# Patient Record
Sex: MALE | Race: WHITE | Employment: OTHER | URBAN - NONMETROPOLITAN AREA
[De-identification: names, ages, dates, MRNs, and addresses within clinical notes are randomized per-mention and may not be internally consistent; named-entity substitution may affect disease eponyms.]

---

## 2017-01-02 DIAGNOSIS — Z94.4 LIVER REPLACED BY TRANSPLANT (H): Primary | ICD-10-CM

## 2017-01-02 DIAGNOSIS — Z13.220 LIPID SCREENING: ICD-10-CM

## 2017-01-02 RX ORDER — LOSARTAN POTASSIUM 50 MG/1
TABLET ORAL
Qty: 90 TABLET | Refills: 1 | Status: SHIPPED | OUTPATIENT
Start: 2017-01-02 | End: 2018-07-05 | Stop reason: DRUGHIGH

## 2017-01-06 ENCOUNTER — COMMUNICATION - GICH (OUTPATIENT)
Dept: INTERNAL MEDICINE | Facility: OTHER | Age: 67
End: 2017-01-06

## 2017-01-06 DIAGNOSIS — J44.9 CHRONIC OBSTRUCTIVE PULMONARY DISEASE (H): ICD-10-CM

## 2017-01-25 ENCOUNTER — DOCUMENTATION ONLY (OUTPATIENT)
Dept: TRANSPLANT | Facility: CLINIC | Age: 67
End: 2017-01-25

## 2017-01-25 NOTE — PROGRESS NOTES
Annual chart review completed.    Pt doing labs at regular advised interval.  [facility] within the past year.

## 2017-02-17 ENCOUNTER — AMBULATORY - GICH (OUTPATIENT)
Dept: LAB | Facility: OTHER | Age: 67
End: 2017-02-17

## 2017-02-17 DIAGNOSIS — Z94.4 HISTORY OF LIVER TRANSPLANT (H): ICD-10-CM

## 2017-02-17 DIAGNOSIS — Z94.4 LIVER REPLACED BY TRANSPLANT (H): ICD-10-CM

## 2017-02-17 DIAGNOSIS — Z79.899 OTHER LONG TERM (CURRENT) DRUG THERAPY: ICD-10-CM

## 2017-02-17 LAB
A/G RATIO - HISTORICAL: 1.4 (ref 1–2)
ALBUMIN SERPL-MCNC: 3.9 G/DL (ref 3.5–5.7)
ALP SERPL-CCNC: 100 IU/L (ref 34–104)
ALT (SGPT) - HISTORICAL: 16 IU/L (ref 7–52)
ANION GAP - HISTORICAL: 8 (ref 5–18)
AST SERPL-CCNC: 14 IU/L (ref 13–39)
BACTERIA URINE: NORMAL BACTERIA/HPF
BILIRUB SERPL-MCNC: 0.7 MG/DL (ref 0.3–1)
BILIRUB UR QL: NEGATIVE
BILIRUBIN, DIRECT: 0.14 MG/DL (ref 0.03–0.18)
BILIRUBIN,INDIRECT: 0.6 MG/DL (ref 0.2–0.8)
BUN SERPL-MCNC: 27 MG/DL (ref 7–25)
BUN/CREAT RATIO - HISTORICAL: 22
CALCIUM SERPL-MCNC: 9 MG/DL (ref 8.6–10.3)
CHLORIDE SERPLBLD-SCNC: 105 MMOL/L (ref 98–107)
CHOL/HDL RATIO - HISTORICAL: 3.82
CHOLESTEROL TOTAL: 126 MG/DL
CLARITY, URINE: CLEAR CLARITY
CO2 SERPL-SCNC: 24 MMOL/L (ref 21–31)
COLOR UR: YELLOW COLOR
CREAT SERPL-MCNC: 1.21 MG/DL (ref 0.7–1.3)
EPITHELIAL CELLS: NORMAL EPI/HPF
ERYTHROCYTE [DISTWIDTH] IN BLOOD BY AUTOMATED COUNT: 13.9 % (ref 11.5–15.5)
GFR IF NOT AFRICAN AMERICAN - HISTORICAL: 60 ML/MIN/1.73M2
GLOBULIN - HISTORICAL: 2.7 G/DL (ref 2–3.7)
GLUCOSE SERPL-MCNC: 169 MG/DL (ref 70–105)
GLUCOSE URINE: NEGATIVE MG/DL
HCT VFR BLD AUTO: 44.3 % (ref 37–53)
HDLC SERPL-MCNC: 33 MG/DL (ref 23–92)
HEMOGLOBIN: 14.6 G/DL (ref 13.5–17.5)
KETONES UR QL: NEGATIVE MG/DL
LDLC SERPL CALC-MCNC: 64 MG/DL
LEUKOCYTE ESTERASE URINE: NEGATIVE
MAGNESIUM SERPL-MCNC: 1.2 MG/DL (ref 1.9–2.7)
MCH RBC QN AUTO: 32.3 PG (ref 26–34)
MCHC RBC AUTO-ENTMCNC: 33 G/DL (ref 32–36)
MCV RBC AUTO: 98 FL (ref 80–100)
NITRITE UR QL STRIP: NEGATIVE
NON-HDL CHOLESTEROL - HISTORICAL: 93 MG/DL
OCCULT BLOOD,URINE - HISTORICAL: ABNORMAL
PATIENT STATUS - HISTORICAL: NORMAL
PH UR: 6 [PH]
PHOSPHATE SERPL-MCNC: 2.5 MG/DL (ref 2.5–5)
PLATELET # BLD AUTO: 84 THOU/CU MM (ref 140–440)
PMV BLD: 9 FL (ref 6.5–11)
POTASSIUM SERPL-SCNC: 5 MMOL/L (ref 3.5–5.1)
PROT SERPL-MCNC: 6.6 G/DL (ref 6.4–8.9)
PROTEIN QUALITATIVE,URINE - HISTORICAL: 30 MG/DL
PROTEIN QUANT,RAND URINE - HISTORICAL: 47 MG/DL (ref 1–14)
RBC - HISTORICAL: NORMAL /HPF
RED BLOOD COUNT - HISTORICAL: 4.53 MIL/CU MM (ref 4.3–5.9)
SODIUM SERPL-SCNC: 137 MMOL/L (ref 133–143)
SP GR UR STRIP: 1.02
TRIGL SERPL-MCNC: 143 MG/DL
UROBILINOGEN,QUALITATIVE - HISTORICAL: NORMAL EU/DL
WBC - HISTORICAL: NORMAL /HPF
WHITE BLOOD COUNT - HISTORICAL: 5.9 THOU/CU MM (ref 4.5–11)

## 2017-02-17 PROCEDURE — 80197 ASSAY OF TACROLIMUS: CPT | Performed by: INTERNAL MEDICINE

## 2017-02-22 ENCOUNTER — COMMUNICATION - GICH (OUTPATIENT)
Dept: PEDIATRICS | Facility: OTHER | Age: 67
End: 2017-02-22

## 2017-02-25 LAB
TACROLIMUS BLD-MCNC: 12.4 UG/L (ref 5–15)
TME LAST DOSE: NORMAL H

## 2017-02-27 DIAGNOSIS — Z94.4 LIVER REPLACED BY TRANSPLANT (H): ICD-10-CM

## 2017-02-27 NOTE — TELEPHONE ENCOUNTER
Prograf level 12.4, drawn at noon.    Please call Bill, remind him that prograf level needs to be a trough level.  If this is a trough level, ask him to decrease his dose to 1 mg po bid and repeat level in a week.

## 2017-03-03 RX ORDER — TACROLIMUS 1 MG/1
1 CAPSULE ORAL EVERY 12 HOURS
Qty: 60 CAPSULE | Refills: 11 | Status: SHIPPED | OUTPATIENT
Start: 2017-03-03 | End: 2018-03-21

## 2017-03-03 NOTE — TELEPHONE ENCOUNTER
Writer spoke with patient who states he spoke to Kiana after my voice message, it was a 12 hour trough, and he did decrease his dose.  Writer reminded patient to repeat tacrolimus drug level lab, and he said Kiana Palumbo RN Post Liver Transplant Coordinator told him he didn't have to, but patient is more comfortable repeating the lab as he is concerned about the result being correct.

## 2017-03-17 ENCOUNTER — COMMUNICATION - GICH (OUTPATIENT)
Dept: INTERNAL MEDICINE | Facility: OTHER | Age: 67
End: 2017-03-17

## 2017-03-24 ENCOUNTER — AMBULATORY - GICH (OUTPATIENT)
Dept: LAB | Facility: OTHER | Age: 67
End: 2017-03-24

## 2017-03-24 DIAGNOSIS — Z94.4 LIVER REPLACED BY TRANSPLANT (H): ICD-10-CM

## 2017-03-24 PROCEDURE — 80197 ASSAY OF TACROLIMUS: CPT | Performed by: INTERNAL MEDICINE

## 2017-03-25 ENCOUNTER — COMMUNICATION - GICH (OUTPATIENT)
Dept: PEDIATRICS | Facility: OTHER | Age: 67
End: 2017-03-25

## 2017-03-27 LAB
TACROLIMUS BLD-MCNC: 5.2 UG/L (ref 5–15)
TME LAST DOSE: NORMAL H

## 2017-03-30 ENCOUNTER — AMBULATORY - GICH (OUTPATIENT)
Dept: FAMILY MEDICINE | Facility: OTHER | Age: 67
End: 2017-03-30

## 2017-04-11 ENCOUNTER — COMMUNICATION - GICH (OUTPATIENT)
Dept: PEDIATRICS | Facility: OTHER | Age: 67
End: 2017-04-11

## 2017-04-11 ENCOUNTER — OFFICE VISIT - GICH (OUTPATIENT)
Dept: PEDIATRICS | Facility: OTHER | Age: 67
End: 2017-04-11

## 2017-04-11 ENCOUNTER — HISTORY (OUTPATIENT)
Dept: PEDIATRICS | Facility: OTHER | Age: 67
End: 2017-04-11

## 2017-04-11 DIAGNOSIS — E66.09 OTHER OBESITY DUE TO EXCESS CALORIES: ICD-10-CM

## 2017-04-11 DIAGNOSIS — Z94.4 HISTORY OF LIVER TRANSPLANT (H): ICD-10-CM

## 2017-04-11 DIAGNOSIS — N18.30 CHRONIC KIDNEY DISEASE, STAGE III (MODERATE) (H): ICD-10-CM

## 2017-04-11 DIAGNOSIS — E11.8 TYPE 2 DIABETES MELLITUS WITH COMPLICATIONS (H): ICD-10-CM

## 2017-04-11 DIAGNOSIS — I10 ESSENTIAL (PRIMARY) HYPERTENSION: ICD-10-CM

## 2017-04-11 DIAGNOSIS — L40.9 PSORIASIS: ICD-10-CM

## 2017-04-11 DIAGNOSIS — D89.9 DISORDER INVOLVING IMMUNE MECHANISM (H): ICD-10-CM

## 2017-04-11 DIAGNOSIS — Z96.642 PRESENCE OF LEFT ARTIFICIAL HIP JOINT: ICD-10-CM

## 2017-04-11 LAB
ANION GAP - HISTORICAL: 2 (ref 5–18)
BUN SERPL-MCNC: 32 MG/DL (ref 7–25)
BUN/CREAT RATIO - HISTORICAL: 24
CALCIUM SERPL-MCNC: 9 MG/DL (ref 8.6–10.3)
CHLORIDE SERPLBLD-SCNC: 105 MMOL/L (ref 98–107)
CO2 SERPL-SCNC: 25 MMOL/L (ref 21–31)
CREAT SERPL-MCNC: 1.32 MG/DL (ref 0.7–1.3)
ESTIMATED AVERAGE GLUCOSE: 143 MG/DL
GFR IF NOT AFRICAN AMERICAN - HISTORICAL: 54 ML/MIN/1.73M2
GLUCOSE SERPL-MCNC: 159 MG/DL (ref 70–105)
HEMOGLOBIN A1C MONITORING (POCT) - HISTORICAL: 6.6 % (ref 4–6.2)
POTASSIUM SERPL-SCNC: 4.9 MMOL/L (ref 3.5–5.1)
SODIUM SERPL-SCNC: 132 MMOL/L (ref 133–143)

## 2017-04-11 ASSESSMENT — ANXIETY QUESTIONNAIRES
3. WORRYING TOO MUCH ABOUT DIFFERENT THINGS: NOT AT ALL
5. BEING SO RESTLESS THAT IT IS HARD TO SIT STILL: NOT AT ALL
GAD7 TOTAL SCORE: 0
6. BECOMING EASILY ANNOYED OR IRRITABLE: NOT AT ALL
4. TROUBLE RELAXING: NOT AT ALL
2. NOT BEING ABLE TO STOP OR CONTROL WORRYING: NOT AT ALL
7. FEELING AFRAID AS IF SOMETHING AWFUL MIGHT HAPPEN: NOT AT ALL
1. FEELING NERVOUS, ANXIOUS, OR ON EDGE: NOT AT ALL

## 2017-04-13 DIAGNOSIS — Z96.642 STATUS POST TOTAL REPLACEMENT OF LEFT HIP: Primary | ICD-10-CM

## 2017-04-17 ENCOUNTER — OFFICE VISIT (OUTPATIENT)
Dept: ORTHOPEDICS | Facility: CLINIC | Age: 67
End: 2017-04-17

## 2017-04-17 ENCOUNTER — AMBULATORY - GICH (OUTPATIENT)
Dept: SCHEDULING | Facility: OTHER | Age: 67
End: 2017-04-17

## 2017-04-17 VITALS — BODY MASS INDEX: 32.75 KG/M2 | WEIGHT: 208.7 LBS | HEIGHT: 67 IN

## 2017-04-17 DIAGNOSIS — S39.012A LOW BACK STRAIN, INITIAL ENCOUNTER: ICD-10-CM

## 2017-04-17 DIAGNOSIS — Z96.642 STATUS POST TOTAL REPLACEMENT OF LEFT HIP: Primary | ICD-10-CM

## 2017-04-17 RX ORDER — HYDROCHLOROTHIAZIDE 25 MG/1
TABLET ORAL
COMMUNITY
Start: 2017-04-11 | End: 2017-12-29

## 2017-04-17 NOTE — NURSING NOTE
"Reason For Visit:   Chief Complaint   Patient presents with     Surgical Followup     1yr F/U S/p Left Total Hip Arthroplasty DOS: 3/18/16     RECHECK     Pt states that a month ago he stepped off a step and he twisted wrong and has had pain ever since.                    Ht 1.689 m (5' 6.5\")  Wt 94.7 kg (208 lb 11.2 oz)  BMI 33.18 kg/m2                   Current Outpatient Prescriptions   Medication     hydrochlorothiazide (HYDRODIURIL) 25 MG tablet     tacrolimus (PROGRAF - GENERIC EQUIVALENT) 1 MG capsule     losartan (COZAAR) 50 MG tablet     magnesium oxide (MAG-OX) 400 MG tablet     calcium-vitamin D (CALTRATE) 600-400 MG-UNIT per tablet     tacrolimus (PROGRAF - GENERIC EQUIVALENT) 0.5 MG capsule     albuterol (PROAIR HFA, PROVENTIL HFA, VENTOLIN HFA) 108 (90 BASE) MCG/ACT inhaler     tiotropium (SPIRIVA HANDIHALER) 18 MCG inhalation capsule     salmeterol (SEREVENT DISKUS) 50 MCG/DOSE diskus inhaler     calcium carbonate-vitamin D 500-400 MG-UNIT TABS tablt     fluocinonide (LIDEX) 0.05 % external solution     desonide (DESOWEN) 0.05 % ointment     fluocinonide (LIDEX) 0.05 % ointment     metoprolol (LOPRESSOR) 100 MG tablet     levothyroxine (SYNTHROID, LEVOTHROID) 125 MCG tablet     omeprazole 20 MG tablet     No current facility-administered medications for this visit.        Allergies   Allergen Reactions     Levaquin [Levofloxacin] Rash     Rash on abdomen, possibly from Levaquin.       Leyda Gtz C.M.A.             "

## 2017-04-17 NOTE — MR AVS SNAPSHOT
After Visit Summary   4/17/2017    Ricki Sharp    MRN: [id number]           Patient Information     Date Of Birth          1950        Visit Information        Provider Department      4/17/2017 8:30 AM Raghu Fisher MD [facility]        Today's Diagnoses     Status post total replacement of left hip    -  1    Low back strain, initial encounter           Follow-ups after your visit        Additional Services     PHYSICAL THERAPY REFERRAL (External-Prints)       Physical Therapy Referral                  Your next 10 appointments already scheduled     Dec 29, 2017 10:45 AM CST   Lab with  LAB   [facility] ([facility])    [address]  1st Floor  [place] [facility] [address] [phone]            Dec 29, 2017 11:45 AM CST   (Arrive by 11:30 AM)   Return Liver Transplant with Mike Gray MD   [facility] ([facility])    [address]  3rd Floor  [place] [facility] [address] [phone]              Who to contact     Please call your clinic at [phone] to:    Ask questions about your health    Make or cancel appointments    Discuss your medicines    Learn about your test results    Speak to your doctor   If you have compliments or concerns about an experience at your clinic, or if you wish to file a complaint, please contact [facility] Patient Relations at [phone] or email us at [email or web address]         Additional Information About Your Visit        MyChart Information     [employer] gives you secure access to your electronic health record. If you see a primary care provider, you can also send messages to your care team and make appointments. If you have questions, please call your primary care clinic.  If you do not have a primary care provider, please call [phone] and they will assist you.      [employer] is an electronic gateway that  "provides easy, online access to your medical records. With [employer], you can request a clinic appointment, read your test results, renew a prescription or communicate with your care team.     To access your existing account, please contact your [facility] or call [phone] for assistance.        Care EveryWhere ID     This is your Care EveryWhere ID. This could be used by other organizations to access your [facility] medical records  [id number]        Your Vitals Were     Height BMI (Body Mass Index)                1.689 m (5' 6.5\") 33.18 kg/m2           Blood Pressure from Last 3 Encounters:   12/19/16 135/84   06/27/16 134/83   04/18/16 145/81    Weight from Last 3 Encounters:   04/17/17 94.7 kg (208 lb 11.2 oz)   12/19/16 93.9 kg (207 lb)   06/27/16 95.3 kg (210 lb)              We Performed the Following     PHYSICAL THERAPY REFERRAL (External-Prints)          Today's Medication Changes          These changes are accurate as of: 4/17/17 11:59 PM.  If you have any questions, ask your nurse or doctor.               These medicines have changed or have updated prescriptions.        Dose/Directions    desonide 0.05 % ointment   Commonly known as:  DESOWEN   This may have changed:    - how to take this  - additional instructions   Used for:  Psoriasis        Use on the face daily   Quantity:  60 g   Refills:  1       * fluocinonide 0.05 % solution   Commonly known as:  LIDEX   This may have changed:  additional instructions   Used for:  Psoriasis        Use to the scalp twice a day   Quantity:  60 mL   Refills:  3       * fluocinonide 0.05 % ointment   Commonly known as:  LIDEX   This may have changed:    - how to take this  - additional instructions   Used for:  Psoriasis        Use on the body twice a day to red scaly areas   Quantity:  60 g   Refills:  3       levothyroxine 125 MCG tablet   Commonly known as:  SYNTHROID/LEVOTHROID   This may have changed:  additional instructions "   Used for:  Liver replaced by transplant (H)        Dose:  125 mcg   Take 1 tablet (125 mcg) by mouth daily   Quantity:  30 tablet   Refills:  4       tiotropium 18 MCG capsule   Commonly known as:  SPIRIVA HANDIHALER   This may have changed:    - how to take this  - when to take this  - additional instructions   Used for:  Chronic obstructive pulmonary disease, unspecified COPD type (H)        Inhale contents of one capsule daily.   Quantity:  30 capsule   Refills:  6       * Notice:  This list has 2 medication(s) that are the same as other medications prescribed for you. Read the directions carefully, and ask your doctor or other care provider to review them with you.             Primary Care Provider Office Phone # Fax #    Jarad Dionicio Bocanegra [phone]       [facility] [address] [facility] [address]        Thank you!     Thank you for choosing [facility]  for your care. Our goal is always to provide you with excellent care. Hearing back from our patients is one way we can continue to improve our services. Please take a few minutes to complete the written survey that you may receive in the mail after your visit with us. Thank you!             Your Updated Medication List - Protect others around you: Learn how to safely use, store and throw away your medicines at www.disposemymeds.org.          This list is accurate as of: 4/17/17 11:59 PM.  Always use your most recent med list.                   Brand Name Dispense Instructions for use    albuterol 108 (90 BASE) MCG/ACT Inhaler    PROAIR HFA/PROVENTIL HFA/VENTOLIN HFA    3 Inhaler    Inhale 2 puffs into the lungs every 4 hours as needed for shortness of breath / dyspnea or wheezing       * calcium carbonate-vitamin D 500-400 MG-UNIT Tabs per tablet     180 tablet    Take 1 tablet by mouth 2 times daily       * calcium-vitamin D 600-400 MG-UNIT per tablet    CALTRATE     Take 1 tablet by mouth        desonide 0.05 % ointment    DESOWEN    60 g    Use on the face daily       * fluocinonide 0.05 % solution    LIDEX    60 mL    Use to the scalp twice a day       * fluocinonide 0.05 % ointment    LIDEX    60 g    Use on the body twice a day to red scaly areas       hydrochlorothiazide 25 MG tablet    HYDRODIURIL     1/2 tablet daily for 4 days then 1 tablet daily       levothyroxine 125 MCG tablet    SYNTHROID/LEVOTHROID    30 tablet    Take 1 tablet (125 mcg) by mouth daily       losartan 50 MG tablet    COZAAR    90 tablet    TAKE ONE TABLET BY MOUTH EVERY DAY       magnesium oxide 400 MG tablet    MAG-OX    120 tablet    Take 2 tablets (800 mg) by mouth 2 times daily       metoprolol 100 MG tablet    LOPRESSOR    60 tablet    Take 1 tablet (100 mg) by mouth 2 times daily       omeprazole 20 MG tablet     30 tablet    Take 1 tablet (20 mg) by mouth daily       salmeterol 50 MCG/DOSE diskus inhaler    SEREVENT DISKUS    3 Inhaler    Inhale 1 puff (50 mcg) into the lungs 2 times daily       * tacrolimus 0.5 MG capsule    PROGRAF - GENERIC EQUIVALENT    60 capsule    TAKE ONE CAPSULE IN THE EVENING (MORNING DOSE 1 MG AND EVENING DOSE 1.5 MG)       * tacrolimus 1 MG capsule    PROGRAF - GENERIC EQUIVALENT    60 capsule    Take 1 capsule (1 mg) by mouth every 12 hours       tiotropium 18 MCG capsule    SPIRIVA HANDIHALER    30 capsule    Inhale contents of one capsule daily.       * Notice:  This list has 6 medication(s) that are the same as other medications prescribed for you. Read the directions carefully, and ask your doctor or other care provider to review them with you.

## 2017-04-17 NOTE — PROGRESS NOTES
DX:  1. R Hip OA  2. L hip OA  3. Chronic Hep C  4. Liver cirrhosis  5. Monomorphic large cell lymphoma/PTLD   6. Portal vein thrombosis  7. Thrombocytopenia - plt 102 in June 2015    TREATMENTS:  1. 3/17/2006, S/P R EVELIN ([facility]) [facility]  2. 2007, joycelyn transplant 2007  3. S/P chemotherapy for lymphoma  4. Portal vein stent placed in Nov 2014  5. $ cycles of rituximab, 4 cycles of R-CHOP - now in complete remission  6. Therapy for hep C with no residual viral load  7. 3/18/2016, L EVELIN ([facility]) [facility]    HISTORY OF PRESENT ILLNESS:  1 mo ago, stepped off stair.  Fell onto R knee.  Having L sided low back and hip pain.  Persists after the injury.  No sx's prior to fall. No radicular sx's.       PHYSICAL EXAMINATION:  Tenderness L side paraspinal mm.  Min tenderness over troch.  He is able to fully weightbear on the affected side.     Neuro exam: no focal weakness.  Full pain free ROM both hips.  Gait: slight antalgic gait in L.      X-rays are obtained demonstrating implant in stable position without evidence of complication.  Leg lengths are symmetric.  Prior non displaced troch tip fx has displaced slightly with fibrous union.     IMPRESSION:    1. 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  No intervention advised at present.  Unclear if aggravated by his recent fall.  I don't think this is the cause of his symptoms that he described today.  2. L spine paraspinal or gluteal m strain.  PT Rx given.  Massage and exercise advised.  NSAID's as allowable given his liver transplant.     PLAN:   1. Aquatic exercise regimen advised.  2. PT Rx given.  3. RTC in 1 yr or sooner as needed if sx's persist or progress.    MD Kenan Walsh Family Professor  Oncology and Adult Reconstructive Surgery  Dept Orthopaedic Surgery, [facility]  [phone] office, [phone] pager  [email or web address]    Total Time = 15 min, 50% of which was spent in counseling and coordination of care as documented above.

## 2017-04-17 NOTE — LETTER
4/17/2017       RE: Ricki Sharp  [address] [place] [facility] [address]     Dear Colleague,    Thank you for referring your patient, Ricki Sharp, to the [facility] at [facility]. Please see a copy of my visit note below.      DX:  1. R Hip OA  2. L hip OA  3. Chronic Hep C  4. Liver cirrhosis  5. Monomorphic large cell lymphoma/PTLD   6. Portal vein thrombosis  7. Thrombocytopenia - plt 102 in June 2015    TREATMENTS:  1. 3/17/2006, S/P R EVELIN ([facility]) [facility]  2. 2007, joycelyn transplant 2007  3. S/P chemotherapy for lymphoma  4. Portal vein stent placed in Nov 2014  5. $ cycles of rituximab, 4 cycles of R-CHOP - now in complete remission  6. Therapy for hep C with no residual viral load  7. 3/18/2016, L EVELIN ([facility]ill[facility]) [facility]    HISTORY OF PRESENT ILLNESS:  1 mo ago, stepped off stair.  Fell onto R knee.  Having L sided low back and hip pain.  Persists after the injury.  No sx's prior to fall. No radicular sx's.       PHYSICAL EXAMINATION:  Tenderness L side paraspinal mm.  Min tenderness over troch.  He is able to fully weightbear on the affected side.     Neuro exam: no focal weakness.  Full pain free ROM both hips.  Gait: slight antalgic gait in L.      X-rays are obtained demonstrating implant in stable position without evidence of complication.  Leg lengths are symmetric.  Prior non displaced troch tip fx has displaced slightly with fibrous union.     IMPRESSION:    1. 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  No intervention advised at present.  Unclear if aggravated by his recent fall.  I don't think this is the cause of his symptoms that he described today.  2. L spine paraspinal or gluteal m strain.  PT Rx given.  Massage and exercise advised.  NSAID's as allowable given his liver transplant.     PLAN:   1. Aquatic exercise regimen advised.  2. PT Rx given.  3. RTC in 1 yr or sooner as needed if sx's persist or progress.    Raghu Fisher,  MD Grayson Family Professor  Oncology and Adult Reconstructive Surgery  Dept Orthopaedic Surgery, [facility]  [phone] office, [phone] pager  [email or web address]    Total Time = 15 min, 50% of which was spent in counseling and coordination of care as documented above.

## 2017-04-19 ENCOUNTER — TELEPHONE (OUTPATIENT)
Dept: ORTHOPEDICS | Facility: CLINIC | Age: 67
End: 2017-04-19

## 2017-04-19 NOTE — TELEPHONE ENCOUNTER
Reached pt's wife today and answered all her questions.  Explained that the small troch fx should not affect his overall prognosis and I don't think his symptoms are related to this incidental finding on the XR.  I asked her to call me again if further concerns or issues arise.    MD Kenan Walsh Family Professor  Oncology and Adult Reconstructive Surgery  Dept Orthopaedic Surgery, [facility]  [phone] office, [phone] pager  [email or web address]

## 2017-04-19 NOTE — TELEPHONE ENCOUNTER
Pt's wife Fide called yesterday and I returned call to 3 different telephone #'s and left voice message at 2 #'s that had voice mail.  Katia will forward copy of my clinic note and the XR's that they had requested.    MD Kenan Walsh Family Professor  Oncology and Adult Reconstructive Surgery  Dept Orthopaedic Surgery, [facility]  [phone] office, [phone] pager  [email or web address]

## 2017-04-25 ENCOUNTER — OFFICE VISIT - GICH (OUTPATIENT)
Dept: PEDIATRICS | Facility: OTHER | Age: 67
End: 2017-04-25

## 2017-04-25 ENCOUNTER — HISTORY (OUTPATIENT)
Dept: PEDIATRICS | Facility: OTHER | Age: 67
End: 2017-04-25

## 2017-04-25 DIAGNOSIS — E66.09 OTHER OBESITY DUE TO EXCESS CALORIES: ICD-10-CM

## 2017-04-25 DIAGNOSIS — N18.30 CHRONIC KIDNEY DISEASE, STAGE III (MODERATE) (H): ICD-10-CM

## 2017-04-25 DIAGNOSIS — I10 ESSENTIAL (PRIMARY) HYPERTENSION: ICD-10-CM

## 2017-04-25 DIAGNOSIS — Z94.4 HISTORY OF LIVER TRANSPLANT (H): ICD-10-CM

## 2017-05-01 ENCOUNTER — COMMUNICATION - GICH (OUTPATIENT)
Dept: PEDIATRICS | Facility: OTHER | Age: 67
End: 2017-05-01

## 2017-05-01 DIAGNOSIS — J44.9 CHRONIC OBSTRUCTIVE PULMONARY DISEASE (H): ICD-10-CM

## 2017-05-01 DIAGNOSIS — J42 CHRONIC BRONCHITIS (H): ICD-10-CM

## 2017-05-18 ENCOUNTER — COMMUNICATION - GICH (OUTPATIENT)
Dept: PEDIATRICS | Facility: OTHER | Age: 67
End: 2017-05-18

## 2017-05-23 ENCOUNTER — COMMUNICATION - GICH (OUTPATIENT)
Dept: INTERNAL MEDICINE | Facility: OTHER | Age: 67
End: 2017-05-23

## 2017-06-22 ENCOUNTER — OFFICE VISIT - GICH (OUTPATIENT)
Dept: PEDIATRICS | Facility: OTHER | Age: 67
End: 2017-06-22

## 2017-06-22 ENCOUNTER — AMBULATORY - GICH (OUTPATIENT)
Dept: LAB | Facility: OTHER | Age: 67
End: 2017-06-22

## 2017-06-22 ENCOUNTER — HISTORY (OUTPATIENT)
Dept: PEDIATRICS | Facility: OTHER | Age: 67
End: 2017-06-22

## 2017-06-22 DIAGNOSIS — I10 ESSENTIAL (PRIMARY) HYPERTENSION: ICD-10-CM

## 2017-06-22 DIAGNOSIS — Z94.4 HISTORY OF LIVER TRANSPLANT (H): ICD-10-CM

## 2017-06-22 DIAGNOSIS — Z79.899 OTHER LONG TERM (CURRENT) DRUG THERAPY: ICD-10-CM

## 2017-06-22 DIAGNOSIS — N18.30 CHRONIC KIDNEY DISEASE, STAGE III (MODERATE) (H): ICD-10-CM

## 2017-06-22 LAB
A/G RATIO - HISTORICAL: 1.3 (ref 1–2)
ALBUMIN SERPL-MCNC: 4 G/DL (ref 3.5–5.7)
ALP SERPL-CCNC: 90 IU/L (ref 34–104)
ALT (SGPT) - HISTORICAL: 15 IU/L (ref 7–52)
ANION GAP - HISTORICAL: 10 (ref 5–18)
AST SERPL-CCNC: 15 IU/L (ref 13–39)
BILIRUB SERPL-MCNC: 1.3 MG/DL (ref 0.3–1)
BILIRUBIN, DIRECT: 0.23 MG/DL (ref 0.03–0.18)
BILIRUBIN,INDIRECT: 1.1 MG/DL (ref 0.2–0.8)
BUN SERPL-MCNC: 32 MG/DL (ref 7–25)
BUN/CREAT RATIO - HISTORICAL: 24
CALCIUM SERPL-MCNC: 9.3 MG/DL (ref 8.6–10.3)
CHLORIDE SERPLBLD-SCNC: 103 MMOL/L (ref 98–107)
CO2 SERPL-SCNC: 23 MMOL/L (ref 21–31)
CREAT SERPL-MCNC: 1.32 MG/DL (ref 0.7–1.3)
ERYTHROCYTE [DISTWIDTH] IN BLOOD BY AUTOMATED COUNT: 15.4 % (ref 11.5–15.5)
GFR IF NOT AFRICAN AMERICAN - HISTORICAL: 54 ML/MIN/1.73M2
GLOBULIN - HISTORICAL: 3 G/DL (ref 2–3.7)
GLUCOSE SERPL-MCNC: 152 MG/DL (ref 70–105)
HCT VFR BLD AUTO: 40.3 % (ref 37–53)
HEMOGLOBIN: 13.9 G/DL (ref 13.5–17.5)
MAGNESIUM SERPL-MCNC: 1.5 MG/DL (ref 1.9–2.7)
MCH RBC QN AUTO: 33.4 PG (ref 26–34)
MCHC RBC AUTO-ENTMCNC: 34.5 G/DL (ref 32–36)
MCV RBC AUTO: 97 FL (ref 80–100)
PHOSPHATE SERPL-MCNC: 3.2 MG/DL (ref 2.5–5)
PLATELET # BLD AUTO: 77 THOU/CU MM (ref 140–440)
PMV BLD: 10.8 FL (ref 6.5–11)
POTASSIUM SERPL-SCNC: 4.6 MMOL/L (ref 3.5–5.1)
PROT SERPL-MCNC: 7 G/DL (ref 6.4–8.9)
RED BLOOD COUNT - HISTORICAL: 4.16 MIL/CU MM (ref 4.3–5.9)
SODIUM SERPL-SCNC: 136 MMOL/L (ref 133–143)
WHITE BLOOD COUNT - HISTORICAL: 7.3 THOU/CU MM (ref 4.5–11)

## 2017-06-22 ASSESSMENT — ANXIETY QUESTIONNAIRES
6. BECOMING EASILY ANNOYED OR IRRITABLE: NOT AT ALL
GAD7 TOTAL SCORE: 0
4. TROUBLE RELAXING: NOT AT ALL
7. FEELING AFRAID AS IF SOMETHING AWFUL MIGHT HAPPEN: NOT AT ALL
5. BEING SO RESTLESS THAT IT IS HARD TO SIT STILL: NOT AT ALL
2. NOT BEING ABLE TO STOP OR CONTROL WORRYING: NOT AT ALL
3. WORRYING TOO MUCH ABOUT DIFFERENT THINGS: NOT AT ALL
1. FEELING NERVOUS, ANXIOUS, OR ON EDGE: NOT AT ALL

## 2017-06-22 ASSESSMENT — PATIENT HEALTH QUESTIONNAIRE - PHQ9: SUM OF ALL RESPONSES TO PHQ QUESTIONS 1-9: 0

## 2017-06-23 ENCOUNTER — AMBULATORY - GICH (OUTPATIENT)
Dept: LAB | Facility: OTHER | Age: 67
End: 2017-06-23

## 2017-06-26 ENCOUNTER — AMBULATORY - GICH (OUTPATIENT)
Dept: LAB | Facility: OTHER | Age: 67
End: 2017-06-26

## 2017-06-26 DIAGNOSIS — Z94.4 LIVER REPLACED BY TRANSPLANT (H): ICD-10-CM

## 2017-06-26 PROCEDURE — 80197 ASSAY OF TACROLIMUS: CPT | Performed by: INTERNAL MEDICINE

## 2017-06-29 LAB
TACROLIMUS BLD-MCNC: 5.3 UG/L (ref 5–15)
TME LAST DOSE: NORMAL H

## 2017-07-24 ENCOUNTER — COMMUNICATION - GICH (OUTPATIENT)
Dept: INTERNAL MEDICINE | Facility: OTHER | Age: 67
End: 2017-07-24

## 2017-07-24 DIAGNOSIS — E83.42 HYPOMAGNESEMIA: ICD-10-CM

## 2017-08-01 ENCOUNTER — COMMUNICATION - GICH (OUTPATIENT)
Dept: INTERNAL MEDICINE | Facility: OTHER | Age: 67
End: 2017-08-01

## 2017-08-01 DIAGNOSIS — L40.9 PSORIASIS: ICD-10-CM

## 2017-09-27 ENCOUNTER — COMMUNICATION - GICH (OUTPATIENT)
Dept: INTERNAL MEDICINE | Facility: OTHER | Age: 67
End: 2017-09-27

## 2017-09-27 DIAGNOSIS — K76.6 PORTAL HYPERTENSION (H): ICD-10-CM

## 2017-10-16 ENCOUNTER — COMMUNICATION - GICH (OUTPATIENT)
Dept: INTERNAL MEDICINE | Facility: OTHER | Age: 67
End: 2017-10-16

## 2017-10-16 DIAGNOSIS — I10 ESSENTIAL (PRIMARY) HYPERTENSION: ICD-10-CM

## 2017-11-06 ENCOUNTER — COMMUNICATION - GICH (OUTPATIENT)
Dept: INTERNAL MEDICINE | Facility: OTHER | Age: 67
End: 2017-11-06

## 2017-11-09 ENCOUNTER — AMBULATORY - GICH (OUTPATIENT)
Dept: PEDIATRICS | Facility: OTHER | Age: 67
End: 2017-11-09

## 2017-11-09 ENCOUNTER — HISTORY (OUTPATIENT)
Dept: PEDIATRICS | Facility: OTHER | Age: 67
End: 2017-11-09

## 2017-11-09 ENCOUNTER — TRANSFERRED RECORDS (OUTPATIENT)
Dept: HEALTH INFORMATION MANAGEMENT | Facility: CLINIC | Age: 67
End: 2017-11-09

## 2017-11-09 ENCOUNTER — OFFICE VISIT - GICH (OUTPATIENT)
Dept: PEDIATRICS | Facility: OTHER | Age: 67
End: 2017-11-09

## 2017-11-09 ENCOUNTER — AMBULATORY - GICH (OUTPATIENT)
Dept: LAB | Facility: OTHER | Age: 67
End: 2017-11-09

## 2017-11-09 DIAGNOSIS — E03.9 HYPOTHYROIDISM: ICD-10-CM

## 2017-11-09 DIAGNOSIS — I35.0 NONRHEUMATIC AORTIC VALVE STENOSIS: ICD-10-CM

## 2017-11-09 DIAGNOSIS — K22.719 BARRETT'S ESOPHAGUS WITH DYSPLASIA: ICD-10-CM

## 2017-11-09 DIAGNOSIS — Z94.4 HISTORY OF LIVER TRANSPLANT (H): ICD-10-CM

## 2017-11-09 DIAGNOSIS — Z79.899 OTHER LONG TERM (CURRENT) DRUG THERAPY: ICD-10-CM

## 2017-11-09 DIAGNOSIS — K27.9 PEPTIC ULCER WITHOUT HEMORRHAGE OR PERFORATION: ICD-10-CM

## 2017-11-09 DIAGNOSIS — M54.50 LOW BACK PAIN: ICD-10-CM

## 2017-11-09 DIAGNOSIS — Z23 ENCOUNTER FOR IMMUNIZATION: ICD-10-CM

## 2017-11-09 DIAGNOSIS — K76.6 PORTAL HYPERTENSION (H): ICD-10-CM

## 2017-11-09 DIAGNOSIS — N52.9 MALE ERECTILE DYSFUNCTION: ICD-10-CM

## 2017-11-09 DIAGNOSIS — G89.29 OTHER CHRONIC PAIN: ICD-10-CM

## 2017-11-09 DIAGNOSIS — J42 CHRONIC BRONCHITIS (H): ICD-10-CM

## 2017-11-09 DIAGNOSIS — E11.8 TYPE 2 DIABETES MELLITUS WITH COMPLICATIONS (H): ICD-10-CM

## 2017-11-09 DIAGNOSIS — I10 ESSENTIAL (PRIMARY) HYPERTENSION: ICD-10-CM

## 2017-11-09 DIAGNOSIS — L30.9 DERMATITIS: ICD-10-CM

## 2017-11-09 LAB
A/G RATIO - HISTORICAL: 1.4 (ref 1–2)
ALBUMIN SERPL-MCNC: 4.1 G/DL (ref 3.5–5.7)
ALP SERPL-CCNC: 80 IU/L (ref 34–104)
ALT (SGPT) - HISTORICAL: 13 IU/L (ref 7–52)
ANION GAP - HISTORICAL: 7 (ref 5–18)
AST SERPL-CCNC: 13 IU/L (ref 13–39)
BILIRUB SERPL-MCNC: 1.3 MG/DL (ref 0.3–1)
BILIRUBIN, DIRECT: 0.2 MG/DL (ref 0.03–0.18)
BILIRUBIN,INDIRECT: 1.1 MG/DL (ref 0.2–0.8)
BUN SERPL-MCNC: 31 MG/DL (ref 7–25)
BUN/CREAT RATIO - HISTORICAL: 25
CALCIUM SERPL-MCNC: 9.3 MG/DL (ref 8.6–10.3)
CHLORIDE SERPLBLD-SCNC: 103 MMOL/L (ref 98–107)
CO2 SERPL-SCNC: 25 MMOL/L (ref 21–31)
CREAT SERPL-MCNC: 1.26 MG/DL (ref 0.7–1.3)
ERYTHROCYTE [DISTWIDTH] IN BLOOD BY AUTOMATED COUNT: 15.6 % (ref 11.5–15.5)
ESTIMATED AVERAGE GLUCOSE: 134 MG/DL
GFR IF NOT AFRICAN AMERICAN - HISTORICAL: 57 ML/MIN/1.73M2
GLOBULIN - HISTORICAL: 2.9 G/DL (ref 2–3.7)
GLUCOSE SERPL-MCNC: 170 MG/DL (ref 70–105)
HCT VFR BLD AUTO: 42.9 % (ref 37–53)
HEMOGLOBIN A1C MONITORING (POCT) - HISTORICAL: 6.3 % (ref 4–6.2)
HEMOGLOBIN: 14.6 G/DL (ref 13.5–17.5)
MAGNESIUM SERPL-MCNC: 1.5 MG/DL (ref 1.9–2.7)
MCH RBC QN AUTO: 32.6 PG (ref 26–34)
MCHC RBC AUTO-ENTMCNC: 34 G/DL (ref 32–36)
MCV RBC AUTO: 96 FL (ref 80–100)
PHOSPHATE SERPL-MCNC: 2.9 MG/DL (ref 2.5–5)
PLATELET # BLD AUTO: 69 THOU/CU MM (ref 140–440)
PMV BLD: 10.3 FL (ref 6.5–11)
POTASSIUM SERPL-SCNC: 4.9 MMOL/L (ref 3.5–5.1)
PROT SERPL-MCNC: 7 G/DL (ref 6.4–8.9)
RED BLOOD COUNT - HISTORICAL: 4.48 MIL/CU MM (ref 4.3–5.9)
SODIUM SERPL-SCNC: 135 MMOL/L (ref 133–143)
TSH - HISTORICAL: 8.51 UIU/ML (ref 0.34–5.6)
WHITE BLOOD COUNT - HISTORICAL: 7.1 THOU/CU MM (ref 4.5–11)

## 2017-11-09 ASSESSMENT — ANXIETY QUESTIONNAIRES
4. TROUBLE RELAXING: NOT AT ALL
6. BECOMING EASILY ANNOYED OR IRRITABLE: NOT AT ALL
7. FEELING AFRAID AS IF SOMETHING AWFUL MIGHT HAPPEN: NOT AT ALL
GAD7 TOTAL SCORE: 0
1. FEELING NERVOUS, ANXIOUS, OR ON EDGE: NOT AT ALL
5. BEING SO RESTLESS THAT IT IS HARD TO SIT STILL: NOT AT ALL
3. WORRYING TOO MUCH ABOUT DIFFERENT THINGS: NOT AT ALL
2. NOT BEING ABLE TO STOP OR CONTROL WORRYING: NOT AT ALL

## 2017-11-09 ASSESSMENT — PATIENT HEALTH QUESTIONNAIRE - PHQ9: SUM OF ALL RESPONSES TO PHQ QUESTIONS 1-9: 0

## 2017-11-14 ENCOUNTER — HOSPITAL ENCOUNTER (OUTPATIENT)
Dept: RADIOLOGY | Facility: OTHER | Age: 67
End: 2017-11-14
Attending: INTERNAL MEDICINE

## 2017-11-14 ENCOUNTER — COMMUNICATION - GICH (OUTPATIENT)
Dept: PEDIATRICS | Facility: OTHER | Age: 67
End: 2017-11-14

## 2017-11-14 ENCOUNTER — TRANSFERRED RECORDS (OUTPATIENT)
Dept: HEALTH INFORMATION MANAGEMENT | Facility: CLINIC | Age: 67
End: 2017-11-14

## 2017-11-14 DIAGNOSIS — M54.50 LOW BACK PAIN: ICD-10-CM

## 2017-11-14 DIAGNOSIS — G89.29 OTHER CHRONIC PAIN: ICD-10-CM

## 2017-11-14 DIAGNOSIS — M51.9 THORACIC, THORACOLUMBAR AND LUMBOSACRAL INTERVERTEBRAL DISC DISORDER: ICD-10-CM

## 2017-11-14 DIAGNOSIS — M99.83 OTHER BIOMECHANICAL LESIONS OF LUMBAR REGION (CODE): ICD-10-CM

## 2017-11-14 DIAGNOSIS — M12.88 OTHER SPECIFIC ARTHROPATHIES, NOT ELSEWHERE CLASSIFIED, OTHER SPECIFIED SITE: ICD-10-CM

## 2017-11-20 ENCOUNTER — TELEPHONE (OUTPATIENT)
Dept: TRANSPLANT | Facility: CLINIC | Age: 67
End: 2017-11-20

## 2017-11-20 NOTE — TELEPHONE ENCOUNTER
Please call patient's wife Fide. Needed to confirm appt dates and touch base with you. Wanted to speak with you first before speaking with scheduling.

## 2017-11-22 ENCOUNTER — TELEPHONE (OUTPATIENT)
Dept: TRANSPLANT | Facility: CLINIC | Age: 67
End: 2017-11-22

## 2017-11-22 NOTE — TELEPHONE ENCOUNTER
Call to bill - we haven't been getting lab results.  Says he has been getting them done, he will call to get them sent to us.

## 2017-11-30 ENCOUNTER — HISTORY (OUTPATIENT)
Dept: PEDIATRICS | Facility: OTHER | Age: 67
End: 2017-11-30

## 2017-11-30 ENCOUNTER — OFFICE VISIT - GICH (OUTPATIENT)
Dept: PEDIATRICS | Facility: OTHER | Age: 67
End: 2017-11-30

## 2017-11-30 ENCOUNTER — TRANSFERRED RECORDS (OUTPATIENT)
Dept: HEALTH INFORMATION MANAGEMENT | Facility: CLINIC | Age: 67
End: 2017-11-30

## 2017-11-30 ENCOUNTER — MEDICAL CORRESPONDENCE (OUTPATIENT)
Dept: HEALTH INFORMATION MANAGEMENT | Facility: CLINIC | Age: 67
End: 2017-11-30

## 2017-11-30 DIAGNOSIS — E66.09 OTHER OBESITY DUE TO EXCESS CALORIES: ICD-10-CM

## 2017-11-30 DIAGNOSIS — M51.16 INTERVERTEBRAL DISC DISORDER WITH RADICULOPATHY OF LUMBAR REGION: ICD-10-CM

## 2017-11-30 DIAGNOSIS — M12.88 OTHER SPECIFIC ARTHROPATHIES, NOT ELSEWHERE CLASSIFIED, OTHER SPECIFIED SITE: ICD-10-CM

## 2017-11-30 ASSESSMENT — ANXIETY QUESTIONNAIRES
GAD7 TOTAL SCORE: 0
5. BEING SO RESTLESS THAT IT IS HARD TO SIT STILL: NOT AT ALL
6. BECOMING EASILY ANNOYED OR IRRITABLE: NOT AT ALL
3. WORRYING TOO MUCH ABOUT DIFFERENT THINGS: NOT AT ALL
7. FEELING AFRAID AS IF SOMETHING AWFUL MIGHT HAPPEN: NOT AT ALL
1. FEELING NERVOUS, ANXIOUS, OR ON EDGE: NOT AT ALL
2. NOT BEING ABLE TO STOP OR CONTROL WORRYING: NOT AT ALL
4. TROUBLE RELAXING: NOT AT ALL

## 2017-11-30 ASSESSMENT — PATIENT HEALTH QUESTIONNAIRE - PHQ9: SUM OF ALL RESPONSES TO PHQ QUESTIONS 1-9: 0

## 2017-12-11 ENCOUNTER — COMMUNICATION - GICH (OUTPATIENT)
Dept: PEDIATRICS | Facility: OTHER | Age: 67
End: 2017-12-11

## 2017-12-16 ENCOUNTER — COMMUNICATION - GICH (OUTPATIENT)
Dept: PEDIATRICS | Facility: OTHER | Age: 67
End: 2017-12-16

## 2017-12-16 DIAGNOSIS — J44.9 CHRONIC OBSTRUCTIVE PULMONARY DISEASE (H): ICD-10-CM

## 2017-12-19 ENCOUNTER — TELEPHONE (OUTPATIENT)
Dept: TRANSPLANT | Facility: CLINIC | Age: 67
End: 2017-12-19

## 2017-12-19 DIAGNOSIS — Z94.4 LIVER REPLACED BY TRANSPLANT (H): Primary | ICD-10-CM

## 2017-12-19 NOTE — LETTER
OUTPATIENT OR TRANSITIONAL CARE  LABORATORY TEST ORDER  [facility] fax: [phone]    Patient Name: Ricki Sharp  Transplant Date: 3/13/2008   YOB: 1950  Issue Date: December 19, 2017   [facility] MR#: [id number]  Expiration Date:   December 19, 2018        Diagnoses: [x]      Liver Transplant (ICD-10 Z94.4)    [x]      Long term use of medications (ICD-10 Z79.899)     Please fax results to [phone]    Frequency: every 2 months and PRN          [x]      CBC with Platelets   [x]      Basic Metabolic Panel (Sodium, Potassium, Chloride, CO2, Creatinine, Urea Nitrogen, Glucose, Calcium)  [x]      Magnesium  [x]      Hepatic panel (Albumin, Alk Phos, ALT, AST, LDH, Direct and Total Bili)  [x]      Tacrolimus drug level  - 12 hour trough, please document time of last dose    Other Frequency: annually around February 2018   [x]      Fasting lipid panel  [x]      Random urine protein  [x]      Urinalysis with microscopic    If you have any questions, please call [facility]- [phone] or [phone],  Fax- [phone].  .

## 2017-12-20 ENCOUNTER — COMMUNICATION - GICH (OUTPATIENT)
Dept: INTERNAL MEDICINE | Facility: OTHER | Age: 67
End: 2017-12-20

## 2017-12-20 DIAGNOSIS — E83.42 HYPOMAGNESEMIA: ICD-10-CM

## 2017-12-21 ENCOUNTER — AMBULATORY - GICH (OUTPATIENT)
Dept: LAB | Facility: OTHER | Age: 67
End: 2017-12-21

## 2017-12-21 DIAGNOSIS — Z94.4 HISTORY OF LIVER TRANSPLANT (H): ICD-10-CM

## 2017-12-21 DIAGNOSIS — Z79.899 OTHER LONG TERM (CURRENT) DRUG THERAPY: ICD-10-CM

## 2017-12-21 DIAGNOSIS — Z94.4 LIVER REPLACED BY TRANSPLANT (H): ICD-10-CM

## 2017-12-21 LAB
A/G RATIO - HISTORICAL: 1.9 (ref 1–2)
ALBUMIN SERPL-MCNC: 4.7 G/DL (ref 3.5–5.7)
ALP SERPL-CCNC: 83 IU/L (ref 34–104)
ALT (SGPT) - HISTORICAL: 14 IU/L (ref 7–52)
ANION GAP - HISTORICAL: 7 (ref 5–18)
AST SERPL-CCNC: 14 IU/L (ref 13–39)
BILIRUB SERPL-MCNC: 1.3 MG/DL (ref 0.3–1)
BILIRUBIN, DIRECT: 0.23 MG/DL (ref 0.03–0.18)
BILIRUBIN,INDIRECT: 1.1 MG/DL (ref 0.2–0.8)
BUN SERPL-MCNC: 28 MG/DL (ref 7–25)
BUN/CREAT RATIO - HISTORICAL: 22
CALCIUM SERPL-MCNC: 9.7 MG/DL (ref 8.6–10.3)
CHLORIDE SERPLBLD-SCNC: 105 MMOL/L (ref 98–107)
CO2 SERPL-SCNC: 23 MMOL/L (ref 21–31)
CREAT SERPL-MCNC: 1.3 MG/DL (ref 0.7–1.3)
ERYTHROCYTE [DISTWIDTH] IN BLOOD BY AUTOMATED COUNT: 15.4 % (ref 11.5–15.5)
GFR IF NOT AFRICAN AMERICAN - HISTORICAL: 55 ML/MIN/1.73M2
GLOBULIN - HISTORICAL: 2.5 G/DL (ref 2–3.7)
GLUCOSE SERPL-MCNC: 229 MG/DL (ref 70–105)
HCT VFR BLD AUTO: 41.4 % (ref 37–53)
HEMOGLOBIN: 14.1 G/DL (ref 13.5–17.5)
MAGNESIUM SERPL-MCNC: 1.6 MG/DL (ref 1.9–2.7)
MCH RBC QN AUTO: 32.6 PG (ref 26–34)
MCHC RBC AUTO-ENTMCNC: 34.1 G/DL (ref 32–36)
MCV RBC AUTO: 96 FL (ref 80–100)
PLATELET # BLD AUTO: 57 THOU/CU MM (ref 140–440)
PMV BLD: 10.8 FL (ref 6.5–11)
POTASSIUM SERPL-SCNC: 5 MMOL/L (ref 3.5–5.1)
PROT SERPL-MCNC: 7.2 G/DL (ref 6.4–8.9)
RED BLOOD COUNT - HISTORICAL: 4.33 MIL/CU MM (ref 4.3–5.9)
SODIUM SERPL-SCNC: 135 MMOL/L (ref 133–143)
WHITE BLOOD COUNT - HISTORICAL: 7 THOU/CU MM (ref 4.5–11)

## 2017-12-21 PROCEDURE — 80197 ASSAY OF TACROLIMUS: CPT | Performed by: INTERNAL MEDICINE

## 2017-12-27 LAB
TACROLIMUS BLD-MCNC: 3.7 UG/L (ref 5–15)
TME LAST DOSE: ABNORMAL H

## 2017-12-27 NOTE — PROGRESS NOTES
Patient Information     Patient Name MRN Sex Ricki Laboy [id number] Male 1950      Progress Notes by Jarad Bocanegra MD at 2017  4:00 PM     Author:  Jarad Bocanegra MD Service:  (none) Author Type:  Physician     Filed:  2017  4:17 PM Encounter Date:  2017 Status:  Signed     :  Jarad Bocanegra MD (Physician)            Subjective  Ricki Sharp is a 67 y.o. male who presents for follow-up blood pressure. His blood pressure has been better lately. At our last visit we started spironolactone, in addition to metoprolol and losartan itch has helped control his hypertension. He thinks it's also been that he's been more active since he had his hip replaced.    Problem List/PMH: reviewed in EMR, and made relevant updates today.  Medications: reviewed in EMR, and made relevant updates today.  Allergies: reviewed in EMR, and made relevant updates today.    Social Hx:  Social History      Substance Use Topics        Smoking status:  Former Smoker     Packs/day: 1.00     Years: 20.00     Types: Cigarettes     Quit date: 10/1/2015     Smokeless tobacco:  Never Used     Alcohol use  No     Social History Narrative     with two children.  Previously worked as a .  On disability secondary to cirrhosis and hepatitis C.      I reviewed social history and made relevant updates today.    Family Hx:   Family History       Problem   Relation Age of Onset     Heart Disease  Father       at 62; myocardial infarction        Alcohol/Drug  Mother       at 65; cirrhosis/heavy alcohol use       Alcohol/Drug  Brother       at 53; cirrhosis/heavy alcohol use       Heart Disease  Other      CAD, MI         Objective  Vitals: reviewed in EMR.  /82  Pulse 66  Wt 91.6 kg (202 lb)  BMI 31.64 kg/m2    Gen: Pleasant male, NAD.  HEENT: MMM  Neck: Supple  Pulm: Breathing easily  Neuro: Grossly intact  Skin: No concerning lesions.  Psychiatric: Normal affect  and insight. Does not appear anxious or depressed.    SODIUM      Date Value Ref Range Status   04/11/2017 132 (L) 133 - 143 mmol/L Final     POTASSIUM      Date Value Ref Range Status   04/11/2017 4.9 3.5 - 5.1 mmol/L Final     CHLORIDE      Date Value Ref Range Status   04/11/2017 105 98 - 107 mmol/L Final     CO2,TOTAL      Date Value Ref Range Status   04/11/2017 25 21 - 31 mmol/L Final     ANION GAP      Date Value Ref Range Status   04/11/2017 2 (L) 5 - 18                 Final     GLUCOSE      Date Value Ref Range Status   04/11/2017 159 (H) 70 - 105 mg/dL Final     BUN      Date Value Ref Range Status   04/11/2017 32 (H) 7 - 25 mg/dL Final     CREATININE      Date Value Ref Range Status   04/11/2017 1.32 (H) 0.70 - 1.30 mg/dL Final     BUN/CREAT RATIO                Date Value Ref Range Status   04/11/2017 24                 Final     CALCIUM      Date Value Ref Range Status   04/11/2017 9.0 8.6 - 10.3 mg/dL Final           Assessment    ICD-10-CM    1. HYPERTENSION I10 BASIC METABOLIC PANEL   2. CKD (chronic kidney disease), stage III N18.3        I'd like to see his kidney function and particularly potassium level. Borderline high normal potassium on previous levels combined with starting a potassium sparing diuretic may have resulted in hyperkalemia. However if potassium level remains normal at appears the spironolactone is helping his hypertension and no change in medications will be made. Strongly encourage lifestyle modification including daily exercise, 5-10% weight loss.    Plan   -- Expected clinical course discussed   -- Medications and their side effects discussed  Patient Instructions    -- No change to BP meds today   -- Daily exercise   -- Work on 5% weight loss   -- Follow-up in 3 months    Return in about 3 months (around 9/22/2017) for Hypertension.    Signed, Jarad Bocanegra MD  Internal Medicine & Pediatrics

## 2017-12-28 NOTE — TELEPHONE ENCOUNTER
Patient Information     Patient Name MRN Ricki Miller [id number] Male 1950      Telephone Encounter by Rosalie Adkins RN at 8/3/2017  8:28 AM     Author:  Rosalie Adkins RN Service:  (none) Author Type:  NURS- Registered Nurse     Filed:  8/3/2017  8:30 AM Encounter Date:  2017 Status:  Signed     :  Rosalie Adkins RN (NURS- Registered Nurse)            High Potency Topical Creams    Office visit in the past 6 months.    Last visit with LEANN DESOUZA was on: 2017 in [facility]  Next visit with LEANN DESOUZA is on: No future appointment listed with this provider  Next visit with Internal Medicine is on: No future appointment listed in this department    Max refill for 6 months from last office visit.  Prescription refilled per RN Medication Refill Policy.................... ROSALIE ADKINS RN ....................  8/3/2017   8:28 AM

## 2017-12-28 NOTE — TELEPHONE ENCOUNTER
Patient Information     Patient Name MRN Sex Ricki Laboy [id number] Male 1950      Telephone Encounter by Jacky Pugh RN at 2017 10:51 AM     Author:  Jacky Pugh RN Service:  (none) Author Type:  NURS- Registered Nurse     Filed:  2017 10:58 AM Encounter Date:  2017 Status:  Signed     :  Jacky Pugh RN (NURS- Registered Nurse)            This is a Refill request from: [facility]  Name of Medication: Mag oxide 400 mg tab  Quantity requested: 120 tabs  Last fill date: Unknown as per rx request  Due for refill: As per rx request and chart review, yes  Last visit with LEANN BOCANEGRA was on: 2017 in [facility]  PCP:  Leann Bocanegra MD  Controlled Substance Agreement:  N/A   Diagnosis r/t this medication request: Unknown    Chart review shows that requested rx was last filled in patient's chart on 13 for a 6 month supply. Chart review also shows that rx request was refused by Dr. Tillman on 17 as rx request wasn't appropriate. Chart review also shows that patient's magnesium was checked on 17 with result of 1.5. Writer is unable to fill rx as requested. Patient was seen by PCP on 17, however magnesium result as noted not noted in office visit notes on that date. Will route rx request to PCP for his consideration/approval.     Unable to complete prescription refill per RN Medication Refill Policy.................... Jacky Pugh RN ....................  2017   10:52 AM

## 2017-12-28 NOTE — PROGRESS NOTES
"Patient Information     Patient Name MRN Sex Ricki Laboy [id number] Male 1950      Progress Notes by Jarad Bocanegra MD at 2017 12:45 PM     Author:  Jarad Bocanegra MD Service:  (none) Author Type:  Physician     Filed:  2017  1:37 PM Encounter Date:  2017 Status:  Signed     :  Jarad Bocanegra MD (Physician)            Subjective  Ricki Sharp is a 67 y.o. male who presents for follow-up MRI. He continues to have pain in the left upper outer buttock that radiates down to the hip. It seems to be worse after specific incident couple of months ago. No foot drop or urinary incontinence. He's tried therapy multiple times in the past and doesn't think it to work.    Problem List/PMH: reviewed in EMR, and made relevant updates today.  Medications: reviewed in EMR, and made relevant updates today.  Allergies: reviewed in EMR, and made relevant updates today.    Social Hx:  Social History      Substance Use Topics        Smoking status:  Former Smoker     Packs/day: 1.00     Years: 20.00     Types: Cigarettes     Quit date: 10/1/2015     Smokeless tobacco:  Never Used     Alcohol use  No     Social History Narrative     with two children.  Previously worked as a .  On disability secondary to cirrhosis and hepatitis C.      I reviewed social history and made relevant updates today.    Family Hx:   Family History       Problem   Relation Age of Onset     Heart Disease  Father       at 62; myocardial infarction        Alcohol/Drug  Mother       at 65; cirrhosis/heavy alcohol use       Alcohol/Drug  Brother       at 53; cirrhosis/heavy alcohol use       Heart Disease  Other      CAD, MI         Objective  Vitals: reviewed in EMR.  /90  Pulse 84  Ht 1.702 m (5' 7\")  Wt 90.7 kg (200 lb)  BMI 31.32 kg/m2    Gen: Pleasant male, NAD.  HEENT: MMM  Neck: Supple  Pulm: Breathing easily  Neuro: Grossly intact  Skin: No concerning " lesions.  Psychiatric: Normal affect and insight. Does not appear anxious or depressed.    Lumbar MRI report dated November 14, 2017 was personally reviewed with the patient today.      Assessment    ICD-10-CM    1. Lumbar disc disease with radiculopathy M51.16 AMB CONSULT TO PHYSICAL MEDICINE REHAB      diclofenac 1 % topical (VOLTAREN) gel   2. Lumbar facet arthropathy M12.88 AMB CONSULT TO PHYSICAL MEDICINE REHAB      diclofenac 1 % topical (VOLTAREN) gel   3. Non morbid obesity due to excess calories E66.09        We had a lengthy discussion with regards to his multiple findings on MRI, etiology of low back pain, expected clinical course, possible treatments.    Plan   -- Expected clinical course discussed   -- Medications and their side effects discussed  Patient Instructions    -- Return to PT   -- PM&R consult   -- Trial of voltaren gel    Your BMI is Body mass index is 31.32 kg/(m^2).  (BMI ranges: Normal 18.5 - 25, Overweight 25 - 30, Obesity greater than 30,     Morbid Obesity greater than 40 or greater than 35 with associated conditions.)    Facts about losing weight:   -- Overweight and Obesity increase your risk for developing diabetes, high blood pressure and stroke, and shorten your life.   -- 90% of weight loss comes from dietary changes, only 10% from exercise    What should I do?   -- Work on 5-10% weight loss   -- Focus on a few healthy dietary changes   -- Eat more fresh fruits and vegetables, and fewer carbohydrates   -- Cut out all calorie-containing beverages (milk, juice, alcohol, etc)   -- Exercise every day   -- Weigh yourself once a week   -- Consider the DASH Diet (http://bit.ly/DASHDiet - redirects to the NIH)   -- Consider Weight Watchers (http://www.weightwatchers.com)   -- Consider My Fitness Pal (iOS, Android, http://www.myfitnesspal.com)          Return if symptoms worsen or fail to improve.    Signed, Jarad Bocanegra MD  Internal Medicine & Pediatrics    Total time 25 minutes, over  half spent counseling and coordinating care regarding low back pain.

## 2017-12-28 NOTE — PROGRESS NOTES
Patient Information     Patient Name MRN Sex Ricki Laboy [id number] Male 1950      Progress Notes by Neena Wang at 2017 12:50 PM     Author:  Neena Wang Service:  (none) Author Type:  Other Clinical Staff     Filed:  2017 12:50 PM Date of Service:  2017 12:50 PM Status:  Signed     :  Neena Wang (Other Clinical Staff)            Falls Risk Criteria:    Age 65 and older or under age 4        Sensory deficits    Poor vision    Use of ambulatory aides    Impaired judgment    Unable to walk independently    Meets High Risk criteria for falls:  Yes               1.  Do you have dizziness or vertigo?    no                    2.  Do you need help standing or walking?   no                 3.  Have you fallen within the last 6 months?    no           4.  Has the patient been fasting?      no       If any risks are marked Yes, the following interventions are utilized:    Do not leave patient unattended     Assist patient in the dressing room and bathroom    Have ambulatory aides available throughout procedure    Involve patient s family if available

## 2017-12-28 NOTE — TELEPHONE ENCOUNTER
Patient Information     Patient Name MRN Ricki Miller [id number] Male 1950      Telephone Encounter by Seda Mcdonough at 2017  1:35 PM     Author:  Seda Mcdonough Service:  (none) Author Type:  (none)     Filed:  2017  1:36 PM Encounter Date:  2017 Status:  Signed     :  Seda Mcdonough            Received call from patient's wife wondering if we had ever received images from [facility].  Could not locate any.  She will call [facility] and have them push the x-rays to us.  Seda Mcdonough LPN ....................  2017   1:36 PM

## 2017-12-28 NOTE — TELEPHONE ENCOUNTER
Patient Information     Patient Name MRN Sex Ricki Laboy [id number] Male 1950      Telephone Encounter by Pauline Jimenez RN at 10/2/2017 10:26 AM     Author:  Pauline Jimenez RN Service:  (none) Author Type:  NURS- Registered Nurse     Filed:  10/2/2017 10:30 AM Encounter Date:  2017 Status:  Signed     :  Pauline Jimenez RN (NURS- Registered Nurse)            Beta Blockers   Metoprolol    Office visit in the past 12 months or per provider note.    Last visit with LEANN DESOUZA was on: 2017 in [facility]  Next visit with LEANN DESOUZA is on: No future appointment listed with this provider  Next visit with Internal Medicine is on: No future appointment listed in this department    Max refill for 12 months from last office visit or per provider note.  Prescription refilled per RN Medication Refill Policy.................... Pauline Jimenez RN ....................  10/2/2017   10:29 AM

## 2017-12-28 NOTE — TELEPHONE ENCOUNTER
Patient Information     Patient Name MRN Sex Ricki Laboy [id number] Male 1950      Telephone Encounter by Jarad Bocanegra MD at 2017  2:57 PM     Author:  Jarad Bocanegra MD Service:  (none) Author Type:  Physician     Filed:  2017  2:57 PM Encounter Date:  2017 Status:  Signed     :  Jarad Bocanegra MD (Physician)            Reviewed MRI report.     -- injection in radiology   -- Return to PT   -- If not improving, would request PM&R consult    Signed, Jarad Bocanegra MD  Internal Medicine & Pediatrics

## 2017-12-28 NOTE — PATIENT INSTRUCTIONS
Patient Information     Patient Name MRN Ricki Miller [id number] Male 1950      Patient Instructions by Jarad Bocanegra MD at 2017  4:00 PM     Author:  Jarad Bocanegra MD Service:  (none) Author Type:  Physician     Filed:  2017  4:14 PM Encounter Date:  2017 Status:  Signed     :  Jarad Bocanegra MD (Physician)             -- No change to BP meds today   -- Daily exercise   -- Work on 5% weight loss   -- Follow-up in 3 months

## 2017-12-28 NOTE — TELEPHONE ENCOUNTER
Patient Information     Patient Name MRN Sex Ricki Laboy [id number] Male 1950      Telephone Encounter by Jocy Estevez RN at 10/19/2017 10:31 AM     Author:  Jocy Estevez RN Service:  (none) Author Type:  NURS- Registered Nurse     Filed:  10/19/2017 10:45 AM Encounter Date:  10/16/2017 Status:  Signed     :  Jocy Estevez RN (NURS- Registered Nurse)            Diuretics (may be prescribed for edema)    Office visit in the past 12 months or per provider note.    Last visit with LEANN DESOUZA was on: 2017 in [place] [facility]  Next visit with LEANN DESOUZA is on: No future appointment listed with this provider  Next visit with Internal Medicine is on: No future appointment listed in this department    Lab testing requirements:  Creatinine and Potassium annually, if ordering lab, order BMP.  CREATININE (mg/dL)    Date Value   2017 1.32 (H)     POTASSIUM (mmol/L)    Date Value   2017 4.6       Review last provider visit note.  If BP reviewed and plan is noted, can refill.  Max refill for 12 months from last office visit or per provider note.    Angiotensin Receptor Blockers (ARB)    Office visit in the past 12 months or per provider note.    Last visit with LEANN DESOUZA was on: 2017 in [facility]  Next visit with LEANN DESOUZA is on: No future appointment listed with this provider  Next visit with Internal Medicine is on: No future appointment listed in this department    Lab test requirements:  Creatinine and Potassium annually, if ordering lab, order BMP.  CREATININE (mg/dL)    Date Value   2017 1.32 (H)     POTASSIUM (mmol/L)    Date Value   2017 4.6       Max refill for 12 months from last office visit or per provider note  Patient is due for medication management appointment. Limited refill provided at this time. [employer] message and/or letter sent for reminder to patient. Prescription refilled per RN Medication Refill  Policy.................... Jocy Estevez RN ....................  10/19/2017   10:44 AM

## 2017-12-28 NOTE — TELEPHONE ENCOUNTER
Patient Information     Patient Name MRN Ricki Miller [id number] Male 1950      Telephone Encounter by Seda Mcdonough at 2017 12:41 PM     Author:  Seda Mcdonough Service:  (none) Author Type:  (none)     Filed:  2017 12:43 PM Encounter Date:  2017 Status:  Signed     :  Seda Mcdonough            Left message to call back  ..................Seda Mcdonough LPN......2017 12:42 PM

## 2017-12-29 ENCOUNTER — OFFICE VISIT (OUTPATIENT)
Dept: GASTROENTEROLOGY | Facility: CLINIC | Age: 67
End: 2017-12-29
Attending: INTERNAL MEDICINE
Payer: MEDICARE

## 2017-12-29 VITALS
TEMPERATURE: 97.8 F | HEIGHT: 67 IN | SYSTOLIC BLOOD PRESSURE: 165 MMHG | HEART RATE: 95 BPM | DIASTOLIC BLOOD PRESSURE: 96 MMHG | BODY MASS INDEX: 31.14 KG/M2 | OXYGEN SATURATION: 96 % | WEIGHT: 198.4 LBS

## 2017-12-29 DIAGNOSIS — Z94.4 LIVER REPLACED BY TRANSPLANT (H): Primary | ICD-10-CM

## 2017-12-29 PROCEDURE — 99212 OFFICE O/P EST SF 10 MIN: CPT | Mod: ZF

## 2017-12-29 RX ORDER — SIMVASTATIN 20 MG
20 TABLET ORAL
COMMUNITY
Start: 2017-11-09 | End: 2018-01-15

## 2017-12-29 RX ORDER — SPIRONOLACTONE 50 MG/1
50 TABLET, FILM COATED ORAL
COMMUNITY
Start: 2017-11-09 | End: 2018-01-15

## 2017-12-29 ASSESSMENT — PAIN SCALES - GENERAL: PAINLEVEL: NO PAIN (0)

## 2017-12-29 NOTE — LETTER
12/29/2017     RE: Ricki Sharp  [address] [facility] [address]     Dear Colleague,    Thank you for referring your patient, Ricki Sharp, to the [facility] HEPATOLOGY at [facility]. Please see a copy of my visit note below.    GI CLINIC VISIT    Interval History: 67 year old male with PMH significant for HEP C cirrhosis c/b hepatopulmonary syndrome s/p DDLT who presents for routine follow up.    Patient denied any complaints at this time including CP, SOB, abdominal pain, n/v/d. Patient noted that he was started on spironolactone and simvastatin recently by PCP.     ROS: 10pt ROS performed and otherwise negative.    PERTINENT PAST MEDICAL/SURGICAL HISTORY:  As noted above.    PERTINENT MEDICATIONS:  Medications reviewed with patient today, see Medication List/Assessment for details.  No other NSAID/anticoagulation reported by patient.  No other OTC/herbal/supplements reported by patient.    PHYSICAL EXAMINATION:  Vitals reviewed, AFVSS  Gen: aaox3, cooperative, pleasant, not diaphoretic, nad  HEENT: ncat, neck supple, no clad/sclad, normal op w/o ulcer/exudate, anicteric, mmm  Resp/CV without acute findings, not dyspneic/tachycardic  Abd: soft, protuberant, nt/nd  Ext: no c/c/e  Skin: warm, perfused, no jaundice  Neuro: grossly intact, no asterixis noted    PERTINENT STUDIES:  Tacrolimus 3.7  Last Basic Metabolic Panel:  Lab Results   Component Value Date     03/23/2016      Lab Results   Component Value Date    POTASSIUM 4.3 03/23/2016     Lab Results   Component Value Date    CHLORIDE 100 03/23/2016     Lab Results   Component Value Date    LIT 8.5 03/23/2016     Lab Results   Component Value Date    CO2 26 03/23/2016     Lab Results   Component Value Date    BUN 25 03/23/2016     Lab Results   Component Value Date    CR 1.28 03/23/2016     Lab Results   Component Value Date     03/23/2016     Liver Function Studies -   Recent Labs   Lab  Test  03/19/16   0652   09/29/14   1107   PROTTOTAL  5.8*   < >  6.9   ALBUMIN  3.1*   < >  4.0   BILITOTAL  0.9   < >  0.8   ALKPHOS  79   < >  119*   AST  19   < >  25   ALT  20   < >  22   BILIDIRECT   --    --   0.10    < > = values in this interval not displayed.       ASSESSMENT/PLAN:    67 year old male with PMH significant for HEP C cirrhosis c/b hepatopulmonary syndrome s/p DDLT who presents for routine follow up.    Plans today  1. 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 LFTs WNL. Tacrolimus 3.7. Continue to monitor. Update vaccines as needed.      # Hyperkalemia   K 5.0. Patient was placed on spironolactone per PCP for HTN. Have instructed patient to stop taking this medication as the risk of hyperkalemia is high since patient is ARB already.    # HTN  /96. Patient is on losartan and metoprolol and recently started on spironolactone. Instructed patient to stop taking spironolactone for above reason. Follow up with PCP. Consider amlodipine as an alternative agent.    RTC 6 months, sooner if symptomatic.    Patient staffed with Dr. Gray, who agrees with plans above.     It was a pleasure to participate in the care of this patient; please contact us with any further questions.     Elmer Nuñez MD  Internal Medicine PGY-2  [phone]    The patient was seen and examined.  The above assessment and plan was developed jointly with the resident.      Mike Gray MD      Professor of Medicine  [employer]      Executive Medical Director, Solid Organ Transplant Program  [facility]

## 2017-12-29 NOTE — NURSING NOTE
Here for post liver transplant follow-up.  Accompanied by wife, Fide.  Reviewed recent labs and assisted with interpretation.  Liver tests and creatinine good.  Feeling well.      Complaints:  Chronic back pain - suggested physical therapy.    Current immunosuppression:    Prograf monotherapy.    Med changes: Was put on spironolactone per PCP for BP control.  Dr. Gray suggested stopping this, using something like amlodipine if necessary.  Suggested Bill take BP at home at least daily and then return to see PCP for management.    Lab frequency:  q 2-3 mos    Follow-up:  Hepatology in 6 mos, derm and PCP annually.

## 2017-12-29 NOTE — PATIENT INSTRUCTIONS
Patient Information     Patient Name MRN Sex Ricki aLboy [id number] Male 1950      Patient Instructions by Jarad Bocanegra MD at 2017  2:30 PM     Author:  Jarad Bocanegra MD  Service:  (none) Author Type:  Physician     Filed:  2017  3:10 PM  Encounter Date:  2017 Status:  Addendum     :  Jarad Bocanegra MD (Physician)        Related Notes: Original Note by Jarad Bocanegra MD (Physician) filed at 2017  3:09 PM             -- Ask Dr. Gray about discontinuing PPI (omeprazole)   -- Get a Pneumonia 23 vaccine after 2017   -- MRI of back   -- Consider Injection and/or return to PT        Your BMI is Body mass index is 31.39 kg/(m^2).  (BMI ranges: Normal 18.5 - 25, Overweight 25 - 30, Obesity greater than 30,     Morbid Obesity greater than 40 or greater than 35 with associated conditions.)    Facts about losing weight:   -- Overweight and Obesity increase your risk for developing diabetes, high blood pressure and stroke, and shorten your life.   -- 90% of weight loss comes from dietary changes, only 10% from exercise    What should I do?   -- Work on 5-10% weight loss   -- Focus on a few healthy dietary changes   -- Eat more fresh fruits and vegetables, and fewer carbohydrates   -- Cut out all calorie-containing beverages (milk, juice, alcohol, etc)   -- Exercise every day   -- Weigh yourself once a week   -- Consider the DASH Diet (http://bit.ly/DASHDiet - redirects to the NIH)   -- Consider Weight Watchers (http://www.weightwatchers.com)   -- Consider My Fitness Pal (iOS, Android, http://www.IBTgamesnesspal.com)

## 2017-12-29 NOTE — PATIENT INSTRUCTIONS
Patient Information     Patient Name MRRicki Hall [id number] Male 1950      Patient Instructions by Jarad Bocanegra MD at 2017 12:45 PM     Author:  Jarad Bocanegra MD  Service:  (none) Author Type:  Physician     Filed:  2017  1:15 PM  Encounter Date:  2017 Status:  Addendum     :  Jarad Bocanegra MD (Physician)        Related Notes: Original Note by Jarad Bocanegra MD (Physician) filed at 2017  1:12 PM             -- Return to PT   -- PM&R consult   -- Trial of voltaren gel    Your BMI is Body mass index is 31.32 kg/(m^2).  (BMI ranges: Normal 18.5 - 25, Overweight 25 - 30, Obesity greater than 30,     Morbid Obesity greater than 40 or greater than 35 with associated conditions.)    Facts about losing weight:   -- Overweight and Obesity increase your risk for developing diabetes, high blood pressure and stroke, and shorten your life.   -- 90% of weight loss comes from dietary changes, only 10% from exercise    What should I do?   -- Work on 5-10% weight loss   -- Focus on a few healthy dietary changes   -- Eat more fresh fruits and vegetables, and fewer carbohydrates   -- Cut out all calorie-containing beverages (milk, juice, alcohol, etc)   -- Exercise every day   -- Weigh yourself once a week   -- Consider the DASH Diet (http://bit.Correlsense/DASHDiet - redirects to the NIH)   -- Consider Weight Watchers (http://www.weightwatchers.com)   -- Consider My Fitness Pal (iOS, Android, http://www.Spinzonesspal.com)

## 2017-12-29 NOTE — NURSING NOTE
Chief Complaint   Patient presents with     RECHECK     Post Liver TXP   Pt roomed, vitals, meds, and allergies reviewed with pt. Pt ready for provider.  Daniel Ontiveros, CMA

## 2017-12-29 NOTE — LETTER
12/29/2017    RE: Ricki Sharp  [address] [place] [place] [address]     GI CLINIC VISIT    Interval History: 67 year old male with PMH significant for HEP C cirrhosis c/b hepatopulmonary syndrome s/p DDLT who presents for routine follow up.    Patient denied any complaints at this time including CP, SOB, abdominal pain, n/v/d. Patient noted that he was started on spironolactone and simvastatin recently by PCP.     ROS: 10pt ROS performed and otherwise negative.    PERTINENT PAST MEDICAL/SURGICAL HISTORY:  As noted above.    PERTINENT MEDICATIONS:  Medications reviewed with patient today, see Medication List/Assessment for details.  No other NSAID/anticoagulation reported by patient.  No other OTC/herbal/supplements reported by patient.    PHYSICAL EXAMINATION:  Vitals reviewed, AFVSS  Gen: aaox3, cooperative, pleasant, not diaphoretic, nad  HEENT: ncat, neck supple, no clad/sclad, normal op w/o ulcer/exudate, anicteric, mmm  Resp/CV without acute findings, not dyspneic/tachycardic  Abd: soft, protuberant, nt/nd  Ext: no c/c/e  Skin: warm, perfused, no jaundice  Neuro: grossly intact, no asterixis noted    PERTINENT STUDIES:  Tacrolimus 3.7  Last Basic Metabolic Panel:  Lab Results   Component Value Date     03/23/2016      Lab Results   Component Value Date    POTASSIUM 4.3 03/23/2016     Lab Results   Component Value Date    CHLORIDE 100 03/23/2016     Lab Results   Component Value Date    LIT 8.5 03/23/2016     Lab Results   Component Value Date    CO2 26 03/23/2016     Lab Results   Component Value Date    BUN 25 03/23/2016     Lab Results   Component Value Date    CR 1.28 03/23/2016     Lab Results   Component Value Date     03/23/2016     Liver Function Studies -   Recent Labs   Lab Test  03/19/16   0652   09/29/14   1107   PROTTOTAL  5.8*   < >  6.9   ALBUMIN  3.1*   < >  4.0   BILITOTAL  0.9   < >  0.8   ALKPHOS  79   < >  119*   AST  19   < >  25   ALT  20   < >  22   BILIDIRECT    --    --   0.10    < > = values in this interval not displayed.       ASSESSMENT/PLAN:    67 year old male with PMH significant for HEP C cirrhosis c/b hepatopulmonary syndrome s/p DDLT who presents for routine follow up.    Plans today  1. 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 LFTs WNL. Tacrolimus 3.7. Continue to monitor. Update vaccines as needed.      # Hyperkalemia   K 5.0. Patient was placed on spironolactone per PCP for HTN. Have instructed patient to stop taking this medication as the risk of hyperkalemia is high since patient is ARB already.    # HTN  /96. Patient is on losartan and metoprolol and recently started on spironolactone. Instructed patient to stop taking spironolactone for above reason. Follow up with PCP. Consider amlodipine as an alternative agent.    RTC 6 months, sooner if symptomatic.    Patient staffed with Dr. Gray, who agrees with plans above.     It was a pleasure to participate in the care of this patient; please contact us with any further questions.     Elmer Nuñez MD  Internal Medicine PGY-2  [phone]    The patient was seen and examined.  The above assessment and plan was developed jointly with the resident.      Mike Gray MD      Professor of Medicine  [employer]      Executive Medical Director, Solid Organ Transplant Program  [facility]

## 2017-12-29 NOTE — PROGRESS NOTES
GI CLINIC VISIT    Interval History: 67 year old male with PMH significant for HEP C cirrhosis c/b hepatopulmonary syndrome s/p DDLT who presents for routine follow up.    Patient denied any complaints at this time including CP, SOB, abdominal pain, n/v/d. Patient noted that he was started on spironolactone and simvastatin recently by PCP.     ROS: 10pt ROS performed and otherwise negative.    PERTINENT PAST MEDICAL/SURGICAL HISTORY:  As noted above.    PERTINENT MEDICATIONS:  Medications reviewed with patient today, see Medication List/Assessment for details.  No other NSAID/anticoagulation reported by patient.  No other OTC/herbal/supplements reported by patient.    PHYSICAL EXAMINATION:  Vitals reviewed, AFVSS  Gen: aaox3, cooperative, pleasant, not diaphoretic, nad  HEENT: ncat, neck supple, no clad/sclad, normal op w/o ulcer/exudate, anicteric, mmm  Resp/CV without acute findings, not dyspneic/tachycardic  Abd: soft, protuberant, nt/nd  Ext: no c/c/e  Skin: warm, perfused, no jaundice  Neuro: grossly intact, no asterixis noted    PERTINENT STUDIES:  Tacrolimus 3.7  Last Basic Metabolic Panel:  Lab Results   Component Value Date     03/23/2016      Lab Results   Component Value Date    POTASSIUM 4.3 03/23/2016     Lab Results   Component Value Date    CHLORIDE 100 03/23/2016     Lab Results   Component Value Date    LIT 8.5 03/23/2016     Lab Results   Component Value Date    CO2 26 03/23/2016     Lab Results   Component Value Date    BUN 25 03/23/2016     Lab Results   Component Value Date    CR 1.28 03/23/2016     Lab Results   Component Value Date     03/23/2016     Liver Function Studies -   Recent Labs   Lab Test  03/19/16   0652   09/29/14   1107   PROTTOTAL  5.8*   < >  6.9   ALBUMIN  3.1*   < >  4.0   BILITOTAL  0.9   < >  0.8   ALKPHOS  79   < >  119*   AST  19   < >  25   ALT  20   < >  22   BILIDIRECT   --    --   0.10    < > = values in this interval not displayed.        ASSESSMENT/PLAN:    67 year old male with PMH significant for HEP C cirrhosis c/b hepatopulmonary syndrome s/p DDLT who presents for routine follow up.    Plans today  1. 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 LFTs WNL. Tacrolimus 3.7. Continue to monitor. Update vaccines as needed.      # Hyperkalemia   K 5.0. Patient was placed on spironolactone per PCP for HTN. Have instructed patient to stop taking this medication as the risk of hyperkalemia is high since patient is ARB already.    # HTN  /96. Patient is on losartan and metoprolol and recently started on spironolactone. Instructed patient to stop taking spironolactone for above reason. Follow up with PCP. Consider amlodipine as an alternative agent.    RTC 6 months, sooner if symptomatic.    Patient staffed with Dr. Gray, who agrees with plans above.     It was a pleasure to participate in the care of this patient; please contact us with any further questions.     Elmer Nuñez MD  Internal Medicine PGY-2  [id number]    The patient was seen and examined.  The above assessment and plan was developed jointly with the resident.      Mike Gray MD      Professor of Medicine  [employer]      Executive Medical Director, Solid Organ Transplant Program  [facility]

## 2017-12-29 NOTE — MR AVS SNAPSHOT
After Visit Summary   12/29/2017    Ricki Sharp    MRN: [id number]           Patient Information     Date Of Birth          1950        Visit Information        Provider Department      12/29/2017 11:45 AM Mike Gray MD M [facility]         Follow-ups after your visit        Your next 10 appointments already scheduled     Dec 28, 2018 11:45 AM CST   (Arrive by 11:30 AM)   Return Liver Transplant with MD BRITTA Barillas [facility] ([facility] [place])    [address] [place] [id number] [phone]              Who to contact     If you have questions or need follow up information about today's clinic visit or your schedule please contact [facility] directly at [phone].  Normal or non-critical lab and imaging results will be communicated to you by MyChart, letter or phone within 4 business days after the clinic has received the results. If you do not hear from us within 7 days, please contact the clinic through MyChart or phone. If you have a critical or abnormal lab result, we will notify you by phone as soon as possible.  Submit refill requests through [facility] or call your pharmacy and they will forward the refill request to us. Please allow 3 business days for your refill to be completed.          Additional Information About Your Visit        MyChart Information     LYYN gives you secure access to your electronic health record. If you see a primary care provider, you can also send messages to your care team and make appointments. If you have questions, please call your primary care clinic.  If you do not have a primary care provider, please call [phone] and they will assist you.        Care EveryWhere ID     This is your Care EveryWhere ID. This could be used by other organizations to access your [facility] medical records  [id number]        Your Vitals Were     Pulse Temperature Height Pulse Oximetry  "BMI (Body Mass Index)       95 97.8  F (36.6  C) (Oral) 1.702 m (5' 7\") 96% 31.07 kg/m2        Blood Pressure from Last 3 Encounters:   12/29/17 (!) 165/96   12/19/16 135/84   06/27/16 134/83    Weight from Last 3 Encounters:   12/29/17 90 kg (198 lb 6.4 oz)   04/17/17 94.7 kg (208 lb 11.2 oz)   12/19/16 93.9 kg (207 lb)              Today, you had the following     No orders found for display         Today's Medication Changes          These changes are accurate as of: 12/29/17 12:39 PM.  If you have any questions, ask your nurse or doctor.               These medicines have changed or have updated prescriptions.        Dose/Directions    desonide 0.05 % ointment   Commonly known as:  DESOWEN   This may have changed:    - how to take this  - additional instructions   Used for:  Psoriasis        Use on the face daily   Quantity:  60 g   Refills:  1       * fluocinonide 0.05 % solution   Commonly known as:  LIDEX   This may have changed:  additional instructions   Used for:  Psoriasis        Use to the scalp twice a day   Quantity:  60 mL   Refills:  3       * fluocinonide 0.05 % ointment   Commonly known as:  LIDEX   This may have changed:    - how to take this  - additional instructions   Used for:  Psoriasis        Use on the body twice a day to red scaly areas   Quantity:  60 g   Refills:  3       levothyroxine 125 MCG tablet   Commonly known as:  SYNTHROID/LEVOTHROID   This may have changed:  additional instructions   Used for:  Liver replaced by transplant (H)        Dose:  125 mcg   Take 1 tablet (125 mcg) by mouth daily   Quantity:  30 tablet   Refills:  4       tiotropium 18 MCG capsule   Commonly known as:  SPIRIVA HANDIHALER   This may have changed:    - how to take this  - when to take this  - additional instructions   Used for:  Chronic obstructive pulmonary disease, unspecified COPD type (H)        Inhale contents of one capsule daily.   Quantity:  30 capsule   Refills:  6       * Notice:  This list has " 2 medication(s) that are the same as other medications prescribed for you. Read the directions carefully, and ask your doctor or other care provider to review them with you.             Primary Care Provider    Jarad Bocanegra MD       No address on file        Equal Access to Services     ROBERT KERNSMALLORY : Tiago chu fish jagdish Grant, waaxda luqadaha, qaybta kaalmada adeamericayatyson, jodi hunter laMary Johussein anthony. So [facility] [phone].    ATENCIÓN: Si habla español, tiene a clemens disposición servicios gratuitos de asistencia lingüística. Ll[facility] [phone].    We comply with applicable federal civil rights laws and [place] laws. We do not discriminate on the basis of race, color, national origin, age, disability, sex, sexual orientation, or gender identity.            Thank you!     Thank you for choosing [facility]  for your care. Our goal is always to provide you with excellent care. Hearing back from our patients is one way we can continue to improve our services. Please take a few minutes to complete the written survey that you may receive in the mail after your visit with us. Thank you!             Your Updated Medication List - Protect others around you: Learn how to safely use, store and throw away your medicines at www.disposemymeds.org.          This list is accurate as of: 12/29/17 12:39 PM.  Always use your most recent med list.                   Brand Name Dispense Instructions for use Diagnosis    albuterol 108 (90 BASE) MCG/ACT Inhaler    PROAIR HFA/PROVENTIL HFA/VENTOLIN HFA    3 Inhaler    Inhale 2 puffs into the lungs every 4 hours as needed for shortness of breath / dyspnea or wheezing    Chronic obstructive pulmonary disease, unspecified COPD type (H)       calcium carbonate-vitamin D 500-400 MG-UNIT Tabs per tablet     180 tablet    Take 1 tablet by mouth 2 times daily    Liver replaced by transplant (H)       desonide 0.05 % ointment    DESOWEN    60 g    Use on the face  daily    Psoriasis       * fluocinonide 0.05 % solution    LIDEX    60 mL    Use to the scalp twice a day    Psoriasis       * fluocinonide 0.05 % ointment    LIDEX    60 g    Use on the body twice a day to red scaly areas    Psoriasis       levothyroxine 125 MCG tablet    SYNTHROID/LEVOTHROID    30 tablet    Take 1 tablet (125 mcg) by mouth daily    Liver replaced by transplant (H)       losartan 50 MG tablet    COZAAR    90 tablet    TAKE ONE TABLET BY MOUTH EVERY DAY    Liver replaced by transplant (H)       magnesium oxide 400 MG tablet    MAG-OX    120 tablet    Take 2 tablets (800 mg) by mouth 2 times daily    PTLD (post-transplant lymphoproliferative disorder) (H), Liver replaced by transplant (H)       metoprolol 100 MG tablet    LOPRESSOR    60 tablet    Take 1 tablet (100 mg) by mouth 2 times daily    Liver replaced by transplant (H)       omeprazole 20 MG tablet     30 tablet    Take 1 tablet (20 mg) by mouth daily    Liver replaced by transplant (H)       salmeterol 50 MCG/DOSE diskus inhaler    SEREVENT DISKUS    3 Inhaler    Inhale 1 puff (50 mcg) into the lungs 2 times daily    Chronic obstructive pulmonary disease, unspecified COPD type (H)       simvastatin 20 MG tablet    ZOCOR     Take 20 mg by mouth        spironolactone 50 MG tablet    ALDACTONE     Take 50 mg by mouth        * tacrolimus 0.5 MG capsule    GENERIC EQUIVALENT    60 capsule    TAKE ONE CAPSULE IN THE EVENING (MORNING DOSE 1 MG AND EVENING DOSE 1.5 MG)    Liver replaced by transplant (H)       * tacrolimus 1 MG capsule    GENERIC EQUIVALENT    60 capsule    Take 1 capsule (1 mg) by mouth every 12 hours    Liver replaced by transplant (H)       tiotropium 18 MCG capsule    SPIRIVA HANDIHALER    30 capsule    Inhale contents of one capsule daily.    Chronic obstructive pulmonary disease, unspecified COPD type (H)       * Notice:  This list has 4 medication(s) that are the same as other medications prescribed for you. Read the  directions carefully, and ask your doctor or other care provider to review them with you.

## 2017-12-30 NOTE — NURSING NOTE
Patient Information     Patient Name MRN Ricki Miller [id number] Male 1950      Nursing Note by Seda Mcdonough at 2017  2:30 PM     Author:  Seda Mcdonough Service:  (none) Author Type:  (none)     Filed:  2017  2:48 PM Encounter Date:  2017 Status:  Signed     :  Seda Mcdonough            Patient presents to clinic for medication review.  Had labs drawn this am and would like to discuss his back pain today.  Seda Mcdonough LPN ....................  2017   2:35 PM

## 2017-12-30 NOTE — NURSING NOTE
Patient Information     Patient Name MRN Ricki Miller [id number] Male 1950      Nursing Note by Seda Mcdonough at 2017 12:45 PM     Author:  Seda Mcdonough Service:  (none) Author Type:  (none)     Filed:  2017 12:53 PM Encounter Date:  2017 Status:  Signed     :  Seda Mcdonough            Patient presents to clinic for F/U on lumbar MRI that was done on 17.  Seda Mcdonough LPN ....................  2017   12:47 PM

## 2017-12-30 NOTE — NURSING NOTE
"Patient Information     Patient Name MRN Ricki Miller [id number] Male 1950      Nursing Note by Sdea Mcdonough at 2017  4:00 PM     Author:  Seda Mcdonough Service:  (none) Author Type:  (none)     Filed:  2017  4:10 PM Encounter Date:  2017 Status:  Signed     :  Seda Mcdonough            Patient presents to clinic for F/U on Blood pressure.  States that has been \"ok\" at times.  Seda Mcdonough LPN ....................  2017   4:03 PM          "

## 2018-01-02 ENCOUNTER — COMMUNICATION - GICH (OUTPATIENT)
Dept: INTERNAL MEDICINE | Facility: OTHER | Age: 68
End: 2018-01-02

## 2018-01-02 DIAGNOSIS — K27.9 PEPTIC ULCER WITHOUT HEMORRHAGE OR PERFORATION: ICD-10-CM

## 2018-01-02 DIAGNOSIS — L40.9 PSORIASIS: ICD-10-CM

## 2018-01-02 NOTE — TELEPHONE ENCOUNTER
Patient Information     Patient Name MRN Ricki Miller [id number] Male 1950      Telephone Encounter by Rosalie Live RN at 2017  2:29 PM     Author:  Rosalie Live RN Service:  (none) Author Type:  NURS- Registered Nurse     Filed:  2017  2:39 PM Encounter Date:  2017 Status:  Signed     :  Rosalie Live RN (NURS- Registered Nurse)            Bronchodilator Inhalers     Office visit in the past 12 months.    Last visit with JARAD BOCANEGRA was on: 2016 in [facility]  Next visit with JARAD BOCANEGRA is on: No future appointment listed with this provider  Next visit with Internal Medicine is on: No future appointment listed in this department    Max refills 12 months from last office visit.    Asthma/COPD    Office visit in the past 12 months.      Max refills 12 months from last office visit.  Prescription refilled per RN Medication Refill Policy.................... ROSALIE LIVE, RUFUS ....................  2017   2:30 PM    Serevent does not have complete directions.  Unable to complete prescription refill per RN Medication Refill Policy.................... ROSALIE LIVE RN ....................  2017   2:36 PM      Patient is switching to [facility] pharmacy, and these medications were previously refilled by the patients pulmonologist..  [facility] called and requested Jarad Bocanegra MD fill these medications.    [facility]  Name of Medication: Serevent discus 50mcg/dose diskus inhaler  Quantity requested: 3  Last fill date: not listed   Last visit with JRAAD BOCANEGRA was on: 2016 in [facility]  PCP:  Jarad Bocanegra MD  Controlled Substance Agreement:  na   Diagnosis r/t this medication request: COPD

## 2018-01-03 NOTE — TELEPHONE ENCOUNTER
Patient Information     Patient Name MRN Ricki Miller [id number] Male 1950      Telephone Encounter by Jarad Bocanegra MD at 2017  9:43 AM     Author:  Jarad Bocanegra MD Service:  (none) Author Type:  Physician     Filed:  2017  9:43 AM Encounter Date:  2017 Status:  Signed     :  Jarad Bocanegra MD (Physician)            Please call Mr. Sharp and ask him to:   -- Schedule nurse-only blood pressure visit.   -- If SBP > 139 or DBP > 89, schedule office visit to follow-up hypertension.    Signed, Jarad Bocanegra MD  Internal Medicine & Pediatrics

## 2018-01-03 NOTE — TELEPHONE ENCOUNTER
Patient Information     Patient Name MRN Ricki Miller [id number] Male 1950      Telephone Encounter by Seda Mcdonough at 2017  8:08 AM     Author:  Seda Mcdonough Service:  (none) Author Type:  (none)     Filed:  2017  8:08 AM Encounter Date:  2017 Status:  Signed     :  Seda Mcdonough            Left message to call back  ..................Seda Mcdonough LPN......2017 8:08 AM

## 2018-01-03 NOTE — TELEPHONE ENCOUNTER
Patient Information     Patient Name MRN Sex Ricki Laboy [id number] Male 1950      Telephone Encounter by Rosalie Live RN at 3/17/2017 12:11 PM     Author:  Rosalie Live RN Service:  (none) Author Type:  NURS- Registered Nurse     Filed:  3/17/2017 12:14 PM Encounter Date:  3/17/2017 Status:  Signed     :  Rosalie Live RN (NURS- Registered Nurse)            Patient was called today regarding BLOOD PRESSURE. Per [facility] Measures, it is recommended that patient complete A RECHECK WITH NURSE ONLY. Unable to reach patient at this time, will attempt to call again.   ROSALIE LIVE RN............. 3/17/2017 12:13 PM

## 2018-01-03 NOTE — TELEPHONE ENCOUNTER
Patient Information     Patient Name MRN Ricki Miller [id number] Male 1950      Telephone Encounter by Seda Mcdonough at 2017  8:21 AM     Author:  Seda Mcdonough Service:  (none) Author Type:  (none)     Filed:  2017  8:22 AM Encounter Date:  2017 Status:  Signed     :  Seda Mcdonough            Multiple messages left for patient with no return call.  Seda Mcdonough LPN ....................  2017   8:22 AM

## 2018-01-03 NOTE — TELEPHONE ENCOUNTER
Patient Information     Patient Name MRN Sex Ricki Laboy [id number] Male 1950      Telephone Encounter by Abad Rosas LPN at 2017 11:25 AM     Author:  Abad Rosas LPN Service:  (none) Author Type:  NURS- Licensed Practical Nurse     Filed:  2017 11:26 AM Encounter Date:  2017 Status:  Signed     :  Abad Rosas LPN (NURS- Licensed Practical Nurse)            Left message to call back  ...................Abad Rosas LPN....2017 11:26 AM

## 2018-01-04 NOTE — NURSING NOTE
Patient Information     Patient Name MRN Ricki Miller [id number] Male 1950      Nursing Note by Seda Mcdonough at 2017  1:00 PM     Author:  Seda Mcdonough Service:  (none) Author Type:  (none)     Filed:  2017  1:10 PM Encounter Date:  2017 Status:  Signed     :  Seda Mcdonough            Patient presents to clinic for F/U on diabetes and HTN.  Seda Mcdonough LPN ....................  2017   1:02 PM

## 2018-01-04 NOTE — PATIENT INSTRUCTIONS
Patient Information     Patient Name MRN Sex Ricki Laboy [id number] Male 1950      Patient Instructions by Jarad Bocanegra MD at 2017 10:30 AM     Author:  Jarad Bocanegra MD Service:  (none) Author Type:  Physician     Filed:  2017 11:02 AM Encounter Date:  2017 Status:  Signed     :  Jarad Bocanegra MD (Physician)             -- Lab today and again in 2 weeks   -- Continue losartan, metoprolol   -- Start hctz 1/2 tablet daily for 4 days then 1 tablet daily   -- See your Orthopedic surgeon as planned   -- Follow-up with Jarad Bocanegra MD in 2 weeks, lab only 1 hour before

## 2018-01-04 NOTE — PROGRESS NOTES
Patient Information     Patient Name MRN Sex Ricki Laboy [id number] Male 1950      Progress Notes by Jarad Bocanegra MD at 2017 10:30 AM     Author:  Jarad Bocanegra MD Service:  (none) Author Type:  Physician     Filed:  2017 11:17 AM Encounter Date:  2017 Status:  Signed     :  Jarad Bocanegra MD (Physician)            Subjective  Ricki Sharp is a 67 y.o. male who presents for blood pressure check. He was called because his blood pressure was high on the registry. He has a history of diabetes mellitus type 2 that has been well controlled with diet. His blood sugars have been less than 150. He notices that his blood pressures have been higher lately. He thinks it's because his left hip is bothering him. He recently twisted and had a sharp pain in the left hip. It's the same side where he had his hip replacement done a year ago. This injury occurred about one month ago. He's going to be seeing his surgeon at the [facility] on Monday. There's been no subsequent injury. His blood pressures have been high at home, no log is present for my review.    Problem List/PMH: reviewed in EMR, and made relevant updates today.  Medications: reviewed in EMR, and made relevant updates today.  Allergies: reviewed in EMR, and made relevant updates today.    Social Hx:  Social History      Substance Use Topics        Smoking status:  Former Smoker     Packs/day: 1.00     Years: 20.00     Types: Cigarettes     Quit date: 10/1/2015     Smokeless tobacco:  Never Used     Alcohol use  No     Social History Narrative     with two children.  Previously worked as a .  On disability secondary to cirrhosis and hepatitis C.      I reviewed social history and made relevant updates today.    Family Hx:   Family History       Problem   Relation Age of Onset     Heart Disease  Father       at 62; myocardial infarction        Alcohol/Drug  Mother       at 65;  "cirrhosis/heavy alcohol use       Alcohol/Drug  Brother       at 53; cirrhosis/heavy alcohol use       Heart Disease  Other      CAD, MI         Objective  Vitals: reviewed in EMR.  /84  Pulse 64  Ht 1.702 m (5' 7\")  Wt 94.8 kg (209 lb)  BMI 32.73 kg/m2    Gen: Pleasant male, NAD.  HEENT: MMM  Neck: Supple  Pulm: Breathing easily  Neuro: Grossly intact  Skin: No concerning lesions.  Psychiatric: Normal affect and insight. Does not appear anxious or depressed.    Diabetes Labs  Lab Results      Component  Value Date/Time    HGBA1C 7.1 (H) 2016 02:44 PM    HGBA1C 6.4 (H) 2011 09:56 AM    CHOL 126 2017 10:35 AM    HDL 33 2017 10:35 AM    LDLCHOL 64 2017 10:35 AM    TRIGLYCERIDE 143 2017 10:35 AM    CREATININE 1.21 2017 10:35 AM         Assessment    ICD-10-CM    1. Essential hypertension I10 BASIC METABOLIC PANEL      BASIC METABOLIC PANEL      hydroCHLOROthiazide (HCTZ) 25 mg tablet      BASIC METABOLIC PANEL   2. S/P liver transplant (HC) Z94.4    3. Controlled diabetes mellitus type 2 with complications, unspecified long term insulin use status (HC) E11.8 Hgb A1c      Hgb A1c   4. Status post left hip replacement Z96.642    5. Non morbid obesity due to excess calories E66.09    6. CKD (chronic kidney disease), stage III N18.3    7. Immunosuppressed status (HC) D89.9        He is at max dose metoprolol. I be leery about increasing losartan due to borderline hyperkalemia and stage III chronic kidney disease. I think the best next step is initiating hydrochlorothiazide with close kidney and electrolyte monitoring. I strongly recommended lifestyle modification including weight loss, increased physical activity. Looking back over the last several years early spring is his worst for weight and blood pressure, likely correlating with immobility and poor diet. He agrees to this assessment. He plans to work more on lifestyle modification.    Plan   -- Expected " clinical course discussed   -- Medications and their side effects discussed  Patient Instructions    -- Lab today and again in 2 weeks   -- Continue losartan, metoprolol   -- Start hctz 1/2 tablet daily for 4 days then 1 tablet daily   -- See your Orthopedic surgeon as planned   -- Follow-up with Jarad Bocanegra MD in 2 weeks, lab only 1 hour before    No Follow-up on file.    Signed, Jarad Bocanegra MD  Internal Medicine & Pediatrics

## 2018-01-04 NOTE — TELEPHONE ENCOUNTER
Patient Information     Patient Name MRN Sex Ricki Laboy [id number] Male 1950      Telephone Encounter by Jarad Bocanegra MD at 3/25/2017  2:30 PM     Author:  Jarad Bocanegra MD Service:  (none) Author Type:  Physician     Filed:  3/25/2017  2:30 PM Encounter Date:  3/25/2017 Status:  Signed     :  Jarad Bocanegra MD (Physician)            Please call Mr. Sharp and ask him to:   -- Schedule nurse-only blood pressure visit.   -- If SBP > 139 or DBP > 89, schedule office visit to follow-up hypertension.    Signed, Jarad Bocanegra MD  Internal Medicine & Pediatrics

## 2018-01-04 NOTE — TELEPHONE ENCOUNTER
Patient Information     Patient Name MRN Ricki Miller [id number] Male 1950      Telephone Encounter by Seda Mcdonough at 3/27/2017  9:24 AM     Author:  Seda Mcdonough Service:  (none) Author Type:  (none)     Filed:  3/27/2017  9:24 AM Encounter Date:  3/25/2017 Status:  Signed     :  Seda Mcdonough            Left message to call back  ..................Seda Mcdonough LPN......3/27/2017 9:24 AM

## 2018-01-04 NOTE — TELEPHONE ENCOUNTER
Patient Information     Patient Name MRN Ricki Miller [id number] Male 1950      Telephone Encounter by Seda Mcdonough at 3/29/2017  9:17 AM     Author:  Seda Mcdonough  Service:  (none) Author Type:  (none)     Filed:  3/29/2017  9:20 AM  Encounter Date:  3/25/2017 Status:  Addendum     :  eSda Mcdonough        Related Notes: Original Note by Seda Mcdonough filed at 3/29/2017  9:17 AM            Patient notified that needs a blood pressure check.  Was transferred to scheduling to get this scheduled tomorrow.  Seda Mcdonough LPN ....................  3/29/2017   9:20 AM

## 2018-01-04 NOTE — NURSING NOTE
Patient Information     Patient Name MRN Ricki Miller [id number] Male 1950      Nursing Note by Seda Mcdonough at 2017 10:30 AM     Author:  Seda Mcdonough Service:  (none) Author Type:  (none)     Filed:  2017 10:51 AM Encounter Date:  2017 Status:  Signed     :  Seda Mcdonough            Patient presents to clinic for blood pressure today and medication review.  Seda Mcdonough LPN ....................  2017   10:43 AM

## 2018-01-04 NOTE — PROGRESS NOTES
Patient Information     Patient Name MRN Sex Ricki Laboy [id number] Male 1950      Progress Notes by Jarad Bocanegra MD at 2017  1:00 PM     Author:  Jarad Bocanegra MD Service:  (none) Author Type:  Physician     Filed:  2017  1:28 PM Encounter Date:  2017 Status:  Signed     :  Jarad Bocanegra MD (Physician)            Subjective  Ricki Sharp is a 67 y.o. male who presents for medication management. At our last visit on  we added hydrochlorothiazide to his regimen of losartan and metoprolol for blood pressure which has been inadequately controlled. In the interim he also saw his orthopedic surgeon who reassured him that his hip is healing normally and that he had a low back strain. He's been seeing the physical therapist and is starting to have some improvement in his low back/buttock pain. He had no adverse effect from hydrochlorothiazide.    Problem List/PMH: reviewed in EMR, and made relevant updates today.  Medications: reviewed in EMR, and made relevant updates today.  Allergies: reviewed in EMR, and made relevant updates today.    Social Hx:  Social History      Substance Use Topics        Smoking status:  Former Smoker     Packs/day: 1.00     Years: 20.00     Types: Cigarettes     Quit date: 10/1/2015     Smokeless tobacco:  Never Used     Alcohol use  No     Social History Narrative     with two children.  Previously worked as a .  On disability secondary to cirrhosis and hepatitis C.      I reviewed social history and made relevant updates today.    Family Hx:   Family History       Problem   Relation Age of Onset     Heart Disease  Father       at 62; myocardial infarction        Alcohol/Drug  Mother       at 65; cirrhosis/heavy alcohol use       Alcohol/Drug  Brother       at 53; cirrhosis/heavy alcohol use       Heart Disease  Other      CAD, MI         Objective  Vitals: reviewed in EMR.  /82  Pulse  52  Wt 94.5 kg (208 lb 6.4 oz)  BMI 32.64 kg/m2    Gen: Pleasant male, NAD.  HEENT: MMM  Neck: Supple  Pulm: Breathing easily  Neuro: Grossly intact  Skin: No concerning lesions.  Psychiatric: Normal affect and insight. Does not appear anxious or depressed.    Diabetes Labs  Lab Results      Component  Value Date/Time    HGBA1C 6.6 (H) 04/11/2017 11:23 AM    HGBA1C 6.4 (H) 08/26/2011 09:56 AM    CHOL 126 02/17/2017 10:35 AM    HDL 33 02/17/2017 10:35 AM    LDLCHOL 64 02/17/2017 10:35 AM    TRIGLYCERIDE 143 02/17/2017 10:35 AM    CREATININE 1.32 (H) 04/11/2017 11:23 AM     Component      Latest Ref Rng & Units 4/11/2017   SODIUM      133 - 143 mmol/L 132 (L)   POTASSIUM      3.5 - 5.1 mmol/L 4.9   CHLORIDE      98 - 107 mmol/L 105   CO2,TOTAL      21 - 31 mmol/L 25   ANION GAP      5 - 18                 2 (L)   GLUCOSE      70 - 105 mg/dL 159 (H)   CALCIUM      8.6 - 10.3 mg/dL 9.0   BUN      7 - 25 mg/dL 32 (H)   CREATININE      0.70 - 1.30 mg/dL 1.32 (H)   BUN/CREAT RATIO                 24   GFR if African American      >60 ml/min/1.73m2 >60   GFR if not African American      >60 ml/min/1.73m2 54 (L)       Assessment    ICD-10-CM    1. Hypertension I10 spironolactone (ALDACTONE) 50 mg tablet      BASIC METABOLIC PANEL   2. S/P liver transplant (HC) Z94.4    3. CKD (chronic kidney disease), stage III N18.3    4. Non morbid obesity due to excess calories E66.09        Unfortunately adding hydrochlorothiazide didn't help his blood pressure at all. He has uncontrolled hypertension despite the use of losartan and metoprolol. Sounds like he tolerated spironolactone in the past which was used around the time of his liver transplant. I'm worried about the potential for hyperkalemia so close follow-up of a basic metabolic panel will be done. The other option would be to consider a loop diuretic however this may make him more thirsty and may be more difficult to tolerate. Amlodipine appears to have an interaction  potentiating tacrolimus so we will avoid using that for now. We will have a low threshold for expert consultation.    Plan   -- Expected clinical course discussed   -- Medications and their side effects discussed  Patient Instructions    -- Continue metoprolol, losartan   -- Stop hydrochlorothiazide because it didn't work for you   -- Start spironolactone    -- Kidney panel in 2 weeks   -- If spironolactone is not effective or potassium increases, would consider trial of Lasix vs refer you to Nephrology for assistance with hypertension   -- Work on healthy eating, daily exercise, 5% weight loss      Signed, Jarad Bocanegra MD  [facility]

## 2018-01-04 NOTE — TELEPHONE ENCOUNTER
Patient Information     Patient Name MRN Ricki Miller [id number] Male 1950      Telephone Encounter by Rosalie Adkins RN at 2017  2:26 PM     Author:  Rosalie Adkins RN Service:  (none) Author Type:  NURS- Registered Nurse     Filed:  2017  2:28 PM Encounter Date:  2017 Status:  Signed     :  Rosalie Adkins RN (NURS- Registered Nurse)            Asthma/COPD    Office visit in the past 12 months.    Last visit with LEANN DESOUZA was on: 2017 in [facility]  Next visit with LEANN DESOUZA is on: No future appointment listed with this provider  Next visit with [facility] is on: No future appointment listed in this department    Max refills 12 months from last office visit.  Prescription refilled per RN Medication Refill Policy.................... ROSALIE ADKINS RN ....................  2017   2:26 PM

## 2018-01-04 NOTE — PATIENT INSTRUCTIONS
Patient Information     Patient Name MRN Ricki Miller [id number] Male 1950      Patient Instructions by Jarad Bocanegra MD at 2017  1:00 PM     Author:  Jarad Bocanegra MD  Service:  (none) Author Type:  Physician     Filed:  2017  1:22 PM  Encounter Date:  2017 Status:  Addendum     :  Jarad Bocanegra MD (Physician)        Related Notes: Original Note by Jarad Bocanegra MD (Physician) filed at 2017  1:18 PM             -- Continue metoprolol, losartan   -- Stop hydrochlorothiazide because it didn't work for you   -- Start spironolactone    -- Kidney panel in 2 weeks   -- If spironolactone is not effective or potassium increases, would consider trial of Lasix vs refer you to Nephrology for assistance with hypertension   -- Work on healthy eating, daily exercise, 5% weight loss

## 2018-01-04 NOTE — TELEPHONE ENCOUNTER
Patient Information     Patient Name MRN Ricki Miller [id number] Male 1950      Telephone Encounter by Ne Kidd RN at 2017  1:56 PM     Author:  Ne Kidd RN Service:  (none) Author Type:  NURS- Registered Nurse     Filed:  2017  1:59 PM Encounter Date:  2017 Status:  Signed     :  Ne Kidd RN (NURS- Registered Nurse)            fluocinonide 0.05 TOPICAL (LIDEX) 0.05 % external solution  The source prescription has been discontinued.  APPLY TOPICALLY TO THE AFFECTED AREA TWICE DAILY.APPLY TO THE SCALP TWICE DAILY       Disp: 60 mL Refills: 0    Class: eRx Start: 2017    For: Psoriasis of scalp  Documented:1 year ago  Last refill:2016  To be filled at: [facility] [address] [place] [address] [address] - [phone]: [phone]    Last visit with LEANN BOCANEGRA was on: 2017 in [facility]  PCP:  Leann Bocanegra MD    Will route to Leann Bocanegra MD medication not on current med list.  Patient has appointment today.     Unable to complete prescription refill per RN Medication Refill Policy.................... NE KIDD RN ....................  2017   1:56 PM

## 2018-01-04 NOTE — NURSING NOTE
Patient Information     Patient Name MRN Sex Ricki Laboy [id number] Male 1950      Nursing Note by Nathen Jacobs RN at 3/30/2017 11:15 AM     Author:  Nathen Jacobs RN Service:  (none) Author Type:  NURS- Registered Nurse     Filed:  3/30/2017 11:30 AM Encounter Date:  3/30/2017 Status:  Signed     :  Nathen Jacobs RN (NURS- Registered Nurse)            Pt presents for Bp check and reports he does check it at home but ususally not this high. Takes medication regularly. Denies headaches or pulsating neck veins. Reports COPD from exacerbation chemo and anti rejection medications.  Pt sat for 10 min for recheck . NATHEN JACOBS RN ....................  3/30/2017   11:26 AM

## 2018-01-05 NOTE — TELEPHONE ENCOUNTER
Patient Information     Patient Name MRN Ricki Miller [id number] Male 1950      Telephone Encounter by Seda Mcdonough at 2017 10:04 AM     Author:  Seda Mcdonough Service:  (none) Author Type:  (none)     Filed:  2017 10:04 AM Encounter Date:  2017 Status:  Signed     :  Seda Mcdonough            Left message to call back  ..................Seda Mcdonough LPN......2017 10:04 AM

## 2018-01-05 NOTE — TELEPHONE ENCOUNTER
Patient Information     Patient Name MRN Ricki Miller [id number] Male 1950      Telephone Encounter by Rahel Arredonod at 2017 12:09 PM     Author:  Rahel Arredondo Service:  (none) Author Type:  (none)     Filed:  2017 12:10 PM Encounter Date:  2017 Status:  Signed     :  Rahel Arredondo            Message left on pt's cell phone that he is due for a blood pressure check.  Rahel Arredondo CMA (AAMA)......................2017  12:10 PM

## 2018-01-05 NOTE — TELEPHONE ENCOUNTER
Patient Information     Patient Name MRN Ricki Miller [id number] Male 1950      Telephone Encounter by Annabella Last at 2017  3:29 PM     Author:  Annabella Last Service:  (none) Author Type:  (none)     Filed:  2017  3:29 PM Encounter Date:  2017 Status:  Signed     :  Annabella Last            Left message to call back  ...................Annabella Last LPN  2017   3:29 PM

## 2018-01-05 NOTE — TELEPHONE ENCOUNTER
Patient Information     Patient Name MRN Ricki Miller [id number] Male 1950      Telephone Encounter by Melissa Saavedra at 2017  8:39 AM     Author:  Melissa Saavedra Service:  (none) Author Type:  (none)     Filed:  2017  8:39 AM Encounter Date:  2017 Status:  Signed     :  Melissa Saavedra            Left message to call back  ....................  2017   8:39 AM

## 2018-01-05 NOTE — TELEPHONE ENCOUNTER
Patient Information     Patient Name MRN Sex Ricki Laboy [id number] Male 1950      Telephone Encounter by Jarad Bocanegra MD at 2017 12:05 PM     Author:  Jarad Bocanegra MD Service:  (none) Author Type:  Physician     Filed:  2017 12:05 PM Encounter Date:  2017 Status:  Signed     :  Jarad Bocanegra MD (Physician)            Please call Mr. Sharp and ask him to:   -- Lab-only for BMP   -- Schedule nurse-only blood pressure visit.   -- If SBP > 139 or DBP > 89, schedule office visit to follow-up hypertension.    Signed, Jarad Bocanegra MD  [facility]

## 2018-01-05 NOTE — TELEPHONE ENCOUNTER
Patient Information     Patient Name MRN Sex Ricki Laboy [id number] Male 1950      Telephone Encounter by Rahel Arredondo at 2017  8:50 AM     Author:  Rahel Arredondo Service:  (none) Author Type:  (none)     Filed:  2017  8:51 AM Encounter Date:  2017 Status:  Signed     :  Rahel Arredondo            Left message to call back.  Pt needs a blood pressure check.    Rahel Arredondo CMA (AAMA)   2017   8:50 AM

## 2018-01-05 NOTE — TELEPHONE ENCOUNTER
Patient Information     Patient Name MRN Ricki Miller [id number] Male 1950      Telephone Encounter by Seda Mcdonough at 2017  1:32 PM     Author:  Seda Mcdonough Service:  (none) Author Type:  (none)     Filed:  2017  1:32 PM Encounter Date:  2017 Status:  Signed     :  Seda Mcdonough            Left message to call back  ..................Seda Mcdonough LPN......2017 1:32 PM

## 2018-01-05 NOTE — TELEPHONE ENCOUNTER
Patient Information     Patient Name MRN Ricki Miller [id number] Male 1950      Telephone Encounter by Seda Mcdonough at 2017  1:35 PM     Author:  Seda Mcdonough Service:  (none) Author Type:  (none)     Filed:  2017  1:35 PM Encounter Date:  2017 Status:  Signed     :  Seda Mcdonough            Left message to call back  ..................Seda Mcdonough LPN......2017 1:35 PM

## 2018-01-05 NOTE — TELEPHONE ENCOUNTER
Patient Information     Patient Name MRN Ricki Miller [id number] Male 1950      Telephone Encounter by Seda Mcdonough at 2017  9:05 AM     Author:  Seda Mcdonough Service:  (none) Author Type:  (none)     Filed:  2017  9:06 AM Encounter Date:  2017 Status:  Signed     :  Seda Mcdonough            Multiple messages left with no call back.  Seda Mcdonough LPN ....................  2017   9:05 AM

## 2018-01-15 ENCOUNTER — OFFICE VISIT (OUTPATIENT)
Dept: PHYSICAL MEDICINE AND REHAB | Facility: CLINIC | Age: 68
End: 2018-01-15
Payer: MEDICARE

## 2018-01-15 ENCOUNTER — AMBULATORY - GICH (OUTPATIENT)
Dept: PHYSICAL THERAPY | Facility: OTHER | Age: 68
End: 2018-01-15

## 2018-01-15 VITALS
DIASTOLIC BLOOD PRESSURE: 95 MMHG | WEIGHT: 199.6 LBS | OXYGEN SATURATION: 94 % | HEIGHT: 67 IN | HEART RATE: 65 BPM | BODY MASS INDEX: 31.33 KG/M2 | SYSTOLIC BLOOD PRESSURE: 159 MMHG

## 2018-01-15 DIAGNOSIS — M48.062 SPINAL STENOSIS OF LUMBAR REGION WITH NEUROGENIC CLAUDICATION: ICD-10-CM

## 2018-01-15 DIAGNOSIS — M51.369 BULGING LUMBAR DISC: ICD-10-CM

## 2018-01-15 DIAGNOSIS — M51.369 LUMBAR DEGENERATIVE DISC DISEASE: ICD-10-CM

## 2018-01-15 DIAGNOSIS — M47.816 LUMBAR FACET ARTHROPATHY: ICD-10-CM

## 2018-01-15 DIAGNOSIS — M54.42 CHRONIC LEFT-SIDED LOW BACK PAIN WITH LEFT-SIDED SCIATICA: Primary | ICD-10-CM

## 2018-01-15 DIAGNOSIS — G89.29 CHRONIC LEFT-SIDED LOW BACK PAIN WITH LEFT-SIDED SCIATICA: Primary | ICD-10-CM

## 2018-01-15 RX ORDER — CYCLOBENZAPRINE HCL 5 MG
TABLET ORAL
Qty: 45 TABLET | Refills: 0 | Status: SHIPPED | OUTPATIENT
Start: 2018-01-15 | End: 2018-03-05

## 2018-01-15 ASSESSMENT — PAIN SCALES - GENERAL: PAINLEVEL: EXTREME PAIN (8)

## 2018-01-15 NOTE — PROGRESS NOTES
"[facility] Physical Medicine & Rehabilitation Clinic Note  Clinic Visit s Daniel 15, 2018    Subjective:  Ricki Sharp is a 67 year old male who is seen in consultation at the request of Dr. Bocanegra for evaluation of left-sided low back pain with intermittent radiation to the left lower extremity.    Symptoms began in March 2017 after stumbling off a step at that time.  Reports low back pain that is located left-sided with intermittent radiation present (left lateral thigh to the left knee).  Symptoms are generally worse in the morning and with standing activities and better with bending forward.  Treatment has consisted of Ibuprofen, heat, aqua therapy, and formal physical therapy ([facility] - April - July 2017 - 9 sessions).  Associated symptoms include denies any bowel/bladder dysfunction or saddle anesthesia.  Denies any numbness/tingling/weakness of the lower extremities.  Denies any previous low back injuries/surgeries.  History of bilateral total hip arthroplasties, most recently on the left with Dr. Fisher at the [facility].    Patient's past medical, surgical, social, and family histories are reviewed today.  Significant medical history as noted above  History reviewed. No pertinent past medical history.    Review of Systems:  Constitutional: NEGATIVE for fever, chills, or change in weight  Skin: NEGATIVE for worrisome rashes, moles, or lesions  Neuro: NEGATIVE for weakness of the lower extremities  MSK: see HPI    Objective:  BP (!) 159/95 (BP Location: Right arm, Patient Position: Sitting, Cuff Size: Adult Regular)  Pulse 65  Ht 1.702 m (5' 7\")  Wt 90.5 kg (199 lb 9.6 oz)  SpO2 94%  BMI 31.26 kg/m2  General: healthy, alert, obese, and in no distress  Skin: no suspicious lesions or rashes  Psych: mentation appears normal and affect normal/bright  HEENT: no scleral icterus  CV: no pedal edema  Resp: normal respiratory effort without conversational dyspnea   Neuro: sensory exam " is within normal limits.  Motor strength as noted below  Lymph: no palpable lymphadenopathy    MSK:    THORACIC/LUMBAR SPINE  Inspection:    No redness, swelling, overlying skin change, or gross deformity/asymmetry  Palpation:    Tender about the lower lumbar spinous processes    No tenderness over the left para lumbar muscles, right para lumbar muscles, left SI joint, right SI joint, left sciatic notch, or right sciatic notch  Range of Motion:     Lumbar flexion within normal limits    Lumbar extension limited by pain  Strength:    5/5 - quadriceps, hamstrings, tibialis anterior, gastrocsoleus, and extensor hallicus longus  Special Tests:    Positive: Straight leg raise (left), slump test (left), DAVID (left - lateral), and Gaenslen's test (left)    Negative: femoral stretch test (left) and SI joint compression (bilateral)    LEFT HIP  Palpation:    Tender about the greater trochanter    No tenderness over the gluteus minimus/medius  Passive Range of Motion:    Flexion within normal limits / IR limited slightly by pain / ER limited slightly by pain  Strength:    Flexion 5/5 / abduction 5/5  Special Tests:    Positive: None    Negative: Logroll, resisted gluteus medius provocation, and passive ER with hip flexion (Drehman's)    Imaging:  No x-rays indicated during today's visit  Previous films were reviewed today, independent visualization of images was performed, and results were discussed with the patient  MRI of the Lumbar Spine without Contrast - 11/2017  Impression:  1. Trace retrolisthesis of L1 on L2 and L2 on L3 are seen.   2. Marrow signal is notable for Modic type III change most predominant at L2-3.  3. Degenerative disk disease includes diffuse desiccation and variable disc height loss, severe at L2-3.  4. L1-2, there is a moderate diffuse disc bulge with mild facet and ligamentum flavum hypertrophy. Spinal stenosis is mild to moderate. Foraminal stenoses are mild.  5. L2-3, there is a moderate diffuse  disc bulge with moderate facet and ligamentum flavum hypertrophy. Spinal stenosis is moderate, accentuated by local lipomatosis. Foraminal stenoses are mild to moderate.  6. L3-4, there is a moderate diffuse disc bulge with a superimposed left foraminal protrusion/extrusion. There is moderate facet and ligamentum flavum hypertrophy. Spinal stenosis is moderate to severe. Foraminal stenoses are mild on the right and severe on the left.  7. L4-5, there is a moderate diffuse disc bulge with moderate to severe facet and ligamentum flavum hypertrophy. Spinal stenosis is mild to moderate. Foraminal stenoses are mild on the right and mild to moderate on the left.  8. L5-S1, there is a mild lobulated diffuse disc bulge with severe facet hypertrophy. Spinal stenosis is mild. Foraminal stenoses are mild to moderate.  9. Mild bilateral SI joint degenerative changes are present.    ASSESSMENT:  1.  Chronic left-sided low back pain with intermittent left-sided sciatica   2.  Lumbar facet arthropathy/degenerative disc disease  3.  Lumbar spinal stenosis  4.  Lumbar disc bulge    PLAN:  1.  Return to formal physical therapy - exercises to include trunk/core stabilization exercises (abdominals/back extensors), joint mobilizations, and use of modalities (ie ice, ultrasound, electrical stimulation, etc.) as needed with home exercise prescription.  2.  Flexeril as needed as prescribed at night for further treatment purposes.  3.  Acetaminophen/ice/heat as needed for improved pain control.  4.  Left L5-S1 lumbar facet joint corticosteroid injection ordered for improvement of pain/discomfort.  5.  Follow-up in 6-8 weeks for further evaluation/medical care.  If symptoms resolve completely, can follow-up as needed.  Consider initiation of Gabapentin, a left L3-4 transforaminal epidural corticosteroid injection, a left sacroiliac joint corticosteroid injection, lumbar medial branch blocks with progression to lumbar radiofrequency ablation,  EMG/NCV of the left lower extremity, spine surgery referral, etc. as deemed appropriate moving forward.     I spent a total of 40 minutes face-to-face with the patient during today's office visit.  See office note for details.    Gio Wang DO, TYREL    Department of Rehabilitation Medicine

## 2018-01-15 NOTE — MR AVS SNAPSHOT
After Visit Summary   1/15/2018    Ricki Sharp    MRN: [id number]           Patient Information     Date Of Birth          1950        Visit Information        Provider Department      1/15/2018 9:30 AM Gio Wang DO M [facility] Physical Medicine and Rehabilitation        Today's Diagnoses     Chronic left-sided low back pain with left-sided sciatica    -  1    Lumbar facet arthropathy        Lumbar degenerative disc disease        Spinal stenosis of lumbar region with neurogenic claudication        Bulging lumbar disc          Care Instructions    We addressed the following today:    1. Left low back pain with radiation  2. Lumbar arthritis  3. Lumbar spinal stenosis  4. Lumbar disc bulge    Activity modification as discussed  Physical therapy: External  Topical Treatments: Ice or heat  Over the counter medication: Acetaminophen (Tylenol) 1000 mg every 6 hours with food (Maximum of 3000 mg/day)  Prescription Medication as directed: Cyclobenzaprine  Lumbar facet joint corticosteroid injection ordered to be completed for improvement of pain/discomfort  Follow up in 6-8 weeks if no improvement of symptoms for further evaluation/medical care (sooner if needed; call direct clinic number [[phone]] at any time with questions/concerns)          Follow-ups after your visit        Additional Services     PHYSICAL THERAPY REFERRAL       Diagnosis: Lumbar spinal stenosis/lumbar facet arthropathy/lumbar degenerative disc disease/lumbar disc bulge    Treatment: Formal physical therapy - exercises to include trunk/core stabilization exercises (abdominals/back extensors), joint mobilizations, and use of modalities (ie ice, ultrasound, electrical stimulation, etc.) as needed with home exercise prescription.                  Your next 10 appointments already scheduled     Dec 28, 2018 11:45 AM CST   (Arrive by 11:30 AM)   Return Liver Transplant with MD BRITTA Barillas [facility] ([facility]    [address]  [address]  [facility] [id number] [phone]              Future tests that were ordered for you today     Open Future Orders        Priority Expected Expires Ordered    XR Lum/Sac Facet Injection Single Lvl Routine 1/15/2018 1/15/2019 1/15/2018            Who to contact     Please call your clinic at [phone] to:    Ask questions about your health    Make or cancel appointments    Discuss your medicines    Learn about your test results    Speak to your doctor   If you have compliments or concerns about an experience at your clinic, or if you wish to file a complaint, please contact [facility] Patient Relations at [phone] or email us at [email or web address]         Additional Information About Your Visit        [employer] Information     [employer] gives you secure access to your electronic health record. If you see a primary care provider, you can also send messages to your care team and make appointments. If you have questions, please call your primary care clinic.  If you do not have a primary care provider, please call [phone] and they will assist you.      [employer] is an electronic gateway that provides easy, online access to your medical records. With [employer], you can request a clinic appointment, read your test results, renew a prescription or communicate with your care team.     To access your existing account, please contact your [facility] or call [phone] for assistance.        Care EveryWhere ID     This is your Care EveryWhere ID. This could be used by other organizations to access your [facility] medical records  [id number]        Your Vitals Were     Pulse Height Pulse Oximetry BMI (Body Mass Index)          65 1.702 m (5' 7\") 94% 31.26 kg/m2         Blood Pressure from Last 3 Encounters:   01/15/18 (!) 159/95   12/29/17 (!) 165/96   12/19/16 135/84    " Weight from Last 3 Encounters:   01/15/18 90.5 kg (199 lb 9.6 oz)   12/29/17 90 kg (198 lb 6.4 oz)   04/17/17 94.7 kg (208 lb 11.2 oz)              We Performed the Following     PHYSICAL THERAPY REFERRAL          Today's Medication Changes          These changes are accurate as of: 1/15/18 10:17 AM.  If you have any questions, ask your nurse or doctor.               Start taking these medicines.        Dose/Directions    cyclobenzaprine 5 MG tablet   Commonly known as:  FLEXERIL   Used for:  Lumbar degenerative disc disease, Lumbar facet arthropathy, Chronic left-sided low back pain with left-sided sciatica   Started by:  Gio Wang, DO        Take 1-2 tablets as needed at night   Quantity:  45 tablet   Refills:  0         These medicines have changed or have updated prescriptions.        Dose/Directions    desonide 0.05 % ointment   Commonly known as:  DESOWEN   This may have changed:    - how to take this  - additional instructions   Used for:  Psoriasis        Use on the face daily   Quantity:  60 g   Refills:  1       * fluocinonide 0.05 % solution   Commonly known as:  LIDEX   This may have changed:  additional instructions   Used for:  Psoriasis        Use to the scalp twice a day   Quantity:  60 mL   Refills:  3       * fluocinonide 0.05 % ointment   Commonly known as:  LIDEX   This may have changed:    - how to take this  - additional instructions   Used for:  Psoriasis        Use on the body twice a day to red scaly areas   Quantity:  60 g   Refills:  3       levothyroxine 125 MCG tablet   Commonly known as:  SYNTHROID/LEVOTHROID   This may have changed:  additional instructions   Used for:  Liver replaced by transplant (H)        Dose:  125 mcg   Take 1 tablet (125 mcg) by mouth daily   Quantity:  30 tablet   Refills:  4       tiotropium 18 MCG capsule   Commonly known as:  SPIRIVA HANDIHALER   This may have changed:    - how to take this  - when to take this  - additional instructions   Used for:   Chronic obstructive pulmonary disease, unspecified COPD type (H)        Inhale contents of one capsule daily.   Quantity:  30 capsule   Refills:  6       * Notice:  This list has 2 medication(s) that are the same as other medications prescribed for you. Read the directions carefully, and ask your doctor or other care provider to review them with you.         Where to get your medicines      These medications were sent to [id number] - [place], [place] - [address]  [address], [facility] [address]     Phone:  [phone]     cyclobenzaprine 5 MG tablet                Primary Care Provider    Jarad Bocanegra MD       No address on file        Equal Access to Services     [facility]: Hadii kimberley Grant, waaxda luqadaha, qaybta kaalmada harvey, jodi rod . So [facility] [phone].    ATENCIÓN: Si habla español, tiene a clemens disposición servicios gratuitos de asistencia lingüística. Llame al [phone].    We comply with applicable federal civil rights laws and [place] laws. We do not discriminate on the basis of race, color, national origin, age, disability, sex, sexual orientation, or gender identity.            Thank you!     Thank you for choosing [facility] PHYSICAL MEDICINE AND REHABILITATION  for your care. Our goal is always to provide you with excellent care. Hearing back from our patients is one way we can continue to improve our services. Please take a few minutes to complete the written survey that you may receive in the mail after your visit with us. Thank you!             Your Updated Medication List - Protect others around you: Learn how to safely use, store and throw away your medicines at www.disposemymeds.org.          This list is accurate as of: 1/15/18 10:17 AM.  Always use your most recent med list.                   Brand Name Dispense Instructions for use Diagnosis    albuterol 108 (90 BASE)  MCG/ACT Inhaler    PROAIR HFA/PROVENTIL HFA/VENTOLIN HFA    3 Inhaler    Inhale 2 puffs into the lungs every 4 hours as needed for shortness of breath / dyspnea or wheezing    Chronic obstructive pulmonary disease, unspecified COPD type (H)       calcium carbonate-vitamin D 500-400 MG-UNIT Tabs per tablet     180 tablet    Take 1 tablet by mouth 2 times daily    Liver replaced by transplant (H)       cyclobenzaprine 5 MG tablet    FLEXERIL    45 tablet    Take 1-2 tablets as needed at night    Lumbar degenerative disc disease, Lumbar facet arthropathy, Chronic left-sided low back pain with left-sided sciatica       desonide 0.05 % ointment    DESOWEN    60 g    Use on the face daily    Psoriasis       * fluocinonide 0.05 % solution    LIDEX    60 mL    Use to the scalp twice a day    Psoriasis       * fluocinonide 0.05 % ointment    LIDEX    60 g    Use on the body twice a day to red scaly areas    Psoriasis       levothyroxine 125 MCG tablet    SYNTHROID/LEVOTHROID    30 tablet    Take 1 tablet (125 mcg) by mouth daily    Liver replaced by transplant (H)       losartan 50 MG tablet    COZAAR    90 tablet    TAKE ONE TABLET BY MOUTH EVERY DAY    Liver replaced by transplant (H)       magnesium oxide 400 MG tablet    MAG-OX    120 tablet    Take 2 tablets (800 mg) by mouth 2 times daily    PTLD (post-transplant lymphoproliferative disorder) (H), Liver replaced by transplant (H)       metoprolol tartrate 100 MG tablet    LOPRESSOR    60 tablet    Take 1 tablet (100 mg) by mouth 2 times daily    Liver replaced by transplant (H)       omeprazole 20 MG tablet     30 tablet    Take 1 tablet (20 mg) by mouth daily    Liver replaced by transplant (H)       * tacrolimus 0.5 MG capsule    GENERIC EQUIVALENT    60 capsule    TAKE ONE CAPSULE IN THE EVENING (MORNING DOSE 1 MG AND EVENING DOSE 1.5 MG)    Liver replaced by transplant (H)       * tacrolimus 1 MG capsule    GENERIC EQUIVALENT    60 capsule    Take 1 capsule (1 mg)  by mouth every 12 hours    Liver replaced by transplant (H)       tiotropium 18 MCG capsule    SPIRIVA HANDIHALER    30 capsule    Inhale contents of one capsule daily.    Chronic obstructive pulmonary disease, unspecified COPD type (H)       * Notice:  This list has 4 medication(s) that are the same as other medications prescribed for you. Read the directions carefully, and ask your doctor or other care provider to review them with you.

## 2018-01-15 NOTE — PATIENT INSTRUCTIONS
We addressed the following today:    1. Left low back pain with radiation  2. Lumbar arthritis  3. Lumbar spinal stenosis  4. Lumbar disc bulge    Activity modification as discussed  Physical therapy: External  Topical Treatments: Ice or heat  Over the counter medication: Acetaminophen (Tylenol) 1000 mg every 6 hours with food (Maximum of 3000 mg/day)  Prescription Medication as directed: Cyclobenzaprine  Lumbar facet joint corticosteroid injection ordered to be completed for improvement of pain/discomfort  Follow up in 6-8 weeks if no improvement of symptoms for further evaluation/medical care (sooner if needed; call direct clinic number [[phone]] at any time with questions/concerns)

## 2018-01-15 NOTE — LETTER
"1/15/2018       RE: Ricki Sharp  [address]  [facility] [address]     Dear Colleague,    Thank you for referring your patient, Ricki Sharp, to the [facility] PHYSICAL MEDICINE AND REHABILITATION at [facility]. Please see a copy of my visit note below.    [facility] Physical Medicine & Rehabilitation Clinic Note  Clinic Visit s Daniel 15, 2018    Subjective:  Ricki Sharp is a 67 year old male who is seen in consultation at the request of Dr. Bocanegra for evaluation of left-sided low back pain with intermittent radiation to the left lower extremity.    Symptoms began in March 2017 after stumbling off a step at that time.  Reports low back pain that is located left-sided with intermittent radiation present (left lateral thigh to the left knee).  Symptoms are generally worse in the morning and with standing activities and better with bending forward.  Treatment has consisted of Ibuprofen, heat, aqua therapy, and formal physical therapy ([facility] - April - July 2017 - 9 sessions).  Associated symptoms include denies any bowel/bladder dysfunction or saddle anesthesia.  Denies any numbness/tingling/weakness of the lower extremities.  Denies any previous low back injuries/surgeries.  History of bilateral total hip arthroplasties, most recently on the left with Dr. Fisher at the [facility].    Patient's past medical, surgical, social, and family histories are reviewed today.  Significant medical history as noted above  History reviewed. No pertinent past medical history.    Review of Systems:  Constitutional: NEGATIVE for fever, chills, or change in weight  Skin: NEGATIVE for worrisome rashes, moles, or lesions  Neuro: NEGATIVE for weakness of the lower extremities  MSK: see HPI    Objective:  BP (!) 159/95 (BP Location: Right arm, Patient Position: Sitting, Cuff Size: Adult Regular)  Pulse 65  Ht 1.702 m (5' 7\")  Wt 90.5 kg (199 lb 9.6 " oz)  SpO2 94%  BMI 31.26 kg/m2  General: healthy, alert, obese, and in no distress  Skin: no suspicious lesions or rashes  Psych: mentation appears normal and affect normal/bright  HEENT: no scleral icterus  CV: no pedal edema  Resp: normal respiratory effort without conversational dyspnea   Neuro: sensory exam is within normal limits.  Motor strength as noted below  Lymph: no palpable lymphadenopathy    MSK:    THORACIC/LUMBAR SPINE  Inspection:    No redness, swelling, overlying skin change, or gross deformity/asymmetry  Palpation:    Tender about the lower lumbar spinous processes    No tenderness over the left para lumbar muscles, right para lumbar muscles, left SI joint, right SI joint, left sciatic notch, or right sciatic notch  Range of Motion:     Lumbar flexion within normal limits    Lumbar extension limited by pain  Strength:    5/5 - quadriceps, hamstrings, tibialis anterior, gastrocsoleus, and extensor hallicus longus  Special Tests:    Positive: Straight leg raise (left), slump test (left), DAVID (left - lateral), and Gaenslen's test (left)    Negative: femoral stretch test (left) and SI joint compression (bilateral)    LEFT HIP  Palpation:    Tender about the greater trochanter    No tenderness over the gluteus minimus/medius  Passive Range of Motion:    Flexion within normal limits / IR limited slightly by pain / ER limited slightly by pain  Strength:    Flexion 5/5 / abduction 5/5  Special Tests:    Positive: None    Negative: Logroll, resisted gluteus medius provocation, and passive ER with hip flexion (Drehman's)    Imaging:  No x-rays indicated during today's visit  Previous films were reviewed today, independent visualization of images was performed, and results were discussed with the patient  MRI of the Lumbar Spine without Contrast - 11/2017  Impression:  1. Trace retrolisthesis of L1 on L2 and L2 on L3 are seen.   2. Marrow signal is notable for Modic type III change most predominant at  L2-3.  3. Degenerative disk disease includes diffuse desiccation and variable disc height loss, severe at L2-3.  4. L1-2, there is a moderate diffuse disc bulge with mild facet and ligamentum flavum hypertrophy. Spinal stenosis is mild to moderate. Foraminal stenoses are mild.  5. L2-3, there is a moderate diffuse disc bulge with moderate facet and ligamentum flavum hypertrophy. Spinal stenosis is moderate, accentuated by local lipomatosis. Foraminal stenoses are mild to moderate.  6. L3-4, there is a moderate diffuse disc bulge with a superimposed left foraminal protrusion/extrusion. There is moderate facet and ligamentum flavum hypertrophy. Spinal stenosis is moderate to severe. Foraminal stenoses are mild on the right and severe on the left.  7. L4-5, there is a moderate diffuse disc bulge with moderate to severe facet and ligamentum flavum hypertrophy. Spinal stenosis is mild to moderate. Foraminal stenoses are mild on the right and mild to moderate on the left.  8. L5-S1, there is a mild lobulated diffuse disc bulge with severe facet hypertrophy. Spinal stenosis is mild. Foraminal stenoses are mild to moderate.  9. Mild bilateral SI joint degenerative changes are present.    ASSESSMENT:  1.  Chronic left-sided low back pain with intermittent left-sided sciatica   2.  Lumbar facet arthropathy/degenerative disc disease  3.  Lumbar spinal stenosis  4.  Lumbar disc bulge    PLAN:  1.  Return to formal physical therapy - exercises to include trunk/core stabilization exercises (abdominals/back extensors), joint mobilizations, and use of modalities (ie ice, ultrasound, electrical stimulation, etc.) as needed with home exercise prescription.  2.  Flexeril as needed as prescribed at night for further treatment purposes.  3.  Acetaminophen/ice/heat as needed for improved pain control.  4.  Left L5-S1 lumbar facet joint corticosteroid injection ordered for improvement of pain/discomfort.  5.  Follow-up in 6-8 weeks for  further evaluation/medical care.  If symptoms resolve completely, can follow-up as needed.  Consider initiation of Gabapentin, a left L3-4 transforaminal epidural corticosteroid injection, a left sacroiliac joint corticosteroid injection, lumbar medial branch blocks with progression to lumbar radiofrequency ablation, EMG/NCV of the left lower extremity, spine surgery referral, etc. as deemed appropriate moving forward.     I spent a total of 40 minutes face-to-face with the patient during today's office visit.  See office note for details.    Gio Wang DO, MAMIEM    Department of Rehabilitation Medicine

## 2018-01-15 NOTE — Clinical Note
Dr. Bocanegra,  Thank you for the referral.  I wanted to introduce myself as I am a new Sports Medicine/PM&R physician that started with the Department of Rehabilitation Medicine in December 2017.  I wanted to let you know that I am happy to see patients of yours for nonsurgical orthopedic/sports medicine/musculoskeletal medicine conditions including peripheral joint abnormalities/disorders, nonoperative spine care, etc.  I also wanted to let you know that I am able to do lumbar interventional procedures, ultrasound guided corticosteroid/PRP/viscosupplementation injections, etc, thus if you have any patients in need of the care above in the future, please feel free to refer them my direction as I would be happy to help in the care of any of your patients.  Thanks and have a great day!  Sincerely,  Gio Wang

## 2018-01-17 ENCOUNTER — COMMUNICATION - GICH (OUTPATIENT)
Dept: INTERNAL MEDICINE | Facility: OTHER | Age: 68
End: 2018-01-17

## 2018-01-17 DIAGNOSIS — F10.10 UNCOMPLICATED ALCOHOL ABUSE: ICD-10-CM

## 2018-01-18 ENCOUNTER — AMBULATORY - GICH (OUTPATIENT)
Dept: RADIOLOGY | Facility: OTHER | Age: 68
End: 2018-01-18

## 2018-01-18 ENCOUNTER — AMBULATORY - GICH (OUTPATIENT)
Dept: PHYSICAL THERAPY | Facility: OTHER | Age: 68
End: 2018-01-18

## 2018-01-18 DIAGNOSIS — M51.26 OTHER INTERVERTEBRAL DISC DISPLACEMENT, LUMBAR REGION: ICD-10-CM

## 2018-01-18 DIAGNOSIS — M54.42 LOW BACK PAIN WITH LEFT-SIDED SCIATICA: ICD-10-CM

## 2018-01-18 DIAGNOSIS — M51.369 OTHER INTERVERTEBRAL DISC DEGENERATION, LUMBAR REGION: ICD-10-CM

## 2018-01-18 DIAGNOSIS — G89.29 OTHER CHRONIC PAIN: ICD-10-CM

## 2018-01-18 DIAGNOSIS — M12.88 OTHER SPECIFIC ARTHROPATHIES, NOT ELSEWHERE CLASSIFIED, OTHER SPECIFIED SITE: ICD-10-CM

## 2018-01-18 DIAGNOSIS — M48.062 SPINAL STENOSIS OF LUMBAR REGION WITH NEUROGENIC CLAUDICATION: ICD-10-CM

## 2018-01-21 ENCOUNTER — COMMUNICATION - GICH (OUTPATIENT)
Dept: INTERNAL MEDICINE | Facility: OTHER | Age: 68
End: 2018-01-21

## 2018-01-21 DIAGNOSIS — I10 ESSENTIAL (PRIMARY) HYPERTENSION: ICD-10-CM

## 2018-01-22 ENCOUNTER — TRANSFERRED RECORDS (OUTPATIENT)
Dept: HEALTH INFORMATION MANAGEMENT | Facility: CLINIC | Age: 68
End: 2018-01-22

## 2018-01-22 ENCOUNTER — HOSPITAL ENCOUNTER (OUTPATIENT)
Dept: PHYSICAL THERAPY | Facility: OTHER | Age: 68
Setting detail: THERAPIES SERIES
End: 2018-01-22

## 2018-01-22 DIAGNOSIS — M54.42 LOW BACK PAIN WITH LEFT-SIDED SCIATICA: ICD-10-CM

## 2018-01-22 DIAGNOSIS — M51.369 OTHER INTERVERTEBRAL DISC DEGENERATION, LUMBAR REGION: ICD-10-CM

## 2018-01-22 DIAGNOSIS — M12.88 OTHER SPECIFIC ARTHROPATHIES, NOT ELSEWHERE CLASSIFIED, OTHER SPECIFIED SITE: ICD-10-CM

## 2018-01-22 DIAGNOSIS — M51.26 OTHER INTERVERTEBRAL DISC DISPLACEMENT, LUMBAR REGION: ICD-10-CM

## 2018-01-22 DIAGNOSIS — M48.062 SPINAL STENOSIS OF LUMBAR REGION WITH NEUROGENIC CLAUDICATION: ICD-10-CM

## 2018-01-22 DIAGNOSIS — G89.29 OTHER CHRONIC PAIN: ICD-10-CM

## 2018-01-23 ENCOUNTER — TRANSFERRED RECORDS (OUTPATIENT)
Dept: HEALTH INFORMATION MANAGEMENT | Facility: CLINIC | Age: 68
End: 2018-01-23

## 2018-01-23 ENCOUNTER — HOSPITAL ENCOUNTER (OUTPATIENT)
Dept: RADIOLOGY | Facility: OTHER | Age: 68
End: 2018-01-23

## 2018-01-23 DIAGNOSIS — M54.42 LOW BACK PAIN WITH LEFT-SIDED SCIATICA: ICD-10-CM

## 2018-01-23 DIAGNOSIS — M12.88 OTHER SPECIFIC ARTHROPATHIES, NOT ELSEWHERE CLASSIFIED, OTHER SPECIFIED SITE: ICD-10-CM

## 2018-01-23 DIAGNOSIS — G89.29 OTHER CHRONIC PAIN: ICD-10-CM

## 2018-01-24 ENCOUNTER — DOCUMENTATION ONLY (OUTPATIENT)
Dept: TRANSPLANT | Facility: CLINIC | Age: 68
End: 2018-01-24

## 2018-01-25 VITALS
WEIGHT: 202 LBS | SYSTOLIC BLOOD PRESSURE: 140 MMHG | DIASTOLIC BLOOD PRESSURE: 100 MMHG | HEART RATE: 84 BPM | BODY MASS INDEX: 31.39 KG/M2 | DIASTOLIC BLOOD PRESSURE: 90 MMHG | SYSTOLIC BLOOD PRESSURE: 152 MMHG | BODY MASS INDEX: 32.12 KG/M2 | SYSTOLIC BLOOD PRESSURE: 120 MMHG | WEIGHT: 200 LBS | HEIGHT: 67 IN | HEART RATE: 66 BPM | DIASTOLIC BLOOD PRESSURE: 82 MMHG

## 2018-01-25 VITALS
SYSTOLIC BLOOD PRESSURE: 148 MMHG | HEART RATE: 52 BPM | BODY MASS INDEX: 33.13 KG/M2 | WEIGHT: 208.4 LBS | DIASTOLIC BLOOD PRESSURE: 82 MMHG

## 2018-01-25 VITALS
HEIGHT: 67 IN | DIASTOLIC BLOOD PRESSURE: 70 MMHG | BODY MASS INDEX: 31.45 KG/M2 | SYSTOLIC BLOOD PRESSURE: 132 MMHG | HEART RATE: 96 BPM | WEIGHT: 200.4 LBS

## 2018-01-25 VITALS
HEART RATE: 64 BPM | WEIGHT: 209 LBS | BODY MASS INDEX: 32.8 KG/M2 | HEIGHT: 67 IN | DIASTOLIC BLOOD PRESSURE: 84 MMHG | SYSTOLIC BLOOD PRESSURE: 152 MMHG

## 2018-01-26 ENCOUNTER — HOSPITAL ENCOUNTER (OUTPATIENT)
Dept: PHYSICAL THERAPY | Facility: OTHER | Age: 68
Setting detail: THERAPIES SERIES
End: 2018-01-26

## 2018-01-26 ASSESSMENT — ANXIETY QUESTIONNAIRES
GAD7 TOTAL SCORE: 0

## 2018-01-26 ASSESSMENT — PATIENT HEALTH QUESTIONNAIRE - PHQ9
SUM OF ALL RESPONSES TO PHQ QUESTIONS 1-9: 0

## 2018-01-29 ENCOUNTER — HOSPITAL ENCOUNTER (OUTPATIENT)
Dept: PHYSICAL THERAPY | Facility: OTHER | Age: 68
Setting detail: THERAPIES SERIES
End: 2018-01-29

## 2018-02-01 ENCOUNTER — HOSPITAL ENCOUNTER (OUTPATIENT)
Dept: PHYSICAL THERAPY | Facility: OTHER | Age: 68
Setting detail: THERAPIES SERIES
End: 2018-02-01

## 2018-02-01 PROCEDURE — 97140 MANUAL THERAPY 1/> REGIONS: CPT | Mod: GP

## 2018-02-01 PROCEDURE — 97110 THERAPEUTIC EXERCISES: CPT | Mod: GP

## 2018-02-05 ENCOUNTER — HOSPITAL ENCOUNTER (OUTPATIENT)
Dept: PHYSICAL THERAPY | Facility: OTHER | Age: 68
Setting detail: THERAPIES SERIES
End: 2018-02-05

## 2018-02-05 PROCEDURE — 97140 MANUAL THERAPY 1/> REGIONS: CPT | Mod: GP

## 2018-02-05 PROCEDURE — 97110 THERAPEUTIC EXERCISES: CPT | Mod: GP

## 2018-02-06 ENCOUNTER — DOCUMENTATION ONLY (OUTPATIENT)
Dept: FAMILY MEDICINE | Facility: OTHER | Age: 68
End: 2018-02-06

## 2018-02-08 ENCOUNTER — DOCUMENTATION ONLY (OUTPATIENT)
Dept: FAMILY MEDICINE | Facility: OTHER | Age: 68
End: 2018-02-08

## 2018-02-12 ENCOUNTER — HOSPITAL ENCOUNTER (OUTPATIENT)
Dept: PHYSICAL THERAPY | Facility: OTHER | Age: 68
Setting detail: THERAPIES SERIES
End: 2018-02-12
Attending: INTERNAL MEDICINE
Payer: MEDICARE

## 2018-02-12 PROCEDURE — 40000185 ZZHC STATISTIC PT OUTPT VISIT

## 2018-02-12 PROCEDURE — 97110 THERAPEUTIC EXERCISES: CPT | Mod: GP

## 2018-02-12 PROCEDURE — 97140 MANUAL THERAPY 1/> REGIONS: CPT | Mod: GP

## 2018-02-12 NOTE — TELEPHONE ENCOUNTER
Patient Information     Patient Name MRN Ricki Miller [id number] Male 1950      Telephone Encounter by Seda Mcdonough at 2017  2:35 PM     Author:  Seda Mcdonough Service:  (none) Author Type:  (none)     Filed:  2017  2:35 PM Encounter Date:  2017 Status:  Signed     :  Seda Mcdonough            Patient notified and will call and make an appointment.  Seda Mcdonough LPN ....................  2017   2:35 PM

## 2018-02-12 NOTE — TELEPHONE ENCOUNTER
Patient Information     Patient Name MRN Ricki Miller [id number] Male 1950      Telephone Encounter by Pauline Arias RN at 2018  2:28 PM     Author:  Pauline Arias RN Service:  (none) Author Type:  NURS- Registered Nurse     Filed:  2018  2:33 PM Encounter Date:  2018 Status:  Signed     :  Pauline Arias RN (NURS- Registered Nurse)            [facility] pharmacy and pt request refill Rxs for fluocinonide (Lidex) and omeprazole (Prilosec).    High Potency Topical Creams    Office visit in the past 6 months.    Last visit with LEANN DESOUZA was on: 2017 in [place] [facility]  Next visit with LEANN DESOUZA is on: No future appointment listed with this provider  Next visit with Internal Medicine is on: No future appointment listed in this department    Max refill for 6 months from last office visit.    Proton Pump Inhibitors    Office visit in the past 12 months or per provider note.    Last visit with LEANN DESOUZA was on: 2017 in [facility]  Next visit with LEANN DESOUZA is on: No future appointment listed with this provider  Next visit with Internal Medicine is on: No future appointment listed in this department    Max refill for 12 months from last office visit or per provider note.    This RN declined / refused Rx refill request for omeprazole due to request too soon.  Rx was written on 17 Qty 180 Refill 2.    Unable to complete prescription refill per RN Medication Refill Policy.................... Pauline Arias RN ....................  2018   2:32 PM            This RN honored Rx refill request for fluocinonide.    Prescription refilled per RN Medication Refill Policy.................... Pauline Arias RN ....................  2018   2:32 PM

## 2018-02-12 NOTE — TELEPHONE ENCOUNTER
Patient Information     Patient Name MRN Sex Ricki Laboy [id number] Male 1950      Telephone Encounter by Pauline Goldstein RN at 2017  2:42 PM     Author:  Pauline Goldstein RN Service:  (none) Author Type:  NURS- Registered Nurse     Filed:  2017  3:03 PM Encounter Date:  2017 Status:  Signed     :  Pauline Goldstein RN (NURS- Registered Nurse)            Bronchodilator Inhalers     Office visit in the past 12 months.    Last visit with LEANN DESOUZA was on: 2017 in [facility]  Next visit with LEANN DESOUZA is on: No future appointment listed with this provider  Next visit with Internal Med Pediatrics is on: No future appointment listed in this department    Max refills 12 months from last office visit.      Patient is due for medication management appointment. Limited refill provided at this time. [employer] message and/or letter sent for reminder to patient. Prescription refilled per RN Medication Refill Policy.................... Pauline Goldstein RN ....................  2017   3:02 PM

## 2018-02-13 NOTE — PROGRESS NOTES
Patient Information     Patient Name MRN Ricki Miller [id number] Male 1950      Progress Notes by Umu Gerardo R.T. (ARRT) at 2018 10:39 AM     Author:  Umu Gerardo R.T. (ARRT) Service:  (none) Author Type:  [employer] - Registered Radiologic Technologist     Filed:  2018 10:39 AM Date of Service:  2018 10:39 AM Status:  Signed     :  Umu Gerardo R.T. (ARRT) (Formerly [facility] - Registered Radiologic Technologist)            RECOVERY TIME  You may experience numbness and/or relief of your pain for up to 4-6 hours after the injection.  Your usual symptoms may return the night of the procedure and may possible be more severe than usual a day or two following.  Please keep track of your pain over the next several days and report how long the relief lasts to the doctor who referred you for this procedure.    The beneficial effects of the steroids usually require 2 to 3 days to take effect, buy may take as long as 5 to 7 days.  If there is no change in the pain, then investigation can be focused on other possible sources of your pain.  In either case, the information is useful to the doctor who referred you for this procedure.    POSSIBLE SIDE EFFECTS  Facial flushing (redness), occasional low grade fevers of 99.5F or less, hiccups, insomnia, headaches, increased heart rate, abdominal cramping, and/or a bloating feeling are side effects of the steroid medications and will go away 3 to 4 days after the injection.    Diabetic Patients  The steroids you have received may significantly increase your blood sugar levels.  Monitor your blood sugar level closely (4-6 times per day) for a period of 4 days or until your blood sugar level normalizes.  If your blood sugar level elevates significantly or you experience confusion, dizziness, sweating, please notify our primary physician and make him/her aware that you have received steroids.

## 2018-02-13 NOTE — INITIAL ASSESSMENTS
Patient Information     Patient Name MRN Sex Ricki Laboy [id number] Male 1950      Initial Assessments by Hang Covarrubias PT at 2018  1:00 PM     Author:  Hang Covarrubias PT  Service:  (none) Author Type:  PT- Physical Therapist     Filed:  2018 10:38 AM  Date of Service:  2018  1:00 PM Status:  Addendum     :  Hang Covarrubias PT (PT- Physical Therapist)        Related Notes: Original Note by Hang Covarrubias PT (PT- Physical Therapist) filed at 2018 10:33 AM            [facility]  Outpatient PT - Initial Evaluation  Spine Eval    Date of Service: 2018     Visit 1/10    Patient Name: Ricki Sharp   YOB: 1950   Referring MD/Provider: Dr. Gio Wang  Medical and Treatment Diagnosis: LUMBAR SPINAL STENOSIS, LUMBAR FACET ARTHROPLASTY/LUMBAR DEGENERATIVE DISC DISEASE/LUMBAR DISC BULGE   PT Treatment Diagnosis: Impaired mobility, decreased activity tolerance, weakness  Insurance: Medicare: G Codes/C Modifiers at Initial Assessment:  PT MOBILITY G CODE  [] Mobility current status: CK - At least 40 percent but less than 60 percent impaired, limited or restricted (18 1300)  [] Mobility goal status: CI - At least 1 percent but less than 20 percent impaired, limited or restricted (18 1300)  Start of Service: 18  Certification Dates: Start of Service: 18  Medicare/MA Re-Cert Due: 18     Precautions:  Be cautious of hip precautions   Cognition:  Oriented to Person, Place, and Time.     Were cultural / age or other special adaptations needed? No      Patient is a vulnerable adult: No      Patient is aware of diagnosis: Yes      Risks and benefits explained: Yes    Subjective      History: Patient is a 67 year old male referred to physical therapy for low back pain. Patient reports that it all started back in 2016 when he slipped off of a step where he thinks landed on his left hip. Both  "hips have been replaced. Since that time, he has been dealing with some back pain. He was referred to Dr. Gio Wang who practices with [facility] in [place]. Patient reports that he was told he has one or two bulging discs in his back pushing up against nerves and that he has arthritis in the back. He has an injection scheduled for tomorrow for this back pain. He is also taking Flexeril at night to help with the pain. Does report that heat seems to help with the discomfort. Does report at times he gets some pain into the hip and symptoms down to his feet from time to time.       Rates pain: 8/10  Describes pain: achy, \"deep tooth ache\"   Locates pain: primarily left side low back, at times going into left     Current functional limitations: walking longer distance, up/down stairs    Prior Level:  Minimal to no difficulty completing the above functional activities.     Past Medical History:     Diagnosis  Date     Anxiety 6/3/2014     Aortic stenosis, mild     ECHO       Benito esophagus      COPD (chronic obstructive pulmonary disease) (HC)      Diabetes mellitus type II      Diffuse large B cell lymphoma (HC)     Undergoing Rituxan/R-CHOP      End-stage liver disease ()     secondary to Hepatitis C and alcoholic cirrhosis. (Dr. Mike Gray, [facility]).  Status post  donor liver transplant in 2008.      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 coli bacteremia, [facility]       COLONOSCOPY      " normal        colonoscopy 9/24/12       DRAIN CARE  03/08    Status post bilateral thorascopic empyema drainage and decortication procedures.       ECHO COMPLETE WO CONTRAST  09/07    ([facility]), within normal limits, LVEF 55%       ECHOCARDIOGRAM  07/06    Transthoracic echocardiogram with left ventricular ejection fraction of 60%, no wall motion abnormality, left atrial enlargement, no pericardial effusion, mild aortic stenosis with aortic valve area of 1.7 cm2       ENDOSCOPIC PANCREATOSCOPY  05/06    Endoscopic retrograde cholangiopancreatography, [facility]        ERCP       ERCP  09/2008    Cholangitis secondary to biliary stricture status post ERCP and stenting       HIP REPLACEMENT      Right       HIP REPLACEMENT  03/06    Right total hip arthroplasty        HIP REPLACEMENT Left 2016     LIVER SURGERY  03/08    Status post liver transplant       OTHER  1996    Banding of esophageal varices, [facility]        PARACENTESIS  06/06 June 23, 2006;July 07, 2006.       CO ESOPHAG XSECT/REPAIR VARICES       CO EXTRAPLEURAL REMOVE EMPYEMA      Bilateral       CO TRANSPLANT LIVER ALLO TRANSPLANT  2008     SCLEROTHERAPY  1995    Sclerosing esophageal varices, [facility]        STRESS TEST EXERCISE  11/06    Exercise stress echo, within normal limits       WRIST FRACTURE TX  2000    ORIF Left       Occupation:  retired    Previous Treatment:    Pain Meds / Anti-inflammatory Meds - Yes, Flexeril  Physical Therapy - Yes, with limited results  Injections - Will be having one tomorrow  Surgery - No   Pain Clinic - No    Diagnostics:  Reviewed (see chart)   Current Medications:  Reviewed (see chart)    Drug Allergies:  Reviewed (see chart)  ?   Latex Allergy:  No        Objective    Items left blank indicate that the test was inappropriate or not meaningful at the time of evaluation and therefore not performed.    Fall Risk Screening: No risk factors  identified    ROM/Strength:     Cerv Thor Lumbar Myotomes    L    R     L    R   Flexion   Min limited C4 Shoulder Elev.   L2 Hip Flexion 4/5 5/5   Extension   Min limited  C5 Shoulder Abd.   L3 Knee Ext.      5/5 5/5   Left Lat. Flex   WFL C6 Elbow Flexion   L4 Ankle DF 5/5 5/5   Right Lat. Flex   Mod limited pain on left C7 Elbow Ext.   L5 1st MTP Ext.     Left Rotation   WFL C8 Finger Flex   S1 Ankle P.F.     Right Rotation   WFL T1 Finger Abd.   S2 Knee Flexion 5/5 5/5          Hip Abduction 5/5 5/5          Ext. Rotation       Observation:  Patient very slow moving today, cautious of certain positions    Palpation: Tension and discomfort noted on left lumbar paraspinals near L3-L5. Patient also had some discomfort going into his left gluteal muscles with moderate palpation however felt symmetrical bilaterally    Gait:  Slower gait pattern. Lack of knee and hip flexion during swing phase    Special Tests:    Sensation: intact to light touch  Slump: positive on left  SLR: approximately 110 degrees bilaterally. More discomfort on left LE  No foot drop noted.     Today's Intervention:    Evaluation  Education on physical therapy, lumbar and neural anatomy, chronic pain, and benefits of exercise for low back pain  Therapeutic exercise:    Lower trunk rotation: 2 minutes   Repeated lumbar extension: 2x10 - no major changes in symptoms at this time, however patient demonstrated improved motion in lumbar spine particularly into extension   Seated sciatic nerve glide: 2x10 bilaterally    Manual therapy: STM to lumbar paraspinals, QL, piriformis and gluteal muscles, L>R. Passive sciatic neural flossing in supine position.     Plan for next visit: Assess response to HEP and treatment today. Progress into more core strengthening.     Home Exercise Program:    Lower trunk rotation: 2 minutes, 2-3 x daily  Repeated lumbar extension: 1x10, 6-8 times dialy  Seated sciatic nerve glide: 2x10 bilaterally, 3x  "daily    Assessment    Therapist Assessment / Clinical Impression:  Signs and symptoms consistent with chronic low back pain with radicular symptoms. According to radiology report of his most recent MRI, patient has a \"focal left foraminal protrusion at L3-L4\". He had no major changes in symptoms with treatment today but will promote more core strengthening and stability exercises in upcoming visits. Due to the pain, he is demonstrating deficits in his mobility, strength, and endurance. He would benefit from physical therapy services in order to improve these limitations.    Functional Impairment(s): See subjective on initial evaluation and Functional Assessment from PSFS.    Physical Impairment(s):  Impaired mobility, weakness, decreased activity tolerance, back pain    Patient Specific Functional and Pain Scales (PSFS):    Clinician Instructions: Complete after the history and before the exam.    Initial Assessment: We want to know what 3 activities in your life you are unable to perform, or are having the most difficulty performing, as a result of your chief problem. Please list and score at least 3 activities that you are unable to perform, or having the most difficulty performing, because of your chief problem.   Patient Specific Activity Scoring Scheme (score one number for each activity):   Activity Score (0-10)  0= Unable to perform activity  10= Able to perform activity at same level as before injury or problem   1. Shoveling snow 5/10   2. Getting out of chair 4/10   3. Bending to  something from floor 4/10   4. Getting in/out of truck 4/10   5.  /10   Totals:  17/40 = 42.5 % ability which relates to 57.5% impairment    Patient verbally states that they understand that the information they have provided above is current and complete to the best of their knowledge.    Patient Specific Functional Scale Modifier Scale Conversion: (patient's modifier that correlates with pt's score on PSFS): 4-CL (60% " Impaired).    G codes and Modifier taken from pt completing a PSFS: completed at initial evaluation   Initial Primary G Code and Modifier:    Per the Patient's intake and/or assessment the Primary G Code is: Mobility .   The Patient's Impairment, Limitation or Restriction Modifier would be best described as: CK - 40% - 60% Impairment.     Goal Primary G Code and Modifier:    The Patient's G Code Goal would be: Mobility    The Patient's Impairment, Limitation or Restriction Modifier goal would be best described as: CI - 1% - 20% Impairment.       Patient Goal: To reduce his pain  Goals:  Functional Short Term Goals (4 weeks):   Patient will demonstrate compliance and independence in their HEP in order to promote return to their prior level of function.   Patient will be able to bend down to the floor and  an item under 10 lbs with pain no greater than 2/10 in order to return to prior level of function.   Patient will be able to ascend/descend 1 flight of stairs with minimal to no pain in order to improve his overall functional mobility.   Patient will be able to stand up from a chair with armrests with no increase in his pain to improve his functional mobility      Functional Long Term Goals (8 weeks):   Patient will be able to self manage symptoms at home in order to return to prior level of function.   Patient will have improved strength and mobility to get in and out of his vehicle without an increase in pain in order to return to prior level of function.   Patient will be able to complete household and yard work, such as shoveling, with no major increase in pain in order to promote return to prior level of function.     Patient participated in goal selection and understand(s) the plan of care: Yes   Patient Potential for Achieving Desired Outcome: Good      Response to Intervention:  Good response to treatment. No major changes in treatment today however no increase in pain. Understands  HEP.    Plan    Treatment Plan:      Frequency:   16 visits    Duration of Treatment: 8 weeks    Planned Interventions:    Home Exercise Program development  Therapeutic Exercise (ROM & Strengthening)  Therapeutic Activities  Neuromuscular Re-education  Manual Therapy  Ultrasound  E-Stim  Gait Training  Hot Packs / Cold Pack Modalities  Vasopneumatic Compression with Cold Therapy ( Game Ready )  Phonophoresis with Ketoprofen  Iontophoresis with Dexamethasone  Mechanical Traction    Thank you for your referral to [facility].  Please call with any questions, concerns or comments.  [phone]    The signature, of the referring medical provider, on this document indicates certification of the above prescribed plan of care and is medically necessary.        X____________________________________________________    Physician Signature                     Date  Time

## 2018-02-13 NOTE — PROGRESS NOTES
"Patient Information     Patient Name MRN Ricki Miller [id number] Male 1950      Progress Notes by Hang Covarrubias PT at 2018  1:45 PM     Author:  Hang Covarrubias PT Service:  (none) Author Type:  PT- Physical Therapist     Filed:  2018  2:42 PM Date of Service:  2018  1:45 PM Status:  Signed     :  Hang Covarrubias PT (PT- Physical Therapist)            [facility]  Outpatient PT - Daily Note    Date of Service: 2018     Visit 5/10    Patient Name: Ricki Sharp \"Bill\"  YOB: 1950   Referring MD/Provider: Dr. Gio Wang  Medical and Treatment Diagnosis: LUMBAR SPINAL STENOSIS, LUMBAR FACET ARTHROPLASTY/LUMBAR DEGENERATIVE DISC DISEASE/LUMBAR DISC BULGE   PT Treatment Diagnosis: Impaired mobility, decreased activity tolerance, weakness  Insurance: Medicare  Start of Service: 18  Certification Dates: Start of Service: 18  Medicare/MA Re-Cert Due: 18       Subjective    Current Status: Patient states that this day he was able to walk around [employer] for about 10 minutes and he was hurting by the time he was done. At this exact moment, he isn't experiencing any pain but said it could get up to a 7/10 when it gets bad. He notices that when he back does flare up, the pain isn't as intense. He also notices that he is taking less Ibuprofen     History: Patient is a 67 year old male referred to physical therapy for low back pain. Patient reports that it all started back in 2016 when he slipped off of a step where he thinks landed on his left hip. Both hips have been replaced. Since that time, he has been dealing with some back pain. He was referred to Dr. Gio Wang who practices with [facility] in [place]. Patient reports that he was told he has one or two bulging discs in his back pushing up against nerves and that he has arthritis in the back. He has an injection scheduled for tomorrow for this back pain. " "He is also taking Flexeril at night to help with the pain. Does report that heat seems to help with the discomfort. Does report at times he gets some pain into the hip and symptoms down to his feet from time to time.       Rates pain: 0/10 at the moment, when it hurts today 7/10  Describes pain: achy, \"deep tooth ache\"   Locates pain: primarily left side low back, at times going into left     Current functional limitations: walking longer distance, up/down stairs    Prior Level:  Minimal to no difficulty completing the above functional activities.       Objective    Items left blank indicate that the test was inappropriate or not meaningful at the time of evaluation and therefore not performed.    Fall Risk Screening: No risk factors identified    ROM/Strength:     Cerv Thor Lumbar Myotomes    L    R     L    R   Flexion   Min limited C4 Shoulder Elev.   L2 Hip Flexion 4/5 5/5   Extension   Min limited  C5 Shoulder Abd.   L3 Knee Ext.      5/5 5/5   Left Lat. Flex   WFL C6 Elbow Flexion   L4 Ankle DF 5/5 5/5   Right Lat. Flex   Mod limited pain on left C7 Elbow Ext.   L5 1st MTP Ext.     Left Rotation   WFL C8 Finger Flex   S1 Ankle P.F.     Right Rotation   WFL T1 Finger Abd.   S2 Knee Flexion 5/5 5/5          Hip Abduction 5/5 5/5          Ext. Rotation       Today's Intervention:    Nu step for 8 minutes on level 5  Therapeutic exercise:    Bridging with marching: x10   Clamshells with yellow theraband: 2x10   Calf Stretch: 3x15 seconds   Hamstring stretch: 2x15 seconds bilaterally   MedX Leg press: 2x10 at 120 lbs   Hamstring curls: 2x10 at 76 lbs   Kneeling ql stretch: 2x10 seconds bilaterally   Hip flexor stretch: 2x10 seconds bilaterally    Side stepping with red theraband; 2x40 feet   Knee extension: 2x10 at 50 lbs    Manual therapy: STM to lumbar paraspinals, QL, piriformis and gluteal muscles     Reviewed HEP     Plan for next visit: Assess response to HEP and treatment today. Continue with core " strengthening and LE mobility and strength.     Home Exercise Program:    Lower trunk rotation: 2 minutes, 2-3 x daily  Repeated lumbar extension: 1x10, 6-8 times dialy  Seated sciatic nerve glide: 2x10 bilaterally, 3x daily  Pelvic tilt: 2x10, 3x daily  Bridging: 2x10, 2x daily  Kneeling QL stretch: 2x15 seconds, 3x daily  Clamshell: 2x10 with yellow theraband, 2x daily    Assessment    Therapist Assessment / Clinical Impression:  Patient is continues to show improvements in his everyday activities and he is noticing more. He can feel his muscles, primarily his core getting stronger and his muscles becoming less stiff. He is tolerating more activity at therapy without an increase in pain. He would continue to benefit from physical therapy to decrease his pain and improve his strength, endurance, and mobility.     Functional Impairment(s): See subjective on initial evaluation and Functional Assessment from PSFS.    Physical Impairment(s):  Impaired mobility, weakness, decreased activity tolerance, back pain    Patient Specific Functional and Pain Scales (PSFS):    Clinician Instructions: Complete after the history and before the exam.    Initial Assessment: We want to know what 3 activities in your life you are unable to perform, or are having the most difficulty performing, as a result of your chief problem. Please list and score at least 3 activities that you are unable to perform, or having the most difficulty performing, because of your chief problem.   Patient Specific Activity Scoring Scheme (score one number for each activity):   Activity Score (0-10)  0= Unable to perform activity  10= Able to perform activity at same level as before injury or problem   1. Shoveling snow 5/10   2. Getting out of chair 4/10   3. Bending to  something from floor 4/10   4. Getting in/out of truck 4/10   5.  /10   Totals:  17/40 = 42.5 % ability which relates to 57.5% impairment    Patient verbally states that they  understand that the information they have provided above is current and complete to the best of their knowledge.    Patient Specific Functional Scale Modifier Scale Conversion: (patient's modifier that correlates with pt's score on PSFS): 4-CL (60% Impaired).    G codes and Modifier taken from pt completing a PSFS: completed at initial evaluation   Initial Primary G Code and Modifier:    Per the Patient's intake and/or assessment the Primary G Code is: Mobility .   The Patient's Impairment, Limitation or Restriction Modifier would be best described as: CK - 40% - 60% Impairment.     Goal Primary G Code and Modifier:    The Patient's G Code Goal would be: Mobility    The Patient's Impairment, Limitation or Restriction Modifier goal would be best described as: CI - 1% - 20% Impairment.       Patient Goal: To reduce his pain  Goals:  Functional Short Term Goals (4 weeks):   Patient will demonstrate compliance and independence in their HEP in order to promote return to their prior level of function.   Patient will be able to bend down to the floor and  an item under 10 lbs with pain no greater than 2/10 in order to return to prior level of function.   Patient will be able to ascend/descend 1 flight of stairs with minimal to no pain in order to improve his overall functional mobility.   Patient will be able to stand up from a chair with armrests with no increase in his pain to improve his functional mobility      Functional Long Term Goals (8 weeks):   Patient will be able to self manage symptoms at home in order to return to prior level of function.   Patient will have improved strength and mobility to get in and out of his vehicle without an increase in pain in order to return to prior level of function.   Patient will be able to complete household and yard work, such as shoveling, with no major increase in pain in order to promote return to prior level of function.        Response to Intervention: BRANDT  fatigue following session. Understands HEP    Plan    Treatment Plan:      Frequency:   16 visits    Duration of Treatment: 8 weeks    Planned Interventions:    Home Exercise Program development  Therapeutic Exercise (ROM & Strengthening)  Therapeutic Activities  Neuromuscular Re-education  Manual Therapy  Ultrasound  E-Stim  Gait Training  Hot Packs / Cold Pack Modalities  Vasopneumatic Compression with Cold Therapy ( Game Ready )  Phonophoresis with Ketoprofen  Iontophoresis with Dexamethasone  Mechanical Traction    Thank you for your referral to [facility].  Please call with any questions, concerns or comments.  [phone]    The signature, of the referring medical provider, on this document indicates certification of the above prescribed plan of care and is medically necessary.

## 2018-02-13 NOTE — TELEPHONE ENCOUNTER
Patient Information     Patient Name MRN Sex Ricki Laboy [id number] Male 1950      Telephone Encounter by Pauline Jimenez RN at 2018 10:24 AM     Author:  Pauline Jimenez RN Service:  (none) Author Type:  NURS- Registered Nurse     Filed:  2018 10:28 AM Encounter Date:  2018 Status:  Signed     :  Pauline Jimenez RN (NURS- Registered Nurse)            Angiotensin Receptor Blockers (ARB)    Office visit in the past 12 months or per provider note.    Last visit with LEANN DESOUZA was on: 2017 in [facility]  Next visit with LEANN DESOUZA is on: No future appointment listed with this provider  Next visit with Internal Medicine is on: No future appointment listed in this department    Lab test requirements:  Creatinine and Potassium annually, if ordering lab, order BMP.  CREATININE (mg/dL)    Date Value   2017 1.30     POTASSIUM (mmol/L)    Date Value   2017 5.0       Max refill for 12 months from last office visit or per provider note  Prescription refilled per RN Medication Refill Policy.................... Pauline Jimenez RN ....................  2018   10:24 AM

## 2018-02-13 NOTE — PROGRESS NOTES
Patient Information     Patient Name MRN Sex Ricki Laboy [id number] Male 1950      Progress Notes by [facility] at 2018 10:39 AM     Author:  Umu [facility] Service:  (none) Author Type:  [employer] - Registered Radiologic Technologist     Filed:  2018 10:39 AM Date of Service:  2018 10:39 AM Status:  Signed     :  Umu Gerardo R.T. ([facility]) ([facility] - Registered Radiologic Technologist)            Falls Risk Criteria:    Age 65 and older or under age 4        Sensory deficits    Poor vision    Use of ambulatory aides    Impaired judgment    Unable to walk independently    Meets High Risk criteria for falls:  Yes             1.  Do you have dizziness or vertigo?    no                    2.  Do you need help standing or walking?   no                 3.  Have you fallen within the last 6 months?    no           4.  Has the patient been fasting?      no       If any risks are marked Yes, the following interventions are utilized:    Do not leave patient unattended     Assist patient in the dressing room and bathroom    Have ambulatory aides available throughout procedure    Involve patient s family if available

## 2018-02-13 NOTE — PROGRESS NOTES
"Patient Information     Patient Name MRN Ricki Miller [id number] Male 1950      Progress Notes by Hang Covarrubias PT at 2018  1:21 PM     Author:  Hang Covarrubias PT Service:  (none) Author Type:  PT- Physical Therapist     Filed:  2018  3:12 PM Date of Service:  2018  1:21 PM Status:  Signed     :  Hang Covarrubias PT (PT- Physical Therapist)            [facility]  Outpatient PT - Daily Note    Date of Service: 2018     Visit 4/10    Patient Name: Ricki Sharp \"Bill\"  YOB: 1950   Referring MD/Provider: Dr. Gio Wang  Medical and Treatment Diagnosis: LUMBAR SPINAL STENOSIS, LUMBAR FACET ARTHROPLASTY/LUMBAR DEGENERATIVE DISC DISEASE/LUMBAR DISC BULGE   PT Treatment Diagnosis: Impaired mobility, decreased activity tolerance, weakness  Insurance: Medicare  Start of Service: 18  Certification Dates: Start of Service: 18  Medicare/MA Re-Cert Due: 18       Subjective    Current Status: Patient reports that he is doing well today. Rates his pain at 5/10 and does think that his exercises are starting to help. Over the past few days, he feels that his pain is slowly getting better. Still has the leg pain from time to time.     History: Patient is a 67 year old male referred to physical therapy for low back pain. Patient reports that it all started back in 2016 when he slipped off of a step where he thinks landed on his left hip. Both hips have been replaced. Since that time, he has been dealing with some back pain. He was referred to Dr. Gio Wang who practices with [facility] in [place]. Patient reports that he was told he has one or two bulging discs in his back pushing up against nerves and that he has arthritis in the back. He has an injection scheduled for tomorrow for this back pain. He is also taking Flexeril at night to help with the pain. Does report that heat seems to help with the discomfort. " "Does report at times he gets some pain into the hip and symptoms down to his feet from time to time.       Rates pain: 5/10  Describes pain: achy, \"deep tooth ache\"   Locates pain: primarily left side low back, at times going into left     Current functional limitations: walking longer distance, up/down stairs    Prior Level:  Minimal to no difficulty completing the above functional activities.       Objective    Items left blank indicate that the test was inappropriate or not meaningful at the time of evaluation and therefore not performed.    Fall Risk Screening: No risk factors identified    ROM/Strength:     Cerv Thor Lumbar Myotomes    L    R     L    R   Flexion   Min limited C4 Shoulder Elev.   L2 Hip Flexion 4/5 5/5   Extension   Min limited  C5 Shoulder Abd.   L3 Knee Ext.      5/5 5/5   Left Lat. Flex   WFL C6 Elbow Flexion   L4 Ankle DF 5/5 5/5   Right Lat. Flex   Mod limited pain on left C7 Elbow Ext.   L5 1st MTP Ext.     Left Rotation   WFL C8 Finger Flex   S1 Ankle P.F.     Right Rotation   WFL T1 Finger Abd.   S2 Knee Flexion 5/5 5/5          Hip Abduction 5/5 5/5          Ext. Rotation       Today's Intervention:    Nu step for 8 minutes on level 5  Therapeutic exercise:    Pelvic tilts: x10 in supine   Bridging: 1x10    Bridging with marching: x5   Clamshells with yellow theraband: 2x10   Calf Stretch: 3x15 seconds   Hamstring stretch: 2x15 seconds bilaterally   MedX Leg press: 2x10 at 110 lbs   Hamstring curls: 2x10 at 70 lbs   Kneeling ql stretch: 2x10 seconds bilaterally    Manual therapy: STM to lumbar paraspinals, QL, piriformis and gluteal muscles - most discomfort in left piriformis and gluteal muscles.     Reviewed HEP     Plan for next visit: Assess response to HEP and treatment today. Continue with core strengthening and LE mobility and strength.     Home Exercise Program:    Lower trunk rotation: 2 minutes, 2-3 x daily  Repeated lumbar extension: 1x10, 6-8 times dialy  Seated sciatic " nerve glide: 2x10 bilaterally, 3x daily  Pelvic tilt: 2x10, 3x daily  Bridging: 2x10, 2x daily  Kneeling QL stretch: 2x15 seconds, 3x daily    Assessment    Therapist Assessment / Clinical Impression:  Patient is showing and reporting signs of pain relief in his left leg and low back. He is tolerating more strengthening and stretching during our sessions and states he is doing his exercises more often at home. He is starting to see improvements with the exercises he is doing. Because of the pain, he is still limited in his mobility, strength, and endurance. He would continue to benefit from ongoing physical therapy to decrease his pain and improve these limitations.     Functional Impairment(s): See subjective on initial evaluation and Functional Assessment from PSFS.    Physical Impairment(s):  Impaired mobility, weakness, decreased activity tolerance, back pain    Patient Specific Functional and Pain Scales (PSFS):    Clinician Instructions: Complete after the history and before the exam.    Initial Assessment: We want to know what 3 activities in your life you are unable to perform, or are having the most difficulty performing, as a result of your chief problem. Please list and score at least 3 activities that you are unable to perform, or having the most difficulty performing, because of your chief problem.   Patient Specific Activity Scoring Scheme (score one number for each activity):   Activity Score (0-10)  0= Unable to perform activity  10= Able to perform activity at same level as before injury or problem   1. Shoveling snow 5/10   2. Getting out of chair 4/10   3. Bending to  something from floor 4/10   4. Getting in/out of truck 4/10   5.  /10   Totals:  17/40 = 42.5 % ability which relates to 57.5% impairment    Patient verbally states that they understand that the information they have provided above is current and complete to the best of their knowledge.    Patient Specific Functional Scale  Modifier Scale Conversion: (patient's modifier that correlates with pt's score on PSFS): 4-CL (60% Impaired).    G codes and Modifier taken from pt completing a PSFS: completed at initial evaluation   Initial Primary G Code and Modifier:    Per the Patient's intake and/or assessment the Primary G Code is: Mobility .   The Patient's Impairment, Limitation or Restriction Modifier would be best described as: CK - 40% - 60% Impairment.     Goal Primary G Code and Modifier:    The Patient's G Code Goal would be: Mobility    The Patient's Impairment, Limitation or Restriction Modifier goal would be best described as: CI - 1% - 20% Impairment.       Patient Goal: To reduce his pain  Goals:  Functional Short Term Goals (4 weeks):   Patient will demonstrate compliance and independence in their HEP in order to promote return to their prior level of function.   Patient will be able to bend down to the floor and  an item under 10 lbs with pain no greater than 2/10 in order to return to prior level of function.   Patient will be able to ascend/descend 1 flight of stairs with minimal to no pain in order to improve his overall functional mobility.   Patient will be able to stand up from a chair with armrests with no increase in his pain to improve his functional mobility      Functional Long Term Goals (8 weeks):   Patient will be able to self manage symptoms at home in order to return to prior level of function.   Patient will have improved strength and mobility to get in and out of his vehicle without an increase in pain in order to return to prior level of function.   Patient will be able to complete household and yard work, such as shoveling, with no major increase in pain in order to promote return to prior level of function.        Response to Intervention: Mild decrease in pain. Feels less stiff in the back by end of session. Understands HEP    Plan    Treatment Plan:      Frequency:   16 visits    Duration  of Treatment: 8 weeks    Planned Interventions:    Home Exercise Program development  Therapeutic Exercise (ROM & Strengthening)  Therapeutic Activities  Neuromuscular Re-education  Manual Therapy  Ultrasound  E-Stim  Gait Training  Hot Packs / Cold Pack Modalities  Vasopneumatic Compression with Cold Therapy ( Game Ready )  Phonophoresis with Ketoprofen  Iontophoresis with Dexamethasone  Mechanical Traction    Thank you for your referral to [facility].  Please call with any questions, concerns or comments.  [phone]    The signature, of the referring medical provider, on this document indicates certification of the above prescribed plan of care and is medically necessary.

## 2018-02-13 NOTE — TELEPHONE ENCOUNTER
Patient Information     Patient Name MRN Ricki Miller [id number] Male 1950      Telephone Encounter by Ne Farah RN at 2018  9:27 AM     Author:  Ne Farah RN Service:  (none) Author Type:  NURS- Registered Nurse     Filed:  2018  9:32 AM Encounter Date:  2018 Status:  Signed     :  Ne Farah RN (NURS- Registered Nurse)            Osteoporosis    Office visit in the past 12 months or per provider note.    Last visit with LEANN DESOUZA was on: 2017 in [facility]  Next visit with LEANN DESOUZA is on: No future appointment listed with this provider  Next visit with Internal Medicine is on: No future appointment listed in this department    No limitation on refills for calcium and calcium with D.  Max refill for 12 months from last office visit or per provider note.  Prescription refilled per RN Medication Refill Policy.................... NE FARAH RN ....................  2018   9:31 AM

## 2018-02-13 NOTE — PROGRESS NOTES
Patient Information     Patient Name MRN Sex Ricki Laboy [id number] Male 1950      Progress Notes by Umu Gerardo R.T. (ARRT) at 2018 10:39 AM     Author:  [facility] Service:  (none) Author Type:  [employer] - Registered Radiologic Technologist     Filed:  2018 10:39 AM Date of Service:  2018 10:39 AM Status:  Signed     :  Umu Gerardo R.T. (YOANAT) ([facility] - Registered Radiologic Technologist)            Lily Protocol    A. Pre-procedure verification complete yes  1-relevant information / documentation available, reviewed and properly matched to the patient; 2-consent accurate and complete, 3-equipment and supplies available    B. Site marking complete Yes  Site marked if not in continuous attendance with patient    C. TIME OUT completed yes  Time Out was conducted just prior to starting procedure to verify the eight required elements: 1-patient identity, 2-consent accurate and complete, 3-position, 4-correct side/site marked (if applicable), 5-procedure, 6-relevant images / results properly labeled and displayed (if applicable), 7-antibiotics / irrigation fluids (if applicable), 8-safety precautions.

## 2018-02-13 NOTE — PROGRESS NOTES
"Patient Information     Patient Name MRN Ricki Miller [id number] Male 1950      Progress Notes by Hang Covarrubias PT at 2018  1:48 PM     Author:  Hang Covarrubias PT Service:  (none) Author Type:  PT- Physical Therapist     Filed:  2018  4:56 PM Date of Service:  2018  1:48 PM Status:  Signed     :  Hang Covarrubias PT (PT- Physical Therapist)            [facility]  Outpatient PT - Daily Note    Date of Service: 2018     Visit 2/10    Patient Name: Ricki Sharp \"Bill\"  YOB: 1950   Referring MD/Provider: Dr. Gio Wang  Medical and Treatment Diagnosis: LUMBAR SPINAL STENOSIS, LUMBAR FACET ARTHROPLASTY/LUMBAR DEGENERATIVE DISC DISEASE/LUMBAR DISC BULGE   PT Treatment Diagnosis: Impaired mobility, decreased activity tolerance, weakness  Insurance: Medicare  Start of Service: 18  Certification Dates: Start of Service: 18  Medicare/MA Re-Cert Due: 18       Subjective    Current Status: Patient reports that he is sore today. States that his exercises are helping loosen him up and the repeated back extension help the most. Says that his symptoms in the leg seem to come and go but is more frequent when he is on his feet for too long. Had an injection on Tuesday that initially helped but reports that when he got back to his vehicle, he started to hurt worse.    History: Patient is a 67 year old male referred to physical therapy for low back pain. Patient reports that it all started back in  when he slipped off of a step where he thinks landed on his left hip. Both hips have been replaced. Since that time, he has been dealing with some back pain. He was referred to Dr. Gio Wang who practices with [facility] in [place]. Patient reports that he was told he has one or two bulging discs in his back pushing up against nerves and that he has arthritis in the back. He has an injection scheduled for tomorrow " "for this back pain. He is also taking Flexeril at night to help with the pain. Does report that heat seems to help with the discomfort. Does report at times he gets some pain into the hip and symptoms down to his feet from time to time.       Rates pain: 7/10  Describes pain: achy, \"deep tooth ache\"   Locates pain: primarily left side low back, at times going into left     Current functional limitations: walking longer distance, up/down stairs    Prior Level:  Minimal to no difficulty completing the above functional activities.       Objective    Items left blank indicate that the test was inappropriate or not meaningful at the time of evaluation and therefore not performed.    Fall Risk Screening: No risk factors identified    ROM/Strength:     Cerv Thor Lumbar Myotomes    L    R     L    R   Flexion   Min limited C4 Shoulder Elev.   L2 Hip Flexion 4/5 5/5   Extension   Min limited  C5 Shoulder Abd.   L3 Knee Ext.      5/5 5/5   Left Lat. Flex   WFL C6 Elbow Flexion   L4 Ankle DF 5/5 5/5   Right Lat. Flex   Mod limited pain on left C7 Elbow Ext.   L5 1st MTP Ext.     Left Rotation   WFL C8 Finger Flex   S1 Ankle P.F.     Right Rotation   WFL T1 Finger Abd.   S2 Knee Flexion 5/5 5/5          Hip Abduction 5/5 5/5          Ext. Rotation       Today's Intervention:    Nu step for 7 minutes on level 3  Therapeutic exercise:    Pelvic tilts: x15 in supine, x10 in sitting    Repeated lumbar extension: 1x10    Calf Stretch: 3x15 seconds   Hamstring stretch: 2x15 seconds bilaterally   Hip flexor stretch: 3x10 seconds bilaterally    Manual therapy: STM to lumbar paraspinals, QL, piriformis and gluteal muscles    Reviewed HEP and discussed technique with nerve glides and benefits of exercise for the spine    Plan for next visit: Assess response to HEP and treatment today. Continue with core strengthening and LE mobility and strength.     Home Exercise Program:    Lower trunk rotation: 2 minutes, 2-3 x daily  Repeated lumbar " "extension: 1x10, 6-8 times dialy  Seated sciatic nerve glide: 2x10 bilaterally, 3x daily  Pelvic tilt: 2x10, 3x daily    Assessment    Therapist Assessment / Clinical Impression:  Patient was fatigued by end of session today. Noted that his lower back was aggravated but did feel that the stretches helped \"loosen\" him up. He is still demonstrating impaired mobility, strength and endurance due to the pain and discomfort. He would continue to benefit from ongoing physical therapy to improve his functional mobility, strength, and endurance.     Functional Impairment(s): See subjective on initial evaluation and Functional Assessment from PSFS.    Physical Impairment(s):  Impaired mobility, weakness, decreased activity tolerance, back pain    Patient Specific Functional and Pain Scales (PSFS):    Clinician Instructions: Complete after the history and before the exam.    Initial Assessment: We want to know what 3 activities in your life you are unable to perform, or are having the most difficulty performing, as a result of your chief problem. Please list and score at least 3 activities that you are unable to perform, or having the most difficulty performing, because of your chief problem.   Patient Specific Activity Scoring Scheme (score one number for each activity):   Activity Score (0-10)  0= Unable to perform activity  10= Able to perform activity at same level as before injury or problem   1. Shoveling snow 5/10   2. Getting out of chair 4/10   3. Bending to  something from floor 4/10   4. Getting in/out of truck 4/10   5.  /10   Totals:  17/40 = 42.5 % ability which relates to 57.5% impairment    Patient verbally states that they understand that the information they have provided above is current and complete to the best of their knowledge.    Patient Specific Functional Scale Modifier Scale Conversion: (patient's modifier that correlates with pt's score on PSFS): 4-CL (60% Impaired).    G codes and Modifier " taken from pt completing a PSFS: completed at initial evaluation   Initial Primary G Code and Modifier:    Per the Patient's intake and/or assessment the Primary G Code is: Mobility .   The Patient's Impairment, Limitation or Restriction Modifier would be best described as: CK - 40% - 60% Impairment.     Goal Primary G Code and Modifier:    The Patient's G Code Goal would be: Mobility    The Patient's Impairment, Limitation or Restriction Modifier goal would be best described as: CI - 1% - 20% Impairment.       Patient Goal: To reduce his pain  Goals:  Functional Short Term Goals (4 weeks):   Patient will demonstrate compliance and independence in their HEP in order to promote return to their prior level of function.   Patient will be able to bend down to the floor and  an item under 10 lbs with pain no greater than 2/10 in order to return to prior level of function.   Patient will be able to ascend/descend 1 flight of stairs with minimal to no pain in order to improve his overall functional mobility.   Patient will be able to stand up from a chair with armrests with no increase in his pain to improve his functional mobility      Functional Long Term Goals (8 weeks):   Patient will be able to self manage symptoms at home in order to return to prior level of function.   Patient will have improved strength and mobility to get in and out of his vehicle without an increase in pain in order to return to prior level of function.   Patient will be able to complete household and yard work, such as shoveling, with no major increase in pain in order to promote return to prior level of function.        Response to Intervention: Fatigued today by end of session. Understands his HEP    Plan    Treatment Plan:      Frequency:   16 visits    Duration of Treatment: 8 weeks    Planned Interventions:    Home Exercise Program development  Therapeutic Exercise (ROM & Strengthening)  Therapeutic  Activities  Neuromuscular Re-education  Manual Therapy  Ultrasound  E-Stim  Gait Training  Hot Packs / Cold Pack Modalities  Vasopneumatic Compression with Cold Therapy ( Game Ready )  Phonophoresis with Ketoprofen  Iontophoresis with Dexamethasone  Mechanical Traction    Thank you for your referral to [facility].  Please call with any questions, concerns or comments.  [phone]    The signature, of the referring medical provider, on this document indicates certification of the above prescribed plan of care and is medically necessary.

## 2018-02-13 NOTE — PROGRESS NOTES
"Patient Information     Patient Name MRN Sex Ricki Laboy [id number] Male 1950      Progress Notes by Hang Covarrubias PT at 2018  1:54 PM     Author:  Hang Covarrubias PT Service:  (none) Author Type:  PT- Physical Therapist     Filed:  2018  7:22 AM Date of Service:  2018  1:54 PM Status:  Signed     :  Hang Covarrubias PT (PT- Physical Therapist)            [facility]  Outpatient PT - Daily Note    Date of Service: 2018     Visit 3/10    Patient Name: Ricki Sharp \"Bill\"  YOB: 1950   Referring MD/Provider: Dr. Gio Wang  Medical and Treatment Diagnosis: LUMBAR SPINAL STENOSIS, LUMBAR FACET ARTHROPLASTY/LUMBAR DEGENERATIVE DISC DISEASE/LUMBAR DISC BULGE   PT Treatment Diagnosis: Impaired mobility, decreased activity tolerance, weakness  Insurance: Medicare  Start of Service: 18  Certification Dates: Start of Service: 18  Medicare/MA Re-Cert Due: 18       Subjective    Current Status: Patient says that he is doing \"ok\" today. States that his back hurts more when he sits for too long. Says that his exercises are going well at home. Does say that his back is a little more sore but the leg pain and symptoms have improved.       History: Patient is a 67 year old male referred to physical therapy for low back pain. Patient reports that it all started back in 2016 when he slipped off of a step where he thinks landed on his left hip. Both hips have been replaced. Since that time, he has been dealing with some back pain. He was referred to Dr. Gio Wang who practices with [facility] in [place]. Patient reports that he was told he has one or two bulging discs in his back pushing up against nerves and that he has arthritis in the back. He has an injection scheduled for tomorrow for this back pain. He is also taking Flexeril at night to help with the pain. Does report that heat seems to help with the " "discomfort. Does report at times he gets some pain into the hip and symptoms down to his feet from time to time.       Rates pain: 7/10  Describes pain: achy, \"deep tooth ache\"   Locates pain: primarily left side low back, at times going into left     Current functional limitations: walking longer distance, up/down stairs    Prior Level:  Minimal to no difficulty completing the above functional activities.       Objective    Items left blank indicate that the test was inappropriate or not meaningful at the time of evaluation and therefore not performed.    Fall Risk Screening: No risk factors identified    ROM/Strength:     Cerv Thor Lumbar Myotomes    L    R     L    R   Flexion   Min limited C4 Shoulder Elev.   L2 Hip Flexion 4/5 5/5   Extension   Min limited  C5 Shoulder Abd.   L3 Knee Ext.      5/5 5/5   Left Lat. Flex   WFL C6 Elbow Flexion   L4 Ankle DF 5/5 5/5   Right Lat. Flex   Mod limited pain on left C7 Elbow Ext.   L5 1st MTP Ext.     Left Rotation   WFL C8 Finger Flex   S1 Ankle P.F.     Right Rotation   WFL T1 Finger Abd.   S2 Knee Flexion 5/5 5/5          Hip Abduction 5/5 5/5          Ext. Rotation       Today's Intervention:    Nu step for 8 minutes on level 3  Therapeutic exercise:    Pelvic tilts: x15 in supine   Bridging: 1x10    Clamshells with red theraband: 2x10   Lower trunk rotation   Repeated lumbar extension: 1x10    Calf Stretch: 3x15 seconds   Hamstring stretch: 2x15 seconds bilaterally   Hip flexor stretch: 3x10 seconds bilaterally   MedX Leg press: 2x10 at 100 lbs   Self STM with tennis ball    Manual therapy: STM to lumbar paraspinals, QL, piriformis and gluteal muscles    Reviewed HEP     Plan for next visit: Assess response to HEP and treatment today. Continue with core strengthening and LE mobility and strength.     Home Exercise Program:    Lower trunk rotation: 2 minutes, 2-3 x daily  Repeated lumbar extension: 1x10, 6-8 times dialy  Seated sciatic nerve glide: 2x10 bilaterally, " 3x daily  Pelvic tilt: 2x10, 3x daily  Bridging: 2x10, 2x daily    Assessment    Therapist Assessment / Clinical Impression:  Patient is reporting that his leg pain is improving since the start of therapy. He still has the pain in the back but can feel that he is getting more flexible. He has been able to tolerate longer walks at the grocery store without significant changes in his symptoms. He would continue to benefit from physical therapy to decrease his pain and improve his overall function, strength, and mobility.     Functional Impairment(s): See subjective on initial evaluation and Functional Assessment from PSFS.    Physical Impairment(s):  Impaired mobility, weakness, decreased activity tolerance, back pain    Patient Specific Functional and Pain Scales (PSFS):    Clinician Instructions: Complete after the history and before the exam.    Initial Assessment: We want to know what 3 activities in your life you are unable to perform, or are having the most difficulty performing, as a result of your chief problem. Please list and score at least 3 activities that you are unable to perform, or having the most difficulty performing, because of your chief problem.   Patient Specific Activity Scoring Scheme (score one number for each activity):   Activity Score (0-10)  0= Unable to perform activity  10= Able to perform activity at same level as before injury or problem   1. Shoveling snow 5/10   2. Getting out of chair 4/10   3. Bending to  something from floor 4/10   4. Getting in/out of truck 4/10   5.  /10   Totals:  17/40 = 42.5 % ability which relates to 57.5% impairment    Patient verbally states that they understand that the information they have provided above is current and complete to the best of their knowledge.    Patient Specific Functional Scale Modifier Scale Conversion: (patient's modifier that correlates with pt's score on PSFS): 4-CL (60% Impaired).    G codes and Modifier taken from pt  completing a PSFS: completed at initial evaluation   Initial Primary G Code and Modifier:    Per the Patient's intake and/or assessment the Primary G Code is: Mobility .   The Patient's Impairment, Limitation or Restriction Modifier would be best described as: CK - 40% - 60% Impairment.     Goal Primary G Code and Modifier:    The Patient's G Code Goal would be: Mobility    The Patient's Impairment, Limitation or Restriction Modifier goal would be best described as: CI - 1% - 20% Impairment.       Patient Goal: To reduce his pain  Goals:  Functional Short Term Goals (4 weeks):   Patient will demonstrate compliance and independence in their HEP in order to promote return to their prior level of function.   Patient will be able to bend down to the floor and  an item under 10 lbs with pain no greater than 2/10 in order to return to prior level of function.   Patient will be able to ascend/descend 1 flight of stairs with minimal to no pain in order to improve his overall functional mobility.   Patient will be able to stand up from a chair with armrests with no increase in his pain to improve his functional mobility      Functional Long Term Goals (8 weeks):   Patient will be able to self manage symptoms at home in order to return to prior level of function.   Patient will have improved strength and mobility to get in and out of his vehicle without an increase in pain in order to return to prior level of function.   Patient will be able to complete household and yard work, such as shoveling, with no major increase in pain in order to promote return to prior level of function.        Response to Intervention: Patient reports fatigue in core muscles and LE's by end of session.     Plan    Treatment Plan:      Frequency:   16 visits    Duration of Treatment: 8 weeks    Planned Interventions:    Home Exercise Program development  Therapeutic Exercise (ROM & Strengthening)  Therapeutic  Activities  Neuromuscular Re-education  Manual Therapy  Ultrasound  E-Stim  Gait Training  Hot Packs / Cold Pack Modalities  Vasopneumatic Compression with Cold Therapy ( Game Ready )  Phonophoresis with Ketoprofen  Iontophoresis with Dexamethasone  Mechanical Traction    Thank you for your referral to [facility].  Please call with any questions, concerns or comments.  [phone]    The signature, of the referring medical provider, on this document indicates certification of the above prescribed plan of care and is medically necessary.

## 2018-02-14 ENCOUNTER — HOSPITAL ENCOUNTER (OUTPATIENT)
Dept: PHYSICAL THERAPY | Facility: OTHER | Age: 68
Setting detail: THERAPIES SERIES
End: 2018-02-14
Attending: INTERNAL MEDICINE
Payer: MEDICARE

## 2018-02-14 PROCEDURE — 97110 THERAPEUTIC EXERCISES: CPT | Mod: GP

## 2018-02-14 PROCEDURE — G8980 MOBILITY D/C STATUS: HCPCS | Mod: GP,CJ

## 2018-02-14 PROCEDURE — G8979 MOBILITY GOAL STATUS: HCPCS | Mod: GP,CI

## 2018-02-14 PROCEDURE — 40000185 ZZHC STATISTIC PT OUTPT VISIT

## 2018-02-14 NOTE — PROGRESS NOTES
Outpatient Physical Therapy Discharge Note     Patient: Ricki Sharp  : 1950    Beginning/End Dates of Reporting Period:  2018 to 2018    Referring Provider: Dr. Gio Wang     Therapy Diagnosis: Impaired mobility, decreased activity tolerance, weakness     Client Self Report: Patient reports that he was pretty sore yesterday but that has improved today. He rates his pain at 4/10 today with minor pain in the hip. He reports that even though he is still having some pain he is doing much better overall and he is happy where his progress is. He wants to continue with his HEP independently and is comfortable with being discharged today.     Objective Measurements:  Objective Measure: Pain rating  Details: 4/10     Objective Measure: Lumbar ROM  Details: Flexion: min limited, Ext: WFL, Rotation: WFL, Lateral Flexion left: WFL, Right: Min limited.     Objective Measure: LE strength:   Details:  bilaterally with LE strength testing     Outcome Measures (most recent score):  PSFS: 32% impaired     Goals:  Goal Identifier HEP Compliance   Goal Description Patient will demonstrate compliance and independence in their HEP in order to promote return to prior level of function.    Target Date 18   Date Met  18   Progress:     Goal Identifier Mobility   Goal Description Patient will be able to bend down to the floor and  an item under 10 lbs with no pain greater than 2/10 in order to return to prior level of function.    Target Date 18   Date Met      Progress: Still has pain above 2/10. No increase in pain symptoms but will at times be above that rating.      Goal Identifier Stairs   Goal Description Patient will be able to ascend/descend 1 flight of stairs with minimal to no pain in order to improve his overall functional mobility   Target Date 18   Date Met      Progress: Still having some constant pain with all activities      Goal Identifier Strength   Goal  Description Patient will be able to stand up from a chair with armrests with no increase in pain to improve his functional mobility   Target Date 02/19/18   Date Met  02/14/18   Progress:     Goal Identifier Symptom management    Goal Description Patient will be able to self manage symptoms at home in order to return to prior level of function.    Target Date 03/19/18   Date Met  02/14/18   Progress:     Goal Identifier strength and mobility   Goal Description Patient will have improve strength and mobility to get in and out of his vehicle without an increase in pain in order to return to prior level of function.    Target Date 03/19/18   Date Met  02/14/18   Progress:     Goal Identifier Strength and mobility   Goal Description Patient will be able to complete household and yard work, such as shoveling, with no major increase in pain in order to promote return to prior level of function.    Target Date 03/19/18   Date Met      Progress: Patient is still progressing towards this goal. Having some difficulty with shoveling at this point in time.      Progress Toward Goals:   Patient is progressing well towards his goals. He has stayed consistent with his HEP and has made a effort to develop a good routine each day.     Plan:  Discharge from therapy.    Discharge: Yes    Reason for Discharge: Patient has improved to a point where he believes he can continue to manage his symptoms independently at home. He recently bought a membership to a local gym and is going to continue with strengthening there.     Equipment Issued: None    Discharge Plan: Patient to continue home program.

## 2018-02-27 ENCOUNTER — TELEPHONE (OUTPATIENT)
Dept: INTERNAL MEDICINE | Facility: OTHER | Age: 68
End: 2018-02-27

## 2018-02-27 DIAGNOSIS — E78.2 MIXED HYPERLIPIDEMIA: ICD-10-CM

## 2018-02-27 DIAGNOSIS — E11.9 TYPE 2 DIABETES MELLITUS WITHOUT COMPLICATION, WITHOUT LONG-TERM CURRENT USE OF INSULIN (H): Primary | ICD-10-CM

## 2018-02-27 DIAGNOSIS — I10 ESSENTIAL HYPERTENSION: ICD-10-CM

## 2018-02-27 NOTE — TELEPHONE ENCOUNTER
Patient would like lab orders placed for his blood pressure prior to his visit on Monday the 5th.

## 2018-03-05 ENCOUNTER — OFFICE VISIT (OUTPATIENT)
Dept: PEDIATRICS | Facility: OTHER | Age: 68
End: 2018-03-05
Attending: INTERNAL MEDICINE
Payer: MEDICARE

## 2018-03-05 VITALS
SYSTOLIC BLOOD PRESSURE: 164 MMHG | WEIGHT: 199.2 LBS | BODY MASS INDEX: 31.2 KG/M2 | HEART RATE: 72 BPM | DIASTOLIC BLOOD PRESSURE: 90 MMHG

## 2018-03-05 DIAGNOSIS — E11.9 TYPE 2 DIABETES MELLITUS WITHOUT COMPLICATION, WITHOUT LONG-TERM CURRENT USE OF INSULIN (H): ICD-10-CM

## 2018-03-05 DIAGNOSIS — M51.369 DEGENERATION OF LUMBAR INTERVERTEBRAL DISC: ICD-10-CM

## 2018-03-05 DIAGNOSIS — I10 ESSENTIAL HYPERTENSION: ICD-10-CM

## 2018-03-05 DIAGNOSIS — I10 ESSENTIAL HYPERTENSION: Primary | ICD-10-CM

## 2018-03-05 DIAGNOSIS — M48.061 LUMBAR FORAMINAL STENOSIS: ICD-10-CM

## 2018-03-05 DIAGNOSIS — M51.369 LUMBAR DEGENERATIVE DISC DISEASE: ICD-10-CM

## 2018-03-05 DIAGNOSIS — G89.29 CHRONIC LEFT-SIDED LOW BACK PAIN WITH LEFT-SIDED SCIATICA: ICD-10-CM

## 2018-03-05 DIAGNOSIS — E78.2 MIXED HYPERLIPIDEMIA: ICD-10-CM

## 2018-03-05 DIAGNOSIS — Z94.4 LIVER REPLACED BY TRANSPLANT (H): ICD-10-CM

## 2018-03-05 DIAGNOSIS — M54.42 CHRONIC LEFT-SIDED LOW BACK PAIN WITH LEFT-SIDED SCIATICA: ICD-10-CM

## 2018-03-05 DIAGNOSIS — M47.816 LUMBAR FACET ARTHROPATHY: ICD-10-CM

## 2018-03-05 LAB
ALBUMIN SERPL-MCNC: 4.1 G/DL (ref 3.5–5.7)
ALP SERPL-CCNC: 80 U/L (ref 34–104)
ALT SERPL W P-5'-P-CCNC: 15 U/L (ref 7–52)
ANION GAP SERPL CALCULATED.3IONS-SCNC: 6 MMOL/L (ref 3–14)
AST SERPL W P-5'-P-CCNC: 13 U/L (ref 13–39)
BILIRUB DIRECT SERPL-MCNC: 0.1 MG/DL (ref 0–0.2)
BILIRUB SERPL-MCNC: 1.5 MG/DL (ref 0.3–1)
BUN SERPL-MCNC: 23 MG/DL (ref 7–25)
CALCIUM SERPL-MCNC: 9.4 MG/DL (ref 8.6–10.3)
CHLORIDE SERPL-SCNC: 101 MMOL/L (ref 98–107)
CHOLEST SERPL-MCNC: 161 MG/DL
CO2 SERPL-SCNC: 28 MMOL/L (ref 21–31)
CREAT SERPL-MCNC: 1.27 MG/DL (ref 0.7–1.3)
ERYTHROCYTE [DISTWIDTH] IN BLOOD BY AUTOMATED COUNT: 15.8 % (ref 10–15)
GFR SERPL CREATININE-BSD FRML MDRD: 57 ML/MIN/1.7M2
GLUCOSE SERPL-MCNC: 181 MG/DL (ref 70–105)
HBA1C MFR BLD: 6.6 % (ref 4–6)
HCT VFR BLD AUTO: 44.9 % (ref 40–53)
HDLC SERPL-MCNC: 35 MG/DL (ref 23–92)
HGB BLD-MCNC: 15.1 G/DL (ref 13.3–17.7)
LDLC SERPL CALC-MCNC: 88 MG/DL
MCH RBC QN AUTO: 31.8 PG (ref 26.5–33)
MCHC RBC AUTO-ENTMCNC: 33.6 G/DL (ref 31.5–36.5)
MCV RBC AUTO: 95 FL (ref 78–100)
NONHDLC SERPL-MCNC: 126 MG/DL
PLATELET # BLD AUTO: 69 10E9/L (ref 150–450)
POTASSIUM SERPL-SCNC: 4.7 MMOL/L (ref 3.5–5.1)
PROT SERPL-MCNC: 7.2 G/DL (ref 6.4–8.9)
RBC # BLD AUTO: 4.75 10E12/L (ref 4.4–5.9)
SODIUM SERPL-SCNC: 135 MMOL/L (ref 134–144)
TRIGL SERPL-MCNC: 189 MG/DL
WBC # BLD AUTO: 6.6 10E9/L (ref 4–11)

## 2018-03-05 PROCEDURE — 99214 OFFICE O/P EST MOD 30 MIN: CPT | Performed by: INTERNAL MEDICINE

## 2018-03-05 PROCEDURE — 36415 COLL VENOUS BLD VENIPUNCTURE: CPT | Performed by: INTERNAL MEDICINE

## 2018-03-05 PROCEDURE — 80061 LIPID PANEL: CPT | Performed by: INTERNAL MEDICINE

## 2018-03-05 PROCEDURE — G0463 HOSPITAL OUTPT CLINIC VISIT: HCPCS

## 2018-03-05 PROCEDURE — 80048 BASIC METABOLIC PNL TOTAL CA: CPT | Performed by: INTERNAL MEDICINE

## 2018-03-05 PROCEDURE — 80076 HEPATIC FUNCTION PANEL: CPT | Performed by: INTERNAL MEDICINE

## 2018-03-05 PROCEDURE — 85027 COMPLETE CBC AUTOMATED: CPT | Performed by: INTERNAL MEDICINE

## 2018-03-05 PROCEDURE — 83036 HEMOGLOBIN GLYCOSYLATED A1C: CPT | Performed by: INTERNAL MEDICINE

## 2018-03-05 RX ORDER — AMLODIPINE BESYLATE 2.5 MG/1
2.5 TABLET ORAL DAILY
Qty: 90 TABLET | Refills: 1 | Status: SHIPPED | OUTPATIENT
Start: 2018-03-05 | End: 2018-05-25

## 2018-03-05 RX ORDER — CYCLOBENZAPRINE HCL 5 MG
TABLET ORAL
Qty: 45 TABLET | Refills: 3 | Status: SHIPPED | OUTPATIENT
Start: 2018-03-05 | End: 2018-08-27

## 2018-03-05 ASSESSMENT — PAIN SCALES - GENERAL: PAINLEVEL: SEVERE PAIN (6)

## 2018-03-05 NOTE — PATIENT INSTRUCTIONS
-- No change to losartan or metoprolol   -- Start amlodipine 2.5 mg daily   -- If you develop significant leg swelling, could switch to Lasix     -- Continue exercise at [facility]   -- Continue flexeril as needed   -- Return to see Dr. Wang of [facility]    Your BMI is Body mass index is 31.2 kg/(m^2).  (BMI ranges: Normal 18.5 - 25, Overweight 25 - 30, Obesity greater than 30,     Morbid Obesity greater than 40 or greater than 35 with associated conditions.)    Facts about losing weight:   -- Overweight and Obesity increase your risk for developing diabetes, high blood pressure and stroke, and shorten your life.   -- 90% of weight loss comes from dietary changes, only 10% from exercise    What should I do?   -- Work on 5-10% weight loss   -- Focus on a few healthy dietary changes   -- Eat more fresh fruits and vegetables, and fewer carbohydrates   -- Cut out all calorie-containing beverages (milk, juice, alcohol, etc)   -- Exercise every day   -- Weigh yourself once a week   -- Consider the DASH Diet (http://http://bit.ly/DASHDiet - redirects to the [facility])   -- Consider Weight Watchers (http://www.weightwatchers.com)   -- Consider My Fitness Pal (iOS, Android, http://www.nScaledpal.com)

## 2018-03-05 NOTE — NURSING NOTE
Pt presents to clinic for a check on BP states that he has been taking for the month of February and it has been running high.  Is having some back issues and would like to discuss those, and would like to discuss current labs that where taken today  Ana Sahni LPN.......3/5/2018 12:47 PM

## 2018-03-05 NOTE — MR AVS SNAPSHOT
After Visit Summary   3/5/2018    Ricki Sharp    MRN: [id number]           Patient Information     Date Of Birth          1950        Visit Information        Provider Department      3/5/2018 12:45 PM Jarad Bocanegra MD [facility]        Today's Diagnoses     Essential hypertension    -  1    Degeneration of lumbar intervertebral disc        Lumbar facet arthropathy        Lumbar foraminal stenosis        Lumbar degenerative disc disease        Chronic left-sided low back pain with left-sided sciatica          Care Instructions     -- No change to losartan or metoprolol   -- Start amlodipine 2.5 mg daily   -- If you develop significant leg swelling, could switch to Lasix     -- Continue exercise at [facility]   -- Continue flexeril as needed   -- Return to see Dr. Wang of PM&R    Your BMI is Body mass index is 31.2 kg/(m^2).  (BMI ranges: Normal 18.5 - 25, Overweight 25 - 30, Obesity greater than 30,     Morbid Obesity greater than 40 or greater than 35 with associated conditions.)    Facts about losing weight:   -- Overweight and Obesity increase your risk for developing diabetes, high blood pressure and stroke, and shorten your life.   -- 90% of weight loss comes from dietary changes, only 10% from exercise    What should I do?   -- Work on 5-10% weight loss   -- Focus on a few healthy dietary changes   -- Eat more fresh fruits and vegetables, and fewer carbohydrates   -- Cut out all calorie-containing beverages (milk, juice, alcohol, etc)   -- Exercise every day   -- Weigh yourself once a week   -- Consider the DASH Diet (http://http://bit.Treater/DASHDiet - redirects to the NIH)   -- Consider Weight Watchers (http://www.weightwatchers.com)   -- Consider My Fitness Pal (iOS, Android, http://www.ProprietÃ¡rioDiretopal.com)                Follow-ups after your visit        Follow-up notes from your care team     Return in about 1 month (around 4/5/2018) for BP visit.      Your  next 10 appointments already scheduled     Dec 28, 2018 11:45 AM CST   (Arrive by 11:30 AM)   Return Liver Transplant with Mike Gray MD   [facility] ([facility])    [address]  [address]  [facility] [address] [phone]              Who to contact     If you have questions or need follow up information about today's clinic visit or your schedule please contact [facility] directly at [phone].  Normal or non-critical lab and imaging results will be communicated to you by [employer], letter or phone within 4 business days after the clinic has received the results. If you do not hear from us within 7 days, please contact the clinic through [employer] or phone. If you have a critical or abnormal lab result, we will notify you by phone as soon as possible.  Submit refill requests through [employer] or call your pharmacy and they will forward the refill request to us. Please allow 3 business days for your refill to be completed.          Additional Information About Your Visit        [employer]kharMiArch Information     [employer] gives you secure access to your electronic health record. If you see a primary care provider, you can also send messages to your care team and make appointments. If you have questions, please call your primary care clinic.  If you do not have a primary care provider, please call [phone] and they will assist you.        Care EveryWhere ID     This is your Care EveryWhere ID. This could be used by other organizations to access your [facility] medical records  [id number]        Your Vitals Were     Pulse BMI (Body Mass Index)                72 31.2 kg/m2           Blood Pressure from Last 3 Encounters:   03/05/18 164/90   01/15/18 (!) 159/95   12/29/17 (!) 165/96    Weight from Last 3 Encounters:   03/05/18 199 lb 3.2 oz (90.4 kg)   01/15/18 199 lb 9.6 oz (90.5 kg)   12/29/17 198 lb 6.4 oz (90 kg)              Today, you had the  following     No orders found for display         Today's Medication Changes          These changes are accurate as of 3/5/18  1:25 PM.  If you have any questions, ask your nurse or doctor.               Start taking these medicines.        Dose/Directions    amLODIPine 2.5 MG tablet   Commonly known as:  NORVASC   Used for:  Essential hypertension   Started by:  Jarad Bocanegra MD        Dose:  2.5 mg   Take 1 tablet (2.5 mg) by mouth daily   Quantity:  90 tablet   Refills:  1         These medicines have changed or have updated prescriptions.        Dose/Directions    desonide 0.05 % ointment   Commonly known as:  DESOWEN   This may have changed:    - how to take this  - additional instructions   Used for:  Psoriasis        Use on the face daily   Quantity:  60 g   Refills:  1       * fluocinonide 0.05 % solution   Commonly known as:  LIDEX   This may have changed:  additional instructions   Used for:  Psoriasis        Use to the scalp twice a day   Quantity:  60 mL   Refills:  3       * fluocinonide 0.05 % ointment   Commonly known as:  LIDEX   This may have changed:    - how to take this  - additional instructions   Used for:  Psoriasis        Use on the body twice a day to red scaly areas   Quantity:  60 g   Refills:  3       levothyroxine 125 MCG tablet   Commonly known as:  SYNTHROID/LEVOTHROID   This may have changed:  additional instructions   Used for:  Liver replaced by transplant (H)        Dose:  125 mcg   Take 1 tablet (125 mcg) by mouth daily   Quantity:  30 tablet   Refills:  4       tiotropium 18 MCG capsule   Commonly known as:  SPIRIVA HANDIHALER   This may have changed:    - how to take this  - when to take this  - additional instructions   Used for:  Chronic obstructive pulmonary disease, unspecified COPD type (H)        Inhale contents of one capsule daily.   Quantity:  30 capsule   Refills:  6       * Notice:  This list has 2 medication(s) that are the same as other medications  prescribed for you. Read the directions carefully, and ask your doctor or other care provider to review them with you.         Where to get your medicines      These medications were sent to [id number] - [place], [place] - [address], [facility] [address]     Phone:  [phone]     amLODIPine 2.5 MG tablet    cyclobenzaprine 5 MG tablet                Primary Care Provider Office Phone # Fax #    Jarad Dionicio Bocanegra [phone]       [address]  [facility] [address]        Equal Access to Services     [facility]: Hadii aad ku hadasho Soomaali, waaxda luqadaha, qaybta kaalmada adeegyada, waxnavin sanchezin hayaan adeeg dale rod . So [facility] [phone].    ATENCIÓN: Si habla español, tiene a clemens disposición servicios gratuitos de asistencia lingüística. Llame al [phone].    We comply with applicable federal civil rights laws and [place] laws. We do not discriminate on the basis of race, color, national origin, age, disability, sex, sexual orientation, or gender identity.            Thank you!     Thank you for choosing [facility]  for your care. Our goal is always to provide you with excellent care. Hearing back from our patients is one way we can continue to improve our services. Please take a few minutes to complete the written survey that you may receive in the mail after your visit with us. Thank you!             Your Updated Medication List - Protect others around you: Learn how to safely use, store and throw away your medicines at www.disposemymeds.org.          This list is accurate as of 3/5/18  1:25 PM.  Always use your most recent med list.                   Brand Name Dispense Instructions for use Diagnosis    albuterol 108 (90 BASE) MCG/ACT Inhaler    PROAIR HFA/PROVENTIL HFA/VENTOLIN HFA    3 Inhaler    Inhale 2 puffs into the lungs every 4 hours as needed for shortness of breath  / dyspnea or wheezing    Chronic obstructive pulmonary disease, unspecified COPD type (H)       amLODIPine 2.5 MG tablet    NORVASC    90 tablet    Take 1 tablet (2.5 mg) by mouth daily    Essential hypertension       calcium carbonate-vitamin D 500-400 MG-UNIT Tabs per tablet     180 tablet    Take 1 tablet by mouth 2 times daily    Liver replaced by transplant (H)       cyclobenzaprine 5 MG tablet    FLEXERIL    45 tablet    Take 1-2 tablets as needed at night    Lumbar degenerative disc disease, Lumbar facet arthropathy, Chronic left-sided low back pain with left-sided sciatica       desonide 0.05 % ointment    DESOWEN    60 g    Use on the face daily    Psoriasis       * fluocinonide 0.05 % solution    LIDEX    60 mL    Use to the scalp twice a day    Psoriasis       * fluocinonide 0.05 % ointment    LIDEX    60 g    Use on the body twice a day to red scaly areas    Psoriasis       levothyroxine 125 MCG tablet    SYNTHROID/LEVOTHROID    30 tablet    Take 1 tablet (125 mcg) by mouth daily    Liver replaced by transplant (H)       losartan 50 MG tablet    COZAAR    90 tablet    TAKE ONE TABLET BY MOUTH EVERY DAY    Liver replaced by transplant (H)       magnesium oxide 400 MG tablet    MAG-OX    120 tablet    Take 2 tablets (800 mg) by mouth 2 times daily    PTLD (post-transplant lymphoproliferative disorder) (H), Liver replaced by transplant (H)       metoprolol tartrate 100 MG tablet    LOPRESSOR    60 tablet    Take 1 tablet (100 mg) by mouth 2 times daily    Liver replaced by transplant (H)       omeprazole 20 MG tablet     30 tablet    Take 1 tablet (20 mg) by mouth daily    Liver replaced by transplant (H)       * tacrolimus 0.5 MG capsule    GENERIC EQUIVALENT    60 capsule    TAKE ONE CAPSULE IN THE EVENING (MORNING DOSE 1 MG AND EVENING DOSE 1.5 MG)    Liver replaced by transplant (H)       * tacrolimus 1 MG capsule    GENERIC EQUIVALENT    60 capsule    Take 1 capsule (1 mg) by mouth every 12 hours     Liver replaced by transplant (H)       tiotropium 18 MCG capsule    SPIRIVA HANDIHALER    30 capsule    Inhale contents of one capsule daily.    Chronic obstructive pulmonary disease, unspecified COPD type (H)       * Notice:  This list has 4 medication(s) that are the same as other medications prescribed for you. Read the directions carefully, and ask your doctor or other care provider to review them with you.

## 2018-03-06 NOTE — PROGRESS NOTES
Subjective  Ricki Sharp is a 67 year old male who presents for blood pressure, back issues.  Home blood pressure log 115-161/70-91.  He continues on losartan and metoprolol.  Did not tolerate spironolactone due to hyperkalemia.  He continues to have pain in the back.  Improved with ibuprofen and Flexeril which helps him to sleep.  He is trying to work on core muscle strength.  He is status post 9 sessions with physical therapy.  Now he is going to the [facility] twice a week.  He continues to have pain across the low back radiating to the buttocks and thighs.  He did see Dr. Wang of physical medicine in the past.    Problem List/PMH: reviewed in EMR, and made relevant updates today.  Medications: reviewed in EMR, and made relevant updates today.  Allergies: reviewed in EMR, and made relevant updates today.    Social Hx:  Social History   Substance Use Topics     Smoking status: Never Smoker     Smokeless tobacco: Never Used     Alcohol use Yes      Comment: rare     Social History     Social History Narrative     I reviewed social history and made relevant updates today.    Family Hx:   History reviewed. No pertinent family history.    Objective  Vitals: reviewed in EMR.  /90 (BP Location: Right arm, Patient Position: Sitting, Cuff Size: Adult Large)  Pulse 72  Wt 199 lb 3.2 oz (90.4 kg)  BMI 31.2 kg/m2    Gen: Pleasant male, NAD.  HEENT: MMM  Neck: Supple  Pulm: Breathing easily  Neuro: Grossly intact  Skin: No concerning lesions.  Psychiatric: Normal affect and insight. Does not appear anxious or depressed.    Labs:  Glucose   Date Value Ref Range Status   03/05/2018 181 (H) 70 - 105 mg/dL Final   12/21/2017 229 (H) 70 - 105 mg/dL      Hemoglobin A1C   Date Value Ref Range Status   03/05/2018 6.6 (H) 4.0 - 6.0 % Final   03/20/2016 6.9 (H) 4.3 - 6.0 % Final     Creatinine   Date Value Ref Range Status   03/05/2018 1.27 0.70 - 1.30 mg/dL Final   12/21/2017 1.30 0.70 - 1.30 mg/dL      LDL Cholesterol  Calculated   Date Value Ref Range Status   03/05/2018 88 <100 mg/dL Final     Comment:     Desirable:       <100 mg/dl             Assessment    ICD-10-CM    1. Essential hypertension I10 amLODIPine (NORVASC) 2.5 MG tablet   2. Degeneration of lumbar intervertebral disc M51.36    3. Lumbar facet arthropathy M12.88 cyclobenzaprine (FLEXERIL) 5 MG tablet   4. Lumbar foraminal stenosis M99.83    5. Lumbar degenerative disc disease M51.36 cyclobenzaprine (FLEXERIL) 5 MG tablet   6. Chronic left-sided low back pain with left-sided sciatica M54.42 cyclobenzaprine (FLEXERIL) 5 MG tablet    G89.29      No orders of the defined types were placed in this encounter.      Unfortunately he is failing conservative measures with regards to his back.  I recommend a return to physical medicine and rehabilitation to see if nonoperative treatment options have been exhausted.  I encouraged him to continue with his home exercise program and physical exercise.  With regards to hypertension it is inadequately controlled at this time.  Options were discussed and a trial of amlodipine is recommended.  Lifestyle modification encouraged including dietary and weight loss.    Plan   -- Expected clinical course discussed   -- Medications and their side effects discussed  Patient Instructions    -- No change to losartan or metoprolol   -- Start amlodipine 2.5 mg daily   -- If you develop significant leg swelling, could switch to Lasix     -- Continue exercise at [facility]   -- Continue flexeril as needed   -- Return to see Dr. Wang of PM&R    Your BMI is Body mass index is 31.2 kg/(m^2).  (BMI ranges: Normal 18.5 - 25, Overweight 25 - 30, Obesity greater than 30,     Morbid Obesity greater than 40 or greater than 35 with associated conditions.)    Facts about losing weight:   -- Overweight and Obesity increase your risk for developing diabetes, high blood pressure and stroke, and shorten your life.   -- 90% of weight loss comes from dietary changes,  only 10% from exercise    What should I do?   -- Work on 5-10% weight loss   -- Focus on a few healthy dietary changes   -- Eat more fresh fruits and vegetables, and fewer carbohydrates   -- Cut out all calorie-containing beverages (milk, juice, alcohol, etc)   -- Exercise every day   -- Weigh yourself once a week   -- Consider the DASH Diet (http://http://Searcheeze.Ingeniatrics/DASHDiet - redirects to the [employer])   -- Consider Weight Watchers (http://www.weightwaLiquid Machines.ReplySend)   -- Consider My Fitness Pal (iOS, Android, http://www.FastFigpal.com)          Signed, Jarad Bocanegra MD  Internal Medicine & Pediatrics

## 2018-03-21 DIAGNOSIS — Z94.4 LIVER REPLACED BY TRANSPLANT (H): ICD-10-CM

## 2018-03-21 RX ORDER — TACROLIMUS 1 MG/1
1 CAPSULE ORAL EVERY 12 HOURS
Qty: 60 CAPSULE | Refills: 11 | Status: SHIPPED | OUTPATIENT
Start: 2018-03-21 | End: 2019-03-27

## 2018-04-12 DIAGNOSIS — M47.816 LUMBAR FACET ARTHROPATHY: ICD-10-CM

## 2018-04-12 DIAGNOSIS — M54.42 CHRONIC LEFT-SIDED LOW BACK PAIN WITH LEFT-SIDED SCIATICA: ICD-10-CM

## 2018-04-12 DIAGNOSIS — M51.369 LUMBAR DEGENERATIVE DISC DISEASE: ICD-10-CM

## 2018-04-12 DIAGNOSIS — G89.29 CHRONIC LEFT-SIDED LOW BACK PAIN WITH LEFT-SIDED SCIATICA: ICD-10-CM

## 2018-04-12 RX ORDER — CYCLOBENZAPRINE HCL 5 MG
TABLET ORAL
Qty: 45 TABLET | Refills: 3 | OUTPATIENT
Start: 2018-04-12

## 2018-04-12 NOTE — TELEPHONE ENCOUNTER
Request refused due to too soon to refill. Last filled on 3-5-18 for #45 X 3 refills. Also Jarad Bocanegra requested follow-up in 1 month and that has not been scheduled. Pharmacy informed. Pauline Jimenez RN on 4/12/2018 at 11:57 AM

## 2018-04-18 DIAGNOSIS — M51.369 LUMBAR DEGENERATIVE DISC DISEASE: ICD-10-CM

## 2018-04-18 DIAGNOSIS — M47.816 LUMBAR FACET ARTHROPATHY: ICD-10-CM

## 2018-04-18 DIAGNOSIS — G89.29 CHRONIC LEFT-SIDED LOW BACK PAIN WITH LEFT-SIDED SCIATICA: ICD-10-CM

## 2018-04-18 DIAGNOSIS — M54.42 CHRONIC LEFT-SIDED LOW BACK PAIN WITH LEFT-SIDED SCIATICA: ICD-10-CM

## 2018-04-18 NOTE — TELEPHONE ENCOUNTER
Called [facility] about refill request for Flexeril which was filled on 3-5-18 for #45 X 3 refills. Per [facility], they sent request in error. Patient has refills there. May disregard. Pauline Jimenez RN on 4/18/2018 at 11:40 AM

## 2018-04-22 DIAGNOSIS — I10 ESSENTIAL HYPERTENSION: Primary | ICD-10-CM

## 2018-04-25 NOTE — TELEPHONE ENCOUNTER
Routing refill request to provider for review/approval because:  Drug not active on patient's medication list  Current medication list is Losartan 50 mg take one tablet daily.  Request is for Losartan 25 mg take one tablet daily    Care every where notes  losartan (COZAAR) 25 mg tablet    Indications: Hypertension TAKE 1 TABLET BY MOUTH EVERY DAY 90 tablet         Will route to Jarad Bocanegra for review and consideration of refills      LOV: 3/5/18  Ne Farah RN on 4/25/2018 at 1:50 PM

## 2018-04-26 RX ORDER — LOSARTAN POTASSIUM 25 MG/1
TABLET ORAL
Qty: 90 TABLET | Refills: 4 | Status: SHIPPED | OUTPATIENT
Start: 2018-04-26 | End: 2019-05-30

## 2018-05-03 DIAGNOSIS — J44.9 CHRONIC OBSTRUCTIVE PULMONARY DISEASE, UNSPECIFIED COPD TYPE (H): ICD-10-CM

## 2018-05-07 RX ORDER — TIOTROPIUM BROMIDE 18 UG/1
CAPSULE ORAL; RESPIRATORY (INHALATION)
Qty: 90 CAPSULE | Refills: 1 | Status: SHIPPED | OUTPATIENT
Start: 2018-05-07 | End: 2019-05-30

## 2018-05-07 NOTE — TELEPHONE ENCOUNTER
Prescription approved per [facility] Refill Protocol.  LOV: 3/5/18  Ne Farah RN on 5/7/2018 at 4:22 PM

## 2018-05-20 ENCOUNTER — HOSPITAL ENCOUNTER (EMERGENCY)
Facility: OTHER | Age: 68
Discharge: HOME | End: 2018-05-20
Attending: EMERGENCY MEDICINE | Admitting: EMERGENCY MEDICINE
Payer: MEDICARE

## 2018-05-20 VITALS
HEIGHT: 67 IN | RESPIRATION RATE: 16 BRPM | SYSTOLIC BLOOD PRESSURE: 155 MMHG | TEMPERATURE: 98.3 F | DIASTOLIC BLOOD PRESSURE: 88 MMHG | OXYGEN SATURATION: 97 %

## 2018-05-20 DIAGNOSIS — M54.42 BILATERAL LOW BACK PAIN WITH LEFT-SIDED SCIATICA, UNSPECIFIED CHRONICITY: ICD-10-CM

## 2018-05-20 PROCEDURE — 99283 EMERGENCY DEPT VISIT LOW MDM: CPT | Mod: Z6 | Performed by: EMERGENCY MEDICINE

## 2018-05-20 PROCEDURE — 25000132 ZZH RX MED GY IP 250 OP 250 PS 637: Mod: GY | Performed by: EMERGENCY MEDICINE

## 2018-05-20 PROCEDURE — 99283 EMERGENCY DEPT VISIT LOW MDM: CPT | Performed by: EMERGENCY MEDICINE

## 2018-05-20 PROCEDURE — A9270 NON-COVERED ITEM OR SERVICE: HCPCS | Mod: GY | Performed by: EMERGENCY MEDICINE

## 2018-05-20 RX ORDER — OXYCODONE HYDROCHLORIDE 5 MG/1
5 TABLET ORAL ONCE
Status: COMPLETED | OUTPATIENT
Start: 2018-05-20 | End: 2018-05-20

## 2018-05-20 RX ORDER — OXYCODONE HYDROCHLORIDE 5 MG/1
5 TABLET ORAL EVERY 4 HOURS PRN
Qty: 14 TABLET | Refills: 0 | Status: SHIPPED | OUTPATIENT
Start: 2018-05-20 | End: 2018-05-25

## 2018-05-20 RX ADMIN — OXYCODONE HYDROCHLORIDE 5 MG: 5 TABLET ORAL at 18:00

## 2018-05-20 RX ADMIN — OXYCODONE HYDROCHLORIDE 5 MG: 5 TABLET ORAL at 18:36

## 2018-05-20 RX ADMIN — IBUPROFEN 600 MG: 400 TABLET ORAL at 17:59

## 2018-05-20 NOTE — ED TRIAGE NOTES
COLUMBIA-SUICIDE SEVERITY RATING SCALE   Screen with Triage Points for Emergency Department      Ask questions that are bolded and underlined.   Past  month   Ask Questions 1 and 2 YES NO   1)  Have you wished you were dead or wished you could go to sleep and not wake up?   x   2)  Have you actually had any thoughts of killing yourself?   x   If YES to 2, ask questions 3, 4, 5, and 6.  If NO to 2, go directly to question 6.   3)  Have you been thinking about how you might do this?   E.g.  I thought about taking an overdose but I never made a specific plan as to when where or how I would actually do it .and I would never go through with it.       4)  Have you had these thoughts and had some intention of acting on them?   As opposed to  I have the thoughts but I definitely will not do anything about them.       5)  Have you started to work out or worked out the details of how to kill yourself? Do you intend to carry out this plan?      6)  Have you ever done anything, started to do anything, or prepared to do anything to end your life?  Examples: Collected pills, obtained a gun, gave away valuables, wrote a will or suicide note, took out pills but didn t swallow any, held a gun but changed your mind or it was grabbed from your hand, went to the roof but didn t jump; or actually took pills, tried to shoot yourself, cut yourself, tried to hang yourself, etc.    If YES, ask: Was this within the past three months?  Lifetime     x    Past 3 Months        Item 1:  Behavioral Health Referral at Discharge  Item 2:  Behavioral Health Referral at Discharge   Item 3:  Behavioral Health Consult (Psychiatric Nurse/) and consider Patient Safety Precautions  Item 4:  Immediate Notification of Physician and/or Behavioral Health and Patient Safety Precautions   Item 5:  Immediate Notification of Physician and/or Behavioral Health and Patient Safety Precautions  Item 6:  Over 3 months ago: Behavioral Health Consult  (Psychiatric Nurse/) and consider Patient Safety Precautions  OR  Item 6:  3 months ago or less: Immediate Notification of Physician and/or Behavioral Health and Patient Safety Precautions

## 2018-05-20 NOTE — ED AVS SNAPSHOT
[facility]    [address]    [place] [address]    Phone:  [phone]    Fax:  [phone]                                       Ricki Sharp   MRN: [id number]    Department:  [facility]   Date of Visit:  5/20/2018           After Visit Summary Signature Page     I have received my discharge instructions, and my questions have been answered. I have discussed any challenges I see with this plan with the nurse or doctor.    ..........................................................................................................................................  Patient/Patient Representative Signature      ..........................................................................................................................................  Patient Representative Print Name and Relationship to Patient    ..................................................               ................................................  Date                                            Time    ..........................................................................................................................................  Reviewed by Signature/Title    ...................................................              ..............................................  Date                                                            Time

## 2018-05-20 NOTE — ED AVS SNAPSHOT
[facility]    [address]    [place] [address]    Phone:  [phone]    Fax:  [phone]                                       Ricki Sharp   MRN: [id number]    Department:  [facility]   Date of Visit:  5/20/2018           Patient Information     Date Of Birth          1950        Your diagnoses for this visit were:     Bilateral low back pain with left-sided sciatica, unspecified chronicity        You were seen by Dionicio Armas MD.      Follow-up Information     Follow up with Jarad Bocanegra MD In 4 days.    Specialty:  Pediatrics    Why:  for symptom review     Contact information:    [address]  [place] [address] [phone]          Discharge Instructions       Please follow-up with Dr. Bocanegra and any other physical therapy or other pain specialists.    Your next 10 appointments already scheduled     Dec 28, 2018 11:45 AM CST   (Arrive by 11:30 AM)   Return Liver Transplant with Mike Gray MD   [facility] ([facility])    [address] [place] [address]  [address]  [facility] [address] [phone]              24 Hour Appointment Hotline       To make an appointment at any [facility], call [phone] ([phone]). If you don't have a family doctor or clinic, we will help you find one. [facility] clinics are conveniently located to serve the needs of you and your family.             Review of your medicines      START taking        Dose / Directions Last dose taken    oxyCODONE IR 5 MG tablet   Commonly known as:  ROXICODONE   Dose:  5 mg   Quantity:  14 tablet        Take 1 tablet (5 mg) by mouth every 4 hours as needed for pain   Refills:  0          Our records show that you are taking the medicines listed below. If these are incorrect, please call your family doctor or clinic.        Dose / Directions Last dose taken    ACETAMINOPHEN PO        Refills:   0        albuterol 108 (90 Base) MCG/ACT Inhaler   Commonly known as:  PROAIR HFA/PROVENTIL HFA/VENTOLIN HFA   Dose:  2 puff   Quantity:  3 Inhaler        Inhale 2 puffs into the lungs every 4 hours as needed for shortness of breath / dyspnea or wheezing   Refills:  1        ALEVE PO   Dose:  220 mg        Take 220 mg by mouth 2 times daily (with meals)   Refills:  0        amLODIPine 2.5 MG tablet   Commonly known as:  NORVASC   Dose:  2.5 mg   Quantity:  90 tablet        Take 1 tablet (2.5 mg) by mouth daily   Refills:  1        calcium carbonate-vitamin D 500-400 MG-UNIT Tabs per tablet   Dose:  1 tablet   Quantity:  180 tablet        Take 1 tablet by mouth 2 times daily   Refills:  3        cyclobenzaprine 5 MG tablet   Commonly known as:  FLEXERIL   Quantity:  45 tablet        Take 1-2 tablets as needed at night   Refills:  3        desonide 0.05 % ointment   Commonly known as:  DESOWEN   Quantity:  60 g        Use on the face daily   Refills:  1        * fluocinonide 0.05 % solution   Commonly known as:  LIDEX   Quantity:  60 mL        Use to the scalp twice a day   Refills:  3        * fluocinonide 0.05 % ointment   Commonly known as:  LIDEX   Quantity:  60 g        Use on the body twice a day to red scaly areas   Refills:  3        levothyroxine 125 MCG tablet   Commonly known as:  SYNTHROID/LEVOTHROID   Dose:  125 mcg   Quantity:  30 tablet        Take 1 tablet (125 mcg) by mouth daily   Refills:  4        * losartan 50 MG tablet   Commonly known as:  COZAAR   Quantity:  90 tablet        TAKE ONE TABLET BY MOUTH EVERY DAY   Refills:  1        * losartan 25 MG tablet   Commonly known as:  COZAAR   Quantity:  90 tablet        TAKE 1 TABLET BY MOUTH EVERY DAY   Refills:  4        magnesium oxide 400 MG tablet   Commonly known as:  MAG-OX   Dose:  800 mg   Quantity:  120 tablet        Take 2 tablets (800 mg) by mouth 2 times daily   Refills:  11        metoprolol tartrate 100 MG tablet   Commonly known as:   LOPRESSOR   Dose:  100 mg   Quantity:  60 tablet        Take 1 tablet (100 mg) by mouth 2 times daily   Refills:  2        omeprazole 20 MG tablet   Dose:  20 mg   Quantity:  30 tablet        Take 1 tablet (20 mg) by mouth daily   Refills:  11        SPIRIVA HANDIHALER 18 MCG capsule   Quantity:  90 capsule   Generic drug:  tiotropium        INHALE THE CONTENTS OF 1 CAPSULE VIA INHALATION DEVICE DAILY   Refills:  1        tacrolimus 1 MG capsule   Commonly known as:  GENERIC EQUIVALENT   Dose:  1 mg   Quantity:  60 capsule        Take 1 capsule (1 mg) by mouth every 12 hours   Refills:  11        * Notice:  This list has 4 medication(s) that are the same as other medications prescribed for you. Read the directions carefully, and ask your doctor or other care provider to review them with you.            Information about OPIOIDS     PRESCRIPTION OPIOIDS: WHAT YOU NEED TO KNOW   You have a prescription for an opioid (narcotic) pain medicine. Opioids can cause addiction. If you have a history of chemical dependency of any type, you are at a higher risk of becoming addicted to opioids. Only take this medicine after all other options have been tried. Take it for as short a time and as few doses as possible.     Do not:    Drive. If you drive while taking these medicines, you could be arrested for driving under the influence (DUI).    Operate heavy machinery    Do any other dangerous activities while taking these medicines.     Drink any alcohol while taking these medicines.      Take with any other medicines that contain acetaminophen. Read all labels carefully. Look for the word  acetaminophen  or  Tylenol.  Ask your pharmacist if you have questions or are unsure.    Store your pills in a secure place, locked if possible. We will not replace any lost or stolen medicine. If you don t finish your medicine, please throw away (dispose) as directed by your pharmacist. The [place] Pollution Control Agency has more  information about safe disposal: https://www.pca.[facility].[place]/living-green/managing-unwanted-medications    All opioids tend to cause constipation. Drink plenty of water and eat foods that have a lot of fiber, such as fruits, vegetables, prune juice, apple juice and high-fiber cereal. Take a laxative (Miralax, milk of magnesia, Colace, Senna) if you don t move your bowels at least every other day.         Prescriptions were sent or printed at these locations (1 Prescription)                   Other Prescriptions                Printed at Department/Unit printer (1 of 1)         oxyCODONE IR (ROXICODONE) 5 MG tablet                Orders Needing Specimen Collection     None      Pending Results     No orders found from 5/18/2018 to 5/21/2018.            Pending Culture Results     No orders found from 5/18/2018 to 5/21/2018.            Pending Results Instructions     If you had any lab results that were not finalized at the time of your Discharge, you can call the ED Lab Result RN at [phone]. You will be contacted by this team for any positive Lab results or changes in treatment. The nurses are available 7 days a week from 10A to 6:30P.  You can leave a message 24 hours per day and they will return your call.        Thank you for choosing [facility]       Thank you for choosing [facility] for your care. Our goal is always to provide you with excellent care. Hearing back from our patients is one way we can continue to improve our services. Please take a few minutes to complete the written survey that you may receive in the mail after you visit with us. Thank you!        [employer] Information     [employer] gives you secure access to your electronic health record. If you see a primary care provider, you can also send messages to your care team and make appointments. If you have questions, please call your primary care clinic.  If you do not have a primary care provider, please call [phone] and they will assist you.         Care EveryWhere ID     This is your Care EveryWhere ID. This could be used by other organizations to access your [facility] medical records  [id number]        Equal Access to Services     ROBERT LOCKWOOD : Tiago Grant, alethea colindres, migdalia gabriel, jodi anthony. So [facility] [phone].    ATENCIÓN: Si habla español, tiene a clemens disposición servicios gratuitos de asistencia lingüística. Llame al [phone].    We comply with applicable federal civil rights laws and [place] laws. We do not discriminate on the basis of race, color, national origin, age, disability, sex, sexual orientation, or gender identity.            After Visit Summary       This is your record. Keep this with you and show to your community pharmacist(s) and doctor(s) at your next visit.

## 2018-05-20 NOTE — ED TRIAGE NOTES
Patient ambulatory to ER, he reports chronic back pain that has exacerbated over the past week. He has been seen by a specialist at the [facility] for this. Patient reports pain with movement. He has taken acetaminophen and aleve for pain. Patient reports hx of liver transplant, denies narcotic use.  Reports recent increase of activity including yard work

## 2018-05-25 ENCOUNTER — OFFICE VISIT (OUTPATIENT)
Dept: PEDIATRICS | Facility: OTHER | Age: 68
End: 2018-05-25
Attending: INTERNAL MEDICINE
Payer: MEDICARE

## 2018-05-25 VITALS
WEIGHT: 201 LBS | BODY MASS INDEX: 31.48 KG/M2 | SYSTOLIC BLOOD PRESSURE: 142 MMHG | DIASTOLIC BLOOD PRESSURE: 82 MMHG | HEART RATE: 76 BPM

## 2018-05-25 DIAGNOSIS — M47.816 LUMBAR FACET ARTHROPATHY: ICD-10-CM

## 2018-05-25 DIAGNOSIS — I10 ESSENTIAL HYPERTENSION: ICD-10-CM

## 2018-05-25 DIAGNOSIS — M48.061 SPINAL STENOSIS OF LUMBAR REGION, UNSPECIFIED WHETHER NEUROGENIC CLAUDICATION PRESENT: Primary | ICD-10-CM

## 2018-05-25 DIAGNOSIS — Z94.4 LIVER REPLACED BY TRANSPLANT (H): ICD-10-CM

## 2018-05-25 DIAGNOSIS — M48.061 LUMBAR FORAMINAL STENOSIS: ICD-10-CM

## 2018-05-25 LAB
ALBUMIN SERPL-MCNC: 4 G/DL (ref 3.5–5.7)
ALP SERPL-CCNC: 102 U/L (ref 34–104)
ALT SERPL W P-5'-P-CCNC: 13 U/L (ref 7–52)
ANION GAP SERPL CALCULATED.3IONS-SCNC: 7 MMOL/L (ref 3–14)
AST SERPL W P-5'-P-CCNC: 13 U/L (ref 13–39)
BILIRUB DIRECT SERPL-MCNC: 0.3 MG/DL (ref 0–0.2)
BILIRUB SERPL-MCNC: 1.4 MG/DL (ref 0.3–1)
BUN SERPL-MCNC: 28 MG/DL (ref 7–25)
CALCIUM SERPL-MCNC: 9.3 MG/DL (ref 8.6–10.3)
CHLORIDE SERPL-SCNC: 99 MMOL/L (ref 98–107)
CO2 SERPL-SCNC: 27 MMOL/L (ref 21–31)
CREAT SERPL-MCNC: 1.41 MG/DL (ref 0.7–1.3)
ERYTHROCYTE [DISTWIDTH] IN BLOOD BY AUTOMATED COUNT: 14.6 % (ref 10–15)
GFR SERPL CREATININE-BSD FRML MDRD: 50 ML/MIN/1.7M2
GLUCOSE SERPL-MCNC: 243 MG/DL (ref 70–105)
HCT VFR BLD AUTO: 43.2 % (ref 40–53)
HGB BLD-MCNC: 15 G/DL (ref 13.3–17.7)
MCH RBC QN AUTO: 32.8 PG (ref 26.5–33)
MCHC RBC AUTO-ENTMCNC: 34.7 G/DL (ref 31.5–36.5)
MCV RBC AUTO: 95 FL (ref 78–100)
PLATELET # BLD AUTO: 99 10E9/L (ref 150–450)
POTASSIUM SERPL-SCNC: 4.4 MMOL/L (ref 3.5–5.1)
PROT SERPL-MCNC: 6.6 G/DL (ref 6.4–8.9)
RBC # BLD AUTO: 4.57 10E12/L (ref 4.4–5.9)
SODIUM SERPL-SCNC: 133 MMOL/L (ref 134–144)
WBC # BLD AUTO: 8.8 10E9/L (ref 4–11)

## 2018-05-25 PROCEDURE — 80048 BASIC METABOLIC PNL TOTAL CA: CPT | Performed by: INTERNAL MEDICINE

## 2018-05-25 PROCEDURE — 99214 OFFICE O/P EST MOD 30 MIN: CPT | Performed by: INTERNAL MEDICINE

## 2018-05-25 PROCEDURE — G0463 HOSPITAL OUTPT CLINIC VISIT: HCPCS

## 2018-05-25 PROCEDURE — 36415 COLL VENOUS BLD VENIPUNCTURE: CPT | Performed by: INTERNAL MEDICINE

## 2018-05-25 PROCEDURE — 85027 COMPLETE CBC AUTOMATED: CPT | Performed by: INTERNAL MEDICINE

## 2018-05-25 PROCEDURE — 80076 HEPATIC FUNCTION PANEL: CPT | Performed by: INTERNAL MEDICINE

## 2018-05-25 RX ORDER — AMLODIPINE BESYLATE 5 MG/1
5 TABLET ORAL DAILY
Qty: 90 TABLET | Refills: 4 | Status: SHIPPED | OUTPATIENT
Start: 2018-05-25 | End: 2019-05-25

## 2018-05-25 RX ORDER — TRAMADOL HYDROCHLORIDE 50 MG/1
50 TABLET ORAL 2 TIMES DAILY PRN
Qty: 60 TABLET | Refills: 2 | Status: SHIPPED | OUTPATIENT
Start: 2018-05-25 | End: 2018-08-14

## 2018-05-25 ASSESSMENT — PAIN SCALES - GENERAL: PAINLEVEL: SEVERE PAIN (7)

## 2018-05-25 NOTE — PATIENT INSTRUCTIONS
-- Trial of tramadol   -- Ice/heat   -- Flexeril   -- Trial of diclofenac gel   -- Referral to Neurosurgery   -- Increase amlodipine to 5 mg daily   -- Goal BPs in 120s-130s

## 2018-05-25 NOTE — MR AVS SNAPSHOT
After Visit Summary   5/25/2018    Ricki Sharp    MRN: [id number]           Patient Information     Date Of Birth          1950        Visit Information        Provider Department      5/25/2018 3:00 PM Jarad Bocanegra MD [facility]        Today's Diagnoses     Spinal stenosis of lumbar region, unspecified whether neurogenic claudication present    -  1    Lumbar foraminal stenosis        Lumbar facet arthropathy        Essential hypertension          Care Instructions     -- Trial of tramadol   -- Ice/heat   -- Flexeril   -- Trial of diclofenac gel   -- Referral to Neurosurgery   -- Increase amlodipine to 5 mg daily   -- Goal BPs in 120s-130s          Follow-ups after your visit        Additional Services     NEUROSURGERY REFERRAL       [facility]                  Follow-up notes from your care team     Return if symptoms worsen or fail to improve.      Your next 10 appointments already scheduled     Dec 28, 2018 11:45 AM CST   (Arrive by 11:30 AM)   Return Liver Transplant with Mike Gray MD   [facility] ([facility] [place])    [address] [place] [address]  [address]  [facility] [address] [phone]              Who to contact     If you have questions or need follow up information about today's clinic visit or your schedule please contact [facility] directly at [phone].  Normal or non-critical lab and imaging results will be communicated to you by MyChart, letter or phone within 4 business days after the clinic has received the results. If you do not hear from us within 7 days, please contact the clinic through [employer] or phone. If you have a critical or abnormal lab result, we will notify you by phone as soon as possible.  Submit refill requests through 365webcall or call your pharmacy and they will forward the refill request to us. Please allow 3 business days for your refill to be completed.           Additional Information About Your Visit        [employer] Information     [employer] gives you secure access to your electronic health record. If you see a primary care provider, you can also send messages to your care team and make appointments. If you have questions, please call your primary care clinic.  If you do not have a primary care provider, please call [phone] and they will assist you.        Care EveryWhere ID     This is your Care EveryWhere ID. This could be used by other organizations to access your [facility] medical records  [id number]        Your Vitals Were     Pulse BMI (Body Mass Index)                76 31.48 kg/m2           Blood Pressure from Last 3 Encounters:   05/25/18 156/84   05/20/18 155/88   03/05/18 164/90    Weight from Last 3 Encounters:   05/25/18 201 lb (91.2 kg)   03/05/18 199 lb 3.2 oz (90.4 kg)   01/15/18 199 lb 9.6 oz (90.5 kg)              We Performed the Following     NEUROSURGERY REFERRAL          Today's Medication Changes          These changes are accurate as of 5/25/18  3:13 PM.  If you have any questions, ask your nurse or doctor.               Start taking these medicines.        Dose/Directions    diclofenac 1 % Gel topical gel   Commonly known as:  VOLTAREN   Used for:  Spinal stenosis of lumbar region, unspecified whether neurogenic claudication present, Lumbar foraminal stenosis, Lumbar facet arthropathy   Started by:  Jarad Bocanegra MD        Apply 4 grams to low back four times daily using enclosed dosing card.   Quantity:  100 g   Refills:  3       traMADol 50 MG tablet   Commonly known as:  ULTRAM   Used for:  Spinal stenosis of lumbar region, unspecified whether neurogenic claudication present, Lumbar foraminal stenosis, Lumbar facet arthropathy   Started by:  Jarad Bocanegra MD        Dose:  50 mg   Take 1 tablet (50 mg) by mouth 2 times daily as needed for moderate to severe pain or severe pain   Quantity:  60 tablet   Refills:  2          These medicines have changed or have updated prescriptions.        Dose/Directions    amLODIPine 5 MG tablet   Commonly known as:  NORVASC   This may have changed:    - medication strength  - how much to take   Used for:  Essential hypertension   Changed by:  Jarad Bocanegra MD        Dose:  5 mg   Take 1 tablet (5 mg) by mouth daily   Quantity:  90 tablet   Refills:  4       desonide 0.05 % ointment   Commonly known as:  DESOWEN   This may have changed:    - how to take this  - additional instructions   Used for:  Psoriasis        Use on the face daily   Quantity:  60 g   Refills:  1       * fluocinonide 0.05 % solution   Commonly known as:  LIDEX   This may have changed:  additional instructions   Used for:  Psoriasis        Use to the scalp twice a day   Quantity:  60 mL   Refills:  3       * fluocinonide 0.05 % ointment   Commonly known as:  LIDEX   This may have changed:    - how to take this  - additional instructions   Used for:  Psoriasis        Use on the body twice a day to red scaly areas   Quantity:  60 g   Refills:  3       levothyroxine 125 MCG tablet   Commonly known as:  SYNTHROID/LEVOTHROID   This may have changed:  additional instructions   Used for:  Liver replaced by transplant (H)        Dose:  125 mcg   Take 1 tablet (125 mcg) by mouth daily   Quantity:  30 tablet   Refills:  4       * Notice:  This list has 2 medication(s) that are the same as other medications prescribed for you. Read the directions carefully, and ask your doctor or other care provider to review them with you.         Where to get your medicines      [facility] [address] [place], [place] - [address], [facility] [address]     Phone:  [phone]     amLODIPine 5 MG tablet    diclofenac 1 % Gel topical gel         Some of these will need a paper prescription and others can be bought over the counter.  Ask your nurse if you have  questions.     Bring a paper prescription for each of these medications     traMADol 50 MG tablet               Information about OPIOIDS     PRESCRIPTION OPIOIDS: WHAT YOU NEED TO KNOW   You have a prescription for an opioid (narcotic) pain medicine. Opioids can cause addiction. If you have a history of chemical dependency of any type, you are at a higher risk of becoming addicted to opioids. Only take this medicine after all other options have been tried. Take it for as short a time and as few doses as possible.     Do not:    Drive. If you drive while taking these medicines, you could be arrested for driving under the influence (DUI).    Operate heavy machinery    Do any other dangerous activities while taking these medicines.     Drink any alcohol while taking these medicines.      Take with any other medicines that contain acetaminophen. Read all labels carefully. Look for the word  acetaminophen  or  Tylenol.  Ask your pharmacist if you have questions or are unsure.    Store your pills in a secure place, locked if possible. We will not replace any lost or stolen medicine. If you don t finish your medicine, please throw away (dispose) as directed by your pharmacist. The [place] Pollution Control Agency has more information about safe disposal: https://www.pca.[facility].[place]/living-green/managing-unwanted-medications    All opioids tend to cause constipation. Drink plenty of water and eat foods that have a lot of fiber, such as fruits, vegetables, prune juice, apple juice and high-fiber cereal. Take a laxative (Miralax, milk of magnesia, Colace, Senna) if you don t move your bowels at least every other day.          Primary Care Provider Office Phone # Fax #    Jarad Bocanegra [phone] [address] [facility] [address]        Equal Access to Services     [facility] MANDIE AH: Tiago Grant, alethea colindres, jodi medina  dale jeanaan ah. So [facility] [place] [facility] [phone].    ATENCIÓN: Si romeo gan, tiene a clemens disposición servicios gratuitos de asistencia lingüística. Jake patton [phone].    We comply with applicable federal civil rights laws and [place] laws. We do not discriminate on the basis of race, color, national origin, age, disability, sex, sexual orientation, or gender identity.            Thank you!     Thank you for choosing [facility] AND \[facility]\""  for your care. Our goal is always to provide you with excellent care. Hearing back from our patients is one way we can continue to improve our services. Please take a few minutes to complete the written survey that you may receive in the mail after your visit with us. Thank you!             Your Updated Medication List - Protect others around you: Learn how to safely use, store and throw away your medicines at www.disposemymeds.org.          This list is accurate as of 5/25/18  3:13 PM.  Always use your most recent med list.                   Brand Name Dispense Instructions for use Diagnosis    ACETAMINOPHEN PO           albuterol 108 (90 Base) MCG/ACT Inhaler    PROAIR HFA/PROVENTIL HFA/VENTOLIN HFA    3 Inhaler    Inhale 2 puffs into the lungs every 4 hours as needed for shortness of breath / dyspnea or wheezing    Chronic obstructive pulmonary disease, unspecified COPD type (H)       ALEVE PO      Take 220 mg by mouth 2 times daily (with meals)        amLODIPine 5 MG tablet    NORVASC    90 tablet    Take 1 tablet (5 mg) by mouth daily    Essential hypertension       calcium carbonate-vitamin D 500-400 MG-UNIT Tabs per tablet     180 tablet    Take 1 tablet by mouth 2 times daily    Liver replaced by transplant (H)       cyclobenzaprine 5 MG tablet    FLEXERIL    45 tablet    Take 1-2 tablets as needed at night    Lumbar degenerative disc disease, Lumbar facet arthropathy, Chronic left-sided low back pain with left-sided sciatica       desonide 0.05 % ointment     DESOWEN    60 g    Use on the face daily    Psoriasis       diclofenac 1 % Gel topical gel    VOLTAREN    100 g    Apply 4 grams to low back four times daily using enclosed dosing card.    Spinal stenosis of lumbar region, unspecified whether neurogenic claudication present, Lumbar foraminal stenosis, Lumbar facet arthropathy       * fluocinonide 0.05 % solution    LIDEX    60 mL    Use to the scalp twice a day    Psoriasis       * fluocinonide 0.05 % ointment    LIDEX    60 g    Use on the body twice a day to red scaly areas    Psoriasis       levothyroxine 125 MCG tablet    SYNTHROID/LEVOTHROID    30 tablet    Take 1 tablet (125 mcg) by mouth daily    Liver replaced by transplant (H)       * losartan 50 MG tablet    COZAAR    90 tablet    TAKE ONE TABLET BY MOUTH EVERY DAY    Liver replaced by transplant (H)       * losartan 25 MG tablet    COZAAR    90 tablet    TAKE 1 TABLET BY MOUTH EVERY DAY    Essential hypertension       magnesium oxide 400 MG tablet    MAG-OX    120 tablet    Take 2 tablets (800 mg) by mouth 2 times daily    PTLD (post-transplant lymphoproliferative disorder) (H), Liver replaced by transplant (H)       metoprolol tartrate 100 MG tablet    LOPRESSOR    60 tablet    Take 1 tablet (100 mg) by mouth 2 times daily    Liver replaced by transplant (H)       omeprazole 20 MG tablet     30 tablet    Take 1 tablet (20 mg) by mouth daily    Liver replaced by transplant (H)       SPIRIVA HANDIHALER 18 MCG capsule   Generic drug:  tiotropium     90 capsule    INHALE THE CONTENTS OF 1 CAPSULE VIA INHALATION DEVICE DAILY    Chronic obstructive pulmonary disease, unspecified COPD type (H)       tacrolimus 1 MG capsule    GENERIC EQUIVALENT    60 capsule    Take 1 capsule (1 mg) by mouth every 12 hours    Liver replaced by transplant (H)       traMADol 50 MG tablet    ULTRAM    60 tablet    Take 1 tablet (50 mg) by mouth 2 times daily as needed for moderate to severe pain or severe pain    Spinal stenosis  of lumbar region, unspecified whether neurogenic claudication present, Lumbar foraminal stenosis, Lumbar facet arthropathy       * Notice:  This list has 4 medication(s) that are the same as other medications prescribed for you. Read the directions carefully, and ask your doctor or other care provider to review them with you.

## 2018-05-25 NOTE — PROGRESS NOTES
Subjective  Ricki Sharp is a 68 year old male who presents for ER follow-up.  He was recently seen at the [facility] emergency department May 20, 2018 for back pain.  He tells me what happened is he mowed his yard using a riding mower on Saturday but by Sunday morning he was so stiff he could not walk.  He has pain across the low back radiating to left and right foot, worse on the left.  Also present along the left upper outer buttock.  He tried 9 sessions of physical therapy over the winter and 7 sessions at the [facility].  He is not getting any improvement in his back.  He thinks it is time to go back down to the surgeon at the [facility].  He brings his blood pressure log for my review.  Initially was closer to the 150s but now improving down towards 130-140 systolic.  Has not had any lower extremity edema.    Problem List/PMH: reviewed in EMR, and made relevant updates today.  Medications: reviewed in EMR, and made relevant updates today.  Allergies: reviewed in EMR, and made relevant updates today.    Social Hx:  Social History   Substance Use Topics     Smoking status: Current Every Day Smoker     Years: 5.00     Smokeless tobacco: Never Used     Alcohol use Yes      Comment: rare     Social History     Social History Narrative         I reviewed social history and made relevant updates today.    Family Hx:   History reviewed. No pertinent family history.    Objective  Vitals: reviewed in EMR.  /82  Pulse 76  Wt 201 lb (91.2 kg)  BMI 31.48 kg/m2    Gen: Pleasant male, NAD.  HEENT: MMM  Neck: Supple  Pulm: Breathing easily  Neuro: Strength in the feet is 5/5 to flexion and extension of the ankles bilaterally.  Skin: No concerning lesions.  Psychiatric: Normal affect and insight. Does not appear anxious or depressed.  Back: Mild tenderness to palpation in the outer buttock bilaterally without crepitus.  Negative straight leg raise bilaterally.    Study Result   MR SPINE  LUMBAR WO     HISTORY: Chronic bilateral low back pain without sciatica. .     TECHNIQUE: Sagittal T1, T2 and STIR as well as axial T2 images of the lumbar spine were obtained.     COMPARISON: CT 03/11/2014.     FINDINGS:     Trace retrolisthesis of L1 on L2 and L2 on L3 are seen. Lumbar lordosis is otherwise preserved.  The vertebral body heights are preserved.  The marrow signal is notable for Modic type III change most predominant at L2-3.     The distal cord and conus medullaris have a normal caliber and morphology.  The conus terminates at L1-2.  No abnormal cord signal is seen in the distal cord or conus.  There are no masses in the spinal canal or paravertebral soft tissues. Paraspinal   adenopathy seen on the prior from 2014 is not visualized on the current study.     Degenerative disk disease includes diffuse desiccation and variable disc height loss, severe at L2-3.     At L1-2, there is a moderate diffuse disc bulge with mild facet and ligamentum flavum hypertrophy. Spinal stenosis is mild to moderate. Foraminal stenoses are mild.     At L2-3, there is a moderate diffuse disc bulge with moderate facet and ligamentum flavum hypertrophy. Spinal stenosis is moderate, accentuated by local lipomatosis. Foraminal stenoses are mild to moderate.     At L3-4, there is a moderate diffuse disc bulge with a superimposed left foraminal protrusion/extrusion. There is moderate facet and ligamentum flavum hypertrophy. Spinal stenosis is moderate to severe. Foraminal stenoses are mild on the right and severe   on the left.     At L4-5, there is a moderate diffuse disc bulge with moderate to severe facet and ligamentum flavum hypertrophy. Spinal stenosis is mild to moderate. Foraminal stenoses are mild on the right and mild to moderate on the left.     At L5-S1, there is a mild lobulated diffuse disc bulge with severe facet hypertrophy. Spinal stenosis is mild. Foraminal stenoses are mild to moderate.     Mild bilateral SI  joint degenerative changes are present.     IMPRESSION:     Diffuse degenerative changes of the lumbar spine as detailed above. Facet degeneration is worst at L4-5 and L5-S1. A focal left foraminal protrusion at L3-4 results in possible exiting left L3 nerve root impingement.     Electronically Signed By: Jose Alberto Cabrera on 11/14/2017 2:14 PM         Assessment    ICD-10-CM    1. Spinal stenosis of lumbar region, unspecified whether neurogenic claudication present M48.061 NEUROSURGERY REFERRAL     traMADol (ULTRAM) 50 MG tablet     diclofenac (VOLTAREN) 1 % GEL topical gel   2. Lumbar foraminal stenosis M99.83 NEUROSURGERY REFERRAL     traMADol (ULTRAM) 50 MG tablet     diclofenac (VOLTAREN) 1 % GEL topical gel   3. Lumbar facet arthropathy M12.88 NEUROSURGERY REFERRAL     traMADol (ULTRAM) 50 MG tablet     diclofenac (VOLTAREN) 1 % GEL topical gel   4. Essential hypertension I10 amLODIPine (NORVASC) 5 MG tablet     Orders Placed This Encounter   Procedures     NEUROSURGERY REFERRAL       Plan   -- Expected clinical course discussed   -- Medications and their side effects discussed  Patient Instructions    -- Trial of tramadol   -- Ice/heat   -- Flexeril   -- Trial of diclofenac gel   -- Referral to Neurosurgery   -- Increase amlodipine to 5 mg daily   -- Goal BPs in 120s-130s      Return if symptoms worsen or fail to improve.    Signed, Jarad Bocanegra MD  Internal Medicine & Pediatrics

## 2018-05-26 ASSESSMENT — PATIENT HEALTH QUESTIONNAIRE - PHQ9: SUM OF ALL RESPONSES TO PHQ QUESTIONS 1-9: 0

## 2018-05-30 ENCOUNTER — TELEPHONE (OUTPATIENT)
Dept: PEDIATRICS | Facility: OTHER | Age: 68
End: 2018-05-30

## 2018-05-30 DIAGNOSIS — M48.061 LUMBAR FORAMINAL STENOSIS: ICD-10-CM

## 2018-05-30 DIAGNOSIS — M51.369 DEGENERATION OF LUMBAR INTERVERTEBRAL DISC: ICD-10-CM

## 2018-05-30 DIAGNOSIS — M47.816 LUMBAR FACET ARTHROPATHY: Primary | ICD-10-CM

## 2018-06-12 ENCOUNTER — HOSPITAL ENCOUNTER (OUTPATIENT)
Dept: MRI IMAGING | Facility: OTHER | Age: 68
Discharge: HOME | End: 2018-06-12
Attending: INTERNAL MEDICINE | Admitting: INTERNAL MEDICINE
Payer: MEDICARE

## 2018-06-12 DIAGNOSIS — M51.369 DEGENERATION OF LUMBAR INTERVERTEBRAL DISC: ICD-10-CM

## 2018-06-12 DIAGNOSIS — M47.816 LUMBAR FACET ARTHROPATHY: ICD-10-CM

## 2018-06-12 DIAGNOSIS — M48.061 LUMBAR FORAMINAL STENOSIS: ICD-10-CM

## 2018-06-12 PROCEDURE — 72148 MRI LUMBAR SPINE W/O DYE: CPT

## 2018-07-02 DIAGNOSIS — Z94.4 LIVER REPLACED BY TRANSPLANT (H): ICD-10-CM

## 2018-07-03 RX ORDER — METOPROLOL TARTRATE 100 MG
TABLET ORAL
Qty: 180 TABLET | Refills: 0 | Status: SHIPPED | OUTPATIENT
Start: 2018-07-03 | End: 2018-10-08

## 2018-07-03 NOTE — TELEPHONE ENCOUNTER
Prescription approved per [facility] Refill Protocol.  LOV: 5/25/18  Ne Farah RN on 7/3/2018 at 3:46 PM

## 2018-07-05 ENCOUNTER — OFFICE VISIT (OUTPATIENT)
Dept: NEUROSURGERY | Facility: CLINIC | Age: 68
End: 2018-07-05
Payer: MEDICARE

## 2018-07-05 VITALS
DIASTOLIC BLOOD PRESSURE: 91 MMHG | BODY MASS INDEX: 31.77 KG/M2 | SYSTOLIC BLOOD PRESSURE: 164 MMHG | WEIGHT: 202.4 LBS | HEIGHT: 67 IN | HEART RATE: 78 BPM

## 2018-07-05 DIAGNOSIS — M51.379 DDD (DEGENERATIVE DISC DISEASE), LUMBOSACRAL: ICD-10-CM

## 2018-07-05 DIAGNOSIS — M51.16 LUMBAR DISC HERNIATION WITH RADICULOPATHY: Primary | ICD-10-CM

## 2018-07-05 DIAGNOSIS — M53.3 SACROILIAC JOINT PAIN: ICD-10-CM

## 2018-07-05 ASSESSMENT — PAIN SCALES - GENERAL: PAINLEVEL: SEVERE PAIN (7)

## 2018-07-05 NOTE — MR AVS SNAPSHOT
After Visit Summary   7/5/2018    Ricki Sharp    MRN: [id number]           Patient Information     Date Of Birth          1950        Visit Information        Provider Department      7/5/2018 2:00 PM Sybil Pinto APRN [facility] Neurosurgery        Today's Diagnoses     Left lumbar 3-4 disc herniation with radiculopathy    -  1    Left sacroiliac joint pain        DDD (degenerative disc disease), lumbosacral          Care Instructions    1. Refer to [facility] for left  L3-4 PJ and the left SIJ injection on separate days.    2. Follow up with ALBERT Pinto ~ 4 weeks after you have completed both injections. Keep a journal of your progress after each injection.     3. Complete x rays before your next  appointment with ALBERT Pinto.          Follow-ups after your visit        Your next 10 appointments already scheduled     Dec 28, 2018 11:45 AM CST   (Arrive by 11:30 AM)   Return Liver Transplant with Mike Gray MD   [facility] ([facility] [place])    [address] [place] [address]  [address]  [facility] [address] [phone]              Who to contact     Please call your clinic at [phone] to:    Ask questions about your health    Make or cancel appointments    Discuss your medicines    Learn about your test results    Speak to your doctor            Additional Information About Your Visit        [employer] Information     [employer] gives you secure access to your electronic health record. If you see a primary care provider, you can also send messages to your care team and make appointments. If you have questions, please call your primary care clinic.  If you do not have a primary care provider, please call [phone] and they will assist you.      [employer] is an electronic gateway that provides easy, online access to your medical records. With [employer], you can request a clinic appointment, read your test results, renew a prescription or  "communicate with your care team.     To access your existing account, please contact your [facility] or call [phone] for assistance.        Care EveryWhere ID     This is your Care EveryWhere ID. This could be used by other organizations to access your [facility] medical records  [id number]        Your Vitals Were     Pulse Height BMI (Body Mass Index)             78 1.702 m (5' 7\") 31.7 kg/m2          Blood Pressure from Last 3 Encounters:   No data found for BP    Weight from Last 3 Encounters:   No data found for Wt              Today, you had the following     No orders found for display         Today's Medication Changes          These changes are accurate as of 7/5/18 11:59 PM.  If you have any questions, ask your nurse or doctor.               These medicines have changed or have updated prescriptions.        Dose/Directions    desonide 0.05 % ointment   Commonly known as:  DESOWEN   This may have changed:    - how to take this  - additional instructions   Used for:  Psoriasis        Use on the face daily   Quantity:  60 g   Refills:  1       fluocinonide 0.05 % solution   Commonly known as:  LIDEX   This may have changed:  additional instructions   Used for:  Psoriasis        Use to the scalp twice a day   Quantity:  60 mL   Refills:  3       levothyroxine 125 MCG tablet   Commonly known as:  SYNTHROID/LEVOTHROID   This may have changed:  additional instructions   Used for:  Liver replaced by transplant (H)        Dose:  125 mcg   Take 1 tablet (125 mcg) by mouth daily   Quantity:  30 tablet   Refills:  4       losartan 25 MG tablet   Commonly known as:  COZAAR   This may have changed:  Another medication with the same name was removed. Continue taking this medication, and follow the directions you see here.   Used for:  Essential hypertension   Changed by:  Sybil Pinto APRN CNP        TAKE 1 TABLET BY MOUTH EVERY DAY   Quantity:  90 tablet   Refills:  4       "          Primary Care Provider Office Phone # Fax #    Jarad Dionicio Bocanegra [phone] [address] [facility] [address]        Equal Access to Services     MICHELLESHREE MANDIE : Taigo kimberley whitten jagdish Grant, waclaudyda lucarmen, qaybta kaneliada harvey, jodi hoang gilmaramerica hunter laMary Johussein anthony. So [facility] [phone].    ATENCIÓN: Si habla español, tiene a clemens disposición servicios gratuitos de asistencia lingüística. Llame al [phone].    We comply with applicable federal civil rights laws and [place] laws. We do not discriminate on the basis of race, color, national origin, age, disability, sex, sexual orientation, or gender identity.            Thank you!     Thank you for choosing [facility]  for your care. Our goal is always to provide you with excellent care. Hearing back from our patients is one way we can continue to improve our services. Please take a few minutes to complete the written survey that you may receive in the mail after your visit with us. Thank you!             Your Updated Medication List - Protect others around you: Learn how to safely use, store and throw away your medicines at www.disposemymeds.org.          This list is accurate as of 7/5/18 11:59 PM.  Always use your most recent med list.                   Brand Name Dispense Instructions for use Diagnosis    ACETAMINOPHEN PO           albuterol 108 (90 Base) MCG/ACT Inhaler    PROAIR HFA/PROVENTIL HFA/VENTOLIN HFA    3 Inhaler    Inhale 2 puffs into the lungs every 4 hours as needed for shortness of breath / dyspnea or wheezing    Chronic obstructive pulmonary disease, unspecified COPD type (H)       ALEVE PO      Take 220 mg by mouth 2 times daily (with meals)        amLODIPine 5 MG tablet    NORVASC    90 tablet    Take 1 tablet (5 mg) by mouth daily    Essential hypertension       calcium carbonate-vitamin D 500-400 MG-UNIT Tabs per tablet     180 tablet    Take 1 tablet by mouth 2 times  daily    Liver replaced by transplant (H)       cyclobenzaprine 5 MG tablet    FLEXERIL    45 tablet    Take 1-2 tablets as needed at night    Lumbar degenerative disc disease, Lumbar facet arthropathy, Chronic left-sided low back pain with left-sided sciatica       desonide 0.05 % ointment    DESOWEN    60 g    Use on the face daily    Psoriasis       diclofenac 1 % Gel topical gel    VOLTAREN    100 g    Apply 4 grams to low back four times daily using enclosed dosing card.    Spinal stenosis of lumbar region, unspecified whether neurogenic claudication present, Lumbar foraminal stenosis, Lumbar facet arthropathy       fluocinonide 0.05 % solution    LIDEX    60 mL    Use to the scalp twice a day    Psoriasis       levothyroxine 125 MCG tablet    SYNTHROID/LEVOTHROID    30 tablet    Take 1 tablet (125 mcg) by mouth daily    Liver replaced by transplant (H)       losartan 25 MG tablet    COZAAR    90 tablet    TAKE 1 TABLET BY MOUTH EVERY DAY    Essential hypertension       magnesium oxide 400 MG tablet    MAG-OX    120 tablet    Take 2 tablets (800 mg) by mouth 2 times daily    PTLD (post-transplant lymphoproliferative disorder) (H), Liver replaced by transplant (H)       metoprolol tartrate 100 MG tablet    LOPRESSOR    180 tablet    TAKE 1 TABLET BY MOUTH TWICE DAILY    Liver replaced by transplant (H)       omeprazole 20 MG tablet     30 tablet    Take 1 tablet (20 mg) by mouth daily    Liver replaced by transplant (H)       SPIRIVA HANDIHALER 18 MCG capsule   Generic drug:  tiotropium     90 capsule    INHALE THE CONTENTS OF 1 CAPSULE VIA INHALATION DEVICE DAILY    Chronic obstructive pulmonary disease, unspecified COPD type (H)       tacrolimus 1 MG capsule    GENERIC EQUIVALENT    60 capsule    Take 1 capsule (1 mg) by mouth every 12 hours    Liver replaced by transplant (H)       traMADol 50 MG tablet    ULTRAM    60 tablet    Take 1 tablet (50 mg) by mouth 2 times daily as needed for moderate to severe  pain or severe pain    Spinal stenosis of lumbar region, unspecified whether neurogenic claudication present, Lumbar foraminal stenosis, Lumbar facet arthropathy

## 2018-07-05 NOTE — LETTER
7/5/2018       RE: Ricki Sharp  [address]   [place] [address]     Dear Colleague,    Thank you for referring your patient, Ricki Sharp, to the [facility] NEUROSURGERY at [facility]. Please see a copy of my visit note below.    Neurosurgery Consultation    Ricki Sharp MRN# [id number]   YOB: 1950 Age: 68 year old   Date of Service: 07/10/18     Provider Requesting Consultation:  Jarad Bocanegra MD  [facility]  [address]  [place] [address]    I had the pleasures of seeing Ricki Sharp in the office at Dr. Bocanegra's request in consultation regarding his acute on chronic low back and left thigh pain.         History of Present Illness:   This is a 68 year old male with multiple PMH significant for chemo related non-Hodgkin's lymphoma (NLH), chronic hepatitis C, liver cirrhosis s/p liver transplant 2007, former drug and ETOH abuse (has been sober for 18 years), HTN, inferior mesenteric vein thrombosis, s/p stent in 2014, hyperlipidemia, bilateral hip OA, s/p replacement of left hip in /2016 and right hip in 3/2006 as well as chronic back pain. He presents to our office due to acute on chronic left lower back and left anterior medial thigh pain since mid 5/2018. This was precipitated by carrying objects and doing many heavy spring chores at home the day before. The pain brought him to the local ED after failure to respond to OTC medications. Of note, he was previously evaluated by our Physiatry for back pain and underwent a left L5-S1 periarticular facet joint injection in 1/02018 with no relief.     The patient reports that he has had ongoing back and left hip and thigh pain for at least 2-3 years. Initially, he thought that this was due to OA In the left hip. The hip pain went away after the replacement in 3/2016. The back pain however never resolved.  He then stepped off stair and fell onto the right  "knee a year later that led to increased left low back and left hip/ thigh pain. He returned to see  his orthopedic surgeon. X ray of left hip showed implant in stable position and the \"prior\" non displaced torch hip fx has displaced slighty with fibrous union. The patient was reassured that the finding on his x ray was not the cause of his back and leg pain. He remained on conservative treatment by his PCP. The patient states that he could not walk or stand for more than 20 minutes due to increased back pain. Pain is improved after he sits down for 20 mintue and then reoccurs. He descries 80% lower lower back and 20% left anterior medial thigh pain.  He denies sphincter disturbance, motor weakness or paresthesia in bilateral lower extremities. He currently takes Tramadol PRN, Flexeril 10 mg Q HS, and Tylenol PRN without adequate pain control. He had a MRI of lumbar spine in 6/2018 and is referred to neurosurgery for evaluation and recommendations.             Past Medical History:   Hypomagnesemia, hyperlipidemia, thrombocytopenia, CKD, obesity, status post bilateral hip replacement, type 2 diabetes, prolonged QT interval, COPD, moderate aortic stenosis, immunosuppressive status, left bundle branch block, first degree AV block, inferior mesenteric vein thrombosis, anxiety, osteoarthritis, cirrhosis, chronic hepatitis C, dysthymia, hypertension, chemotherapy-induced non-Hodgkin's lymphoma, portal hypertension, hypoalbuminemia, peptic ulcer disease, and Benito's esophagus with dysplasia.           Past Surgical History:   Status post liver transplant, ERCP, sclerotherapy for esophagus varices, bilateral hip replacement, cholecystectomy, extrapleural removal for empyema, ORIF of left wrist, and paracentesis.           Social History:     Social History     Marital status:      Spouse name: N/A     Number of children: N/A     Years of education: N/A     Occupational History     Retired     Social History Main " Topics     Smoking status: Current Every Day Smoker     Years: 5.00     Smokeless tobacco: Never Used     Alcohol use Yes      Comment: rare     Drug use: No     Sexual activity: Not on file             Family History:   Daughter and mother  from cirrhosis related to alcohol abuse, father  from myocardial infarction.           Immunizations:   Influenza on 2017            Allergies:     Allergen Reactions     Lisinopril Cough     Spironolactone Other (See Comments)     hyperkalemia     Levaquin [Levofloxacin] Rash     Rash on abdomen, possibly from Levaquin.              Medications:     Current Outpatient Prescriptions:      ACETAMINOPHEN PO, , Disp: , Rfl:      albuterol (PROAIR HFA, PROVENTIL HFA, VENTOLIN HFA) 108 (90 BASE) MCG/ACT inhaler, Inhale 2 puffs into the lungs every 4 hours as needed for shortness of breath / dyspnea or wheezing, Disp: 3 Inhaler, Rfl: 1     amLODIPine (NORVASC) 5 MG tablet, Take 1 tablet (5 mg) by mouth daily, Disp: 90 tablet, Rfl: 4     calcium carbonate-vitamin D 500-400 MG-UNIT TABS tablt, Take 1 tablet by mouth 2 times daily, Disp: 180 tablet, Rfl: 3     cyclobenzaprine (FLEXERIL) 5 MG tablet, Take 1-2 tablets as needed at night, Disp: 45 tablet, Rfl: 3     desonide (DESOWEN) 0.05 % ointment, Use on the face daily (Patient taking differently: Use on the face daily PRN), Disp: 60 g, Rfl: 1     diclofenac (VOLTAREN) 1 % GEL topical gel, Apply 4 grams to low back four times daily using enclosed dosing card., Disp: 100 g, Rfl: 3     fluocinonide (LIDEX) 0.05 % external solution, Use to the scalp twice a day (Patient taking differently: Use to the scalp twice a day  PRN), Disp: 60 mL, Rfl: 3     levothyroxine (SYNTHROID, LEVOTHROID) 125 MCG tablet, Take 1 tablet (125 mcg) by mouth daily (Patient taking differently: Take 125 mcg by mouth daily NOON), Disp: 30 tablet, Rfl: 4     losartan (COZAAR) 25 MG tablet, TAKE 1 TABLET BY MOUTH EVERY DAY, Disp: 90 tablet, Rfl: 4      "magnesium oxide (MAG-OX) 400 MG tablet, Take 2 tablets (800 mg) by mouth 2 times daily, Disp: 120 tablet, Rfl: 11     metoprolol tartrate (LOPRESSOR) 100 MG tablet, TAKE 1 TABLET BY MOUTH TWICE DAILY, Disp: 180 tablet, Rfl: 0     Naproxen Sodium (ALEVE PO), Take 220 mg by mouth 2 times daily (with meals), Disp: , Rfl:      omeprazole 20 MG tablet, Take 1 tablet (20 mg) by mouth daily, Disp: 30 tablet, Rfl: 11     SPIRIVA HANDIHALER 18 MCG capsule, INHALE THE CONTENTS OF 1 CAPSULE VIA INHALATION DEVICE DAILY, Disp: 90 capsule, Rfl: 1     tacrolimus (GENERIC EQUIVALENT) 1 MG capsule, Take 1 capsule (1 mg) by mouth every 12 hours, Disp: 60 capsule, Rfl: 11     traMADol (ULTRAM) 50 MG tablet, Take 1 tablet (50 mg) by mouth 2 times daily as needed for moderate to severe pain or severe pain, Disp: 60 tablet, Rfl: 2            Review of Systems:     ROS: is 14 point review of system is stated above in the HPI, PMH, and PSH.  The remaining system is otherwise negative.           Physical Exam:    BP (!) 164/91  Pulse 78  Ht 1.702 m (5' 7\")  Wt 91.8 kg (202 lb 6.4 oz)  BMI 31.7 kg/m2    Patient Supplied Answers To the [facility] Pain Questionnaire  [facility] Pain -  Patient Entered Questionnaire/Answers 7/3/2018   What number best describes your pain right now:  0 = No pain  to  10 = Worst pain imaginable 7   How would you describe the pain? dull, aching   Which of the following worsen your pain? standing, walking, exercise   Which of the following improve or reduce your pain?  lying down, sitting, medication, relaxation   What number best describes your average pain for the past week:  0 = No pain  to  10 = Worst pain imaginable 8   What number best describes your LOWEST pain in past 24 hours:  0 = No pain  to  10 = Worst pain imaginable 2   What number best describes your WORST pain in past 24 hours:  0 = No pain  to  10 = Worst pain imaginable 8   When is your pain worst? AM   What non-medicine treatments have you already had for " your pain? physical therapy, spine injections (shots), counseling, exercise   Have you tried treating your pain with medication?  Yes   Are you currently taking medications for your pain? Yes     General: Well-nourished and well-developed in no acute distress.  HEENT: Head is normocephalic and atraumatic. Neck is supple without adenopathy or thyromegaly. Palpation of his bilateral temporomandibular joint shows no popping, clicking, tenderness and/or swelling. Conjunctivae are anicteric. Oropharynx and nasopharynx are without exudate and/or erythema. Inspection of his oral cavity shows no obvious ulcerations and/or lesions.   Lungs: Clear to auscultation.   Cardiovascular: Heart rate is regular with S1, S2 and no extra sounds.   Abdomen: + bowel sound x 4 quadrants. Soft. None tender. None distended.       Focused Neurologic Exam:   He is alert, oriented and cooperative. He answers questions and follows commands appropriately in fluent speech and normal tone. CN II-XII are grossly normal.  Motor:    Delt Bi Tri FE IP Quad Hamst TibAnt EHL Gastroc   R 5 5 5 5 5 5 5 5 5 5   L 5 5 5 5 5 5 5 5 5 5   Sensory: Intact to light touch.  DTR's Patellar and Achilles 0/4.   No fasciculations.  Muscle bulk and tone WNL.  No Clonus.     Inspection of the spine reveals no scoliosis, exaggerated kyphosis or lordosis.   Lumbar ROM full.  No spasming, no tenderness, no step offs. No SLR.  Positive SJ tenderness. No trochanteric bursal tenderness. Negative Angie's. Negative Dionicio's.    Able to heel and toe stand/walk. Gait wide base. Normal station. No antalgia.              Imaging:   I reviewed his lumbar MRI with my colleague Dr. Efrem Ordoñez and agreed with the radiologist's findings below. Detail report and the actual image are available in EPIC and PACS.           Assessment and Plan:     1. Left lumbar 3-4 disc herniation with radiculopathy    2. Left sacroiliac joint pain    3. DDD (degenerative disc disease), lumbosacral          The patient's neurologic exam is normal. His clinical presentation, exam and MRI study show several possible etiologies contributing to his left back and left leg pain. I told him that I do not see that the previous left L5-S1 facet injection would help with his condition and he clearly did not benefit from it. I recommend a left L3-4 epidural steroid injection and left SIJ injection on separate days to see this will improve his back pain. Given his complex medical history and If he does respond to one or both injections, he can potentially have these repeated a few times before considering surgery as the last resort. The patient also would like to avoid surgery as much as possible and is in agreement to the plan of care.    I will plan to see him back in one month after the injection. I would also like him to get a scoliosis x ray and 4 view lumbar spine x ray prior to his next visit.      Thank you very much for allowing us to participate in the care of this patient. Please do not hesitate to contact us with questions. We will keep you informed of his progress.       Total time: 60 minutes with more than 30 minutes spent in direct face to face contact reviewing films, providing education, counseling, the importance of good health habits, non-operative therapies,and indications for surgery as well as further follow up.    This note was completed using Dragon voice recognition software.  Although reviewed after completion, some word and grammatical errors may occur.        Sybil Pinto DNP, APRN, FNP-BC  [facility]   Department of Neurosurgery  Phone: [phone]  Fax: [phone]

## 2018-07-10 NOTE — PATIENT INSTRUCTIONS
1. Refer to [facility] [place] [facility] for left  L3-4 PJ and the left SIJ injection on separate days.    2. Follow up with ALBERT Pinto ~ 4 weeks after you have completed both injections. Keep a journal of your progress after each injection.     3. Complete x rays before your next  appointment with ALBERT Pinto.

## 2018-07-10 NOTE — PROGRESS NOTES
"Neurosurgery Consultation    Ricki Sharp MRN# [id number]   YOB: 1950 Age: 68 year old   Date of Service: 07/10/18     Provider Requesting Consultation:  Jarad Bocanegra MD  [facility]  [address]  [place] [address]    I had the pleasures of seeing Ricki Sharp in the office at Dr. Bocanegra's request in consultation regarding his acute on chronic low back and left thigh pain.         History of Present Illness:   This is a 68 year old male with multiple PMH significant for chemo related non-Hodgkin's lymphoma (NLH), chronic hepatitis C, liver cirrhosis s/p liver transplant 2007, former drug and ETOH abuse (has been sober for 18 years), HTN, inferior mesenteric vein thrombosis, s/p stent in 2014, hyperlipidemia, bilateral hip OA, s/p replacement of left hip in /2016 and right hip in 3/2006 as well as chronic back pain. He presents to our office due to acute on chronic left lower back and left anterior medial thigh pain since mid 5/2018. This was precipitated by carrying objects and doing many heavy spring chores at home the day before. The pain brought him to the local ED after failure to respond to OTC medications. Of note, he was previously evaluated by our Physiatry for back pain and underwent a left L5-S1 periarticular facet joint injection in 1/02018 with no relief.     The patient reports that he has had ongoing back and left hip and thigh pain for at least 2-3 years. Initially, he thought that this was due to OA In the left hip. The hip pain went away after the replacement in 3/2016. The back pain however never resolved.  He then stepped off stair and fell onto the right knee a year later that led to increased left low back and left hip/ thigh pain. He returned to see  his orthopedic surgeon. X ray of left hip showed implant in stable position and the \"prior\" non displaced torch hip fx has displaced slighty with fibrous union. The patient was reassured " that the finding on his x ray was not the cause of his back and leg pain. He remained on conservative treatment by his PCP. The patient states that he could not walk or stand for more than 20 minutes due to increased back pain. Pain is improved after he sits down for 20 mintue and then reoccurs. He descries 80% lower lower back and 20% left anterior medial thigh pain.  He denies sphincter disturbance, motor weakness or paresthesia in bilateral lower extremities. He currently takes Tramadol PRN, Flexeril 10 mg Q HS, and Tylenol PRN without adequate pain control. He had a MRI of lumbar spine in 2018 and is referred to neurosurgery for evaluation and recommendations.             Past Medical History:   Hypomagnesemia, hyperlipidemia, thrombocytopenia, CKD, obesity, status post bilateral hip replacement, type 2 diabetes, prolonged QT interval, COPD, moderate aortic stenosis, immunosuppressive status, left bundle branch block, first degree AV block, inferior mesenteric vein thrombosis, anxiety, osteoarthritis, cirrhosis, chronic hepatitis C, dysthymia, hypertension, chemotherapy-induced non-Hodgkin's lymphoma, portal hypertension, hypoalbuminemia, peptic ulcer disease, and Benito's esophagus with dysplasia.           Past Surgical History:   Status post liver transplant, ERCP, sclerotherapy for esophagus varices, bilateral hip replacement, cholecystectomy, extrapleural removal for empyema, ORIF of left wrist, and paracentesis.           Social History:     Social History     Marital status:      Spouse name: N/A     Number of children: N/A     Years of education: N/A     Occupational History     Retired     Social History Main Topics     Smoking status: Current Every Day Smoker     Years: 5.00     Smokeless tobacco: Never Used     Alcohol use Yes      Comment: rare     Drug use: No     Sexual activity: Not on file             Family History:   Daughter and mother  from cirrhosis related to alcohol abuse,  father  from myocardial infarction.           Immunizations:   Influenza on 2017            Allergies:     Allergen Reactions     Lisinopril Cough     Spironolactone Other (See Comments)     hyperkalemia     Levaquin [Levofloxacin] Rash     Rash on abdomen, possibly from Levaquin.              Medications:     Current Outpatient Prescriptions:      ACETAMINOPHEN PO, , Disp: , Rfl:      albuterol (PROAIR HFA, PROVENTIL HFA, VENTOLIN HFA) 108 (90 BASE) MCG/ACT inhaler, Inhale 2 puffs into the lungs every 4 hours as needed for shortness of breath / dyspnea or wheezing, Disp: 3 Inhaler, Rfl: 1     amLODIPine (NORVASC) 5 MG tablet, Take 1 tablet (5 mg) by mouth daily, Disp: 90 tablet, Rfl: 4     calcium carbonate-vitamin D 500-400 MG-UNIT TABS tablt, Take 1 tablet by mouth 2 times daily, Disp: 180 tablet, Rfl: 3     cyclobenzaprine (FLEXERIL) 5 MG tablet, Take 1-2 tablets as needed at night, Disp: 45 tablet, Rfl: 3     desonide (DESOWEN) 0.05 % ointment, Use on the face daily (Patient taking differently: Use on the face daily PRN), Disp: 60 g, Rfl: 1     diclofenac (VOLTAREN) 1 % GEL topical gel, Apply 4 grams to low back four times daily using enclosed dosing card., Disp: 100 g, Rfl: 3     fluocinonide (LIDEX) 0.05 % external solution, Use to the scalp twice a day (Patient taking differently: Use to the scalp twice a day  PRN), Disp: 60 mL, Rfl: 3     levothyroxine (SYNTHROID, LEVOTHROID) 125 MCG tablet, Take 1 tablet (125 mcg) by mouth daily (Patient taking differently: Take 125 mcg by mouth daily NOON), Disp: 30 tablet, Rfl: 4     losartan (COZAAR) 25 MG tablet, TAKE 1 TABLET BY MOUTH EVERY DAY, Disp: 90 tablet, Rfl: 4     magnesium oxide (MAG-OX) 400 MG tablet, Take 2 tablets (800 mg) by mouth 2 times daily, Disp: 120 tablet, Rfl: 11     metoprolol tartrate (LOPRESSOR) 100 MG tablet, TAKE 1 TABLET BY MOUTH TWICE DAILY, Disp: 180 tablet, Rfl: 0     Naproxen Sodium (ALEVE PO), Take 220 mg by mouth 2 times  "daily (with meals), Disp: , Rfl:      omeprazole 20 MG tablet, Take 1 tablet (20 mg) by mouth daily, Disp: 30 tablet, Rfl: 11     SPIRIVA HANDIHALER 18 MCG capsule, INHALE THE CONTENTS OF 1 CAPSULE VIA INHALATION DEVICE DAILY, Disp: 90 capsule, Rfl: 1     tacrolimus (GENERIC EQUIVALENT) 1 MG capsule, Take 1 capsule (1 mg) by mouth every 12 hours, Disp: 60 capsule, Rfl: 11     traMADol (ULTRAM) 50 MG tablet, Take 1 tablet (50 mg) by mouth 2 times daily as needed for moderate to severe pain or severe pain, Disp: 60 tablet, Rfl: 2            Review of Systems:     ROS: is 14 point review of system is stated above in the HPI, PMH, and PSH.  The remaining system is otherwise negative.           Physical Exam:    BP (!) 164/91  Pulse 78  Ht 1.702 m (5' 7\")  Wt 91.8 kg (202 lb 6.4 oz)  BMI 31.7 kg/m2    Patient Supplied Answers To the [facility] Pain Questionnaire  [facility] Pain -  Patient Entered Questionnaire/Answers 7/3/2018   What number best describes your pain right now:  0 = No pain  to  10 = Worst pain imaginable 7   How would you describe the pain? dull, aching   Which of the following worsen your pain? standing, walking, exercise   Which of the following improve or reduce your pain?  lying down, sitting, medication, relaxation   What number best describes your average pain for the past week:  0 = No pain  to  10 = Worst pain imaginable 8   What number best describes your LOWEST pain in past 24 hours:  0 = No pain  to  10 = Worst pain imaginable 2   What number best describes your WORST pain in past 24 hours:  0 = No pain  to  10 = Worst pain imaginable 8   When is your pain worst? AM   What non-medicine treatments have you already had for your pain? physical therapy, spine injections (shots), counseling, exercise   Have you tried treating your pain with medication?  Yes   Are you currently taking medications for your pain? Yes     General: Well-nourished and well-developed in no acute distress.  HEENT: Head is " normocephalic and atraumatic. Neck is supple without adenopathy or thyromegaly. Palpation of his bilateral temporomandibular joint shows no popping, clicking, tenderness and/or swelling. Conjunctivae are anicteric. Oropharynx and nasopharynx are without exudate and/or erythema. Inspection of his oral cavity shows no obvious ulcerations and/or lesions.   Lungs: Clear to auscultation.   Cardiovascular: Heart rate is regular with S1, S2 and no extra sounds.   Abdomen: + bowel sound x 4 quadrants. Soft. None tender. None distended.       Focused Neurologic Exam:   He is alert, oriented and cooperative. He answers questions and follows commands appropriately in fluent speech and normal tone. CN II-XII are grossly normal.  Motor:    Delt Bi Tri FE IP Quad Hamst TibAnt EHL Gastroc   R 5 5 5 5 5 5 5 5 5 5   L 5 5 5 5 5 5 5 5 5 5   Sensory: Intact to light touch.  DTR's Patellar and Achilles 0/4.   No fasciculations.  Muscle bulk and tone WNL.  No Clonus.     Inspection of the spine reveals no scoliosis, exaggerated kyphosis or lordosis.   Lumbar ROM full.  No spasming, no tenderness, no step offs. No SLR.  Positive SJ tenderness. No trochanteric bursal tenderness. Negative Angie's. Negative Dionicio's.    Able to heel and toe stand/walk. Gait wide base. Normal station. No antalgia.              Imaging:   I reviewed his lumbar MRI with my colleague Dr. Efrem Ordoñez and agreed with the radiologist's findings below. Detail report and the actual image are available in EPIC and PACS.           Assessment and Plan:     1. Left lumbar 3-4 disc herniation with radiculopathy    2. Left sacroiliac joint pain    3. DDD (degenerative disc disease), lumbosacral         The patient's neurologic exam is normal. His clinical presentation, exam and MRI study show several possible etiologies contributing to his left back and left leg pain. I told him that I do not see that the previous left L5-S1 facet injection would help with his condition  and he clearly did not benefit from it. I recommend a left L3-4 epidural steroid injection and left SIJ injection on separate days to see this will improve his back pain. Given his complex medical history and If he does respond to one or both injections, he can potentially have these repeated a few times before considering surgery as the last resort. The patient also would like to avoid surgery as much as possible and is in agreement to the plan of care.    I will plan to see him back in one month after the injection. I would also like him to get a scoliosis x ray and 4 view lumbar spine x ray prior to his next visit.      Thank you very much for allowing us to participate in the care of this patient. Please do not hesitate to contact us with questions. We will keep you informed of his progress.       Total time: 60 minutes with more than 30 minutes spent in direct face to face contact reviewing films, providing education, counseling, the importance of good health habits, non-operative therapies,and indications for surgery as well as further follow up.    Sybil Pinto, CRAIG, APRN, FNP-BC  [facility]   Department of Neurosurgery  Phone: [phone]  Fax: [phone]

## 2018-07-12 ENCOUNTER — TELEPHONE (OUTPATIENT)
Dept: NEUROSURGERY | Facility: CLINIC | Age: 68
End: 2018-07-12

## 2018-07-12 ENCOUNTER — TELEPHONE (OUTPATIENT)
Dept: ANESTHESIOLOGY | Facility: CLINIC | Age: 68
End: 2018-07-12

## 2018-07-12 DIAGNOSIS — M53.3 SACROILIAC JOINT PAIN: ICD-10-CM

## 2018-07-12 DIAGNOSIS — M54.16 LUMBAR RADICULOPATHY: Primary | ICD-10-CM

## 2018-07-12 NOTE — TELEPHONE ENCOUNTER
----- Message from Kiana Rothman RN sent at 7/12/2018 12:29 PM CDT -----  Yes,  I reviewed with Sybil Pinto NP and that is correct.  Thanks  Kiana    ----- Message -----     From: Graham Palafox RN     Sent: 7/12/2018  12:09 PM       To: RUFUS Cisneros,    I spoke with you this morning regarding Ricki. I'm just double checking with you.    The order you put in is for a     Epidural Steroid (transforaminal approach): Lumbar      You want it done at  Left L3-4 ?    Then a week later, you also want a left SI joint injection?      Thank you, Graham

## 2018-07-13 ENCOUNTER — TELEPHONE (OUTPATIENT)
Dept: ANESTHESIOLOGY | Facility: CLINIC | Age: 68
End: 2018-07-13

## 2018-07-13 NOTE — TELEPHONE ENCOUNTER
----- Message from Graham Palafox RN sent at 7/12/2018 12:36 PM CDT -----  Hi April,    Direct procedure referral:    Please call and schedule with anyone of our MD's.    Epidural Steroid (transforaminal approach): Lumbar L3,L4    Then one week later, left sacroiliac joint injection    Let the patient know we'll call with pre-procedure instructions.    Once we know which doctor will schedule under, we'll put in orders.    Thank you,   Graham    ----- Message -----     From: Kiana Rothman RN     Sent: 7/12/2018  12:29 PM       To: Graham Palafox RN    Yes,  I reviewed with Sybil Pinto NP and that is correct.  Jennifer Bruno    ----- Message -----     From: Graham Palafox RN     Sent: 7/12/2018  12:09 PM       To: RUFUS Cisneros,    I spoke with you this morning regarding Ricki. I'm just double checking with you.    The order you put in is for a     Epidural Steroid (transforaminal approach): Lumbar      You want it done at  Left L3-4 ?    Then a week later, you also want a left SI joint injection?      Thank you, Graham

## 2018-07-17 DIAGNOSIS — M54.16 LUMBAR RADICULOPATHY: Primary | ICD-10-CM

## 2018-07-17 NOTE — TELEPHONE ENCOUNTER
I have Ricki scheduled for his procedure on 7/30/2018. He was unable to come in the early morning due to the distance.     He is aware that he needs to have a .

## 2018-07-17 NOTE — TELEPHONE ENCOUNTER
Received voicemail message from patient stating he is returning April's call to schedule his procedure.

## 2018-07-18 ENCOUNTER — TELEPHONE (OUTPATIENT)
Dept: ANESTHESIOLOGY | Facility: CLINIC | Age: 68
End: 2018-07-18

## 2018-07-18 NOTE — TELEPHONE ENCOUNTER
Received call from patient's wife stating that they need to change the date of Ricki's procedure.

## 2018-07-19 NOTE — TELEPHONE ENCOUNTER
Patient's wife calling back to reschedule her 's surgery from 7/30, rescheduled to 8/1 at 10:05, will wait the phone call for the exact arrival time and will call back is something comes up and needs to reschedule again.

## 2018-07-23 DIAGNOSIS — G89.29 CHRONIC LEFT SACROILIAC JOINT PAIN: ICD-10-CM

## 2018-07-23 DIAGNOSIS — M53.3 PAIN OF LEFT SACROILIAC JOINT: ICD-10-CM

## 2018-07-23 DIAGNOSIS — M53.3 CHRONIC LEFT SACROILIAC JOINT PAIN: ICD-10-CM

## 2018-07-23 DIAGNOSIS — M54.16 LUMBAR RADICULOPATHY: Primary | ICD-10-CM

## 2018-07-23 NOTE — PROGRESS NOTES
Patient Information     Patient Name  Ricki Sharp MRN  [id number] Sex  Male   1950      Letter by Jarad Bocanegra MD at      Author:  Jarad Bocanegra MD Service:  (none) Author Type:  (none)    Filed:   Encounter Date:  10/16/2017 Status:  (Other)           Ricki Sharp  [address] [facility] [address]          2015    Dear Mr. Sharp:    This letter is to remind you that you are due for your follow up exam with Jarad Bocanegra MD.     A LIMITED refill of   Orders Placed This Encounter      losartan (COZAAR) 25 mg tablet      spironolactone (ALDACTONE) 50 mg tablet has been called into your pharmacy. Additional refills require you to complete this appointment.    Please call the clinic at [phone] to schedule your appointment.    If you should require additional refills before your scheduled appointment, please contact your pharmacy and we will refill your medication until the date of your appointment.    If you are no longer seeing Jarad Bocanegra MD for primary care, please call to let us know. Doing so will remove you from our call/contact list.      Thank you for choosing [facility] and [facility] for your health care needs.    Sincerely,    Refill RN  [facility]

## 2018-07-24 ENCOUNTER — TELEPHONE (OUTPATIENT)
Dept: ANESTHESIOLOGY | Facility: CLINIC | Age: 68
End: 2018-07-24

## 2018-07-24 NOTE — TELEPHONE ENCOUNTER
"----- Message from Graham Palafox RN sent at 7/23/2018  3:00 PM CDT -----  Regarding: RE: Scheduled Injection  Contact: [phone]  Hi April,    Yes, the patient should have been scheduled for a second injection. When I communicated regarding scheduling, I requesting a lumbar transforaminal PJ and then one week later, a left SI joint injection.    It looks like he was scheduled for a lumbar PJ only. I'll go ahead and put in orders for a left SI joint injection under Dr. Agarwal also if you can schedule him.    I'll make sure to call him a few days after his first injection to see if the injection helped with his pain or not. We'll do that for the second one also.    Thank you, Graham      ----- Message -----     From: Carlson, April     Sent: 7/20/2018   2:07 PM       To: Kiana Rothman RN, [facility]  Subject: FW: Scheduled Injection                          They will get a call 1-3 days before the procedure.     I will am adding the nurses to advise on the 2nd injection.     Thank you,   April  ----- Message -----     From: Kiana Rothman RN     Sent: 7/20/2018   1:50 PM       To: Nadira Estevez  Subject: RE: Scheduled Injection                          Yes, that does help, when will they get a call?    Pt was to get two injections one week apart to see which injection provides more relief.    Per ALBERT Pinto from  on 7/5/18.  Do you need another order?    \"  I recommend a left L3-4 epidural steroid injection and left SIJ injection on separate days to see this will improve his back pain. Given his complex medical history and If he does respond to one or both injections, he can potentially have these repeated a few times before considering surgery as the last resort. The patient also would like to avoid surgery as much as possible and is in agreement to the plan of care.    Jennifer Bruno  ----- Message -----     From: Estevez, April     Sent: 7/20/2018  12:27 PM       To: Kiana Rothman RN  Subject: RE: Scheduled " Jeremi Bruno,    Thank you for your message. He will get a call from the proceudre room nurse to go over all if the instructions for the day of the procedure, that includes arrival time. I was only give one order for him, so only that one was schedule. He may need another after the first depending on how the first one goes.    Does that help?    Thank you,   April    ----- Message -----     From: Kiana Rothman RN     Sent: 7/20/2018   9:47 AM       To: Nadira Estevez  Subject: Scheduled Injection                              Darvin Waddell, good to find you!    Hey this patient is scheduled for Injection on 8/1 @ 10am  Questions from wife:  1. Location  2. Arrival time  3. Length of procedure  4. Two injections ordered, a week apart, are they both scheduled?    Thanks for the help, can you call and reassure?     Kiana

## 2018-07-24 NOTE — PROGRESS NOTES
Patient Information     Patient Name  Ricki Sharp MRN  [id number] Sex  Male   1950      Letter by Jarad Bocanegra MD at      Author:  Jarad Bocanegra MD Service:  (none) Author Type:  (none)    Filed:   Encounter Date:  2017 Status:  (Other)           Ricki Sharp  [address]  [facility] [address]          2015    Dear Mr. Sharp:    This letter is to remind you that you are due for your annual exam with Jarad Bocanegra MD. Your last comprehensive visit was more than 12 months ago.    A LIMITED refill of         PROAIR HFA 90 mcg/actuation inhaler     has been called into your pharmacy. Additional refills require you to complete this appointment.    Please call the clinic at [phone] to schedule your appointment.    If you should require additional refills before your scheduled appointment, please contact your pharmacy and we will refill your medication until the date of your appointment.    If you are no longer seeing Jarad Bocanegra MD for primary care, please call to let us know. Doing so will remove you from our call/contact list.      Thank you for choosing [facility] and [facility] for your health care needs.    Sincerely,    Refill RN  [facility]

## 2018-07-24 NOTE — PROGRESS NOTES
Patient Information     Patient Name  Ricki Sharp MRN  [id number] Sex  Male   1950      Letter by Jarad Bocanegra MD at      Author:  Jarad Bocanegra MD Service:  (none) Author Type:  (none)    Filed:   Encounter Date:  2017 Status:  (Other)           Ricki Sharp  [address] [place] [place] [address]          2015    Dear Mr. Sharp:    As discussed.    Results for orders placed or performed in visit on 17      CBC W PLT NO DIFF      Result  Value Ref Range    WHITE BLOOD COUNT         7.1 4.5 - 11.0 thou/cu mm    RED BLOOD COUNT           4.48 4.30 - 5.90 mil/cu mm    HEMOGLOBIN                14.6 13.5 - 17.5 g/dL    HEMATOCRIT                42.9 37.0 - 53.0 %    MCV                       96 80 - 100 fL    MCH                       32.6 26.0 - 34.0 pg    MCHC                      34.0 32.0 - 36.0 g/dL    RDW                       15.6 (H) 11.5 - 15.5 %    PLATELET COUNT            69 (L) 140 - 440 thou/cu mm    MPV                       10.3 6.5 - 11.0 fL   BASIC METABOLIC PANEL      Result  Value Ref Range    SODIUM 135 133 - 143 mmol/L    POTASSIUM 4.9 3.5 - 5.1 mmol/L    CHLORIDE 103 98 - 107 mmol/L    CO2,TOTAL 25 21 - 31 mmol/L    ANION GAP 7 5 - 18                    GLUCOSE 170 (H) 70 - 105 mg/dL    CALCIUM 9.3 8.6 - 10.3 mg/dL    BUN 31 (H) 7 - 25 mg/dL    CREATININE 1.26 0.70 - 1.30 mg/dL    BUN/CREAT RATIO           25                    GFR if African American >60 >60 ml/min/1.73m2    GFR if not  57 (L) >60 ml/min/1.73m2   PHOSPHORUS      Result  Value Ref Range    PHOSPHORUS 2.9 2.5 - 5.0 mg/dL   MAGNESIUM      Result  Value Ref Range    MAGNESIUM 1.5 (L) 1.9 - 2.7 mg/dL   HEPATIC FUNCTION PANEL      Result  Value Ref Range    ALBUMIN 4.1 3.5 - 5.7 g/dL    PROTEIN,TOTAL 7.0 6.4 - 8.9 g/dL    GLOBULIN                  2.9 2.0 - 3.7 g/dL    A/G RATIO 1.4 1.0 - 2.0                    BILIRUBIN,TOTAL 1.3 (H) 0.3 - 1.0 mg/dL     BILIRUBIN,DIRECT 0.20 (H) 0.03 - 0.18 mg/dL    ALK PHOSPHATASE 80 34 - 104 IU/L    ALT (SGPT) 13 7 - 52 IU/L    AST (SGOT) 13 13 - 39 IU/L    BILIRUBIN,INDIRECT 1.1 (H) 0.2 - 0.8 mg/dL     If you have any questions or concerns, please call the clinic at [phone].    Signed, Jarad Bocanegra MD  Internal Medicine & Pediatrics

## 2018-07-24 NOTE — TELEPHONE ENCOUNTER
Ricki is scheduled for his SI joint injection on 8/13/2018.   He is aware that he needs to have a  and that he will get a call the day before from a nurse with instructions.

## 2018-07-25 ENCOUNTER — HOSPITAL ENCOUNTER (OUTPATIENT)
Facility: AMBULATORY SURGERY CENTER | Age: 68
End: 2018-07-25
Attending: ANESTHESIOLOGY
Payer: MEDICARE

## 2018-08-01 ENCOUNTER — RADIANT APPOINTMENT (OUTPATIENT)
Dept: RADIOLOGY | Facility: AMBULATORY SURGERY CENTER | Age: 68
End: 2018-08-01
Attending: ANESTHESIOLOGY
Payer: MEDICARE

## 2018-08-01 ENCOUNTER — HOSPITAL ENCOUNTER (OUTPATIENT)
Facility: AMBULATORY SURGERY CENTER | Age: 68
End: 2018-08-01
Payer: MEDICARE

## 2018-08-01 ENCOUNTER — SURGERY (OUTPATIENT)
Age: 68
End: 2018-08-01

## 2018-08-01 VITALS
DIASTOLIC BLOOD PRESSURE: 101 MMHG | HEART RATE: 75 BPM | RESPIRATION RATE: 16 BRPM | SYSTOLIC BLOOD PRESSURE: 142 MMHG | WEIGHT: 200 LBS | HEIGHT: 67 IN | OXYGEN SATURATION: 96 % | BODY MASS INDEX: 31.39 KG/M2 | TEMPERATURE: 97.8 F

## 2018-08-01 DIAGNOSIS — R52 PAIN: ICD-10-CM

## 2018-08-01 RX ORDER — IOPAMIDOL 408 MG/ML
INJECTION, SOLUTION INTRATHECAL PRN
Status: DISCONTINUED | OUTPATIENT
Start: 2018-08-01 | End: 2018-08-01 | Stop reason: HOSPADM

## 2018-08-01 RX ORDER — DEXAMETHASONE SODIUM PHOSPHATE 10 MG/ML
INJECTION INTRAMUSCULAR; INTRAVENOUS PRN
Status: DISCONTINUED | OUTPATIENT
Start: 2018-08-01 | End: 2018-08-01 | Stop reason: HOSPADM

## 2018-08-01 RX ORDER — LIDOCAINE HYDROCHLORIDE 10 MG/ML
INJECTION, SOLUTION EPIDURAL; INFILTRATION; INTRACAUDAL; PERINEURAL PRN
Status: DISCONTINUED | OUTPATIENT
Start: 2018-08-01 | End: 2018-08-01 | Stop reason: HOSPADM

## 2018-08-01 RX ADMIN — LIDOCAINE HYDROCHLORIDE 3 ML: 10 INJECTION, SOLUTION EPIDURAL; INFILTRATION; INTRACAUDAL; PERINEURAL at 10:22

## 2018-08-01 RX ADMIN — DEXAMETHASONE SODIUM PHOSPHATE 10 MG: 10 INJECTION INTRAMUSCULAR; INTRAVENOUS at 10:21

## 2018-08-01 RX ADMIN — IOPAMIDOL 1.5 ML: 408 INJECTION, SOLUTION INTRATHECAL at 10:22

## 2018-08-01 NOTE — DISCHARGE INSTRUCTIONS
Home Care Instructions after an Epidural Steroid Pain Injection    A lumbar epidural steroid injection delivers steroid medication directly into the area that may be causing your lower back pain and/or leg pain. A cervical or thoracic epidural steroid injection delivers steroids into the epidural space surrounding spinal nerve roots to help relieve pain in the upper spine/neck.    Activity  -Rest today  -Do not work today  -Resume normal activity tomorrow  -DO NOT shower for 24 hours  -DO NOT remove bandaid for 24 hours    Pain  -You may experience soreness at the injection site for one or two days  -You may use an ice pack for 20 minutes every 2 hours for the first 24 hours  -You may use a heating pad after the first 24 hours  -You may use Tylenol (acetaminophen) every 4 hours or other pain medicines as     directed by your physician    You may experience numbness radiating into your legs or arms (depending on the procedure location). This numbness may last several hours. Until sensation returns to normal; please use caution in walking, climbing stairs, and stepping out of your vehicle, etc.    DID YOU RECEIVE SEDATION TODAY?  No      Common side effects of steroids:  Not everyone will experience corticosteroid side effects. If side effects are experienced, they will gradually subside in the 7-10 day period following an injection. Most common side effects include:  -Flushed face and/or chest  -Feeling of warmth, particularly in the face but could be an overall feeling of warmth  -Increased blood sugar in diabetic patients  -Menstrual irregularities my occur. If taking hormone-based birth control an alternate method of birth control is recommended  -Sleep disturbances and/or mood swings are possible  -Leg cramps    Please contact us if you have:  -Severe pain  -Fever more than 101.5 degrees Fahrenheit  -Signs of infection at the injection site (redness, swelling, or drainage)    If you have questions, please contact  our office at [phone] between the hours of 7:00 am and 3:00 pm Monday through Friday. After office hours you can contact the on call provider by dialing [phone]. If you need immediate attention, we recommend that you go to a hospital emergency room or dial [phone].

## 2018-08-01 NOTE — IP AVS SNAPSHOT
MRN:[id number]                      After Visit Summary   8/1/2018    Ricki Sharp    MRN: [id number]           Thank you!     Thank you for choosing [facility] for your care. Our goal is always to provide you with excellent care. Hearing back from our patients is one way we can continue to improve our services. Please take a few minutes to complete the written survey that you may receive in the mail after you visit with us. Thank you!        Patient Information     Date Of Birth          1950        About your hospital stay     You were admitted on:  August 1, 2018 You last received care in the:  [facility]    You were discharged on:  August 1, 2018       Who to Call     For medical emergencies, please call 911.  For non-urgent questions about your medical care, please call your primary care provider or clinic, [phone]  For questions related to your surgery, please call your surgery clinic        Attending Provider     Provider Specialty    Dionte Haro MD Anesthesiology       Primary Care Provider Office Phone # Fax #    Jarad Dionicio Bocanegra [phone]      Your next 10 appointments already scheduled     Aug 13, 2018   Procedure with Jeni Agarwal MD   [facility] ([facility])    [address] [place], [place] [id number] [phone]           Located in the Clinics and Surgery Center at [address] [place] [address].   parking is very convenient and highly recommended.  is a $6 flat rate fee.  Both  and self parkers should enter the main arrival plaza from [facility]; parking attendants will direct you based on your parking preference.            Dec 28, 2018 11:45 AM CST   (Arrive by 11:30 AM)   Return Liver Transplant with Mike Gray MD   [facility] ([facility])    [address] [place] [address]  [address]  [facility] [address] [phone]              Further instructions from your care team       Home Care Instructions after an Epidural Steroid Pain Injection    A lumbar epidural steroid injection delivers steroid medication directly into the area that may be causing your lower back pain and/or leg pain. A cervical or thoracic epidural steroid injection delivers steroids into the epidural space surrounding spinal nerve roots to help relieve pain in the upper spine/neck.    Activity  -Rest today  -Do not work today  -Resume normal activity tomorrow  -DO NOT shower for 24 hours  -DO NOT remove bandaid for 24 hours    Pain  -You may experience soreness at the injection site for one or two days  -You may use an ice pack for 20 minutes every 2 hours for the first 24 hours  -You may use a heating pad after the first 24 hours  -You may use Tylenol (acetaminophen) every 4 hours or other pain medicines as     directed by your physician    You may experience numbness radiating into your legs or arms (depending on the procedure location). This numbness may last several hours. Until sensation returns to normal; please use caution in walking, climbing stairs, and stepping out of your vehicle, etc.    DID YOU RECEIVE SEDATION TODAY?  No      Common side effects of steroids:  Not everyone will experience corticosteroid side effects. If side effects are experienced, they will gradually subside in the 7-10 day period following an injection. Most common side effects include:  -Flushed face and/or chest  -Feeling of warmth, particularly in the face but could be an overall feeling of warmth  -Increased blood sugar in diabetic patients  -Menstrual irregularities my occur. If taking hormone-based birth control an alternate method of birth control is recommended  -Sleep disturbances and/or mood swings are possible  -Leg cramps    Please contact us if you have:  -Severe pain  -Fever more than 101.5 degrees Fahrenheit  -Signs of  "infection at the injection site (redness, swelling, or drainage)    If you have questions, please contact our office at [phone] between the hours of 7:00 am and 3:00 pm Monday through Friday. After office hours you can contact the on call provider by dialing [phone]. If you need immediate attention, we recommend that you go to a hospital emergency room or dial 911.      Pending Results     Date and Time Order Name Status Description    8/1/2018 0933 XR SURGERY NAZANIN FLUORO LESS THAN 5 MIN W STILLS In process             Admission Information     Date & Time Provider Department Dept. Phone    8/1/2018 Dionte Haro MD [facility] [phone]      Your Vitals Were     Blood Pressure Pulse Temperature Respirations Height Weight    176/100 75 97.8  F (36.6  C) (Temporal) 15 1.702 m (5' 7\") 90.7 kg (200 lb)    Pulse Oximetry BMI (Body Mass Index)                97% 31.32 kg/m2          [employer] Information     [employer] gives you secure access to your electronic health record. If you see a primary care provider, you can also send messages to your care team and make appointments. If you have questions, please call your primary care clinic.  If you do not have a primary care provider, please call [phone] and they will assist you.      [employer] is an electronic gateway that provides easy, online access to your medical records. With [employer], you can request a clinic appointment, read your test results, renew a prescription or communicate with your care team.     To access your existing account, please contact your [facility] or call [phone] for assistance.        Care EveryWhere ID     This is your Care EveryWhere ID. This could be used by other organizations to access your [facility] medical records  [id number]        Equal Access to Services     ROBERT LOCKWOOD AH: alethea Huertas qaybta kaalmada adeegyada, waxay idiin " stefaniehussein gilmaramerica willisgeno la'aan ah. So [facility] [phone].    ATENCIÓN: Si habla elvia, tiene a clemens disposición servicios gratuitos de asistencia lingüística. Jake al [phone].    We comply with applicable federal civil rights laws and [place] laws. We do not discriminate on the basis of race, color, national origin, age, disability, sex, sexual orientation, or gender identity.               Review of your medicines      UNREVIEWED medicines. Ask your doctor about these medicines        Dose / Directions    ACETAMINOPHEN PO        Refills:  0       albuterol 108 (90 Base) MCG/ACT Inhaler   Commonly known as:  PROAIR HFA/PROVENTIL HFA/VENTOLIN HFA   Used for:  Chronic obstructive pulmonary disease, unspecified COPD type (H)        Dose:  2 puff   Inhale 2 puffs into the lungs every 4 hours as needed for shortness of breath / dyspnea or wheezing   Quantity:  3 Inhaler   Refills:  1       amLODIPine 5 MG tablet   Commonly known as:  NORVASC   Used for:  Essential hypertension        Dose:  5 mg   Take 1 tablet (5 mg) by mouth daily   Quantity:  90 tablet   Refills:  4       calcium carbonate-vitamin D 500-400 MG-UNIT Tabs per tablet   Used for:  Liver replaced by transplant (H)        Dose:  1 tablet   Take 1 tablet by mouth 2 times daily   Quantity:  180 tablet   Refills:  3       cyclobenzaprine 5 MG tablet   Commonly known as:  FLEXERIL   Used for:  Lumbar degenerative disc disease, Lumbar facet arthropathy, Chronic left-sided low back pain with left-sided sciatica        Take 1-2 tablets as needed at night   Quantity:  45 tablet   Refills:  3       desonide 0.05 % ointment   Commonly known as:  DESOWEN   Used for:  Psoriasis        Use on the face daily   Quantity:  60 g   Refills:  1       diclofenac 1 % Gel topical gel   Commonly known as:  VOLTAREN   Used for:  Spinal stenosis of lumbar region, unspecified whether neurogenic claudication present, Lumbar foraminal stenosis, Lumbar facet arthropathy        Apply  4 grams to low back four times daily using enclosed dosing card.   Quantity:  100 g   Refills:  3       fluocinonide 0.05 % solution   Commonly known as:  LIDEX   Used for:  Psoriasis        Use to the scalp twice a day   Quantity:  60 mL   Refills:  3       levothyroxine 125 MCG tablet   Commonly known as:  SYNTHROID/LEVOTHROID   Used for:  Liver replaced by transplant (H)        Dose:  125 mcg   Take 1 tablet (125 mcg) by mouth daily   Quantity:  30 tablet   Refills:  4       losartan 25 MG tablet   Commonly known as:  COZAAR   Used for:  Essential hypertension        TAKE 1 TABLET BY MOUTH EVERY DAY   Quantity:  90 tablet   Refills:  4       magnesium oxide 400 MG tablet   Commonly known as:  MAG-OX   Used for:  PTLD (post-transplant lymphoproliferative disorder) (H), Liver replaced by transplant (H)        Dose:  800 mg   Take 2 tablets (800 mg) by mouth 2 times daily   Quantity:  120 tablet   Refills:  11       metoprolol tartrate 100 MG tablet   Commonly known as:  LOPRESSOR   Used for:  Liver replaced by transplant (H)        TAKE 1 TABLET BY MOUTH TWICE DAILY   Quantity:  180 tablet   Refills:  0       omeprazole 20 MG tablet   Used for:  Liver replaced by transplant (H)        Dose:  20 mg   Take 1 tablet (20 mg) by mouth daily   Quantity:  30 tablet   Refills:  11       SPIRIVA HANDIHALER 18 MCG capsule   Used for:  Chronic obstructive pulmonary disease, unspecified COPD type (H)   Generic drug:  tiotropium        INHALE THE CONTENTS OF 1 CAPSULE VIA INHALATION DEVICE DAILY   Quantity:  90 capsule   Refills:  1       tacrolimus 1 MG capsule   Commonly known as:  GENERIC EQUIVALENT   Used for:  Liver replaced by transplant (H)        Dose:  1 mg   Take 1 capsule (1 mg) by mouth every 12 hours   Quantity:  60 capsule   Refills:  11       traMADol 50 MG tablet   Commonly known as:  ULTRAM   Used for:  Spinal stenosis of lumbar region, unspecified whether neurogenic claudication present, Lumbar foraminal  stenosis, Lumbar facet arthropathy        Dose:  50 mg   Take 1 tablet (50 mg) by mouth 2 times daily as needed for moderate to severe pain or severe pain   Quantity:  60 tablet   Refills:  2                Protect others around you: Learn how to safely use, store and throw away your medicines at www.disposemymeds.org.             Medication List: This is a list of all your medications and when to take them. Check marks below indicate your daily home schedule. Keep this list as a reference.      Medications           Morning Afternoon Evening Bedtime As Needed    ACETAMINOPHEN PO                                albuterol 108 (90 Base) MCG/ACT Inhaler   Commonly known as:  PROAIR HFA/PROVENTIL HFA/VENTOLIN HFA   Inhale 2 puffs into the lungs every 4 hours as needed for shortness of breath / dyspnea or wheezing                                amLODIPine 5 MG tablet   Commonly known as:  NORVASC   Take 1 tablet (5 mg) by mouth daily                                calcium carbonate-vitamin D 500-400 MG-UNIT Tabs per tablet   Take 1 tablet by mouth 2 times daily                                cyclobenzaprine 5 MG tablet   Commonly known as:  FLEXERIL   Take 1-2 tablets as needed at night                                desonide 0.05 % ointment   Commonly known as:  DESOWEN   Use on the face daily                                diclofenac 1 % Gel topical gel   Commonly known as:  VOLTAREN   Apply 4 grams to low back four times daily using enclosed dosing card.                                fluocinonide 0.05 % solution   Commonly known as:  LIDEX   Use to the scalp twice a day                                levothyroxine 125 MCG tablet   Commonly known as:  SYNTHROID/LEVOTHROID   Take 1 tablet (125 mcg) by mouth daily                                losartan 25 MG tablet   Commonly known as:  COZAAR   TAKE 1 TABLET BY MOUTH EVERY DAY                                magnesium oxide 400 MG tablet   Commonly known as:  MAG-OX    Take 2 tablets (800 mg) by mouth 2 times daily                                metoprolol tartrate 100 MG tablet   Commonly known as:  LOPRESSOR   TAKE 1 TABLET BY MOUTH TWICE DAILY                                omeprazole 20 MG tablet   Take 1 tablet (20 mg) by mouth daily                                SPIRIVA HANDIHALER 18 MCG capsule   INHALE THE CONTENTS OF 1 CAPSULE VIA INHALATION DEVICE DAILY   Generic drug:  tiotropium                                tacrolimus 1 MG capsule   Commonly known as:  GENERIC EQUIVALENT   Take 1 capsule (1 mg) by mouth every 12 hours                                traMADol 50 MG tablet   Commonly known as:  ULTRAM   Take 1 tablet (50 mg) by mouth 2 times daily as needed for moderate to severe pain or severe pain

## 2018-08-01 NOTE — OP NOTE
Patient: Ricki Sharp Age: 68 year old   MRN: [id number] Attending: Dr. Agarwal     Date of Visit: August 1, 2018      PAIN MEDICINE CLINIC PROCEDURE NOTE    ATTENDING CLINICIAN:    Jeni Agarwal MD    ASSISTANT CLINICIAN:  Raghu Pennington DO    PREPROCEDURE DIAGNOSES:  1.  Lumbosacral radiculopathy  2.  Chronic low back pain      PROCEDURE(S) PERFORMED:  1. Left L3-L4 transforaminal epidural steroid injection.  2.  Fluoroscopic guidance for the above-named procedure(s).      ANESTHESIA:  Local. See nursing notes for pre and post procedure vitals    BLOOD LOSS:  Minimal.    DRAINS AND SPECIMENS:  None.    COMPLICATIONS:  None.    INDICATIONS:  Ricki Sharp is a 68 year old male with a history of low back pain with radicular symptoms to the lower extremity.  The patient stated that the patient was in their usual state of health and denied recent anticoagulant use or recent infections.  Therefore, the plan is to perform above mentioned procedure.     Procedure Details:  The patient was met in the procedure room, where the patient was identified by name, medical record number and date of birth.  All of the patient s last minute questions were answered. Written informed consent was obtained and saved in the electronic medical record, after the risks, benefits, and alternatives were discussed with the patient.      A formal time-out procedure was performed, as per protocol, including patient name, title of procedure, and site of procedure, and all in the room concurred.  Routine monitors were applied.      The patient was placed in the prone position on the procedure room table.  All pressure points were checked and comfortably padded.  Routine monitors were placed.  Vital signs were stable.    A chlorhexidine prep was completed followed by sterile draping per standard procedure.     The AP fluoroscopic view was optimized for approach at left L3-L4 neural foramina. Targeting the 6 o'clock position of the  pedicle, skin, and subcutaneous tissue overlying the region was anesthetized with 1% lidocaine using a 25-gauge 1-1/2 inch needle.  Then a 22-gauge 3-1/2 inch spinal needle was advanced through the anesthetized tract. Under serial fluoroscopic images, the needle are intermittently advanced until osseous contact was made at the 6 o'clock position of the pedicle.  The needle was then walked off in an inferior direction.  Using lateral fluoroscopic imaging, needle tip position was confirmed at the superior portion of the neural foramen.  Then after negative aspiration, 0.5 mL of the Omnipaque contrast was injected, confirming appropriate epidural and nerve root spread.  After negative aspiration, 3 mL of treatment solution containing 2 mL of bupivacaine 0.25% and 1 mL dexamethasone 10mg  per mL preservative-free was injected.    Light pressure was held at the puncture site(s) to prevent ecchymosis and oozing.  The patient's skin was cleansed, and hemostasis was confirmed.  Band-aids were applied to the needle injection site(s).      Condition:    The patient remained awake and alert throughout the procedure.  The patient tolerated the procedure well and was monitored for approximately 15 minutes afterward in the post procedure area.  There were no immediate post procedure complications noted.  The patient was then discharged to home as per protocol.  The patient will follow up in the outpatient clinic in 4 week(s), unless otherwise clinically indicated.              .

## 2018-08-01 NOTE — IP AVS SNAPSHOT
[facility]    [address] [place] [address]    Phone:  [phone]    Fax:  [phone]                                       After Visit Summary   8/1/2018    Ricki Sharp    MRN: [id number]           After Visit Summary Signature Page     I have received my discharge instructions, and my questions have been answered. I have discussed any challenges I see with this plan with the nurse or doctor.    ..........................................................................................................................................  Patient/Patient Representative Signature      ..........................................................................................................................................  Patient Representative Print Name and Relationship to Patient    ..................................................               ................................................  Date                                            Time    ..........................................................................................................................................  Reviewed by Signature/Title    ...................................................              ..............................................  Date                                                            Time

## 2018-08-06 ENCOUNTER — TELEPHONE (OUTPATIENT)
Dept: ANESTHESIOLOGY | Facility: CLINIC | Age: 68
End: 2018-08-06

## 2018-08-08 ENCOUNTER — TELEPHONE (OUTPATIENT)
Dept: ANESTHESIOLOGY | Facility: CLINIC | Age: 68
End: 2018-08-08

## 2018-08-08 ENCOUNTER — CARE COORDINATION (OUTPATIENT)
Dept: ANESTHESIOLOGY | Facility: CLINIC | Age: 68
End: 2018-08-08

## 2018-08-08 NOTE — PROGRESS NOTES
"Purpose of call: Follow up after Left L3-L4 transforaminal epidural steroid injection   Date of service: 08/01/2018  Spoke with: Brendan    Location of pain: radicular pain down left leg     Pre injection pain rated: 7-8/10  Post injection pain rated: 5/10    Brendan states he is doing well and his left legged radicular pain has improved. He states he would like to cancel his left sacroiliac joint injection next week since his pain is tolerable.     He also states pre injection, he was only able to ambulate 25-30 yards. Currently he can ambulate 100 yards. He reports being able to \"live more freely\" and \"do more\" physically.    Follow up: Brendan will call if he needs to repeat the injection in the future       "

## 2018-08-08 NOTE — TELEPHONE ENCOUNTER
----- Message from Graham Palafox RN sent at 8/8/2018  2:45 PM CDT -----  Hi April,    Please cancel this patient's proc with Dr. Agarwal next week. He is feeling better from his last procedure and does not want to move forward yet. He will call when he wants to schedule.    You do not need to call him.    Thank you,  Graham

## 2018-08-14 DIAGNOSIS — M48.061 LUMBAR FORAMINAL STENOSIS: ICD-10-CM

## 2018-08-14 DIAGNOSIS — M47.816 LUMBAR FACET ARTHROPATHY: ICD-10-CM

## 2018-08-14 DIAGNOSIS — M48.061 SPINAL STENOSIS OF LUMBAR REGION, UNSPECIFIED WHETHER NEUROGENIC CLAUDICATION PRESENT: ICD-10-CM

## 2018-08-15 DIAGNOSIS — Z94.4 LIVER REPLACED BY TRANSPLANT (H): ICD-10-CM

## 2018-08-15 LAB
ALBUMIN SERPL-MCNC: 3.8 G/DL (ref 3.5–5.7)
ALP SERPL-CCNC: 109 U/L (ref 34–104)
ALT SERPL W P-5'-P-CCNC: 17 U/L (ref 7–52)
ANION GAP SERPL CALCULATED.3IONS-SCNC: 8 MMOL/L (ref 3–14)
AST SERPL W P-5'-P-CCNC: 13 U/L (ref 13–39)
BILIRUB DIRECT SERPL-MCNC: 0.1 MG/DL (ref 0–0.2)
BILIRUB SERPL-MCNC: 1 MG/DL (ref 0.3–1)
BUN SERPL-MCNC: 21 MG/DL (ref 7–25)
CALCIUM SERPL-MCNC: 9 MG/DL (ref 8.6–10.3)
CHLORIDE SERPL-SCNC: 102 MMOL/L (ref 98–107)
CO2 SERPL-SCNC: 27 MMOL/L (ref 21–31)
CREAT SERPL-MCNC: 1.14 MG/DL (ref 0.7–1.3)
ERYTHROCYTE [DISTWIDTH] IN BLOOD BY AUTOMATED COUNT: 14.6 % (ref 10–15)
GFR SERPL CREATININE-BSD FRML MDRD: 64 ML/MIN/1.7M2
GLUCOSE SERPL-MCNC: 212 MG/DL (ref 70–105)
HCT VFR BLD AUTO: 40.8 % (ref 40–53)
HGB BLD-MCNC: 13.9 G/DL (ref 13.3–17.7)
MCH RBC QN AUTO: 31.8 PG (ref 26.5–33)
MCHC RBC AUTO-ENTMCNC: 34.1 G/DL (ref 31.5–36.5)
MCV RBC AUTO: 93 FL (ref 78–100)
PLATELET # BLD AUTO: 90 10E9/L (ref 150–450)
POTASSIUM SERPL-SCNC: 4.5 MMOL/L (ref 3.5–5.1)
PROT SERPL-MCNC: 6.6 G/DL (ref 6.4–8.9)
RBC # BLD AUTO: 4.37 10E12/L (ref 4.4–5.9)
SODIUM SERPL-SCNC: 137 MMOL/L (ref 134–144)
WBC # BLD AUTO: 6.2 10E9/L (ref 4–11)

## 2018-08-15 PROCEDURE — 85027 COMPLETE CBC AUTOMATED: CPT | Performed by: INTERNAL MEDICINE

## 2018-08-15 PROCEDURE — 80076 HEPATIC FUNCTION PANEL: CPT | Performed by: INTERNAL MEDICINE

## 2018-08-15 PROCEDURE — 36415 COLL VENOUS BLD VENIPUNCTURE: CPT | Performed by: INTERNAL MEDICINE

## 2018-08-15 PROCEDURE — 80197 ASSAY OF TACROLIMUS: CPT | Performed by: INTERNAL MEDICINE

## 2018-08-15 PROCEDURE — 80048 BASIC METABOLIC PNL TOTAL CA: CPT | Performed by: INTERNAL MEDICINE

## 2018-08-16 RX ORDER — TRAMADOL HYDROCHLORIDE 50 MG/1
TABLET ORAL
Qty: 60 TABLET | Refills: 3 | Status: SHIPPED | OUTPATIENT
Start: 2018-08-16 | End: 2019-03-30

## 2018-08-16 NOTE — TELEPHONE ENCOUNTER
PLEASE REVIEW, SIGN AND SEND AS APPROPRIATE: THANK YOU.    TRAMADOL 50MG TABLETS     Last Written Prescription Date:  5/25/18  Last Fill Quantity: 60,   # refills: 2  Last Office Visit: 5/25/18  Future Office visit:       Routing refill request to provider for review/approval because:  Drug not on the [facility], [facility] or [facility] refill protocol or controlled substance.    Neena López RN on 8/16/2018 at 3:38 PM

## 2018-08-17 LAB
TACROLIMUS BLD-MCNC: 5.1 UG/L (ref 5–15)
TME LAST DOSE: NORMAL H

## 2018-08-17 NOTE — TELEPHONE ENCOUNTER
Left message to call back....................  8/17/2018   9:23 AM   Umu Felipe LPN on 8/17/2018 at 9:23 AM

## 2018-08-27 DIAGNOSIS — M47.816 LUMBAR FACET ARTHROPATHY: ICD-10-CM

## 2018-08-27 DIAGNOSIS — M54.42 CHRONIC LEFT-SIDED LOW BACK PAIN WITH LEFT-SIDED SCIATICA: ICD-10-CM

## 2018-08-27 DIAGNOSIS — G89.29 CHRONIC LEFT-SIDED LOW BACK PAIN WITH LEFT-SIDED SCIATICA: ICD-10-CM

## 2018-08-27 DIAGNOSIS — M51.369 LUMBAR DEGENERATIVE DISC DISEASE: ICD-10-CM

## 2018-08-30 RX ORDER — CYCLOBENZAPRINE HCL 5 MG
TABLET ORAL
Qty: 45 TABLET | Refills: 3 | Status: SHIPPED | OUTPATIENT
Start: 2018-08-30 | End: 2019-02-25

## 2018-08-30 NOTE — TELEPHONE ENCOUNTER
Routing refill request to provider for review/approval because:  Drug not on the [facility] refill protocol     Flexeril last filled on 3-5-18 for #45 X 3 refills. Last seen on 5-25-18. Pauline Jimenez RN on 8/30/2018 at 8:50 AM

## 2018-09-14 ENCOUNTER — TELEPHONE (OUTPATIENT)
Dept: ONCOLOGY | Facility: CLINIC | Age: 68
End: 2018-09-14

## 2018-09-14 NOTE — TELEPHONE ENCOUNTER
Tobacco Treatment Team at the [facility] attempted to reach Mr. Sharp on 9/14/2018 regarding the tobacco cessation program to help Mr. Sharp to quit smoking. Spoke with patient who reports quitting in June 2018 with the help of NRT patches.

## 2018-10-08 DIAGNOSIS — L40.9 PSORIASIS: ICD-10-CM

## 2018-10-08 DIAGNOSIS — Z94.4 LIVER REPLACED BY TRANSPLANT (H): ICD-10-CM

## 2018-10-12 RX ORDER — METOPROLOL TARTRATE 100 MG
TABLET ORAL
Qty: 180 TABLET | Refills: 2 | Status: SHIPPED | OUTPATIENT
Start: 2018-10-12 | End: 2019-07-11

## 2018-10-12 RX ORDER — FLUOCINONIDE TOPICAL SOLUTION USP, 0.05% 0.5 MG/ML
SOLUTION TOPICAL
Qty: 60 ML | Refills: 1 | Status: SHIPPED | OUTPATIENT
Start: 2018-10-12 | End: 2019-02-06

## 2018-10-12 NOTE — TELEPHONE ENCOUNTER
Prescription approved per [facility] Refill Protocol.  Lidex not refilled since 2014. No steroid on current list. Pauline Jimenez RN on 10/12/2018 at 8:43 AM

## 2018-10-17 ENCOUNTER — HOSPITAL ENCOUNTER (OUTPATIENT)
Dept: GENERAL RADIOLOGY | Facility: OTHER | Age: 68
Discharge: HOME | End: 2018-10-17
Attending: PHYSICIAN ASSISTANT | Admitting: PHYSICIAN ASSISTANT
Payer: MEDICARE

## 2018-10-17 ENCOUNTER — OFFICE VISIT (OUTPATIENT)
Dept: FAMILY MEDICINE | Facility: OTHER | Age: 68
End: 2018-10-17
Payer: MEDICARE

## 2018-10-17 VITALS
DIASTOLIC BLOOD PRESSURE: 70 MMHG | HEART RATE: 80 BPM | SYSTOLIC BLOOD PRESSURE: 128 MMHG | OXYGEN SATURATION: 96 % | WEIGHT: 208.1 LBS | TEMPERATURE: 98.3 F | HEIGHT: 67 IN | RESPIRATION RATE: 18 BRPM | BODY MASS INDEX: 32.66 KG/M2

## 2018-10-17 DIAGNOSIS — M25.532 PAIN AND SWELLING OF LEFT WRIST: ICD-10-CM

## 2018-10-17 DIAGNOSIS — M25.432 PAIN AND SWELLING OF LEFT WRIST: ICD-10-CM

## 2018-10-17 DIAGNOSIS — L03.114 CELLULITIS OF LEFT UPPER EXTREMITY: Primary | ICD-10-CM

## 2018-10-17 LAB
BASOPHILS # BLD AUTO: 0 10E9/L (ref 0–0.2)
BASOPHILS NFR BLD AUTO: 0.4 %
DIFFERENTIAL METHOD BLD: ABNORMAL
EOSINOPHIL # BLD AUTO: 0.2 10E9/L (ref 0–0.7)
EOSINOPHIL NFR BLD AUTO: 1.6 %
ERYTHROCYTE [DISTWIDTH] IN BLOOD BY AUTOMATED COUNT: 14.3 % (ref 10–15)
HCT VFR BLD AUTO: 40.6 % (ref 40–53)
HGB BLD-MCNC: 14.1 G/DL (ref 13.3–17.7)
IMM GRANULOCYTES # BLD: 0.1 10E9/L (ref 0–0.4)
IMM GRANULOCYTES NFR BLD: 1.1 %
LYMPHOCYTES # BLD AUTO: 2 10E9/L (ref 0.8–5.3)
LYMPHOCYTES NFR BLD AUTO: 18.5 %
MCH RBC QN AUTO: 31.6 PG (ref 26.5–33)
MCHC RBC AUTO-ENTMCNC: 34.7 G/DL (ref 31.5–36.5)
MCV RBC AUTO: 91 FL (ref 78–100)
MONOCYTES # BLD AUTO: 0.7 10E9/L (ref 0–1.3)
MONOCYTES NFR BLD AUTO: 6.8 %
NEUTROPHILS # BLD AUTO: 7.7 10E9/L (ref 1.6–8.3)
NEUTROPHILS NFR BLD AUTO: 71.6 %
PLATELET # BLD AUTO: 87 10E9/L (ref 150–450)
RBC # BLD AUTO: 4.46 10E12/L (ref 4.4–5.9)
URATE SERPL-MCNC: 5.3 MG/DL (ref 4.4–7.6)
WBC # BLD AUTO: 10.8 10E9/L (ref 4–11)

## 2018-10-17 PROCEDURE — 84550 ASSAY OF BLOOD/URIC ACID: CPT | Performed by: PHYSICIAN ASSISTANT

## 2018-10-17 PROCEDURE — G0463 HOSPITAL OUTPT CLINIC VISIT: HCPCS | Mod: 25

## 2018-10-17 PROCEDURE — 25000128 H RX IP 250 OP 636: Performed by: PHYSICIAN ASSISTANT

## 2018-10-17 PROCEDURE — G0463 HOSPITAL OUTPT CLINIC VISIT: HCPCS

## 2018-10-17 PROCEDURE — 99213 OFFICE O/P EST LOW 20 MIN: CPT | Performed by: PHYSICIAN ASSISTANT

## 2018-10-17 PROCEDURE — 73110 X-RAY EXAM OF WRIST: CPT | Mod: LT

## 2018-10-17 PROCEDURE — 96372 THER/PROPH/DIAG INJ SC/IM: CPT

## 2018-10-17 PROCEDURE — 36415 COLL VENOUS BLD VENIPUNCTURE: CPT | Performed by: PHYSICIAN ASSISTANT

## 2018-10-17 PROCEDURE — 85025 COMPLETE CBC W/AUTO DIFF WBC: CPT | Performed by: PHYSICIAN ASSISTANT

## 2018-10-17 RX ORDER — CEFTRIAXONE SODIUM 1 G
1 VIAL (EA) INJECTION ONCE
Status: COMPLETED | OUTPATIENT
Start: 2018-10-17 | End: 2018-10-17

## 2018-10-17 RX ADMIN — CEFTRIAXONE SODIUM 1 G: 1 INJECTION, POWDER, FOR SOLUTION INTRAMUSCULAR; INTRAVENOUS at 16:03

## 2018-10-17 ASSESSMENT — PAIN SCALES - GENERAL: PAINLEVEL: EXTREME PAIN (8)

## 2018-10-17 NOTE — PROGRESS NOTES
SUBJECTIVE:  Ricki Sharp is a 68 year old male presents to clinic for evaluation of wrist pain and swelling. Onset 3 days ago, course is worsening. Associated symptoms: erythema, swelling pain over dorsum of wrist. He did get a small dog bite puncture wound to the back of his hand about 10 days ago, There is a small scab in this area. This was his nephews dog, and it was a puppy. He is not sure if the dog is immunized but will talk to his nephew about this, and watch the dog for 10 days.     No recent injuries. He did have a wrist fracture to same wrist in the past  No history of gout  History of arthritis  He has a liver transplant, reduced immunity.     Past Medical History:   Diagnosis Date     COPD (chronic obstructive pulmonary disease) (H)      Hypertension      Thyroid disease      Current Outpatient Prescriptions   Medication     ACETAMINOPHEN PO     albuterol (PROAIR HFA, PROVENTIL HFA, VENTOLIN HFA) 108 (90 BASE) MCG/ACT inhaler     amLODIPine (NORVASC) 5 MG tablet     amoxicillin-clavulanate (AUGMENTIN) 875-125 MG per tablet     calcium carbonate-vitamin D 500-400 MG-UNIT TABS tablt     cyclobenzaprine (FLEXERIL) 5 MG tablet     desonide (DESOWEN) 0.05 % ointment     diclofenac (VOLTAREN) 1 % GEL topical gel     fluocinonide (LIDEX) 0.05 % solution     levothyroxine (SYNTHROID, LEVOTHROID) 125 MCG tablet     losartan (COZAAR) 25 MG tablet     magnesium oxide (MAG-OX) 400 MG tablet     metoprolol tartrate (LOPRESSOR) 100 MG tablet     omeprazole 20 MG tablet     SPIRIVA HANDIHALER 18 MCG capsule     tacrolimus (GENERIC EQUIVALENT) 1 MG capsule     traMADol (ULTRAM) 50 MG tablet     No current facility-administered medications for this visit.         Allergies   Allergen Reactions     Lisinopril Cough     Spironolactone Other (See Comments)     hyperkalemia     Levaquin [Levofloxacin] Rash     Rash on abdomen, possibly from Levaquin.     ROS  General: no fever, feels well  Musculoskeletal: swelling  "and pain left wrist    OBJECTIVE:  Vitals:    10/17/18 1416   BP: 128/70   BP Location: Left arm   Patient Position: Sitting   Cuff Size: Adult Regular   Pulse: 80   Resp: 18   Temp: 98.3  F (36.8  C)   TempSrc: Tympanic   SpO2: 96%   Weight: 208 lb 1.6 oz (94.4 kg)   Height: 5' 7\" (1.702 m)     Vital signs as noted above.  Appearance: in no apparent distress.  Wrist exam: left hand/wrist  Inspection: there is a small scab on dorsum of hand with no erythema surrounding it. Dorsum of hand is swollen through wrist and distal forearm. Erythema over dorsal wrist and volar wrist.   Palpation: TTP wrist  Neurovascular: normal pulses, cap refill, sensation to soft touch  ROM: reduced ROM due to swelling and pain      Results for orders placed or performed in visit on 10/17/18   Uric acid   Result Value Ref Range    Uric Acid 5.3 4.4 - 7.6 mg/dL   CBC and Differential   Result Value Ref Range    WBC 10.8 4.0 - 11.0 10e9/L    RBC Count 4.46 4.4 - 5.9 10e12/L    Hemoglobin 14.1 13.3 - 17.7 g/dL    Hematocrit 40.6 40.0 - 53.0 %    MCV 91 78 - 100 fl    MCH 31.6 26.5 - 33.0 pg    MCHC 34.7 31.5 - 36.5 g/dL    RDW 14.3 10.0 - 15.0 %    Platelet Count 87 (L) 150 - 450 10e9/L    Diff Method Automated Method     % Neutrophils 71.6 %    % Lymphocytes 18.5 %    % Monocytes 6.8 %    % Eosinophils 1.6 %    % Basophils 0.4 %    % Immature Granulocytes 1.1 %    Absolute Neutrophil 7.7 1.6 - 8.3 10e9/L    Absolute Lymphocytes 2.0 0.8 - 5.3 10e9/L    Absolute Monocytes 0.7 0.0 - 1.3 10e9/L    Absolute Eosinophils 0.2 0.0 - 0.7 10e9/L    Absolute Basophils 0.0 0.0 - 0.2 10e9/L    Abs Immature Granulocytes 0.1 0 - 0.4 10e9/L     *Note: Due to a large number of results and/or encounters for the requested time period, some results have not been displayed. A complete set of results can be found in Results Review.     HISTORY: 68 yearsMale ; Pain and swelling of left wrist; Pain and swelling of left wrist  TECHNIQUE: 3 views left " wrist  COMPARISON: None  FINDINGS: There is deformity of the distal radius, best seen on thelateral view, question history of old fracture.     Joint spaces are congruent. There is mild chondrocalcinosis. Vascular  calcifications are present. There is no evidence of acute fracture or  dislocation.     IMPRESSION: Chondrocalcinosis and mild osteoarthritic change of the wrist.  Old healed fracture deformity of the distal radius.  RUDY CLARK MD      ASSESSMENT:  (L03.114) Cellulitis of left upper extremity  (primary encounter diagnosis)  Plan: amoxicillin-clavulanate (AUGMENTIN) 875-125 MG         per tablet      (M25.532,  M25.432) Pain and swelling of left wrist  Plan: XR Wrist Left G/E 3 Views, Uric acid, CBC and         Differential    Cellulitis, left wrist  Uric acid not elevated  Blood count - WBC not elevated, normal RBC, hemoglobin, hematocrit. Platelets low 87 - within historic range  XR left wrist  - negative for abnormality, pending radiology confirmation  Rest arm, next few days  Wash hand with warm soapy water daily and apply topical antibiotic ointment to small scabbed area 3 times daily for 7 days  Ceftriaxone 1 g given in clinic today  Start Augmentin 875 mg oral tablet, take one tablet twice daily for 10 days  Can apply ice 10-20 minutes every 1-2 hours to reduce swelling and pain  Ibuprofen 600 -800 mg every 6-8 hours. Take with food. Max dose 2400 mg/day  Tylenol 650 mg every 4-6 hours, max 3000 mg/day  Follow up with PCP in 3 days for a recheck  Discussed contacting nephew to check on immune status of the dog for rabies. If the dog is not immunized then he should watch the dog for 10 days. If the dog is healthy and active after 10 days there is no risk of rabies. If the dog is ill or has  then he should return to clinic for rabies follow up.   Patient received verbal and written instruction including review of warning signs    Kerry Munoz PA-C on 10/17/2018 at 6:41 PM

## 2018-10-17 NOTE — PATIENT INSTRUCTIONS
Cellulitis, left wrist  Uric acid not elevated  Blood count - WBC not elevated, normal RBC, hemoglobin, hematocrit. Platelets low 87 - within historic range  XR left wrist  - negative for abnormality, pending radiology confirmation  Rest arm, next few days  Ceftriaxone 1 g given in clinic today  Start Augmentin 875 mg oral tablet, take one tablet twice daily for 10 days  Can apply ice 10-20 minutes every 1-2 hours to reduce swelling and pain  Ibuprofen 600 -800 mg every 6-8 hours. Take with food. Max dose 2400 mg/day  Tylenol 650 mg every 4-6 hours, max 3000 mg/day  Follow up with PCP in 3 days for a recheck  Seek immediate care for worsening    Red areas that spread    Swelling or pain that gets worse    Fluid leaking from the skin (pus)    Fever higher of 100.4  F (38.0  C) or higher after 2 days on antibiotics    Cellulitis  Cellulitis is an infection of the deep layers of skin. A break in the skin, such as a cut or scratch, can let bacteria under the skin. If the bacteria get to deep layers of the skin, it can be serious. If not treated, cellulitis can get into the bloodstream and lymph nodes. The infection can then spread throughout the body. This causes serious illness.  Cellulitis causes the affected skin to become red, swollen, warm, and sore. The reddened areas have a visible border. An open sore may leak fluid (pus). You may have a fever, chills, and pain.  Cellulitis is treated with antibiotics taken for 7 to 10 days. An open sore may be cleaned and covered with cool wet gauze. Symptoms should get better 1 to 2 days after treatment is started. Make sure to take all the antibiotics for the full number of days until they are gone. Keep taking the medicine even if your symptoms go away.  Home care  Follow these tips:    Limit the use of the part of your body with cellulitis.     If the infection is on your leg, keep your leg raised while sitting. This will help to reduce swelling.    Take all of the  antibiotic medicine exactly as directed until it is gone. Do not miss any doses, especially during the first 7 days. Don t stop taking the medicine when your symptoms get better.    Keep the affected area clean and dry.    Wash your hands with soap and warm water before and after touching your skin. Anyone else who touches your skin should also wash his or her hands. Don't share towels.  Follow-up care  Follow up with your healthcare provider, or as advised. If your infection does not go away on the first antibiotic, your healthcare provider will prescribe a different one.  When to seek medical advice  Call your healthcare provider right away if any of these occur:    Red areas that spread    Swelling or pain that gets worse    Fluid leaking from the skin (pus)    Fever higher of 100.4  F (38.0  C) or higher after 2 days on antibiotics  Date Last Reviewed: 9/1/2016 2000-2017 [employer]. [address] [place] [address], [place] [address]. All rights reserved. This information is not intended as a substitute for professional medical care. Always follow your healthcare professional's instructions.

## 2018-10-17 NOTE — MR AVS SNAPSHOT
After Visit Summary   10/17/2018    Ricki Sharp    MRN: [id number]           Patient Information     Date Of Birth          1950        Visit Information        Provider Department      10/17/2018 2:00 PM Kerry Munoz PA-C [facility]        Today's Diagnoses     Cellulitis of left upper extremity    -  1    Pain and swelling of left wrist          Care Instructions    Cellulitis, left wrist  Uric acid not elevated  Blood count - WBC not elevated, normal RBC, hemoglobin, hematocrit. Platelets low 87 - within historic range  XR left wrist  - negative for abnormality, pending radiology confirmation  Rest arm, next few days  Ceftriaxone 1 g given in clinic today  Start Augmentin 875 mg oral tablet, take one tablet twice daily for 10 days  Can apply ice 10-20 minutes every 1-2 hours to reduce swelling and pain  Ibuprofen 600 -800 mg every 6-8 hours. Take with food. Max dose 2400 mg/day  Tylenol 650 mg every 4-6 hours, max 3000 mg/day  Follow up with PCP in 3 days for a recheck  Seek immediate care for worsening    Red areas that spread    Swelling or pain that gets worse    Fluid leaking from the skin (pus)    Fever higher of 100.4  F (38.0  C) or higher after 2 days on antibiotics    Cellulitis  Cellulitis is an infection of the deep layers of skin. A break in the skin, such as a cut or scratch, can let bacteria under the skin. If the bacteria get to deep layers of the skin, it can be serious. If not treated, cellulitis can get into the bloodstream and lymph nodes. The infection can then spread throughout the body. This causes serious illness.  Cellulitis causes the affected skin to become red, swollen, warm, and sore. The reddened areas have a visible border. An open sore may leak fluid (pus). You may have a fever, chills, and pain.  Cellulitis is treated with antibiotics taken for 7 to 10 days. An open sore may be cleaned and covered with cool wet gauze. Symptoms  should get better 1 to 2 days after treatment is started. Make sure to take all the antibiotics for the full number of days until they are gone. Keep taking the medicine even if your symptoms go away.  Home care  Follow these tips:    Limit the use of the part of your body with cellulitis.     If the infection is on your leg, keep your leg raised while sitting. This will help to reduce swelling.    Take all of the antibiotic medicine exactly as directed until it is gone. Do not miss any doses, especially during the first 7 days. Don t stop taking the medicine when your symptoms get better.    Keep the affected area clean and dry.    Wash your hands with soap and warm water before and after touching your skin. Anyone else who touches your skin should also wash his or her hands. Don't share towels.  Follow-up care  Follow up with your healthcare provider, or as advised. If your infection does not go away on the first antibiotic, your healthcare provider will prescribe a different one.  When to seek medical advice  Call your healthcare provider right away if any of these occur:    Red areas that spread    Swelling or pain that gets worse    Fluid leaking from the skin (pus)    Fever higher of 100.4  F (38.0  C) or higher after 2 days on antibiotics  Date Last Reviewed: 9/1/2016 2000-2017 [employer]. [address] [place] [address], [place] [address]. All rights reserved. This information is not intended as a substitute for professional medical care. Always follow your healthcare professional's instructions.                Follow-ups after your visit        Follow-up notes from your care team     Return if symptoms worsen or fail to improve.      Your next 10 appointments already scheduled     Dec 28, 2018 11:45 AM CST   (Arrive by 11:30 AM)   Return Liver Transplant with Mike Gray MD   [facility] ([facility])    [address]  [address]  [facility] [address]  "  [phone]              Future tests that were ordered for you today     Open Future Orders        Priority Expected Expires Ordered    XR Wrist Left G/E 3 Views Routine 10/17/2018 10/17/2019 10/17/2018            Who to contact     If you have questions or need follow up information about today's clinic visit or your schedule please contact [facility] directly at [phone].  Normal or non-critical lab and imaging results will be communicated to you by Moglue[employer], letter or phone within 4 business days after the clinic has received the results. If you do not hear from us within 7 days, please contact the clinic through LOAG or phone. If you have a critical or abnormal lab result, we will notify you by phone as soon as possible.  Submit refill requests through LOAG or call your pharmacy and they will forward the refill request to us. Please allow 3 business days for your refill to be completed.          Additional Information About Your Visit        [facility] Information     LOAG gives you secure access to your electronic health record. If you see a primary care provider, you can also send messages to your care team and make appointments. If you have questions, please call your primary care clinic.  If you do not have a primary care provider, please call [phone] and they will assist you.        Care EveryWhere ID     This is your Care EveryWhere ID. This could be used by other organizations to access your [facility] medical records  [id number]        Your Vitals Were     Pulse Temperature Respirations Height Pulse Oximetry BMI (Body Mass Index)    80 98.3  F (36.8  C) (Tympanic) 18 5' 7\" (1.702 m) 96% 32.59 kg/m2       Blood Pressure from Last 3 Encounters:   10/17/18 128/70   08/01/18 (!) 142/101   07/05/18 (!) 164/91    Weight from Last 3 Encounters:   10/17/18 208 lb 1.6 oz (94.4 kg)   08/01/18 200 lb (90.7 kg)   07/05/18 202 lb 6.4 oz (91.8 kg)              We Performed the " Following     CBC and Differential     Uric acid          Today's Medication Changes          These changes are accurate as of 10/17/18  3:36 PM.  If you have any questions, ask your nurse or doctor.               Start taking these medicines.        Dose/Directions    amoxicillin-clavulanate 875-125 MG per tablet   Commonly known as:  AUGMENTIN   Used for:  Cellulitis of left upper extremity   Started by:  Kerry Munoz PA-C        Dose:  1 tablet   Take 1 tablet by mouth 2 times daily for 10 days   Quantity:  20 tablet   Refills:  0         These medicines have changed or have updated prescriptions.        Dose/Directions    desonide 0.05 % ointment   Commonly known as:  DESOWEN   This may have changed:    - how to take this  - additional instructions   Used for:  Psoriasis        Use on the face daily   Quantity:  60 g   Refills:  1       levothyroxine 125 MCG tablet   Commonly known as:  SYNTHROID/LEVOTHROID   This may have changed:  additional instructions   Used for:  Liver replaced by transplant (H)        Dose:  125 mcg   Take 1 tablet (125 mcg) by mouth daily   Quantity:  30 tablet   Refills:  4            Where to get your medicines      [id number] [place], [place] - [address]  [address], [facility] [address]     Phone:  [phone]     amoxicillin-clavulanate 875-125 MG per tablet                Primary Care Provider Office Phone # Fax #    Jarad Dionicio Bocanegra [phone]       [address] [facility] [address]        Equal Access to Services     [facility] AH: Hadii kimberley Grant, waaxda luqadaha, qaybta kaalmada stevieyada, jodi anthony. So [place] -[phone].    ATENCIÓN: Si habla español, tiene a clemens disposición servicios gratuitos de asistencia lingüística. Jake al [phone].    We comply with applicable federal civil rights laws and [place]  laws. We do not discriminate on the basis of race, color, national origin, age, disability, sex, sexual orientation, or gender identity.            Thank you!     Thank you for choosing [facility]  for your care. Our goal is always to provide you with excellent care. Hearing back from our patients is one way we can continue to improve our services. Please take a few minutes to complete the written survey that you may receive in the mail after your visit with us. Thank you!             Your Updated Medication List - Protect others around you: Learn how to safely use, store and throw away your medicines at www.disposemymeds.org.          This list is accurate as of 10/17/18  3:36 PM.  Always use your most recent med list.                   Brand Name Dispense Instructions for use Diagnosis    ACETAMINOPHEN PO           albuterol 108 (90 Base) MCG/ACT inhaler    PROAIR HFA/PROVENTIL HFA/VENTOLIN HFA    3 Inhaler    Inhale 2 puffs into the lungs every 4 hours as needed for shortness of breath / dyspnea or wheezing    Chronic obstructive pulmonary disease, unspecified COPD type (H)       amLODIPine 5 MG tablet    NORVASC    90 tablet    Take 1 tablet (5 mg) by mouth daily    Essential hypertension       amoxicillin-clavulanate 875-125 MG per tablet    AUGMENTIN    20 tablet    Take 1 tablet by mouth 2 times daily for 10 days    Cellulitis of left upper extremity       calcium carbonate-vitamin D 500-400 MG-UNIT Tabs per tablet     180 tablet    Take 1 tablet by mouth 2 times daily    Liver replaced by transplant (H)       cyclobenzaprine 5 MG tablet    FLEXERIL    45 tablet    TAKE 1 TO 2 TABLETS BY MOUTH AT NIGHT AS NEEDED    Lumbar degenerative disc disease, Lumbar facet arthropathy, Chronic left-sided low back pain with left-sided sciatica       desonide 0.05 % ointment    DESOWEN    60 g    Use on the face daily    Psoriasis       diclofenac 1 % Gel topical gel    VOLTAREN    100 g    Apply 4  grams to low back four times daily using enclosed dosing card.    Spinal stenosis of lumbar region, unspecified whether neurogenic claudication present, Lumbar foraminal stenosis, Lumbar facet arthropathy       fluocinonide 0.05 % solution    LIDEX    60 mL    APPLY TOPICALLY TO THE AFFECTED AREA TWICE DAILY.APPLY TO THE SCALP TWICE DAILY    Psoriasis       levothyroxine 125 MCG tablet    SYNTHROID/LEVOTHROID    30 tablet    Take 1 tablet (125 mcg) by mouth daily    Liver replaced by transplant (H)       losartan 25 MG tablet    COZAAR    90 tablet    TAKE 1 TABLET BY MOUTH EVERY DAY    Essential hypertension       magnesium oxide 400 MG tablet    MAG-OX    120 tablet    Take 2 tablets (800 mg) by mouth 2 times daily    PTLD (post-transplant lymphoproliferative disorder) (H), Liver replaced by transplant (H)       metoprolol tartrate 100 MG tablet    LOPRESSOR    180 tablet    TAKE 1 TABLET BY MOUTH TWICE DAILY    Liver replaced by transplant (H)       omeprazole 20 MG tablet     30 tablet    Take 1 tablet (20 mg) by mouth daily    Liver replaced by transplant (H)       SPIRIVA HANDIHALER 18 MCG capsule   Generic drug:  tiotropium     90 capsule    INHALE THE CONTENTS OF 1 CAPSULE VIA INHALATION DEVICE DAILY    Chronic obstructive pulmonary disease, unspecified COPD type (H)       tacrolimus 1 MG capsule    GENERIC EQUIVALENT    60 capsule    Take 1 capsule (1 mg) by mouth every 12 hours    Liver replaced by transplant (H)       traMADol 50 MG tablet    ULTRAM    60 tablet    TAKE 1 TABLET BY MOUTH TWICE DAILY AS NEEDED FOR MODERATE TO SEVERE PAIN.    Spinal stenosis of lumbar region, unspecified whether neurogenic claudication present, Lumbar foraminal stenosis, Lumbar facet arthropathy

## 2018-10-18 ENCOUNTER — TELEPHONE (OUTPATIENT)
Dept: TRANSPLANT | Facility: CLINIC | Age: 68
End: 2018-10-18

## 2018-10-18 NOTE — TELEPHONE ENCOUNTER
"Monday, hand started swelling, redness went up his arm.  Brendan then remembered that his brothers' dog \"nipped at his skin\".  Local MD put him on Augmentin.  Seems better today. Told her to go back to PCP if doesn;t improve.  "

## 2018-10-18 NOTE — TELEPHONE ENCOUNTER
Patient Call: General    Reason for call: wife called- stated pt bit by a dog recently- She is concerned they (their clinic)are not taking it serious enough      Call back needed? Yes    Return Call Needed  Same as documented in contacts section  When to return call?: Greater than one day: Route standard priority

## 2018-10-19 ENCOUNTER — TELEPHONE (OUTPATIENT)
Dept: PEDIATRICS | Facility: OTHER | Age: 68
End: 2018-10-19

## 2018-10-19 DIAGNOSIS — I10 ESSENTIAL HYPERTENSION: Primary | ICD-10-CM

## 2018-10-19 RX ORDER — ADHESIVE BANDAGE 3/4"
BANDAGE TOPICAL
Qty: 1 EACH | Refills: 11 | Status: SHIPPED | OUTPATIENT
Start: 2018-10-19

## 2018-10-19 NOTE — TELEPHONE ENCOUNTER
----- Message from Bekcy Friedman sent at 10/19/2018  2:56 PM CDT -----  Regarding: BP Monitor  Hi Dr. Bocanegra-It's nice that we are all in the same system now, so I can contact you.  Ricki Sharp's BP monitor is old, and we would like to see if we can bill for a new one.  Would you be willing to please order a new monitor for him?  It can be a generic type script, and we can fill in with what we have, most likely an Omron 3.  If agreeable please send to the [facility] pharmacy ([address] [place] [address], [facility]) we are in [place].  Thank you!  Becky

## 2018-10-23 ENCOUNTER — OFFICE VISIT (OUTPATIENT)
Dept: PEDIATRICS | Facility: OTHER | Age: 68
End: 2018-10-23
Attending: INTERNAL MEDICINE
Payer: MEDICARE

## 2018-10-23 VITALS
HEART RATE: 76 BPM | DIASTOLIC BLOOD PRESSURE: 74 MMHG | TEMPERATURE: 97.7 F | BODY MASS INDEX: 32.58 KG/M2 | SYSTOLIC BLOOD PRESSURE: 130 MMHG | WEIGHT: 208 LBS
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
## 2018-10-23 DIAGNOSIS — Z79.621 LONG-TERM CURRENT USE OF TACROLIMUS: ICD-10-CM

## 2018-10-23 DIAGNOSIS — M47.816 LUMBAR FACET ARTHROPATHY: ICD-10-CM

## 2018-10-23 DIAGNOSIS — M51.369 DEGENERATION OF LUMBAR INTERVERTEBRAL DISC: ICD-10-CM

## 2018-10-23 DIAGNOSIS — L03.114 CELLULITIS OF LEFT UPPER EXTREMITY: ICD-10-CM

## 2018-10-23 DIAGNOSIS — S61.452A DOG BITE OF LEFT HAND, INITIAL ENCOUNTER: Primary | ICD-10-CM

## 2018-10-23 DIAGNOSIS — Z23 NEED FOR VACCINATION: ICD-10-CM

## 2018-10-23 PROCEDURE — G0463 HOSPITAL OUTPT CLINIC VISIT: HCPCS

## 2018-10-23 PROCEDURE — 90662 IIV NO PRSV INCREASED AG IM: CPT | Performed by: INTERNAL MEDICINE

## 2018-10-23 PROCEDURE — G0008 ADMIN INFLUENZA VIRUS VAC: HCPCS

## 2018-10-23 PROCEDURE — G0463 HOSPITAL OUTPT CLINIC VISIT: HCPCS | Mod: 25

## 2018-10-23 PROCEDURE — 99213 OFFICE O/P EST LOW 20 MIN: CPT | Performed by: INTERNAL MEDICINE

## 2018-10-23 ASSESSMENT — PATIENT HEALTH QUESTIONNAIRE - PHQ9: 5. POOR APPETITE OR OVEREATING: NOT AT ALL

## 2018-10-23 ASSESSMENT — PAIN SCALES - GENERAL: PAINLEVEL: NO PAIN (0)

## 2018-10-23 ASSESSMENT — ANXIETY QUESTIONNAIRES
1. FEELING NERVOUS, ANXIOUS, OR ON EDGE: NOT AT ALL
IF YOU CHECKED OFF ANY PROBLEMS ON THIS QUESTIONNAIRE, HOW DIFFICULT HAVE THESE PROBLEMS MADE IT FOR YOU TO DO YOUR WORK, TAKE CARE OF THINGS AT HOME, OR GET ALONG WITH OTHER PEOPLE: NOT DIFFICULT AT ALL
3. WORRYING TOO MUCH ABOUT DIFFERENT THINGS: NOT AT ALL
GAD7 TOTAL SCORE: 0
2. NOT BEING ABLE TO STOP OR CONTROL WORRYING: NOT AT ALL
5. BEING SO RESTLESS THAT IT IS HARD TO SIT STILL: NOT AT ALL
7. FEELING AFRAID AS IF SOMETHING AWFUL MIGHT HAPPEN: NOT AT ALL
6. BECOMING EASILY ANNOYED OR IRRITABLE: NOT AT ALL

## 2018-10-23 NOTE — PROGRESS NOTES
Subjective  Ricki Sharp is a 68 year old male who presents for follow-up from [facility] clinic.  About 2 weeks ago he was bit on the hand by a puppy.  He did not think anything of it.  Then about 13 days later he noticed redness and swelling of the left hand and wrist.  He came to [facility] clinic and was given a dose of Rocephin IM followed by oral Augmentin.  The rash was outlined.  Over the course of antibiotics the redness and pain significantly improved.  He still has a little stiffness in the wrist but it is improving.  No fevers or vomiting.  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    Problem List/PMH: reviewed in EMR, and made relevant updates today.  Medications: reviewed in EMR, and made relevant updates today.  Allergies: reviewed in EMR, and made relevant updates today.    Social Hx:  Social History   Substance Use Topics     Smoking status: Former Smoker     Years: 5.00     Quit date: 5/30/2018     Smokeless tobacco: Never Used     Alcohol use No     Social History     Social History Narrative         I reviewed social history and made relevant updates today.    Family Hx:   History reviewed. No pertinent family history.    Objective  Vitals: reviewed in EMR.  /74 (BP Location: Right arm, Patient Position: Sitting, Cuff Size: Adult Large)  Pulse 76  Temp 97.7  F (36.5  C) (Tympanic)  Wt 208 lb (94.3 kg)  BMI 32.58 kg/m2    Gen: Pleasant male, NAD.  HEENT: MMM  Neck: Supple  Pulm: Breathing easily  Neuro: Grossly intact  Skin: No erythema of the left hand, wrist or forearm in the identified areas where the cellulitis was located.  Small discoloration on the dorsum of the hand where he was bitten by the puppy without induration or drainage.  This is located in between the second and third metacarpals.  Psychiatric: Normal affect and insight. Does not appear anxious or  depressed.        Assessment    ICD-10-CM    1. Dog bite of left hand, initial encounter S61.452A     W54.0XXA    2. Cellulitis of left upper extremity L03.114    3. Need for vaccination Z23 GH IMM-  FLU VACCINE, INCREASED ANTIGEN, PRESV FREE   4. Long-term current use of tacrolimus Z79.899    5. Degeneration of lumbar intervertebral disc M51.36    6. Lumbar facet arthropathy M47.816      Orders Placed This Encounter   Procedures     GH IMM-  FLU VACCINE, INCREASED ANTIGEN, PRESV FREE       Fortunately he sustained a bite in an area where it missed the tendons.  It sounds as though he had a cellulitis which has rapidly improved.  It also rapidly had progressed so staph aureus is on the differential.  Chronic immunosuppression is likely contributor.  Warning signs discussed, if symptoms persist would request orthopedics consultation.  If worse at any point recommend return as antibiotics may need to be reinitiated.    Plan   -- Expected clinical course discussed   -- Flu shot today   -- Tdap at the pharmacy   -- Warning signs discussed   -- Follow-up if concerns    Signed, Jarad Bocanegra MD  Internal Medicine & Pediatrics

## 2018-10-23 NOTE — NURSING NOTE
Patient presents to clinic for F/U on possible dog bite on the left wrist.  Seda Mcdonough LPN ....................  10/23/2018   2:17 PM      Previous A1C is at goal of <8  Lab Results   Component Value Date    A1C 6.6 03/05/2018    A1C 6.9 03/20/2016    A1C 6.4 06/23/2015    A1C Unable to calculate   total hemoglobin <6 g/dL   06/19/2014    A1C 7.4 04/12/2014     Urine microalbumin:creatine: DUE  Foot exam DUE  Eye exam 3/30/18  Tobacco User NO  Patient is on a daily aspirin  Patient is not on a Statin.  Blood pressure today of:     BP Readings from Last 1 Encounters:   10/23/18 130/74      is at the goal of <139/89 for diabetics.    Seda Mcdonough LPN on 10/23/2018 at 2:20 PM      Medication Reconciliation: complete    Seda Mcdonough LPN

## 2018-10-23 NOTE — MR AVS SNAPSHOT
After Visit Summary   10/23/2018    Ricki Sharp    MRN: [id number]           Patient Information     Date Of Birth          1950        Visit Information        Provider Department      10/23/2018 2:15 PM Jarad Bocanegra MD [facility]        Today's Diagnoses     Dog bite of left hand, initial encounter    -  1    Cellulitis of left upper extremity        Need for vaccination        Long-term current use of tacrolimus        Degeneration of lumbar intervertebral disc        Lumbar facet arthropathy           Follow-ups after your visit        Your next 10 appointments already scheduled     Dec 28, 2018 11:45 AM CST   (Arrive by 11:30 AM)   Return Liver Transplant with Mike Gray MD   [facility] ([facility] [place])    [address] [place] [address]  [address]  [facility] [place] [facility] [address] [phone]              Who to contact     If you have questions or need follow up information about today's clinic visit or your schedule please contact [facility] directly at [phone].  Normal or non-critical lab and imaging results will be communicated to you by Dropcamhart, letter or phone within 4 business days after the clinic has received the results. If you do not hear from us within 7 days, please contact the clinic through PeriphaGent or phone. If you have a critical or abnormal lab result, we will notify you by phone as soon as possible.  Submit refill requests through [email or web address] or call your pharmacy and they will forward the refill request to us. Please allow 3 business days for your refill to be completed.          Additional Information About Your Visit        Dropcamhart Information     [email or web address] gives you secure access to your electronic health record. If you see a primary care provider, you can also send messages to your care team and make appointments. If you have questions, please call your primary care clinic.  If you do not  have a primary care provider, please call [phone] and they will assist you.        Care EveryWhere ID     This is your Care EveryWhere ID. This could be used by other organizations to access your [place] medical records  [id number]        Your Vitals Were     Pulse Temperature BMI (Body Mass Index)             76 97.7  F (36.5  C) (Tympanic) 32.58 kg/m2          Blood Pressure from Last 3 Encounters:   10/23/18 130/74   10/17/18 128/70   08/01/18 (!) 142/101    Weight from Last 3 Encounters:   10/23/18 208 lb (94.3 kg)   10/17/18 208 lb 1.6 oz (94.4 kg)   08/01/18 200 lb (90.7 kg)              We Performed the Following     GH IMM-  FLU VACCINE, INCREASED ANTIGEN, PRESV FREE          Today's Medication Changes          These changes are accurate as of 10/23/18  2:41 PM.  If you have any questions, ask your nurse or doctor.               These medicines have changed or have updated prescriptions.        Dose/Directions    desonide 0.05 % ointment   Commonly known as:  DESOWEN   This may have changed:    - how to take this  - additional instructions   Used for:  Psoriasis        Use on the face daily   Quantity:  60 g   Refills:  1       levothyroxine 125 MCG tablet   Commonly known as:  SYNTHROID/LEVOTHROID   This may have changed:  additional instructions   Used for:  Liver replaced by transplant (H)        Dose:  125 mcg   Take 1 tablet (125 mcg) by mouth daily   Quantity:  30 tablet   Refills:  4                Primary Care Provider Office Phone # Fax #    Jarad Dionicio Bocanegra [phone] [address] [facility] [address]        Equal Access to Services     [facility]: Hadii kimberley dubon Soitalo, waaxda luqadaha, qaybta kaalmajodi jones. So [facility] [phone].    ATENCIÓN: Si habla español, tiene a clemens disposición servicios gratuitos de asistencia lingüística. Llame al [phone].    We comply with applicable federal  civil rights laws and [place] laws. We do not discriminate on the basis of race, color, national origin, age, disability, sex, sexual orientation, or gender identity.            Thank you!     Thank you for choosing [facility] AND [facility]  for your care. Our goal is always to provide you with excellent care. Hearing back from our patients is one way we can continue to improve our services. Please take a few minutes to complete the written survey that you may receive in the mail after your visit with us. Thank you!             Your Updated Medication List - Protect others around you: Learn how to safely use, store and throw away your medicines at www.disposemymeds.org.          This list is accurate as of 10/23/18  2:41 PM.  Always use your most recent med list.                   Brand Name Dispense Instructions for use Diagnosis    ACETAMINOPHEN PO           albuterol 108 (90 Base) MCG/ACT inhaler    PROAIR HFA/PROVENTIL HFA/VENTOLIN HFA    3 Inhaler    Inhale 2 puffs into the lungs every 4 hours as needed for shortness of breath / dyspnea or wheezing    Chronic obstructive pulmonary disease, unspecified COPD type (H)       amLODIPine 5 MG tablet    NORVASC    90 tablet    Take 1 tablet (5 mg) by mouth daily    Essential hypertension       amoxicillin-clavulanate 875-125 MG per tablet    AUGMENTIN    20 tablet    Take 1 tablet by mouth 2 times daily for 10 days    Cellulitis of left upper extremity       Blood Pressure Cuff Misc     1 each    BP cuff. HTN. 99    Essential hypertension       calcium carbonate-vitamin D 500-400 MG-UNIT Tabs per tablet     180 tablet    Take 1 tablet by mouth 2 times daily    Liver replaced by transplant (H)       cyclobenzaprine 5 MG tablet    FLEXERIL    45 tablet    TAKE 1 TO 2 TABLETS BY MOUTH AT NIGHT AS NEEDED    Lumbar degenerative disc disease, Lumbar facet arthropathy, Chronic left-sided low back pain with left-sided sciatica       desonide 0.05 % ointment     DESOWEN    60 g    Use on the face daily    Psoriasis       diclofenac 1 % Gel topical gel    VOLTAREN    100 g    Apply 4 grams to low back four times daily using enclosed dosing card.    Spinal stenosis of lumbar region, unspecified whether neurogenic claudication present, Lumbar foraminal stenosis, Lumbar facet arthropathy       fluocinonide 0.05 % solution    LIDEX    60 mL    APPLY TOPICALLY TO THE AFFECTED AREA TWICE DAILY.APPLY TO THE SCALP TWICE DAILY    Psoriasis       levothyroxine 125 MCG tablet    SYNTHROID/LEVOTHROID    30 tablet    Take 1 tablet (125 mcg) by mouth daily    Liver replaced by transplant (H)       losartan 25 MG tablet    COZAAR    90 tablet    TAKE 1 TABLET BY MOUTH EVERY DAY    Essential hypertension       magnesium oxide 400 MG tablet    MAG-OX    120 tablet    Take 2 tablets (800 mg) by mouth 2 times daily    PTLD (post-transplant lymphoproliferative disorder) (H), Liver replaced by transplant (H)       metoprolol tartrate 100 MG tablet    LOPRESSOR    180 tablet    TAKE 1 TABLET BY MOUTH TWICE DAILY    Liver replaced by transplant (H)       omeprazole 20 MG tablet     30 tablet    Take 1 tablet (20 mg) by mouth daily    Liver replaced by transplant (H)       SPIRIVA HANDIHALER 18 MCG capsule   Generic drug:  tiotropium     90 capsule    INHALE THE CONTENTS OF 1 CAPSULE VIA INHALATION DEVICE DAILY    Chronic obstructive pulmonary disease, unspecified COPD type (H)       tacrolimus 1 MG capsule    GENERIC EQUIVALENT    60 capsule    Take 1 capsule (1 mg) by mouth every 12 hours    Liver replaced by transplant (H)       traMADol 50 MG tablet    ULTRAM    60 tablet    TAKE 1 TABLET BY MOUTH TWICE DAILY AS NEEDED FOR MODERATE TO SEVERE PAIN.    Spinal stenosis of lumbar region, unspecified whether neurogenic claudication present, Lumbar foraminal stenosis, Lumbar facet arthropathy

## 2018-10-24 ASSESSMENT — PATIENT HEALTH QUESTIONNAIRE - PHQ9: SUM OF ALL RESPONSES TO PHQ QUESTIONS 1-9: 0

## 2018-10-24 ASSESSMENT — ANXIETY QUESTIONNAIRES: GAD7 TOTAL SCORE: 0

## 2018-10-30 DIAGNOSIS — Z94.4 LIVER REPLACED BY TRANSPLANT (H): ICD-10-CM

## 2018-10-30 LAB
ALBUMIN SERPL-MCNC: 4 G/DL (ref 3.5–5.7)
ALP SERPL-CCNC: 118 U/L (ref 34–104)
ALT SERPL W P-5'-P-CCNC: 25 U/L (ref 7–52)
ANION GAP SERPL CALCULATED.3IONS-SCNC: 10 MMOL/L (ref 3–14)
AST SERPL W P-5'-P-CCNC: 22 U/L (ref 13–39)
BILIRUB DIRECT SERPL-MCNC: 0.2 MG/DL (ref 0–0.2)
BILIRUB SERPL-MCNC: 1 MG/DL (ref 0.3–1)
BUN SERPL-MCNC: 20 MG/DL (ref 7–25)
CALCIUM SERPL-MCNC: 9.2 MG/DL (ref 8.6–10.3)
CHLORIDE SERPL-SCNC: 102 MMOL/L (ref 98–107)
CO2 SERPL-SCNC: 24 MMOL/L (ref 21–31)
CREAT SERPL-MCNC: 0.98 MG/DL (ref 0.7–1.3)
ERYTHROCYTE [DISTWIDTH] IN BLOOD BY AUTOMATED COUNT: 14.4 % (ref 10–15)
GFR SERPL CREATININE-BSD FRML MDRD: 77 ML/MIN/1.7M2
GLUCOSE SERPL-MCNC: 228 MG/DL (ref 70–105)
HCT VFR BLD AUTO: 41.8 % (ref 40–53)
HGB BLD-MCNC: 14.2 G/DL (ref 13.3–17.7)
MCH RBC QN AUTO: 31.5 PG (ref 26.5–33)
MCHC RBC AUTO-ENTMCNC: 34 G/DL (ref 31.5–36.5)
MCV RBC AUTO: 93 FL (ref 78–100)
PLATELET # BLD AUTO: 87 10E9/L (ref 150–450)
POTASSIUM SERPL-SCNC: 4.7 MMOL/L (ref 3.5–5.1)
PROT SERPL-MCNC: 7 G/DL (ref 6.4–8.9)
RBC # BLD AUTO: 4.51 10E12/L (ref 4.4–5.9)
SODIUM SERPL-SCNC: 136 MMOL/L (ref 134–144)
WBC # BLD AUTO: 6.6 10E9/L (ref 4–11)

## 2018-10-30 PROCEDURE — 85027 COMPLETE CBC AUTOMATED: CPT | Performed by: INTERNAL MEDICINE

## 2018-10-30 PROCEDURE — 80197 ASSAY OF TACROLIMUS: CPT | Performed by: INTERNAL MEDICINE

## 2018-10-30 PROCEDURE — 36415 COLL VENOUS BLD VENIPUNCTURE: CPT | Performed by: INTERNAL MEDICINE

## 2018-10-30 PROCEDURE — 80076 HEPATIC FUNCTION PANEL: CPT | Performed by: INTERNAL MEDICINE

## 2018-10-30 PROCEDURE — 80048 BASIC METABOLIC PNL TOTAL CA: CPT | Performed by: INTERNAL MEDICINE

## 2018-10-31 LAB
TACROLIMUS BLD-MCNC: 11.8 UG/L (ref 5–15)
TME LAST DOSE: 1100 H

## 2018-12-04 ENCOUNTER — APPOINTMENT (OUTPATIENT)
Dept: LAB | Facility: OTHER | Age: 68
End: 2018-12-04
Attending: INTERNAL MEDICINE
Payer: MEDICARE

## 2018-12-04 DIAGNOSIS — Z94.4 LIVER REPLACED BY TRANSPLANT (H): Primary | ICD-10-CM

## 2018-12-04 DIAGNOSIS — Z94.4 LIVER REPLACED BY TRANSPLANT (H): ICD-10-CM

## 2018-12-04 PROCEDURE — 36415 COLL VENOUS BLD VENIPUNCTURE: CPT

## 2018-12-04 PROCEDURE — 80197 ASSAY OF TACROLIMUS: CPT | Performed by: INTERNAL MEDICINE

## 2018-12-04 NOTE — LETTER
OUTPATIENT OR TRANSITIONAL CARE  LABORATORY TEST ORDER  [facility] fax: [phone]    Patient Name: Ricki Sharp  Transplant Date: 3/13/2008   YOB: 1950  Issue Date: 12/04/18 @ 0930   [facility] MR#: [id number]  Expiration Date: 12/04/19       Diagnoses: [x]      Liver Transplant (ICD-10 Z94.4)    [x]      Long term use of medications (ICD-10 Z79.899)     Please fax results to [phone]    Frequency: Every 2 to 3 months, and PRN        [x] CBC with Platelets   [x] Basic Metabolic Panel (Sodium, Potassium, Chloride, CO2, Creatinine, Urea Nitrogen, Glucose, Calcium)  [x] Hepatic panel (Albumin, Alk Phos, ALT, AST, Direct and Total Bili)  [x] Tacrolimus drug level - 12 hour trough, please document time of last dose     Other Frequency: annually ONCE in March 2019  [x]      Fasting lipid panel  [x] Random urine protein  [x] Urinalysis with reflex to micro    If you have questions, please call [phone] or [phone].    .

## 2018-12-07 LAB
TACROLIMUS BLD-MCNC: 6 UG/L (ref 5–15)
TME LAST DOSE: NORMAL H

## 2018-12-10 ENCOUNTER — PATIENT OUTREACH (OUTPATIENT)
Dept: CARE COORDINATION | Facility: CLINIC | Age: 68
End: 2018-12-10

## 2018-12-10 ENCOUNTER — OFFICE VISIT (OUTPATIENT)
Dept: GASTROENTEROLOGY | Facility: CLINIC | Age: 68
End: 2018-12-10
Attending: INTERNAL MEDICINE
Payer: MEDICARE

## 2018-12-10 VITALS
SYSTOLIC BLOOD PRESSURE: 163 MMHG | HEART RATE: 83 BPM | WEIGHT: 211.6 LBS | HEIGHT: 67 IN | BODY MASS INDEX: 33.21 KG/M2 | DIASTOLIC BLOOD PRESSURE: 93 MMHG

## 2018-12-10 DIAGNOSIS — Z94.4 LIVER REPLACED BY TRANSPLANT (H): Primary | ICD-10-CM

## 2018-12-10 PROCEDURE — G0463 HOSPITAL OUTPT CLINIC VISIT: HCPCS | Mod: ZF

## 2018-12-10 ASSESSMENT — PAIN SCALES - GENERAL: PAINLEVEL: NO PAIN (0)

## 2018-12-10 ASSESSMENT — MIFFLIN-ST. JEOR: SCORE: 1698.44

## 2018-12-10 NOTE — NURSING NOTE
"BP (!) 163/93   Pulse 83   Ht 1.702 m (5' 7\")   Wt 96 kg (211 lb 9.6 oz)   BMI 33.14 kg/m    Chief Complaint   Patient presents with     RECHECK     follow up with Hep C, [facility]       "

## 2018-12-10 NOTE — PROGRESS NOTES
I had the pleasure of seeing Ricki Sharp for followup in the Liver Transplantation Clinic at the [facility] on 12/10/2018.  Mr. Sharp returns for followup now almost 11 years status post liver transplantation for cirrhosis caused by chronic hepatitis C and complicated by hepatopulmonary syndrome.  His post-transplant course was complicated by lymphoproliferative disease several years back.      He feels fairly well at this visit.  He denies any abdominal pain, itching or skin rash.  He does have a mild to moderate amount of fatigue.  He denies any increased abdominal girth or lower extremity edema.  He has not had any gastrointestinal bleeding or any overt signs of hepatic encephalopathy.  He denies any fevers or chills.  He does have some chronic shortness of breath related to chronic lung disease.  He denies any nausea or vomiting, diarrhea or constipation.  His appetite has been good.  He does really need to lose some weight.      He also has been running significantly high blood sugars to the point where I think he needs to be on something for his diabetes.     Current Outpatient Medications   Medication     ACETAMINOPHEN PO     albuterol (PROAIR HFA, PROVENTIL HFA, VENTOLIN HFA) 108 (90 BASE) MCG/ACT inhaler     amLODIPine (NORVASC) 5 MG tablet     Blood Pressure Monitoring (BLOOD PRESSURE CUFF) MISC     calcium carbonate-vitamin D 500-400 MG-UNIT TABS tablt     cyclobenzaprine (FLEXERIL) 5 MG tablet     desonide (DESOWEN) 0.05 % ointment     diclofenac (VOLTAREN) 1 % GEL topical gel     fluocinonide (LIDEX) 0.05 % solution     levothyroxine (SYNTHROID, LEVOTHROID) 125 MCG tablet     losartan (COZAAR) 25 MG tablet     magnesium oxide (MAG-OX) 400 MG tablet     metoprolol tartrate (LOPRESSOR) 100 MG tablet     omeprazole 20 MG tablet     SPIRIVA HANDIHALER 18 MCG capsule     tacrolimus (GENERIC EQUIVALENT) 1 MG capsule     traMADol (ULTRAM) 50 MG tablet     No current  "facility-administered medications for this visit.      Vital signs:      BP: (!) 163/93 Pulse: 83           Height: 170.2 cm (5' 7\") Weight: 96 kg (211 lb 9.6 oz)  Estimated body mass index is 33.14 kg/m  as calculated from the following:    Height as of this encounter: 1.702 m (5' 7\").    Weight as of this encounter: 96 kg (211 lb 9.6 oz).    In general, he looks reasonably well.  HEENT exam shows no scleral icterus or temporal muscle wasting.  His chest is clear.  His abdominal exam shows no increase in girth.  No masses or tenderness to palpation are present.  His liver is 10 cm in span without left lobe enlargement.  No spleen tip is palpable, and extremity exam shows no edema.  Skin exam shows no stigmata of chronic liver disease.  Neurologic exam shows no asterixis.     His most recent laboratory tests were from 10/30.  His white count was 6.6, hemoglobin 14.2, platelets are 87,000.  Sodium 136, potassium 4.7, BUN is 24, creatinine 0.98.  AST is 22, ALT is 25, alkaline phosphatase is 118, albumin is 4.0, total protein of 7 and total bilirubin is 1.0.      My impression is that Mr. Sharp is now almost 11 years status post liver transplantation for cirrhosis caused by chronic hepatitis C.  He is a sustained virologic responder to hepatitis C treatment.  He did quit smoking, which obviously is very good for his chronic lung disease.      He does need to be treated for his diabetes, and I have recommended that he speak with his primary physician about that.  Most transplant patients will need to be on insulin-based therapy and generally do not respond to oral agents.  He could be on metformin if that was recommended.  Otherwise, my plan will be to see him back in the clinic again in 1 year.      Thank you very much for allowing me to participate in the care of your patient.  If you have any questions regarding my recommendations, please do not hesitate to contact me.       Mike Gray MD      Professor of " Medicine  [facility] [employer]      Executive Medical Director, Solid Organ Transplant Program  [facility]

## 2018-12-10 NOTE — LETTER
12/10/2018      RE: Ricki Sharp  [address] [facility] [address]       I had the pleasure of seeing Ricki Sharp for followup in the Liver Transplantation Clinic at the [facility] on 12/10/2018.  Mr. Sharp returns for followup now almost 11 years status post liver transplantation for cirrhosis caused by chronic hepatitis C and complicated by hepatopulmonary syndrome.  His post-transplant course was complicated by lymphoproliferative disease several years back.      He feels fairly well at this visit.  He denies any abdominal pain, itching or skin rash.  He does have a mild to moderate amount of fatigue.  He denies any increased abdominal girth or lower extremity edema.  He has not had any gastrointestinal bleeding or any overt signs of hepatic encephalopathy.  He denies any fevers or chills.  He does have some chronic shortness of breath related to chronic lung disease.  He denies any nausea or vomiting, diarrhea or constipation.  His appetite has been good.  He does really need to lose some weight.      He also has been running significantly high blood sugars to the point where I think he needs to be on something for his diabetes.     Current Outpatient Medications   Medication     ACETAMINOPHEN PO     albuterol (PROAIR HFA, PROVENTIL HFA, VENTOLIN HFA) 108 (90 BASE) MCG/ACT inhaler     amLODIPine (NORVASC) 5 MG tablet     Blood Pressure Monitoring (BLOOD PRESSURE CUFF) MISC     calcium carbonate-vitamin D 500-400 MG-UNIT TABS tablt     cyclobenzaprine (FLEXERIL) 5 MG tablet     desonide (DESOWEN) 0.05 % ointment     diclofenac (VOLTAREN) 1 % GEL topical gel     fluocinonide (LIDEX) 0.05 % solution     levothyroxine (SYNTHROID, LEVOTHROID) 125 MCG tablet     losartan (COZAAR) 25 MG tablet     magnesium oxide (MAG-OX) 400 MG tablet     metoprolol tartrate (LOPRESSOR) 100 MG tablet     omeprazole 20 MG tablet     SPIRIVA HANDIHALER 18 MCG capsule     tacrolimus  "(GENERIC EQUIVALENT) 1 MG capsule     traMADol (ULTRAM) 50 MG tablet     No current facility-administered medications for this visit.      Vital signs:      BP: (!) 163/93 Pulse: 83           Height: 170.2 cm (5' 7\") Weight: 96 kg (211 lb 9.6 oz)  Estimated body mass index is 33.14 kg/m  as calculated from the following:    Height as of this encounter: 1.702 m (5' 7\").    Weight as of this encounter: 96 kg (211 lb 9.6 oz).    In general, he looks reasonably well.  HEENT exam shows no scleral icterus or temporal muscle wasting.  His chest is clear.  His abdominal exam shows no increase in girth.  No masses or tenderness to palpation are present.  His liver is 10 cm in span without left lobe enlargement.  No spleen tip is palpable, and extremity exam shows no edema.  Skin exam shows no stigmata of chronic liver disease.  Neurologic exam shows no asterixis.     His most recent laboratory tests were from 10/30.  His white count was 6.6, hemoglobin 14.2, platelets are 87,000.  Sodium 136, potassium 4.7, BUN is 24, creatinine 0.98.  AST is 22, ALT is 25, alkaline phosphatase is 118, albumin is 4.0, total protein of 7 and total bilirubin is 1.0.      My impression is that Mr. Sharp is now almost 11 years status post liver transplantation for cirrhosis caused by chronic hepatitis C.  He is a sustained virologic responder to hepatitis C treatment.  He did quit smoking, which obviously is very good for his chronic lung disease.      He does need to be treated for his diabetes, and I have recommended that he speak with his primary physician about that.  Most transplant patients will need to be on insulin-based therapy and generally do not respond to oral agents.  He could be on metformin if that was recommended.  Otherwise, my plan will be to see him back in the clinic again in 1 year.      Thank you very much for allowing me to participate in the care of your patient.  If you have any questions regarding my recommendations, " please do not hesitate to contact me.       Mike Gray MD      Professor of Medicine  [employer]      Executive Medical Director, Solid Organ Transplant Program  [facility]

## 2018-12-13 ENCOUNTER — TELEPHONE (OUTPATIENT)
Dept: GASTROENTEROLOGY | Facility: CLINIC | Age: 68
End: 2018-12-13

## 2019-01-05 DIAGNOSIS — K27.9 PEPTIC ULCER: Primary | ICD-10-CM

## 2019-01-08 NOTE — TELEPHONE ENCOUNTER
Prescription approved per [facility] Refill Protocol.  Pauline Jimenez RN on 1/8/2019 at 10:58 AM

## 2019-01-11 DIAGNOSIS — D47.Z1 PTLD (POST-TRANSPLANT LYMPHOPROLIFERATIVE DISORDER) (H): ICD-10-CM

## 2019-01-11 DIAGNOSIS — Z94.4 LIVER REPLACED BY TRANSPLANT (H): ICD-10-CM

## 2019-01-14 RX ORDER — MAGNESIUM OXIDE 400 MG/1
TABLET ORAL
Qty: 360 TABLET | Refills: 11 | Status: SHIPPED | OUTPATIENT
Start: 2019-01-14 | End: 2020-01-17

## 2019-01-14 NOTE — TELEPHONE ENCOUNTER
Routing refill request to provider for review/approval because:  Drug not on the [facility] refill protocol     LOV: 10/23/18    Ne Farah RN on 1/14/2019 at 9:25 AM

## 2019-01-15 ENCOUNTER — DOCUMENTATION ONLY (OUTPATIENT)
Dept: TRANSPLANT | Facility: CLINIC | Age: 69
End: 2019-01-15

## 2019-02-06 ENCOUNTER — TELEPHONE (OUTPATIENT)
Dept: PEDIATRICS | Facility: OTHER | Age: 69
End: 2019-02-06

## 2019-02-06 DIAGNOSIS — E03.9 ACQUIRED HYPOTHYROIDISM: Primary | ICD-10-CM

## 2019-02-06 DIAGNOSIS — L40.9 PSORIASIS: ICD-10-CM

## 2019-02-06 DIAGNOSIS — E03.9 HYPOTHYROIDISM: ICD-10-CM

## 2019-02-07 RX ORDER — LEVOTHYROXINE SODIUM 150 UG/1
TABLET ORAL
Qty: 90 TABLET | Refills: 1 | Status: SHIPPED | OUTPATIENT
Start: 2019-02-07 | End: 2019-03-11

## 2019-02-07 RX ORDER — FLUOCINONIDE TOPICAL SOLUTION USP, 0.05% 0.5 MG/ML
SOLUTION TOPICAL
Qty: 60 ML | Refills: 0 | Status: SHIPPED | OUTPATIENT
Start: 2019-02-07 | End: 2019-03-26

## 2019-02-07 NOTE — TELEPHONE ENCOUNTER
Routing refill request to provider for review/approval because:  Labs not current:  TSH    Last TSH found 8.51 on 11-9-17.  LOV 10-23-18. Pauline Jimenez RN on 2/7/2019 at 2:54 PM

## 2019-02-15 DIAGNOSIS — Z94.4 LIVER REPLACED BY TRANSPLANT (H): ICD-10-CM

## 2019-02-15 NOTE — TELEPHONE ENCOUNTER
Prescription approved per [facility] Refill Protocol. Pauline Jimenez RN on 2/15/2019 at 10:28 AM

## 2019-02-15 NOTE — TELEPHONE ENCOUNTER
Refill request for Calcium supplement on file, system is unable to let this writer pend the Calcium.      Annabella Last LPN 2/15/2019 2:13 PM

## 2019-02-25 DIAGNOSIS — M51.369 LUMBAR DEGENERATIVE DISC DISEASE: ICD-10-CM

## 2019-02-25 DIAGNOSIS — M47.816 LUMBAR FACET ARTHROPATHY: ICD-10-CM

## 2019-02-25 DIAGNOSIS — M54.42 CHRONIC LEFT-SIDED LOW BACK PAIN WITH LEFT-SIDED SCIATICA: ICD-10-CM

## 2019-02-25 DIAGNOSIS — G89.29 CHRONIC LEFT-SIDED LOW BACK PAIN WITH LEFT-SIDED SCIATICA: ICD-10-CM

## 2019-02-27 RX ORDER — CYCLOBENZAPRINE HCL 5 MG
TABLET ORAL
Qty: 45 TABLET | Refills: 0 | Status: SHIPPED | OUTPATIENT
Start: 2019-02-27 | End: 2019-04-13

## 2019-02-27 NOTE — TELEPHONE ENCOUNTER
Refill request for Cyclobenzaprine.  Last office visit 10/23/2019.  Last refill 8/30/2018.  Routing to PCP to address refill.  Unable to complete prescription refill per RN Medication Refill Policy. Kathy Silva 2/27/2019 10:14 AM

## 2019-03-11 ENCOUNTER — OFFICE VISIT (OUTPATIENT)
Dept: PEDIATRICS | Facility: OTHER | Age: 69
End: 2019-03-11
Attending: INTERNAL MEDICINE
Payer: MEDICARE

## 2019-03-11 ENCOUNTER — MEDICAL CORRESPONDENCE (OUTPATIENT)
Dept: LAB | Facility: OTHER | Age: 69
End: 2019-03-11

## 2019-03-11 VITALS
SYSTOLIC BLOOD PRESSURE: 136 MMHG | HEART RATE: 72 BPM | WEIGHT: 210 LBS | DIASTOLIC BLOOD PRESSURE: 80 MMHG | RESPIRATION RATE: 18 BRPM | HEIGHT: 67 IN | BODY MASS INDEX: 32.96 KG/M2 | TEMPERATURE: 98.1 F

## 2019-03-11 DIAGNOSIS — Z94.4 LIVER REPLACED BY TRANSPLANT (H): ICD-10-CM

## 2019-03-11 DIAGNOSIS — I10 ESSENTIAL HYPERTENSION: ICD-10-CM

## 2019-03-11 DIAGNOSIS — E78.2 MIXED HYPERLIPIDEMIA: ICD-10-CM

## 2019-03-11 DIAGNOSIS — E03.9 ACQUIRED HYPOTHYROIDISM: ICD-10-CM

## 2019-03-11 DIAGNOSIS — E11.9 TYPE 2 DIABETES MELLITUS WITHOUT COMPLICATION, WITHOUT LONG-TERM CURRENT USE OF INSULIN (H): Primary | Chronic | ICD-10-CM

## 2019-03-11 LAB
ALBUMIN MFR UR ELPH: 131 MG/DL (ref 1–14)
ALBUMIN MFR UR ELPH: NORMAL MG/DL (ref 1–14)
ALBUMIN SERPL-MCNC: 4 G/DL (ref 3.5–5.7)
ALBUMIN UR-MCNC: 100 MG/DL
ALP SERPL-CCNC: 109 U/L (ref 34–104)
ALT SERPL W P-5'-P-CCNC: 25 U/L (ref 7–52)
ANION GAP SERPL CALCULATED.3IONS-SCNC: 10 MMOL/L (ref 3–14)
APPEARANCE UR: CLEAR
AST SERPL W P-5'-P-CCNC: 18 U/L (ref 13–39)
BILIRUB DIRECT SERPL-MCNC: 0.2 MG/DL (ref 0–0.2)
BILIRUB SERPL-MCNC: 1.5 MG/DL (ref 0.3–1)
BILIRUB UR QL STRIP: NEGATIVE
BUN SERPL-MCNC: 22 MG/DL (ref 7–25)
CALCIUM SERPL-MCNC: 9.1 MG/DL (ref 8.6–10.3)
CHLORIDE SERPL-SCNC: 100 MMOL/L (ref 98–107)
CHOLEST SERPL-MCNC: 163 MG/DL
CO2 SERPL-SCNC: 25 MMOL/L (ref 21–31)
COLOR UR AUTO: YELLOW
CREAT SERPL-MCNC: 1.45 MG/DL (ref 0.7–1.3)
CREAT UR-MCNC: 126 MG/DL
CREAT UR-MCNC: NORMAL MG/DL
ERYTHROCYTE [DISTWIDTH] IN BLOOD BY AUTOMATED COUNT: 15.1 % (ref 10–15)
GFR SERPL CREATININE-BSD FRML MDRD: 49 ML/MIN/{1.73_M2}
GLUCOSE SERPL-MCNC: 251 MG/DL (ref 70–105)
GLUCOSE UR STRIP-MCNC: 250 MG/DL
HBA1C MFR BLD: 8.4 % (ref 4–6)
HCT VFR BLD AUTO: 43.4 % (ref 40–53)
HDLC SERPL-MCNC: 36 MG/DL (ref 23–92)
HGB BLD-MCNC: 14.7 G/DL (ref 13.3–17.7)
HGB UR QL STRIP: ABNORMAL
HYALINE CASTS #/AREA URNS LPF: NORMAL /LPF
KETONES UR STRIP-MCNC: NEGATIVE MG/DL
LDLC SERPL CALC-MCNC: 73 MG/DL
LEUKOCYTE ESTERASE UR QL STRIP: NEGATIVE
MCH RBC QN AUTO: 31.5 PG (ref 26.5–33)
MCHC RBC AUTO-ENTMCNC: 33.9 G/DL (ref 31.5–36.5)
MCV RBC AUTO: 93 FL (ref 78–100)
NITRATE UR QL: NEGATIVE
NONHDLC SERPL-MCNC: 127 MG/DL
PH UR STRIP: 6 PH (ref 5–9)
PLATELET # BLD AUTO: 99 10E9/L (ref 150–450)
POTASSIUM SERPL-SCNC: 4.2 MMOL/L (ref 3.5–5.1)
PROT SERPL-MCNC: 7 G/DL (ref 6.4–8.9)
PROT/CREAT 24H UR: 1.04 MG/G{CREAT}
PROT/CREAT 24H UR: NORMAL MG/G{CREAT}
RBC # BLD AUTO: 4.67 10E12/L (ref 4.4–5.9)
RBC #/AREA URNS AUTO: NORMAL /HPF
SODIUM SERPL-SCNC: 135 MMOL/L (ref 134–144)
SOURCE: ABNORMAL
SP GR UR STRIP: 1.02 (ref 1–1.03)
TRIGL SERPL-MCNC: 272 MG/DL
TSH SERPL DL<=0.05 MIU/L-ACNC: 10.13 IU/ML (ref 0.34–5.6)
UROBILINOGEN UR STRIP-ACNC: 0.2 EU/DL (ref 0.2–1)
WBC # BLD AUTO: 6.9 10E9/L (ref 4–11)
WBC #/AREA URNS AUTO: NORMAL /HPF

## 2019-03-11 PROCEDURE — 85027 COMPLETE CBC AUTOMATED: CPT | Performed by: INTERNAL MEDICINE

## 2019-03-11 PROCEDURE — 83036 HEMOGLOBIN GLYCOSYLATED A1C: CPT | Performed by: INTERNAL MEDICINE

## 2019-03-11 PROCEDURE — 80061 LIPID PANEL: CPT | Performed by: INTERNAL MEDICINE

## 2019-03-11 PROCEDURE — 80197 ASSAY OF TACROLIMUS: CPT | Performed by: INTERNAL MEDICINE

## 2019-03-11 PROCEDURE — 84443 ASSAY THYROID STIM HORMONE: CPT | Performed by: INTERNAL MEDICINE

## 2019-03-11 PROCEDURE — 80048 BASIC METABOLIC PNL TOTAL CA: CPT | Performed by: INTERNAL MEDICINE

## 2019-03-11 PROCEDURE — 36415 COLL VENOUS BLD VENIPUNCTURE: CPT | Performed by: INTERNAL MEDICINE

## 2019-03-11 PROCEDURE — 81001 URINALYSIS AUTO W/SCOPE: CPT | Performed by: INTERNAL MEDICINE

## 2019-03-11 PROCEDURE — 80076 HEPATIC FUNCTION PANEL: CPT | Performed by: INTERNAL MEDICINE

## 2019-03-11 PROCEDURE — 99214 OFFICE O/P EST MOD 30 MIN: CPT | Performed by: INTERNAL MEDICINE

## 2019-03-11 PROCEDURE — G0463 HOSPITAL OUTPT CLINIC VISIT: HCPCS

## 2019-03-11 PROCEDURE — 84156 ASSAY OF PROTEIN URINE: CPT | Performed by: INTERNAL MEDICINE

## 2019-03-11 RX ORDER — SIMVASTATIN 40 MG
40 TABLET ORAL AT BEDTIME
Qty: 90 TABLET | Refills: 3 | Status: SHIPPED | OUTPATIENT
Start: 2019-03-11 | End: 2019-08-19

## 2019-03-11 RX ORDER — GLIPIZIDE 5 MG/1
5 TABLET, FILM COATED, EXTENDED RELEASE ORAL DAILY
Qty: 90 TABLET | Refills: 3 | Status: SHIPPED | OUTPATIENT
Start: 2019-03-11 | End: 2019-05-30

## 2019-03-11 RX ORDER — LEVOTHYROXINE SODIUM 175 UG/1
175 TABLET ORAL DAILY
Qty: 90 TABLET | Refills: 3 | Status: SHIPPED | OUTPATIENT
Start: 2019-03-11 | End: 2020-03-13

## 2019-03-11 RX ORDER — ASPIRIN 81 MG/1
81 TABLET ORAL DAILY
COMMUNITY
Start: 2016-08-18 | End: 2019-05-30

## 2019-03-11 ASSESSMENT — PAIN SCALES - GENERAL: PAINLEVEL: SEVERE PAIN (6)

## 2019-03-11 ASSESSMENT — MIFFLIN-ST. JEOR: SCORE: 1691.18

## 2019-03-11 NOTE — PATIENT INSTRUCTIONS
Aspects of Diabetes we can improve:  Hemoglobin A1c Lab Results   Component Value Date    A1C 6.6 03/05/2018    A1C 6.9 03/20/2016    A1C 6.4 06/23/2015    A1C Unable to calculate   total hemoglobin <6 g/dL   06/19/2014    A1C 7.4 04/12/2014    Goal range is under 8. Best is 6.5 to 7   Blood Pressure 136/80 Goal to keep less than 140/90   Tobacco  reports that he quit smoking about 9 months ago. He quit after 5.00 years of use. he has never used smokeless tobacco. Goal to abstain from tobacco   Aspirin yes Aspirin reduces risk of heart disease and stroke   ACE/ARB losartan These medications reduce risk of kidney disease   Cholesterol Start simvastatin Statins reduce risk of heart disease and stroke   Eye Exam DUE Annual diabetic eye exam   Healthy weight Body mass index is 32.89 kg/m . Goal BMI under 30, best is under 25.      -- I'm trying to exercise daily (goal at least 20 min/day) with moderate aerobic activity   -- Eat healthy (resources from ADA at http://www.diabetes.org/)   -- I'm taking good care of my feet. Consider seeing the Podiatrist   -- Check blood sugars as directed, record in log book and bring to every appointment   -- Goal before breakfast, under 150.   -- Goal 2 hours after supper, under 180   -- Likely start glipizide after A1c comes back   -- Next diabetes lab draw: 3-6 months   -- Next diabetes office visit: 1-3 months    Your BMI is Body mass index is 32.89 kg/m .  (BMI ranges: Normal 18.5 - 25, Overweight 25 - 30, Obesity greater than 30,     Morbid Obesity greater than 40 or greater than 35 with associated conditions.)    Facts about losing weight:   -- Overweight and Obesity increase your risk for developing diabetes, high blood pressure and stroke, and shorten your life.   -- 90% of weight loss comes from dietary changes, only 10% from exercise    What should I do?   -- Work on 5-10% weight loss   -- Focus on a few healthy dietary changes   -- Eat more fresh fruits and vegetables, and  fewer carbohydrates   -- Cut out all calorie-containing beverages (milk, juice, alcohol, etc)   -- Exercise every day   -- Weigh yourself once a week   -- Consider the DASH Diet (http://http://bit.ly/DASHDiet - redirects to the NIH)   -- Consider Weight Watchers (http://www.weightCellity.Thengine Co)   -- Consider My Fitness Pal (iOS, Android, http://www.FoodByNet.com)

## 2019-03-11 NOTE — PROGRESS NOTES
"Subjective  Ricki Sharp is a 68 year old male who presents for diabetes follow-up.  His blood sugars have been higher lately.  He has not been checking very often.  He usually checks in the morning after he has coffee.  167-200s.  He is not taking any medication.  He is been sedentary throughout the winter.  His only exercises going to the grocery store.  He had some other blood work done today for Dr. Gray, his hepatologist who he sees for history of liver transplant.  He has some tingling in the toes intermittently as well as the left index finger.    Allergies: reviewed in EMR  Medications: reviewed in EMR  Problem List/PMH:reviewed in EMR    Social Hx:  Social History     Tobacco Use     Smoking status: Former Smoker     Years: 5.00     Last attempt to quit: 2018     Years since quittin.7     Smokeless tobacco: Never Used   Substance Use Topics     Alcohol use: No     Drug use: No     Social History     Social History Narrative         I reviewed social history and made relevant updates today.    Family Hx:   History reviewed. No pertinent family history.    Objective  Vitals: reviewed in EMR.  /80 (BP Location: Right arm, Patient Position: Sitting, Cuff Size: Adult Large)   Pulse 72   Temp 98.1  F (36.7  C) (Tympanic)   Resp 18   Ht 1.702 m (5' 7\")   Wt 95.3 kg (210 lb)   BMI 32.89 kg/m      Gen: Pleasant male, NAD.  HEENT: MMM  Neck: Supple  Pulm: Breathing easily  Neuro: Grossly intact  Skin: No concerning lesions.  Psychiatric: Normal affect and insight. Does not appear anxious or depressed.    Foot Exam:  3/11/2019  Intact to monofilament bilaterally.  Skin intact without erythema.  Toes are thickened and dystrophic bilaterally.    Diabetes Labs  Hemoglobin A1C (%)   Date Value   2019 8.4 (H)   2018 6.6 (H)   2016 6.9 (H)   2015 6.4 (H)   2014 Unable to calculate   total hemoglobin <6 g/dL     2014 7.4 (H)     Creatinine (mg/dL)   Date " Value   03/11/2019 1.45 (H)   10/30/2018 0.98   08/15/2018 1.14   05/25/2018 1.41 (H)   03/05/2018 1.27   12/21/2017 1.30     Glucose (mg/dL)   Date Value   03/11/2019 251 (H)   10/30/2018 228 (H)   08/15/2018 212 (H)   05/25/2018 243 (H)   03/05/2018 181 (H)   12/21/2017 229 (H)     Potassium (mmol/L)   Date Value   03/11/2019 4.2   10/30/2018 4.7   08/15/2018 4.5   05/25/2018 4.4   03/05/2018 4.7   12/21/2017 5.0       Assessment    ICD-10-CM    1. Type 2 diabetes mellitus without complication, without long-term current use of insulin (H) E11.9 Hemoglobin A1c     simvastatin (ZOCOR) 40 MG tablet     Hemoglobin A1c     glipiZIDE (GLUCOTROL XL) 5 MG 24 hr tablet     Hemoglobin A1c   2. Liver replaced by transplant (H) Z94.4    3. Essential hypertension I10    4. Mixed hyperlipidemia E78.2 simvastatin (ZOCOR) 40 MG tablet     Difficult to say exactly what his control is been since we do not have many blood sugars to evaluate however it sounds like his A1c will be worse.  After the visit his A1c did return elevated at 8.4.  I will have him start glipizide and follow-up in 3 months with an A1c prior to the visit.    Plan  Patient Instructions     Aspects of Diabetes we can improve:  Hemoglobin A1c Lab Results   Component Value Date    A1C 6.6 03/05/2018    A1C 6.9 03/20/2016    A1C 6.4 06/23/2015    A1C Unable to calculate   total hemoglobin <6 g/dL   06/19/2014    A1C 7.4 04/12/2014    Goal range is under 8. Best is 6.5 to 7   Blood Pressure 136/80 Goal to keep less than 140/90   Tobacco  reports that he quit smoking about 9 months ago. He quit after 5.00 years of use. he has never used smokeless tobacco. Goal to abstain from tobacco   Aspirin yes Aspirin reduces risk of heart disease and stroke   ACE/ARB losartan These medications reduce risk of kidney disease   Cholesterol Start simvastatin Statins reduce risk of heart disease and stroke   Eye Exam DUE Annual diabetic eye exam   Healthy weight Body mass index is  32.89 kg/m . Goal BMI under 30, best is under 25.      -- I'm trying to exercise daily (goal at least 20 min/day) with moderate aerobic activity   -- Eat healthy (resources from ADA at http://www.diabetes.org/)   -- I'm taking good care of my feet. Consider seeing the Podiatrist   -- Check blood sugars as directed, record in log book and bring to every appointment   -- Goal before breakfast, under 150.   -- Goal 2 hours after supper, under 180   -- Likely start glipizide after A1c comes back   -- Next diabetes lab draw: 3-6 months   -- Next diabetes office visit: 1-3 months    Your BMI is Body mass index is 32.89 kg/m .  (BMI ranges: Normal 18.5 - 25, Overweight 25 - 30, Obesity greater than 30,     Morbid Obesity greater than 40 or greater than 35 with associated conditions.)    Facts about losing weight:   -- Overweight and Obesity increase your risk for developing diabetes, high blood pressure and stroke, and shorten your life.   -- 90% of weight loss comes from dietary changes, only 10% from exercise    What should I do?   -- Work on 5-10% weight loss   -- Focus on a few healthy dietary changes   -- Eat more fresh fruits and vegetables, and fewer carbohydrates   -- Cut out all calorie-containing beverages (milk, juice, alcohol, etc)   -- Exercise every day   -- Weigh yourself once a week   -- Consider the DASH Diet (http://http://bit.ly/DASHDiet - redirects to the NIH)   -- Consider Weight Watchers (http://www.weightwatchers.com)   -- Consider My Fitness Pal (iOS, Android, http://www.Startup Genomenesspal.com)            Signed, Jarad Bocanegra MD  Internal Medicine & Pediatrics

## 2019-03-11 NOTE — NURSING NOTE
"Previous A1C is at goal of <8  Lab Results   Component Value Date    A1C 6.6 03/05/2018    A1C 6.9 03/20/2016    A1C 6.4 06/23/2015    A1C Unable to calculate   total hemoglobin <6 g/dL   06/19/2014    A1C 7.4 04/12/2014     Urine microalbumin:creatine: na  Foot exam due  Eye exam 3/30/18     Tobacco User no  Patient is on a daily aspirin  Patient is not on a Statin.  Blood pressure today of:     BP Readings from Last 1 Encounters:   03/11/19 136/80      is at the goal of <139/89 for diabetics.    Annabella Hernandez LPN on 3/11/2019 at 11:35 AM    Chief Complaint   Patient presents with     Recheck Medication     lab results/ diabetic       Initial /80 (BP Location: Right arm, Patient Position: Sitting, Cuff Size: Adult Large)   Pulse 72   Temp 98.1  F (36.7  C) (Tympanic)   Resp 18   Ht 1.702 m (5' 7\")   Wt 95.3 kg (210 lb)   BMI 32.89 kg/m   Estimated body mass index is 32.89 kg/m  as calculated from the following:    Height as of this encounter: 1.702 m (5' 7\").    Weight as of this encounter: 95.3 kg (210 lb).  Medication Reconciliation: complete    Annabella Hernandez LPN   "

## 2019-03-12 ENCOUNTER — TELEPHONE (OUTPATIENT)
Dept: TRANSPLANT | Facility: CLINIC | Age: 69
End: 2019-03-12

## 2019-03-12 LAB
TACROLIMUS BLD-MCNC: 5.3 UG/L (ref 5–15)
TME LAST DOSE: NORMAL H

## 2019-03-12 NOTE — TELEPHONE ENCOUNTER
Spoke to Brendan ocasio: elevated creatinine and proteinuria .  He saw his PCP yesterday, plan is to start medication for elevated glucoses.    We will fax an order to his PCP asking for repeat labs in the next week or two (he's going back to see his PCP when A1C comes back).  Will also check urine protein creatinine ratio at that time.

## 2019-03-12 NOTE — LETTER
PHYSICIAN ORDERS     Dr. Bocanegra/Laboratory  [phone] ([id number])         DATE & TIME ISSUED: 2019 9:31 AM  PATIENT NAME: Ricki Sharp   : 1950     [facility] MR# [if applicable]: [id number]     DIAGNOSIS:  Liver transplant  ICD-10 CODE: Z94.4   Urine protein/creatinine ratio    Please process in the next 1- 2 weeks.     Any questions please call: [phone]    Please fax results to [phone]    .

## 2019-03-18 ENCOUNTER — TELEPHONE (OUTPATIENT)
Dept: TRANSPLANT | Facility: CLINIC | Age: 69
End: 2019-03-18

## 2019-03-18 DIAGNOSIS — R79.89 ELEVATED SERUM CREATININE: Primary | ICD-10-CM

## 2019-03-18 NOTE — TELEPHONE ENCOUNTER
Per Dr. Gray, pt needs to lose weight and see nephrology.  Message left for Bill, referral to nephrology placed.

## 2019-03-26 DIAGNOSIS — Z94.4 LIVER REPLACED BY TRANSPLANT (H): ICD-10-CM

## 2019-03-26 DIAGNOSIS — L40.9 PSORIASIS: ICD-10-CM

## 2019-03-26 RX ORDER — TACROLIMUS 1 MG/1
CAPSULE ORAL
Qty: 60 CAPSULE | Refills: 11 | OUTPATIENT
Start: 2019-03-26

## 2019-03-26 NOTE — TELEPHONE ENCOUNTER
Spoke with the patient briefly to mention the appt I was asked to schedule. The pt stated that he will need to call me back later and assures me that he has my telephone number.

## 2019-03-27 DIAGNOSIS — Z94.4 LIVER REPLACED BY TRANSPLANT (H): ICD-10-CM

## 2019-03-27 RX ORDER — TACROLIMUS 1 MG/1
1 CAPSULE ORAL EVERY 12 HOURS
Qty: 60 CAPSULE | Refills: 11 | Status: SHIPPED | OUTPATIENT
Start: 2019-03-27 | End: 2019-05-30

## 2019-03-27 NOTE — TELEPHONE ENCOUNTER
Routing refill request to provider for review/approval because:  Drug not on the [facility] refill protocol   LOV: 3/11/19  Ne Farah RN on 3/27/2019 at 11:13 AM

## 2019-03-28 RX ORDER — FLUOCINONIDE TOPICAL SOLUTION USP, 0.05% 0.5 MG/ML
SOLUTION TOPICAL
Qty: 60 ML | Refills: 3 | Status: SHIPPED | OUTPATIENT
Start: 2019-03-28 | End: 2019-12-02

## 2019-03-30 DIAGNOSIS — M47.816 LUMBAR FACET ARTHROPATHY: ICD-10-CM

## 2019-03-30 DIAGNOSIS — M48.061 SPINAL STENOSIS OF LUMBAR REGION, UNSPECIFIED WHETHER NEUROGENIC CLAUDICATION PRESENT: ICD-10-CM

## 2019-03-30 DIAGNOSIS — M48.061 LUMBAR FORAMINAL STENOSIS: ICD-10-CM

## 2019-04-02 RX ORDER — TRAMADOL HYDROCHLORIDE 50 MG/1
TABLET ORAL
Qty: 60 TABLET | Refills: 5 | Status: SHIPPED | OUTPATIENT
Start: 2019-04-02 | End: 2019-07-19

## 2019-04-02 NOTE — TELEPHONE ENCOUNTER
Chart review shows that Rx as requested was last filled as follows:    Outpatient Medication Detail      Disp Refills Start End JUAN DANIEL   traMADol (ULTRAM) 50 MG tablet 60 tablet 3 8/16/2018  No   Sig: TAKE 1 TABLET BY MOUTH TWICE DAILY AS NEEDED FOR MODERATE TO SEVERE PAIN.   Class: Local Print   Order: [id number]   Outpatient Morphine Equivalent Daily Dose (MEDD)     8/16/18 and after   Unknown   Order Name Dose Route Frequency Maximum MEDD    traMADol (ULTRAM) 50 MG tablet     Unknown   Total Potential Daily Morphine Equivalence Unknown   An error was encountered while attempting to calculate the morphine equivalent daily dose for at least one order.    Calculation Information                Printout Tracking     External Result Report   Pharmacy     [id number] - [place], [place] - [address]     And is due for a refill. LOV with PCP was on 3/11/19. Writer will lorena up and route Rx request to PCP for his consideration/approval.    Unable to complete prescription refill per RN Medication Refill Policy. Jacky Pugh 4/2/2019 4:06 PM

## 2019-04-13 DIAGNOSIS — M54.42 CHRONIC LEFT-SIDED LOW BACK PAIN WITH LEFT-SIDED SCIATICA: ICD-10-CM

## 2019-04-13 DIAGNOSIS — G89.29 CHRONIC LEFT-SIDED LOW BACK PAIN WITH LEFT-SIDED SCIATICA: ICD-10-CM

## 2019-04-13 DIAGNOSIS — M47.816 LUMBAR FACET ARTHROPATHY: ICD-10-CM

## 2019-04-13 DIAGNOSIS — M51.369 LUMBAR DEGENERATIVE DISC DISEASE: ICD-10-CM

## 2019-04-16 RX ORDER — CYCLOBENZAPRINE HCL 5 MG
TABLET ORAL
Qty: 45 TABLET | Refills: 3 | Status: SHIPPED | OUTPATIENT
Start: 2019-04-16 | End: 2019-10-22

## 2019-04-16 NOTE — TELEPHONE ENCOUNTER
Routing refill request to provider for review/approval because:  Drug not on the [facility] refill protocol     Filled 2-27-19 for #45 X 0 refills. LOV 3-11-19. Pauline Jimenez RN on 4/16/2019 at 11:13 AM

## 2019-04-17 DIAGNOSIS — Z94.4 LIVER REPLACED BY TRANSPLANT (H): ICD-10-CM

## 2019-04-17 RX ORDER — TACROLIMUS 1 MG/1
CAPSULE ORAL
Qty: 60 CAPSULE | Refills: 11 | Status: SHIPPED | OUTPATIENT
Start: 2019-04-17 | End: 2020-04-17

## 2019-05-25 DIAGNOSIS — I10 ESSENTIAL HYPERTENSION: ICD-10-CM

## 2019-05-28 RX ORDER — AMLODIPINE BESYLATE 5 MG/1
TABLET ORAL
Qty: 90 TABLET | Refills: 0 | Status: SHIPPED | OUTPATIENT
Start: 2019-05-28 | End: 2019-05-30

## 2019-05-28 NOTE — TELEPHONE ENCOUNTER
Routing refill request to provider for review/approval because:  Labs out of range:  Creatinine    LOV: 3/11/19  Patient noted to have appointment on 5/30/19  Ne Farah RN on 5/28/2019 at 4:23 PM

## 2019-05-30 ENCOUNTER — OFFICE VISIT (OUTPATIENT)
Dept: PEDIATRICS | Facility: OTHER | Age: 69
End: 2019-05-30
Attending: INTERNAL MEDICINE
Payer: MEDICARE

## 2019-05-30 VITALS
BODY MASS INDEX: 32.96 KG/M2 | DIASTOLIC BLOOD PRESSURE: 84 MMHG | HEIGHT: 67 IN | OXYGEN SATURATION: 97 % | SYSTOLIC BLOOD PRESSURE: 132 MMHG | TEMPERATURE: 98.2 F | WEIGHT: 210 LBS | RESPIRATION RATE: 18 BRPM | HEART RATE: 78 BPM

## 2019-05-30 DIAGNOSIS — E11.9 TYPE 2 DIABETES MELLITUS WITHOUT COMPLICATION, WITHOUT LONG-TERM CURRENT USE OF INSULIN (H): Chronic | ICD-10-CM

## 2019-05-30 DIAGNOSIS — E03.9 ACQUIRED HYPOTHYROIDISM: ICD-10-CM

## 2019-05-30 DIAGNOSIS — E78.2 MIXED HYPERLIPIDEMIA: ICD-10-CM

## 2019-05-30 DIAGNOSIS — J44.9 CHRONIC OBSTRUCTIVE PULMONARY DISEASE, UNSPECIFIED COPD TYPE (H): ICD-10-CM

## 2019-05-30 DIAGNOSIS — E11.9 TYPE 2 DIABETES MELLITUS WITHOUT COMPLICATION, WITHOUT LONG-TERM CURRENT USE OF INSULIN (H): Primary | Chronic | ICD-10-CM

## 2019-05-30 DIAGNOSIS — Z94.4 LIVER REPLACED BY TRANSPLANT (H): ICD-10-CM

## 2019-05-30 DIAGNOSIS — I10 ESSENTIAL HYPERTENSION: ICD-10-CM

## 2019-05-30 LAB
ALBUMIN SERPL-MCNC: 4.1 G/DL (ref 3.5–5.7)
ALP SERPL-CCNC: 112 U/L (ref 34–104)
ALT SERPL W P-5'-P-CCNC: 18 U/L (ref 7–52)
ANION GAP SERPL CALCULATED.3IONS-SCNC: 8 MMOL/L (ref 3–14)
AST SERPL W P-5'-P-CCNC: 16 U/L (ref 13–39)
BILIRUB DIRECT SERPL-MCNC: 0.2 MG/DL (ref 0–0.2)
BILIRUB SERPL-MCNC: 1.4 MG/DL (ref 0.3–1)
BUN SERPL-MCNC: 21 MG/DL (ref 7–25)
CALCIUM SERPL-MCNC: 8.9 MG/DL (ref 8.6–10.3)
CHLORIDE SERPL-SCNC: 101 MMOL/L (ref 98–107)
CO2 SERPL-SCNC: 27 MMOL/L (ref 21–31)
CREAT SERPL-MCNC: 1.38 MG/DL (ref 0.7–1.3)
ERYTHROCYTE [DISTWIDTH] IN BLOOD BY AUTOMATED COUNT: 14.8 % (ref 10–15)
GFR SERPL CREATININE-BSD FRML MDRD: 51 ML/MIN/{1.73_M2}
GLUCOSE SERPL-MCNC: 274 MG/DL (ref 70–105)
HBA1C MFR BLD: 7.9 % (ref 4–6)
HCT VFR BLD AUTO: 41.6 % (ref 40–53)
HGB BLD-MCNC: 14.2 G/DL (ref 13.3–17.7)
MCH RBC QN AUTO: 31.3 PG (ref 26.5–33)
MCHC RBC AUTO-ENTMCNC: 34.1 G/DL (ref 31.5–36.5)
MCV RBC AUTO: 92 FL (ref 78–100)
PLATELET # BLD AUTO: 82 10E9/L (ref 150–450)
POTASSIUM SERPL-SCNC: 4.7 MMOL/L (ref 3.5–5.1)
PROT SERPL-MCNC: 6.8 G/DL (ref 6.4–8.9)
RBC # BLD AUTO: 4.54 10E12/L (ref 4.4–5.9)
SODIUM SERPL-SCNC: 136 MMOL/L (ref 134–144)
TSH SERPL DL<=0.05 MIU/L-ACNC: 2.68 IU/ML (ref 0.34–5.6)
WBC # BLD AUTO: 7.4 10E9/L (ref 4–11)

## 2019-05-30 PROCEDURE — 80197 ASSAY OF TACROLIMUS: CPT | Performed by: INTERNAL MEDICINE

## 2019-05-30 PROCEDURE — 36415 COLL VENOUS BLD VENIPUNCTURE: CPT | Performed by: INTERNAL MEDICINE

## 2019-05-30 PROCEDURE — 99214 OFFICE O/P EST MOD 30 MIN: CPT | Performed by: INTERNAL MEDICINE

## 2019-05-30 PROCEDURE — G0463 HOSPITAL OUTPT CLINIC VISIT: HCPCS

## 2019-05-30 PROCEDURE — 85027 COMPLETE CBC AUTOMATED: CPT | Performed by: INTERNAL MEDICINE

## 2019-05-30 PROCEDURE — 80048 BASIC METABOLIC PNL TOTAL CA: CPT | Performed by: INTERNAL MEDICINE

## 2019-05-30 PROCEDURE — 84443 ASSAY THYROID STIM HORMONE: CPT | Performed by: INTERNAL MEDICINE

## 2019-05-30 PROCEDURE — 83036 HEMOGLOBIN GLYCOSYLATED A1C: CPT | Performed by: INTERNAL MEDICINE

## 2019-05-30 PROCEDURE — 80076 HEPATIC FUNCTION PANEL: CPT | Performed by: INTERNAL MEDICINE

## 2019-05-30 RX ORDER — METFORMIN HCL 500 MG
TABLET, EXTENDED RELEASE 24 HR ORAL
Qty: 360 TABLET | Refills: 3 | Status: SHIPPED | OUTPATIENT
Start: 2019-05-30 | End: 2019-08-19

## 2019-05-30 RX ORDER — GLIPIZIDE 10 MG/1
10 TABLET, FILM COATED, EXTENDED RELEASE ORAL DAILY
Qty: 90 TABLET | Refills: 3 | Status: SHIPPED | OUTPATIENT
Start: 2019-05-30 | End: 2020-08-25

## 2019-05-30 RX ORDER — TIOTROPIUM BROMIDE 18 UG/1
18 CAPSULE ORAL; RESPIRATORY (INHALATION) DAILY
Qty: 90 CAPSULE | Refills: 3 | Status: SHIPPED | OUTPATIENT
Start: 2019-05-30 | End: 2020-08-10

## 2019-05-30 RX ORDER — LOSARTAN POTASSIUM 25 MG/1
25 TABLET ORAL DAILY
Qty: 90 TABLET | Refills: 4 | Status: SHIPPED | OUTPATIENT
Start: 2019-05-30 | End: 2020-08-03

## 2019-05-30 RX ORDER — AMLODIPINE BESYLATE 5 MG/1
TABLET ORAL
Qty: 90 TABLET | Refills: 3 | Status: SHIPPED | OUTPATIENT
Start: 2019-05-30 | End: 2020-08-10

## 2019-05-30 ASSESSMENT — ANXIETY QUESTIONNAIRES
3. WORRYING TOO MUCH ABOUT DIFFERENT THINGS: NOT AT ALL
1. FEELING NERVOUS, ANXIOUS, OR ON EDGE: NOT AT ALL
5. BEING SO RESTLESS THAT IT IS HARD TO SIT STILL: NOT AT ALL
7. FEELING AFRAID AS IF SOMETHING AWFUL MIGHT HAPPEN: NOT AT ALL
GAD7 TOTAL SCORE: 0
2. NOT BEING ABLE TO STOP OR CONTROL WORRYING: NOT AT ALL
IF YOU CHECKED OFF ANY PROBLEMS ON THIS QUESTIONNAIRE, HOW DIFFICULT HAVE THESE PROBLEMS MADE IT FOR YOU TO DO YOUR WORK, TAKE CARE OF THINGS AT HOME, OR GET ALONG WITH OTHER PEOPLE: NOT DIFFICULT AT ALL
6. BECOMING EASILY ANNOYED OR IRRITABLE: NOT AT ALL

## 2019-05-30 ASSESSMENT — PAIN SCALES - GENERAL: PAINLEVEL: NO PAIN (0)

## 2019-05-30 ASSESSMENT — MIFFLIN-ST. JEOR: SCORE: 1686.18

## 2019-05-30 ASSESSMENT — PATIENT HEALTH QUESTIONNAIRE - PHQ9
5. POOR APPETITE OR OVEREATING: NOT AT ALL
SUM OF ALL RESPONSES TO PHQ QUESTIONS 1-9: 0

## 2019-05-30 NOTE — PATIENT INSTRUCTIONS
-- Start metformin XR, 1000 mg daily for a week, then 2000 mg daily   -- Increase glipizide to 10 mg daily   -- Goal sugar before breakfast under 140   -- Goal sugar 2 hours after supper under 170   -- Keep a sugar log book   -- Daily exercise   -- Reduced carbs   -- 5% weight loss   -- Follow-up in 4-6 weeks    Your BMI is Body mass index is 32.89 kg/m .  (BMI ranges: Normal 18.5 - 25, Overweight 25 - 30, Obesity greater than 30,     Morbid Obesity greater than 40 or greater than 35 with associated conditions.)    Facts about losing weight:   -- Overweight and Obesity increase your risk for developing diabetes, high blood pressure and stroke, and shorten your life.   -- 90% of weight loss comes from dietary changes, only 10% from exercise    What should I do?   -- Work on 5-10% weight loss   -- Focus on a few healthy dietary changes   -- Eat more fresh fruits and vegetables, and fewer carbohydrates   -- Cut out all calorie-containing beverages (milk, juice, alcohol, etc)   -- Exercise every day   -- Weigh yourself once a week   -- Consider the DASH Diet (http://http://bit.ly/DASHDiet - redirects to the [facility])   -- Consider Weight Watchers (http://www.weightwatchTestPlant.com)   -- Consider My Fitness Pal (iOS, Android, http://www.TapTappal.com)

## 2019-05-30 NOTE — PROGRESS NOTES
"Subjective  Ricki Sharp is a 69 year old male who presents for diabetes follow-up.  History of diabetes mellitus type 2 which has been borderline controlled with use of glipizide.  He is not sure why were not on metformin.  He thinks it has something to do with the kidneys.  He discussed it with Dr. Gray who said it was okay for him to use metformin with regards to his liver transplant.  History of hypothyroidism which is stable on levothyroxine.  History of hypertension which is stable on amlodipine, losartan, metoprolol.  History of hyperlipidemia which is stable on Lipitor.    Allergies: reviewed in EMR  Medications: reviewed in EMR  Problem List/PMH:reviewed in EMR    Social Hx:  Social History     Tobacco Use     Smoking status: Former Smoker     Years: 5.00     Last attempt to quit: 2018     Years since quittin.0     Smokeless tobacco: Never Used   Substance Use Topics     Alcohol use: No     Drug use: No     Social History     Social History Narrative         I reviewed social history and made relevant updates today.    Family Hx:   History reviewed. No pertinent family history.    Objective  Vitals: reviewed in EMR.  /84 (BP Location: Right arm, Patient Position: Sitting, Cuff Size: Adult Large)   Pulse 78   Temp 98.2  F (36.8  C) (Tympanic)   Resp 18   Ht 1.702 m (5' 7\")   Wt 95.3 kg (210 lb)   SpO2 97%   BMI 32.89 kg/m      Gen: Pleasant male, NAD.  HEENT: MMM  Neck: Supple  Pulm: Breathing easily  Neuro: Grossly intact  Skin: No concerning lesions.  Psychiatric: Normal affect and insight. Does not appear anxious or depressed.    Foot Exam:  2019  Intact to monofilament bilaterally.  Skin intact without erythema.    Diabetes Labs  Hemoglobin A1C (%)   Date Value   2019 7.9 (H)   2019 8.4 (H)   2018 6.6 (H)   2016 6.9 (H)   2015 6.4 (H)   2014 Unable to calculate   total hemoglobin <6 g/dL       Creatinine (mg/dL)   Date Value "   05/30/2019 1.38 (H)   03/11/2019 1.45 (H)   10/30/2018 0.98   08/15/2018 1.14   05/25/2018 1.41 (H)   03/05/2018 1.27     Glucose (mg/dL)   Date Value   05/30/2019 274 (H)   03/11/2019 251 (H)   10/30/2018 228 (H)   08/15/2018 212 (H)   05/25/2018 243 (H)   03/05/2018 181 (H)     Potassium (mmol/L)   Date Value   05/30/2019 4.7   03/11/2019 4.2   10/30/2018 4.7   08/15/2018 4.5   05/25/2018 4.4   03/05/2018 4.7       Assessment    ICD-10-CM    1. Type 2 diabetes mellitus without complication, without long-term current use of insulin (H) E11.9 glipiZIDE (GLUCOTROL XL) 10 MG 24 hr tablet     metFORMIN (GLUCOPHAGE-XR) 500 MG 24 hr tablet   2. Essential hypertension I10 amLODIPine (NORVASC) 5 MG tablet     losartan (COZAAR) 25 MG tablet   3. Chronic obstructive pulmonary disease, unspecified COPD type (H) J44.9 tiotropium (SPIRIVA HANDIHALER) 18 MCG inhaled capsule   4. Mixed hyperlipidemia E78.2    5. Liver replaced by transplant (H) Z94.4        Plan  Patient Instructions    -- Start metformin XR, 1000 mg daily for a week, then 2000 mg daily   -- Increase glipizide to 10 mg daily   -- Goal sugar before breakfast under 140   -- Goal sugar 2 hours after supper under 170   -- Keep a sugar log book   -- Daily exercise   -- Reduced carbs   -- 5% weight loss   -- Follow-up in 4-6 weeks    Your BMI is Body mass index is 32.89 kg/m .  (BMI ranges: Normal 18.5 - 25, Overweight 25 - 30, Obesity greater than 30,     Morbid Obesity greater than 40 or greater than 35 with associated conditions.)    Facts about losing weight:   -- Overweight and Obesity increase your risk for developing diabetes, high blood pressure and stroke, and shorten your life.   -- 90% of weight loss comes from dietary changes, only 10% from exercise    What should I do?   -- Work on 5-10% weight loss   -- Focus on a few healthy dietary changes   -- Eat more fresh fruits and vegetables, and fewer carbohydrates   -- Cut out all calorie-containing beverages  (milk, juice, alcohol, etc)   -- Exercise every day   -- Weigh yourself once a week   -- Consider the DASH Diet (http://http://bit.Oxford Networks/DASHDiet - redirects to the NIH)   -- Consider Weight Watchers (http://www.weightNext 2 Greatness.Service2Media)   -- Consider My Fitness Pal (iOS, Android, http://www.Immunexpress.com)            Signed, Jarad Bocanegra MD  Internal Medicine & Pediatrics

## 2019-05-31 ASSESSMENT — ANXIETY QUESTIONNAIRES: GAD7 TOTAL SCORE: 0

## 2019-06-01 LAB
TACROLIMUS BLD-MCNC: 5.9 UG/L (ref 5–15)
TME LAST DOSE: NORMAL H

## 2019-06-10 ENCOUNTER — TELEPHONE (OUTPATIENT)
Dept: PEDIATRICS | Facility: OTHER | Age: 69
End: 2019-06-10

## 2019-06-10 DIAGNOSIS — E11.9 TYPE 2 DIABETES MELLITUS WITHOUT COMPLICATION, WITHOUT LONG-TERM CURRENT USE OF INSULIN (H): Primary | Chronic | ICD-10-CM

## 2019-06-10 RX ORDER — LANCETS
EACH MISCELLANEOUS
Qty: 200 EACH | Refills: 11 | Status: SHIPPED | OUTPATIENT
Start: 2019-06-10

## 2019-06-10 NOTE — TELEPHONE ENCOUNTER
Diabetic meter, test strips, and lancets pended.  Not sure how many times a day you want him to check his blood sugars.     Seda Mcdonough LPN ....................  6/10/2019   3:59 PM

## 2019-07-11 DIAGNOSIS — Z94.4 LIVER REPLACED BY TRANSPLANT (H): ICD-10-CM

## 2019-07-11 RX ORDER — METOPROLOL TARTRATE 100 MG
TABLET ORAL
Qty: 180 TABLET | Refills: 2 | Status: SHIPPED | OUTPATIENT
Start: 2019-07-11 | End: 2020-04-13

## 2019-07-11 NOTE — TELEPHONE ENCOUNTER
"Requested Prescriptions   Pending Prescriptions Disp Refills     metoprolol tartrate (LOPRESSOR) 100 MG tablet [Pharmacy Med Name: METOPROLOL TARTRATE 100MG TABLETS] 180 tablet 0     Sig: TAKE 1 TABLET BY MOUTH TWICE DAILY       Beta-Blockers Protocol Passed - 7/11/2019  3:57 AM        Passed - Blood pressure under 140/90 in past 12 months     BP Readings from Last 3 Encounters:   05/30/19 132/84   03/11/19 136/80   12/10/18 (!) 163/93                 Passed - Patient is age 6 or older        Passed - Recent (12 mo) or future (30 days) visit within the authorizing provider's specialty     Patient had office visit in the last 12 months or has a visit in the next 30 days with authorizing provider or within the authorizing provider's specialty.  See \"Patient Info\" tab in inbasket, or \"Choose Columns\" in Meds & Orders section of the refill encounter.              Passed - Medication is active on med list        lov 05/03/19  Prescription approved per [facility] Refill Protocol.    "

## 2019-07-14 DIAGNOSIS — I10 ESSENTIAL HYPERTENSION: ICD-10-CM

## 2019-07-15 ENCOUNTER — TELEPHONE (OUTPATIENT)
Dept: PEDIATRICS | Facility: OTHER | Age: 69
End: 2019-07-15

## 2019-07-15 DIAGNOSIS — Z94.4 LIVER REPLACED BY TRANSPLANT (H): Primary | ICD-10-CM

## 2019-07-15 NOTE — TELEPHONE ENCOUNTER
Spoke to wife and pt needs to be seen every year with dermatology to check his skin due to him being a liver transplant pt.  Referral pended.  Seda Benito LPN ....................  7/15/2019   1:20 PM

## 2019-07-15 NOTE — TELEPHONE ENCOUNTER
After patient's name and date of birth verified the below information was given to patient.     Shabana Mansfield ---- 7/15/2019 2:08 PM

## 2019-07-16 NOTE — TELEPHONE ENCOUNTER
Patient Information     Patient Name MRN Sex Ricki Laboy [id number] Male 1950      Telephone Encounter by Ne Kidd RN at 2017  2:30 PM     Author:  Ne Kidd RN Service:  (none) Author Type:  NURS- Registered Nurse     Filed:  2017  2:34 PM Encounter Date:  2017 Status:  Signed     :  Ne Kidd RN (NURS- Registered Nurse)            This is a Refill request from: [facility]  Name of Medication: Magnesium oxide 400mg  Quantity requested: 120  Last fill date: 11/15/17  Last visit with LEANN DESOUZA was on: 2017 in [facility]  PCP:  MD Dr. Daljit Sands out of office until 17 will route to covering team let for review and consideration of refill.      Unable to complete prescription refill per RN Medication Refill Policy.................... NE KIDD RN ....................  2017   2:31 PM            
Affect and characteristics of appearance, verbalizations, behaviors are appropriate

## 2019-07-18 RX ORDER — LOSARTAN POTASSIUM 25 MG/1
TABLET ORAL
Qty: 90 TABLET | Refills: 0 | OUTPATIENT
Start: 2019-07-18

## 2019-07-18 NOTE — TELEPHONE ENCOUNTER
Refused, too soon. Filled on 5-30-19 for #90 X 4 refills. Pauline Jimenez RN on 7/18/2019 at 12:05 PM

## 2019-07-19 DIAGNOSIS — M48.061 LUMBAR FORAMINAL STENOSIS: ICD-10-CM

## 2019-07-19 DIAGNOSIS — M48.061 SPINAL STENOSIS OF LUMBAR REGION, UNSPECIFIED WHETHER NEUROGENIC CLAUDICATION PRESENT: ICD-10-CM

## 2019-07-19 DIAGNOSIS — M47.816 LUMBAR FACET ARTHROPATHY: ICD-10-CM

## 2019-07-19 RX ORDER — TRAMADOL HYDROCHLORIDE 50 MG/1
TABLET ORAL
Qty: 60 TABLET | Refills: 0 | Status: CANCELLED | OUTPATIENT
Start: 2019-07-19

## 2019-07-22 ENCOUNTER — TELEPHONE (OUTPATIENT)
Dept: PEDIATRICS | Facility: OTHER | Age: 69
End: 2019-07-22

## 2019-07-24 NOTE — TELEPHONE ENCOUNTER
[facility] sent Rx request for the following:      Tramadol 50 mg tablet      Last Prescription Date:   4/2/19  Last Fill Qty/Refills:         60, R-5    Last Office Visit:              5/30/19   Future Office visit:           None noted  Pt had Rx to last into October but requested refill outside of 30 day period.  Needs new Rx.  Jhonny'd up for 3 months supply for PCP review/approval    Unable to complete prescription refill per RNMedication Refill Policy.................... Pauline Child ....................  7/24/2019   11:20 AM

## 2019-07-25 RX ORDER — TRAMADOL HYDROCHLORIDE 50 MG/1
TABLET ORAL
Qty: 60 TABLET | Refills: 2 | Status: SHIPPED | OUTPATIENT
Start: 2019-07-25 | End: 2019-08-20

## 2019-07-25 NOTE — TELEPHONE ENCOUNTER
Left a message for the patient to call back. Looks like a referral for dermatology was placed on 7-.  Carlie Barrett LPN on 7/25/2019 at 8:40 AM

## 2019-07-29 NOTE — TELEPHONE ENCOUNTER
Referral has been placed weeks ago and faxed to [facility].  Seda Benito LPN ....................  7/29/2019   9:39 AM

## 2019-08-16 ENCOUNTER — TRANSFERRED RECORDS (OUTPATIENT)
Dept: HEALTH INFORMATION MANAGEMENT | Facility: OTHER | Age: 69
End: 2019-08-16

## 2019-08-19 ENCOUNTER — OFFICE VISIT (OUTPATIENT)
Dept: PEDIATRICS | Facility: OTHER | Age: 69
End: 2019-08-19
Attending: INTERNAL MEDICINE
Payer: MEDICARE

## 2019-08-19 VITALS
BODY MASS INDEX: 32.33 KG/M2 | DIASTOLIC BLOOD PRESSURE: 82 MMHG | OXYGEN SATURATION: 97 % | SYSTOLIC BLOOD PRESSURE: 134 MMHG | RESPIRATION RATE: 16 BRPM | WEIGHT: 206 LBS | HEART RATE: 86 BPM | TEMPERATURE: 98.2 F | HEIGHT: 67 IN

## 2019-08-19 DIAGNOSIS — E53.8 VITAMIN B12 DEFICIENCY (NON ANEMIC): ICD-10-CM

## 2019-08-19 DIAGNOSIS — Z94.4 LIVER REPLACED BY TRANSPLANT (H): ICD-10-CM

## 2019-08-19 DIAGNOSIS — E11.9 TYPE 2 DIABETES MELLITUS WITHOUT COMPLICATION, WITHOUT LONG-TERM CURRENT USE OF INSULIN (H): Chronic | ICD-10-CM

## 2019-08-19 DIAGNOSIS — R20.2 TINGLING OF LEFT UPPER EXTREMITY: Primary | ICD-10-CM

## 2019-08-19 DIAGNOSIS — K21.9 GASTROESOPHAGEAL REFLUX DISEASE, ESOPHAGITIS PRESENCE NOT SPECIFIED: ICD-10-CM

## 2019-08-19 LAB
ALBUMIN SERPL-MCNC: 4 G/DL (ref 3.5–5.7)
ALP SERPL-CCNC: 96 U/L (ref 34–104)
ALT SERPL W P-5'-P-CCNC: 20 U/L (ref 7–52)
ANION GAP SERPL CALCULATED.3IONS-SCNC: 8 MMOL/L (ref 3–14)
AST SERPL W P-5'-P-CCNC: 17 U/L (ref 13–39)
BILIRUB DIRECT SERPL-MCNC: 0.1 MG/DL (ref 0–0.2)
BILIRUB SERPL-MCNC: 1.1 MG/DL (ref 0.3–1)
BUN SERPL-MCNC: 26 MG/DL (ref 7–25)
CALCIUM SERPL-MCNC: 8.8 MG/DL (ref 8.6–10.3)
CHLORIDE SERPL-SCNC: 102 MMOL/L (ref 98–107)
CO2 SERPL-SCNC: 25 MMOL/L (ref 21–31)
CREAT SERPL-MCNC: 1.21 MG/DL (ref 0.7–1.3)
ERYTHROCYTE [DISTWIDTH] IN BLOOD BY AUTOMATED COUNT: 15.5 % (ref 10–15)
GFR SERPL CREATININE-BSD FRML MDRD: 60 ML/MIN/{1.73_M2}
GLUCOSE SERPL-MCNC: 206 MG/DL (ref 70–105)
HBA1C MFR BLD: 7 % (ref 4–6)
HCT VFR BLD AUTO: 41.7 % (ref 40–53)
HGB BLD-MCNC: 13.9 G/DL (ref 13.3–17.7)
MCH RBC QN AUTO: 31.2 PG (ref 26.5–33)
MCHC RBC AUTO-ENTMCNC: 33.3 G/DL (ref 31.5–36.5)
MCV RBC AUTO: 94 FL (ref 78–100)
PLATELET # BLD AUTO: 91 10E9/L (ref 150–450)
POTASSIUM SERPL-SCNC: 4.4 MMOL/L (ref 3.5–5.1)
PROT SERPL-MCNC: 6.6 G/DL (ref 6.4–8.9)
RBC # BLD AUTO: 4.45 10E12/L (ref 4.4–5.9)
SODIUM SERPL-SCNC: 135 MMOL/L (ref 134–144)
VIT B12 SERPL-MCNC: 175 PG/ML (ref 180–914)
WBC # BLD AUTO: 7.1 10E9/L (ref 4–11)

## 2019-08-19 PROCEDURE — 36415 COLL VENOUS BLD VENIPUNCTURE: CPT | Mod: ZL | Performed by: INTERNAL MEDICINE

## 2019-08-19 PROCEDURE — 36415 COLL VENOUS BLD VENIPUNCTURE: CPT | Performed by: INTERNAL MEDICINE

## 2019-08-19 PROCEDURE — G0463 HOSPITAL OUTPT CLINIC VISIT: HCPCS

## 2019-08-19 PROCEDURE — 83036 HEMOGLOBIN GLYCOSYLATED A1C: CPT | Mod: ZL | Performed by: INTERNAL MEDICINE

## 2019-08-19 PROCEDURE — 99214 OFFICE O/P EST MOD 30 MIN: CPT | Performed by: INTERNAL MEDICINE

## 2019-08-19 PROCEDURE — 80197 ASSAY OF TACROLIMUS: CPT | Performed by: INTERNAL MEDICINE

## 2019-08-19 PROCEDURE — 80048 BASIC METABOLIC PNL TOTAL CA: CPT | Performed by: INTERNAL MEDICINE

## 2019-08-19 PROCEDURE — 80076 HEPATIC FUNCTION PANEL: CPT | Performed by: INTERNAL MEDICINE

## 2019-08-19 PROCEDURE — 85027 COMPLETE CBC AUTOMATED: CPT | Performed by: INTERNAL MEDICINE

## 2019-08-19 PROCEDURE — 82607 VITAMIN B-12: CPT | Mod: ZL | Performed by: INTERNAL MEDICINE

## 2019-08-19 RX ORDER — LANOLIN ALCOHOL/MO/W.PET/CERES
1000 CREAM (GRAM) TOPICAL DAILY
Qty: 90 TABLET | Refills: 3 | Status: ON HOLD | OUTPATIENT
Start: 2019-08-19 | End: 2021-01-01

## 2019-08-19 RX ORDER — METFORMIN HCL 500 MG
2000 TABLET, EXTENDED RELEASE 24 HR ORAL
Qty: 360 TABLET | Refills: 3 | Status: SHIPPED | OUTPATIENT
Start: 2019-08-19 | End: 2020-01-01

## 2019-08-19 ASSESSMENT — ANXIETY QUESTIONNAIRES
7. FEELING AFRAID AS IF SOMETHING AWFUL MIGHT HAPPEN: NOT AT ALL
3. WORRYING TOO MUCH ABOUT DIFFERENT THINGS: NOT AT ALL
GAD7 TOTAL SCORE: 0
2. NOT BEING ABLE TO STOP OR CONTROL WORRYING: NOT AT ALL
1. FEELING NERVOUS, ANXIOUS, OR ON EDGE: NOT AT ALL
IF YOU CHECKED OFF ANY PROBLEMS ON THIS QUESTIONNAIRE, HOW DIFFICULT HAVE THESE PROBLEMS MADE IT FOR YOU TO DO YOUR WORK, TAKE CARE OF THINGS AT HOME, OR GET ALONG WITH OTHER PEOPLE: NOT DIFFICULT AT ALL
6. BECOMING EASILY ANNOYED OR IRRITABLE: NOT AT ALL
5. BEING SO RESTLESS THAT IT IS HARD TO SIT STILL: NOT AT ALL

## 2019-08-19 ASSESSMENT — MIFFLIN-ST. JEOR: SCORE: 1668.04

## 2019-08-19 ASSESSMENT — PATIENT HEALTH QUESTIONNAIRE - PHQ9
SUM OF ALL RESPONSES TO PHQ QUESTIONS 1-9: 0
5. POOR APPETITE OR OVEREATING: NOT AT ALL

## 2019-08-19 ASSESSMENT — PAIN SCALES - GENERAL: PAINLEVEL: NO PAIN (0)

## 2019-08-19 NOTE — NURSING NOTE
Patient presents to clinic for diabetic check.  Seda Benito LPN ....................  8/19/2019   12:47 PM    No LMP for male patient.  Medication Reconciliation: complete    Seda Benito LPN  8/19/2019 12:47 PM      Previous A1C is at goal of <8  Lab Results   Component Value Date    A1C 7.9 05/30/2019    A1C 8.4 03/11/2019    A1C 6.6 03/05/2018    A1C 6.9 03/20/2016    A1C 6.4 06/23/2015     Urine microalbumin:creatine: DUE  Foot exam 3/11/19  Eye exam 8/16/19    Tobacco User NO  Patient is not on a daily aspirin  Patient is on a Statin.  Blood pressure today of:     BP Readings from Last 1 Encounters:   08/19/19 134/82      is at the goal of <139/89 for diabetics.    Seda Benito LPN on 8/19/2019 at 12:47 PM

## 2019-08-19 NOTE — PROGRESS NOTES
"Subjective  Ricki Sharp is a 69 year old male who presents for diabetes follow-up.  He is also got several other questions.  He is been having some tingling in the fingers of the left hand.  His wife is worried it is caused by 1 of his medications such as metformin or statin.  Only left hand, not right hand or feet.  He notices it the most when he is trying to play his guitar.  It is the tips of the fingers 1 through 5 on left hand.  He is right-handed.  He has not been doing anything overly aggressive with flexing his wrist that he can think of.  No injury or trauma.  His wife would like to get off the omeprazole.  She wants to know how he would discontinue that.  He does get intermittent heartburn.  He has diabetes mellitus type 2 which has been borderline controlled with metformin and glipizide.  Home blood glucose log is personally reviewed today.  Morning blood sugars have been a little bit high in the 1  range but before supper/evening have been 120-140.    Allergies: reviewed in EMR  Medications: reviewed in EMR  Problem List/PMH:reviewed in EMR    Social Hx:  Social History     Tobacco Use     Smoking status: Former Smoker     Years: 5.00     Last attempt to quit: 2018     Years since quittin.2     Smokeless tobacco: Never Used   Substance Use Topics     Alcohol use: No     Drug use: No     Social History     Social History Narrative         I reviewed social history and made relevant updates today.    Family Hx:   History reviewed. No pertinent family history.    Objective  Vitals: reviewed in EMR.  /82 (BP Location: Right arm, Patient Position: Sitting, Cuff Size: Adult Large)   Pulse 86   Temp 98.2  F (36.8  C) (Tympanic)   Resp 16   Ht 1.702 m (5' 7\")   Wt 93.4 kg (206 lb)   SpO2 97%   BMI 32.26 kg/m      Gen: Pleasant male, NAD.  HEENT: MMM  Neck: Supple  Pulm: Breathing easily  Neuro: Grossly intact  Skin: No concerning lesions.  Psychiatric: Normal affect " and insight. Does not appear anxious or depressed.    Diabetes Labs  Hemoglobin A1C (%)   Date Value   08/19/2019 7.0 (H)   05/30/2019 7.9 (H)   03/11/2019 8.4 (H)   03/05/2018 6.6 (H)   03/20/2016 6.9 (H)   06/23/2015 6.4 (H)     Creatinine (mg/dL)   Date Value   08/19/2019 1.21   05/30/2019 1.38 (H)   03/11/2019 1.45 (H)   10/30/2018 0.98   08/15/2018 1.14   05/25/2018 1.41 (H)     Glucose (mg/dL)   Date Value   08/19/2019 206 (H)   05/30/2019 274 (H)   03/11/2019 251 (H)   10/30/2018 228 (H)   08/15/2018 212 (H)   05/25/2018 243 (H)     Potassium (mmol/L)   Date Value   08/19/2019 4.4   05/30/2019 4.7   03/11/2019 4.2   10/30/2018 4.7   08/15/2018 4.5   05/25/2018 4.4       Assessment    ICD-10-CM    1. Tingling of left upper extremity R20.2 Vitamin B12     Vitamin B12     NEUROLOGY ADULT REFERRAL     ORTHOPEDICS ADULT REFERRAL   2. Type 2 diabetes mellitus without complication, without long-term current use of insulin (H) E11.9 metFORMIN (GLUCOPHAGE-XR) 500 MG 24 hr tablet     Hemoglobin A1c     insulin glargine (LANTUS PEN) 100 UNIT/ML pen     Hemoglobin A1c   3. Gastroesophageal reflux disease, esophagitis presence not specified K21.9    4. Vitamin B12 deficiency (non anemic) E53.8 cyanocobalamin (VITAMIN B-12) 1000 MCG tablet       Plan  Patient Instructions    -- Start Lantus 5 units at bed   -- If you have some lows, let me know when and what and we'd likely cut back on glipizide.   -- If you feel low, check it.  If < 70 take come carb   -- Daily exercise   -- Work on 5-10% weight loss     -- Okay to wean slowly off omeprazole   -- Use ranitidine scheduled while trying that     -- EMG left arm   -- Ortho consult for possible carpal tunnel      Find Local Classes   * Preventing falls   * Preventing and managing diabetes   * Managing chronic conditions and pain    [email or web address]  [phone]    Examples of classes:   -- Diabetes prevention program (Pre-Diabetes or at risk for diabetes)   -- Living  well with Diabetes   -- Living well with Chronic Conditions   -- Living well with Chronic Pain   -- Stay Active and Independent for Life (SAIL)   -- Clifford Ji Jae: Moving for better balance    Your BMI is Body mass index is 32.26 kg/m .  (BMI ranges: Normal 18.5 - 25, Overweight 25 - 30, Obesity greater than 30,     Morbid Obesity greater than 40 or greater than 35 with associated conditions.)    Facts about losing weight:   -- Overweight and Obesity increase your risk for developing diabetes, high blood pressure and stroke, and shorten your life.   -- 90% of weight loss comes from dietary changes, only 10% from exercise    What should I do?   -- Work on 5-10% weight loss   -- Focus on a few healthy dietary changes   -- Eat more fresh fruits and vegetables, and fewer carbohydrates   -- Cut out all calorie-containing beverages (milk, juice, alcohol, etc)   -- Exercise every day   -- Weigh yourself once a week   -- Consider the DASH Diet (http://http://bit.e-contratos/DASHDiet - redirects to the [employer])   -- Consider Weight Watchers (http://www.weightwatchers.com)   -- Consider My Fitness Pal (iOS, Android, http://www.Xinrongnesspal.com)              Return in about 3 months (around 11/19/2019) for diabetes, medication management.    SignedJarad MD  Internal Medicine & Pediatrics

## 2019-08-19 NOTE — PATIENT INSTRUCTIONS
-- Start Lantus 5 units at bed   -- If you have some lows, let me know when and what and we'd likely cut back on glipizide.   -- If you feel low, check it.  If < 70 take come carb   -- Daily exercise   -- Work on 5-10% weight loss     -- Okay to wean slowly off omeprazole   -- Use ranitidine scheduled while trying that     -- EMG left arm   -- Ortho consult for possible carpal tunnel      Find Local Classes   * Preventing falls   * Preventing and managing diabetes   * Managing chronic conditions and pain    [email or web address]  [phone]    Examples of classes:   -- Diabetes prevention program (Pre-Diabetes or at risk for diabetes)   -- Living well with Diabetes   -- Living well with Chronic Conditions   -- Living well with Chronic Pain   -- Stay Active and Independent for Life (SAIL)   -- Clifford Ji Jae: Moving for better balance    Your BMI is Body mass index is 32.26 kg/m .  (BMI ranges: Normal 18.5 - 25, Overweight 25 - 30, Obesity greater than 30,     Morbid Obesity greater than 40 or greater than 35 with associated conditions.)    Facts about losing weight:   -- Overweight and Obesity increase your risk for developing diabetes, high blood pressure and stroke, and shorten your life.   -- 90% of weight loss comes from dietary changes, only 10% from exercise    What should I do?   -- Work on 5-10% weight loss   -- Focus on a few healthy dietary changes   -- Eat more fresh fruits and vegetables, and fewer carbohydrates   -- Cut out all calorie-containing beverages (milk, juice, alcohol, etc)   -- Exercise every day   -- Weigh yourself once a week   -- Consider the DASH Diet (http://http://bit.ly/DASHDiet - redirects to the NIH)   -- Consider Weight Watchers (http://www.weightwatchers.com)   -- Consider My Fitness Pal (iOS, Android, http://www.[employer]ubatorpal.com)

## 2019-08-20 ENCOUNTER — TELEPHONE (OUTPATIENT)
Dept: TRANSPLANT | Facility: CLINIC | Age: 69
End: 2019-08-20

## 2019-08-20 DIAGNOSIS — M48.061 SPINAL STENOSIS OF LUMBAR REGION, UNSPECIFIED WHETHER NEUROGENIC CLAUDICATION PRESENT: ICD-10-CM

## 2019-08-20 DIAGNOSIS — M47.816 LUMBAR FACET ARTHROPATHY: ICD-10-CM

## 2019-08-20 DIAGNOSIS — M48.061 LUMBAR FORAMINAL STENOSIS: ICD-10-CM

## 2019-08-20 LAB
TACROLIMUS BLD-MCNC: 5.4 UG/L (ref 5–15)
TME LAST DOSE: NORMAL H

## 2019-08-20 ASSESSMENT — ANXIETY QUESTIONNAIRES: GAD7 TOTAL SCORE: 0

## 2019-08-20 NOTE — TELEPHONE ENCOUNTER
Spoke to Fide.  She is frustrated w/ how hard it is to manage Brendan's blood glucoses.  Provided encouragement.  Encouraged her to stay in close touch w/ local diabetic team.

## 2019-08-26 ENCOUNTER — TELEPHONE (OUTPATIENT)
Dept: PEDIATRICS | Facility: OTHER | Age: 69
End: 2019-08-26

## 2019-08-26 RX ORDER — TRAMADOL HYDROCHLORIDE 50 MG/1
TABLET ORAL
Qty: 60 TABLET | Refills: 3 | Status: SHIPPED | OUTPATIENT
Start: 2019-08-26 | End: 2019-11-04

## 2019-08-26 NOTE — TELEPHONE ENCOUNTER
Does patient still need antibiotic prophylaxsis prior to dental work?    Thanks,  Shannen Velasquez on 8/26/2019 at 3:57 PM

## 2019-08-26 NOTE — TELEPHONE ENCOUNTER
Routing refill request to provider for review/approval because:  Drug not on the [facility] refill protocol   Due to new law patient had refilled after 30 days    Ne Farah RN on 8/26/2019 at 11:33 AM

## 2019-08-26 NOTE — TELEPHONE ENCOUNTER
If it's regarding his hip, he should ask the surgeon.    Signed, Jarad Bocanegra MD  Internal Medicine & Pediatrics

## 2019-08-30 ENCOUNTER — TELEPHONE (OUTPATIENT)
Dept: ORTHOPEDICS | Facility: CLINIC | Age: 69
End: 2019-08-30

## 2019-08-30 NOTE — TELEPHONE ENCOUNTER
[facility] Call Center    Phone Message    May a detailed message be left on voicemail: yes    Reason for Call: Medication Question or concern regarding medication   Prescription Clarification  Name of Medication: Antibiotics  Prescribing Provider: Dr. Fisher   Pharmacy: N/A   What on the order needs clarification? Other: Wondering if pt needs antibiotics before dental work. They said there's new research that you don't need to take it anymore but wants to make sure with Dr. Fisher first. Please call pt's wife Fide at [phone]           Action Taken: Message routed to:  [facility] ([facility]): Ortho

## 2019-08-30 NOTE — TELEPHONE ENCOUNTER
Called Fide back and let her know that Brendan will be required to take dental antibiotics for the rest of his life because that is our protocol for total joint replacements.

## 2019-09-26 ENCOUNTER — MYC MEDICAL ADVICE (OUTPATIENT)
Dept: PEDIATRICS | Facility: OTHER | Age: 69
End: 2019-09-26

## 2019-10-01 ENCOUNTER — TELEPHONE (OUTPATIENT)
Dept: TRANSPLANT | Facility: CLINIC | Age: 69
End: 2019-10-01

## 2019-10-01 ENCOUNTER — MYC MEDICAL ADVICE (OUTPATIENT)
Dept: PEDIATRICS | Facility: OTHER | Age: 69
End: 2019-10-01

## 2019-10-01 DIAGNOSIS — G62.1 ALCOHOLIC PERIPHERAL NEUROPATHY (H): Primary | ICD-10-CM

## 2019-10-04 ENCOUNTER — HEALTH MAINTENANCE LETTER (OUTPATIENT)
Age: 69
End: 2019-10-04

## 2019-10-15 ENCOUNTER — TELEPHONE (OUTPATIENT)
Dept: TRANSPLANT | Facility: CLINIC | Age: 69
End: 2019-10-15

## 2019-10-15 DIAGNOSIS — G56.92 NEUROPATHY OF LEFT HAND: Primary | ICD-10-CM

## 2019-10-15 DIAGNOSIS — G62.9 PERIPHERAL NEUROPATHY: ICD-10-CM

## 2019-10-15 NOTE — TELEPHONE ENCOUNTER
Call from Fide stating that Brendan can hardly play his guitar anymore because of a pain that he gets when he presses on the string. His guitar playing has been one of his favorite hobbies and this is a big loss to him.  He was given a referral by his PCP at [facility] but he would like someone to see him here.  Referral placed.

## 2019-10-16 ENCOUNTER — TELEPHONE (OUTPATIENT)
Dept: TRANSPLANT | Facility: CLINIC | Age: 69
End: 2019-10-16

## 2019-10-16 NOTE — TELEPHONE ENCOUNTER
----- Message from Kiana Palumbo RN sent at 10/15/2019  8:07 AM CDT -----  Regarding: neurology ref - pain in left hand  Appointment Request: neurology at [facility]  Appointment Timeframe Requested: next avail  Patient Aware? Yes.  Physician Override Approved? N/A  Clinic manager Approved? N/A  Orders Placed with appropriate diagnosis: Yes     Thank you,   Kiana Palumbo, RN

## 2019-10-17 ENCOUNTER — TRANSFERRED RECORDS (OUTPATIENT)
Dept: HEALTH INFORMATION MANAGEMENT | Facility: OTHER | Age: 69
End: 2019-10-17

## 2019-10-18 ENCOUNTER — TELEPHONE (OUTPATIENT)
Dept: TRANSPLANT | Facility: CLINIC | Age: 69
End: 2019-10-18

## 2019-10-18 NOTE — TELEPHONE ENCOUNTER
----- Message from Kiana Palumbo RN sent at 10/15/2019  8:07 AM CDT -----  Regarding: neurology ref - pain in left hand  Appointment Request: neurology at [facility]  Appointment Timeframe Requested: next avail  Patient Aware? Yes.  Physician Override Approved? N/A  Clinic manager Approved? N/A  Orders Placed with appropriate diagnosis: Yes     Thank you,   Kiana Palumbo, RN

## 2019-10-22 DIAGNOSIS — M47.816 LUMBAR FACET ARTHROPATHY: ICD-10-CM

## 2019-10-22 DIAGNOSIS — G89.29 CHRONIC LEFT-SIDED LOW BACK PAIN WITH LEFT-SIDED SCIATICA: ICD-10-CM

## 2019-10-22 DIAGNOSIS — E11.9 TYPE 2 DIABETES MELLITUS WITHOUT COMPLICATION, WITHOUT LONG-TERM CURRENT USE OF INSULIN (H): Primary | Chronic | ICD-10-CM

## 2019-10-22 DIAGNOSIS — M51.369 LUMBAR DEGENERATIVE DISC DISEASE: ICD-10-CM

## 2019-10-22 DIAGNOSIS — M54.42 CHRONIC LEFT-SIDED LOW BACK PAIN WITH LEFT-SIDED SCIATICA: ICD-10-CM

## 2019-10-25 RX ORDER — PEN NEEDLE, DIABETIC 31 GX5/16"
NEEDLE, DISPOSABLE MISCELLANEOUS
Qty: 100 EACH | Refills: 3 | Status: SHIPPED | OUTPATIENT
Start: 2019-10-25

## 2019-10-25 RX ORDER — CYCLOBENZAPRINE HCL 5 MG
TABLET ORAL
Qty: 45 TABLET | Refills: 3 | Status: SHIPPED | OUTPATIENT
Start: 2019-10-25 | End: 2020-04-28

## 2019-10-25 NOTE — TELEPHONE ENCOUNTER
Routing refill request to provider for review/approval because:  Flexeril not on the [facility] refill protocol  BD needles not on current med list     Flexeril filled on 4-16-19 for #45 X 3 refills. LOV 8-19-19. Pauline Jimenez RN on 10/25/2019 at 9:25 AM

## 2019-10-27 ENCOUNTER — PRE VISIT (OUTPATIENT)
Dept: NEUROLOGY | Facility: CLINIC | Age: 69
End: 2019-10-27

## 2019-10-27 NOTE — TELEPHONE ENCOUNTER
FUTURE VISIT INFORMATION      FUTURE VISIT INFORMATION:    Date: 1/29/2020    Time: 8AM    Location: [facility]  REFERRAL INFORMATION:    Referring provider:  Kiana Palumbo RN     Referring providers clinic: [facility]      Reason for visit/diagnosis  Neuropathy of Left Hand     RECORDS REQUESTED FROM:       Clinic name Comments Records Status Imaging Status   [facility] Everywhere Requested to PACS 11/2017

## 2019-11-04 ENCOUNTER — TELEPHONE (OUTPATIENT)
Dept: PEDIATRICS | Facility: OTHER | Age: 69
End: 2019-11-04

## 2019-11-04 DIAGNOSIS — M47.816 LUMBAR FACET ARTHROPATHY: ICD-10-CM

## 2019-11-04 DIAGNOSIS — M48.061 SPINAL STENOSIS OF LUMBAR REGION, UNSPECIFIED WHETHER NEUROGENIC CLAUDICATION PRESENT: ICD-10-CM

## 2019-11-04 DIAGNOSIS — M48.061 LUMBAR FORAMINAL STENOSIS: ICD-10-CM

## 2019-11-04 RX ORDER — TRAMADOL HYDROCHLORIDE 50 MG/1
50 TABLET ORAL 2 TIMES DAILY
Qty: 60 TABLET | Refills: 3 | Status: SHIPPED | OUTPATIENT
Start: 2019-11-04 | End: 2020-04-28

## 2019-11-04 NOTE — TELEPHONE ENCOUNTER
States that he tried to get a refill of tramadol but it was late so he's needing a new prescription

## 2019-11-04 NOTE — TELEPHONE ENCOUNTER
Called patient and verified name & date of birth. Informed him of medication and no further questions.  Umu Felipe LPN on 11/4/2019 at 1:32 PM

## 2019-11-11 ENCOUNTER — HOSPITAL ENCOUNTER (OUTPATIENT)
Dept: MRI IMAGING | Facility: OTHER | Age: 69
Discharge: HOME | End: 2019-11-11
Attending: PSYCHIATRY & NEUROLOGY | Admitting: FAMILY MEDICINE
Payer: MEDICARE

## 2019-11-11 DIAGNOSIS — M54.12 CERVICAL RADICULOPATHY: ICD-10-CM

## 2019-11-11 PROCEDURE — 72141 MRI NECK SPINE W/O DYE: CPT

## 2019-12-02 ENCOUNTER — OFFICE VISIT (OUTPATIENT)
Dept: PEDIATRICS | Facility: OTHER | Age: 69
End: 2019-12-02
Attending: INTERNAL MEDICINE
Payer: MEDICARE

## 2019-12-02 VITALS
TEMPERATURE: 96.9 F | WEIGHT: 204 LBS | HEART RATE: 70 BPM | DIASTOLIC BLOOD PRESSURE: 80 MMHG | HEIGHT: 67 IN | OXYGEN SATURATION: 97 % | BODY MASS INDEX: 32.02 KG/M2 | RESPIRATION RATE: 14 BRPM | SYSTOLIC BLOOD PRESSURE: 130 MMHG

## 2019-12-02 DIAGNOSIS — R07.9 CHEST PAIN, EXERTIONAL: ICD-10-CM

## 2019-12-02 DIAGNOSIS — Z94.4 LIVER REPLACED BY TRANSPLANT (H): ICD-10-CM

## 2019-12-02 DIAGNOSIS — E11.9 TYPE 2 DIABETES MELLITUS WITHOUT COMPLICATION, WITHOUT LONG-TERM CURRENT USE OF INSULIN (H): Chronic | ICD-10-CM

## 2019-12-02 DIAGNOSIS — M48.02 CERVICAL STENOSIS OF SPINAL CANAL: ICD-10-CM

## 2019-12-02 DIAGNOSIS — E11.42 DIABETIC PERIPHERAL NEUROPATHY (H): ICD-10-CM

## 2019-12-02 DIAGNOSIS — L40.9 PSORIASIS: ICD-10-CM

## 2019-12-02 DIAGNOSIS — E11.9 TYPE 2 DIABETES MELLITUS WITHOUT COMPLICATION, WITHOUT LONG-TERM CURRENT USE OF INSULIN (H): Primary | Chronic | ICD-10-CM

## 2019-12-02 DIAGNOSIS — D47.Z1 PTLD (POST-TRANSPLANT LYMPHOPROLIFERATIVE DISORDER) (H): ICD-10-CM

## 2019-12-02 DIAGNOSIS — E03.8 SUBCLINICAL HYPOTHYROIDISM: ICD-10-CM

## 2019-12-02 DIAGNOSIS — N18.30 CHRONIC KIDNEY DISEASE, STAGE III (MODERATE) (H): ICD-10-CM

## 2019-12-02 LAB
ALBUMIN SERPL-MCNC: 4 G/DL (ref 3.5–5.7)
ALP SERPL-CCNC: 96 U/L (ref 34–104)
ALT SERPL W P-5'-P-CCNC: 14 U/L (ref 7–52)
ANION GAP SERPL CALCULATED.3IONS-SCNC: 10 MMOL/L (ref 3–14)
AST SERPL W P-5'-P-CCNC: 14 U/L (ref 13–39)
BILIRUB DIRECT SERPL-MCNC: 0.1 MG/DL (ref 0–0.2)
BILIRUB SERPL-MCNC: 1.1 MG/DL (ref 0.3–1)
BUN SERPL-MCNC: 25 MG/DL (ref 7–25)
CALCIUM SERPL-MCNC: 9.5 MG/DL (ref 8.6–10.3)
CHLORIDE SERPL-SCNC: 99 MMOL/L (ref 98–107)
CO2 SERPL-SCNC: 26 MMOL/L (ref 21–31)
CREAT SERPL-MCNC: 1.43 MG/DL (ref 0.7–1.3)
ERYTHROCYTE [DISTWIDTH] IN BLOOD BY AUTOMATED COUNT: 14.7 % (ref 10–15)
GFR SERPL CREATININE-BSD FRML MDRD: 49 ML/MIN/{1.73_M2}
GLUCOSE SERPL-MCNC: 212 MG/DL (ref 70–105)
HBA1C MFR BLD: 6.6 % (ref 4–6)
HCT VFR BLD AUTO: 43.1 % (ref 40–53)
HGB BLD-MCNC: 14.1 G/DL (ref 13.3–17.7)
MCH RBC QN AUTO: 31.1 PG (ref 26.5–33)
MCHC RBC AUTO-ENTMCNC: 32.7 G/DL (ref 31.5–36.5)
MCV RBC AUTO: 95 FL (ref 78–100)
PLATELET # BLD AUTO: 150 10E9/L (ref 150–450)
POTASSIUM SERPL-SCNC: 4.4 MMOL/L (ref 3.5–5.1)
PROT SERPL-MCNC: 7.5 G/DL (ref 6.4–8.9)
RBC # BLD AUTO: 4.54 10E12/L (ref 4.4–5.9)
SODIUM SERPL-SCNC: 135 MMOL/L (ref 134–144)
TSH SERPL DL<=0.05 MIU/L-ACNC: 0.77 IU/ML (ref 0.34–5.6)
WBC # BLD AUTO: 9.1 10E9/L (ref 4–11)

## 2019-12-02 PROCEDURE — 36415 COLL VENOUS BLD VENIPUNCTURE: CPT | Mod: ZL | Performed by: INTERNAL MEDICINE

## 2019-12-02 PROCEDURE — 85027 COMPLETE CBC AUTOMATED: CPT | Mod: ZL | Performed by: INTERNAL MEDICINE

## 2019-12-02 PROCEDURE — 93005 ELECTROCARDIOGRAM TRACING: CPT | Performed by: INTERNAL MEDICINE

## 2019-12-02 PROCEDURE — 93010 ELECTROCARDIOGRAM REPORT: CPT | Performed by: INTERNAL MEDICINE

## 2019-12-02 PROCEDURE — G0008 ADMIN INFLUENZA VIRUS VAC: HCPCS

## 2019-12-02 PROCEDURE — 83036 HEMOGLOBIN GLYCOSYLATED A1C: CPT | Mod: ZL | Performed by: INTERNAL MEDICINE

## 2019-12-02 PROCEDURE — 84443 ASSAY THYROID STIM HORMONE: CPT | Mod: ZL | Performed by: INTERNAL MEDICINE

## 2019-12-02 PROCEDURE — G0463 HOSPITAL OUTPT CLINIC VISIT: HCPCS | Mod: 25

## 2019-12-02 PROCEDURE — 90662 IIV NO PRSV INCREASED AG IM: CPT

## 2019-12-02 PROCEDURE — 80076 HEPATIC FUNCTION PANEL: CPT | Mod: ZL | Performed by: INTERNAL MEDICINE

## 2019-12-02 PROCEDURE — 99215 OFFICE O/P EST HI 40 MIN: CPT | Performed by: INTERNAL MEDICINE

## 2019-12-02 PROCEDURE — 80048 BASIC METABOLIC PNL TOTAL CA: CPT | Mod: ZL | Performed by: INTERNAL MEDICINE

## 2019-12-02 RX ORDER — ATORVASTATIN CALCIUM 10 MG/1
10 TABLET, FILM COATED ORAL DAILY
Qty: 90 TABLET | Refills: 3 | Status: SHIPPED | OUTPATIENT
Start: 2019-12-02 | End: 2020-08-25

## 2019-12-02 RX ORDER — FLUOCINONIDE TOPICAL SOLUTION USP, 0.05% 0.5 MG/ML
SOLUTION TOPICAL
Qty: 60 ML | Refills: 3 | Status: SHIPPED | OUTPATIENT
Start: 2019-12-02 | End: 2020-10-26

## 2019-12-02 ASSESSMENT — MIFFLIN-ST. JEOR: SCORE: 1658.97

## 2019-12-02 ASSESSMENT — PAIN SCALES - GENERAL: PAINLEVEL: NO PAIN (0)

## 2019-12-02 ASSESSMENT — PATIENT HEALTH QUESTIONNAIRE - PHQ9: SUM OF ALL RESPONSES TO PHQ QUESTIONS 1-9: 0

## 2019-12-02 NOTE — NURSING NOTE
Patient presents to clinic for medication review and diabetes.  Seda Benito LPN ....................  12/2/2019   3:17 PM    No LMP for male patient.  Medication Reconciliation: complete    Sdea Benito LPN  12/2/2019 3:17 PM    Previous A1C is at goal of <8  Lab Results   Component Value Date    A1C 7.0 08/19/2019    A1C 7.9 05/30/2019    A1C 8.4 03/11/2019    A1C 6.6 03/05/2018    A1C 6.9 03/20/2016     Urine microalbumin:creatine: DUe  Foot exam 3/11/19  Eye exam 8/16/19    Tobacco User NO  Patient is not on a daily aspirin  Patient is not on a Statin.  Blood pressure today of:     BP Readings from Last 1 Encounters:   12/02/19 130/80      is at the goal of <139/89 for diabetics.    Seda Benito LPN on 12/2/2019 at 3:17 PM

## 2019-12-02 NOTE — PATIENT INSTRUCTIONS
Aspects of Diabetes we can improve:  Hemoglobin A1c Lab Results   Component Value Date    A1C 7.0 08/19/2019    A1C 7.9 05/30/2019    A1C 8.4 03/11/2019    A1C 6.6 03/05/2018    A1C 6.9 03/20/2016    Goal range is under 8. Best is 6.5 to 7   Blood Pressure 130/80 Goal to keep less than 140/90   Tobacco  reports that he quit smoking about 18 months ago. He quit after 5.00 years of use. He has never used smokeless tobacco. Goal to abstain from tobacco   Aspirin yes Aspirin reduces risk of heart disease and stroke   ACE/ARB losartan These medications reduce risk of kidney disease   Cholesterol Start atorvastatin Statins reduce risk of heart disease and stroke   Eye Exam 8/19 Annual diabetic eye exam   Healthy weight Body mass index is 31.95 kg/m . Goal BMI under 30, best is under 25.      -- I'm trying to exercise daily (goal at least 20 min/day) with moderate aerobic activity   -- Eat healthy (resources from ADA at http://www.diabetes.org/)   -- I'm taking good care of my feet. Consider seeing the Podiatrist   -- Check blood sugars as directed, record in log book and bring to every appointment   -- Goal sugar before breakfast: under 140   -- Goal sugar 2 hours after supper: under 170   -- Next diabetes lab draw: 3-6 months   -- Next diabetes office visit: 3-6 months

## 2019-12-02 NOTE — NURSING NOTE
Immunization Documentation    Prior to Immunization administration, verified patients identity using patient's name and date of birth. Please see IMMUNIZATIONS  and order for additional information.  Patient / Parent instructed to remain in clinic for 15 minutes and report any adverse reaction to staff immediately.    Was entire vial of medication used? Yes  Vial/Syringe: Leyda Benito LPN  12/2/2019   3:53 PM

## 2019-12-03 DIAGNOSIS — Z94.4 LIVER REPLACED BY TRANSPLANT (H): ICD-10-CM

## 2019-12-03 PROCEDURE — 36415 COLL VENOUS BLD VENIPUNCTURE: CPT | Mod: ZL | Performed by: INTERNAL MEDICINE

## 2019-12-03 PROCEDURE — 80197 ASSAY OF TACROLIMUS: CPT | Mod: ZL | Performed by: INTERNAL MEDICINE

## 2019-12-03 NOTE — PROGRESS NOTES
Subjective  Ricki Sharp is a 69 year old male who presents for diabetes check.  He is been experiencing chest pain with shortness of breath.  He is noticing this with exertion.  He is not able to exert himself very much due to chronic dyspnea.  When he gets up to use the bathroom in the middle of the night he feels short of breath followed by chest pain that can then radiate to teeth pain.  When he sits down to rest the pains resolve first with the teeth pain in the chest pain finally the shortness of breath goes away.  He has a history of diabetes mellitus type 2 which has been well controlled with the use of Lantus 5 units nightly, glipizide XL 10 mg daily, metformin 2000 mg daily.  He stopped taking his statin because he read about it on the Internet and thought it was bad for him.  He continues on aspirin for prophylaxis.  History of hypertension which is well-controlled on amlodipine, losartan, metoprolol.  He also wants to know what to do about his neck pain.  He saw the neurologist and underwent an EMG.  Subsequently the neurologist ordered an MRI but he is not yet report.    Review of Systems:  Skin: Negative  Eyes: Negative  Ears/Nose/Throat: Negative  Respiratory: See HPI  Cardiovascular: See HPI  Gastrointestinal: Negative  Genitourinary: Negative  Musculoskeletal: See HPI  Neurologic: No weakness.  Tingling in fingers stable and unchanged.  Psychiatric: Negative  Hematologic/Lymphatic/Immunologic: Negative  Endocrine: See HPI        Problem List/PMH: reviewed in EMR, and made relevant updates today.  Medications: reviewed in EMR, and made relevant updates today.  Allergies: reviewed in EMR, and made relevant updates today.    Social Hx:  Social History     Tobacco Use     Smoking status: Former Smoker     Years: 5.00     Last attempt to quit: 2018     Years since quittin.5     Smokeless tobacco: Never Used   Substance Use Topics     Alcohol use: No     Drug use: No     Social History  "    Social History Narrative         I reviewed social history and made relevant updates today.    Family Hx:   History reviewed. No pertinent family history.    Objective  Vitals: reviewed in EMR.  /80 (BP Location: Right arm, Patient Position: Sitting, Cuff Size: Adult Large)   Pulse 70   Temp 96.9  F (36.1  C) (Tympanic)   Resp 14   Ht 1.702 m (5' 7\")   Wt 92.5 kg (204 lb)   SpO2 97%   BMI 31.95 kg/m      Gen: Pleasant male, NAD.  HEENT: MMM, no OP erythema.   Neck: Supple, no JVD, no bruits.  CV: RRR no m/r/g.   Pulm: CTAB no w/r/r  Neuro: Grossly intact  Msk: No lower extremity edema.  Skin: No concerning lesions.  Psychiatric: Normal affect and insight. Does not appear anxious or depressed.    Labs:  Lab Results   Component Value Date    WBC 9.1 12/02/2019    HGB 14.1 12/02/2019    HCT 43.1 12/02/2019     12/02/2019    CHOL 163 03/11/2019    TRIG 272 (H) 03/11/2019    HDL 36 03/11/2019    ALT 14 12/02/2019    AST 14 12/02/2019     12/02/2019    BUN 25 12/02/2019    CO2 26 12/02/2019    TSH 5.65 (H) 10/29/2015    INR 1.4 04/11/2016       Glucose   Date Value Ref Range Status   12/02/2019 212 (H) 70 - 105 mg/dL Final   08/19/2019 206 (H) 70 - 105 mg/dL Final     Hemoglobin A1C   Date Value Ref Range Status   12/02/2019 6.6 (H) 4.0 - 6.0 % Final   08/19/2019 7.0 (H) 4.0 - 6.0 % Final     Creatinine   Date Value Ref Range Status   12/02/2019 1.43 (H) 0.70 - 1.30 mg/dL Final   08/19/2019 1.21 0.70 - 1.30 mg/dL Final     LDL Cholesterol Calculated   Date Value Ref Range Status   03/11/2019 73 <100 mg/dL Final     Comment:     Desirable:       <100 mg/dl       Recent Results (from the past 744 hour(s))   MR Cervical Spine w/o Contrast    Narrative    MR CERVICAL SPINE W/O CONTRAST    HISTORY: 69 years Male Cervical radiculopathy. Patient reports  numbness of left hand and fingers of 6 months duration.    COMPARISONS: Outside CT neck dated 3/28/2016    TECHNIQUE: Sagittal T1, sagittal " T2, sagittal STIR, axial gradient  echo and axial T2 imaging of the cervical spine was performed.    FINDINGS: There is advanced multilevel degenerative disc disease of  the cervical spine. There is slight reversal of lordosis from C2  through C5. There is exaggerated kyphosis from C5 through T1. There is  mild anterolisthesis of C7 are in relation to T1.    There is cervical canal stenosis at C4-C5 and slight kinking  of the  cervical cord above the C4 level.     C2-C3: There is loss of disc signal. The central canal and  neuroforamen are widely patent.    C3-C4: There is complete loss of disc height and possible bony  ankylosis between the C3 and C4 vertebral bodies. The central canal is  patent. There is redundant buckling of the cervical cord at this  level. There is moderate to severe bilateral neuroforaminal narrowing.    C4-C5: There is severe loss of disc height. There is posterior  buckling of the posterior longitudinal ligament and slight  retrolisthesis of C4 in relation to C5. There is severe central canal  stenosis and anterior posterior flattening of the cervical spinal  cord. There is severe neuroforaminal narrowing. There is facet  osteoarthritis    C5-C6: There is severe loss of disc height. There is a posterior  broad-based disc bulge and buckling of the posterior longitudinal  ligament. There is mild mass effect upon the ventral surface of the  cervical cord. There is facet osteoarthritis with associated bone  marrow edema on the right, right greater than left with moderate to  severe right neuroforaminal narrowing. There is moderate to severe  left neuroforaminal narrowing as well.    C6-C7: There is moderate severe loss of disc height. There is a mild  broad-based disc bulge. The central canal is patent. There is  neuroforaminal narrowing of moderate severity.    C7-T1: There is mild degenerative anterolisthesis of C3 7 in relation  is T1. This measures 3 mm. The central canal is patent.  The  neuroforamen are moderately  narrowed.      Impression    IMPRESSION: Advanced multilevel degenerative disc disease with severe  central canal stenosis at the C4-C5 level with mass effect upon the  cord and  redundant buckling of the cervical cord above this level.    Advanced multilevel degenerative changes with multilevel advanced  neural foraminal narrowing as detailed above.    RUDY CLARK MD           Assessment    ICD-10-CM    1. Type 2 diabetes mellitus without complication, without long-term current use of insulin (H) E11.9 Hemoglobin A1c     atorvastatin (LIPITOR) 10 MG tablet   2. 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 Liver replaced by transplant (H) Z94.4    4. Psoriasis L40.9 fluocinonide (LIDEX) 0.05 % external solution   5. Chest pain, exertional R07.9 EKG 12-lead, tracing only     NM Lexiscan stress test   6. Cervical stenosis of spinal canal M48.02 NEUROSURGERY REFERRAL   7. Subclinical hypothyroidism E03.9    8. Chronic kidney disease, stage III (moderate) (H) N18.3    9. Diabetic peripheral neuropathy (H) E11.42 Thyrotropin GH     Orders Placed This Encounter   Procedures     [facility] IMM-  FLU VACCINE, INCREASED ANTIGEN, PRESV FREE     Thyrotropin GH     Hemoglobin A1c     NEUROSURGERY REFERRAL     EKG 12-lead, tracing only       I am concerned about his chest pain that this may represent stable angina.  Differential diagnosis also includes progression of COPD, gastroesophageal reflux disease with aspiration, others.  Recommend start with stress test.    With regards to his neck we reviewed the MRI.  He has significant central canal spinal stenosis.  Based on ongoing symptoms I recommend neurosurgical evaluation to consider repair.    Plan   -- Expected clinical course discussed   -- Medications and their side effects discussed  Patient Instructions     Aspects of Diabetes we can improve:  Hemoglobin A1c Lab Results   Component Value Date    A1C 7.0  08/19/2019    A1C 7.9 05/30/2019    A1C 8.4 03/11/2019    A1C 6.6 03/05/2018    A1C 6.9 03/20/2016    Goal range is under 8. Best is 6.5 to 7   Blood Pressure 130/80 Goal to keep less than 140/90   Tobacco  reports that he quit smoking about 18 months ago. He quit after 5.00 years of use. He has never used smokeless tobacco. Goal to abstain from tobacco   Aspirin yes Aspirin reduces risk of heart disease and stroke   ACE/ARB losartan These medications reduce risk of kidney disease   Cholesterol Start atorvastatin Statins reduce risk of heart disease and stroke   Eye Exam 8/19 Annual diabetic eye exam   Healthy weight Body mass index is 31.95 kg/m . Goal BMI under 30, best is under 25.      -- I'm trying to exercise daily (goal at least 20 min/day) with moderate aerobic activity   -- Eat healthy (resources from ADA at http://www.diabetes.org/)   -- I'm taking good care of my feet. Consider seeing the Podiatrist   -- Check blood sugars as directed, record in log book and bring to every appointment   -- Goal sugar before breakfast: under 140   -- Goal sugar 2 hours after supper: under 170   -- Next diabetes lab draw: 3-6 months   -- Next diabetes office visit: 3-6 months        Return in about 3 months (around 3/2/2020) for diabetes, medication management.    Jarad Thompson MD, FAAP, FACP  Internal Medicine & Pediatrics

## 2019-12-04 LAB
TACROLIMUS BLD-MCNC: 5.6 UG/L (ref 5–15)
TME LAST DOSE: NORMAL H

## 2019-12-04 RX ORDER — FLUOCINONIDE TOPICAL SOLUTION USP, 0.05% 0.5 MG/ML
SOLUTION TOPICAL
Qty: 60 ML | Refills: 0 | OUTPATIENT
Start: 2019-12-04

## 2019-12-04 NOTE — TELEPHONE ENCOUNTER
[facility] sent Rx request for the following:      Fluocinonide 0.05% external solution       Last Prescription Date:   12/2/19  Last Fill Qty/Refills:         60 mL, R-3      Just refilled.  Request refused.    Unable to complete prescription refill per RNMedication Refill Policy.................... Pauline Child RN ....................  12/4/2019   1:19 PM

## 2019-12-09 ENCOUNTER — TELEPHONE (OUTPATIENT)
Dept: CARDIOLOGY | Facility: OTHER | Age: 69
End: 2019-12-09

## 2019-12-10 ENCOUNTER — HOSPITAL ENCOUNTER (OUTPATIENT)
Dept: NUCLEAR MEDICINE | Facility: OTHER | Age: 69
End: 2019-12-10
Attending: INTERNAL MEDICINE
Payer: MEDICARE

## 2019-12-10 ENCOUNTER — HOSPITAL ENCOUNTER (OUTPATIENT)
Dept: NUCLEAR MEDICINE | Facility: OTHER | Age: 69
Discharge: HOME | End: 2019-12-10
Attending: INTERNAL MEDICINE | Admitting: INTERNAL MEDICINE
Payer: MEDICARE

## 2019-12-10 VITALS — HEART RATE: 73 BPM | SYSTOLIC BLOOD PRESSURE: 150 MMHG | DIASTOLIC BLOOD PRESSURE: 90 MMHG

## 2019-12-10 DIAGNOSIS — R07.9 CHEST PAIN, EXERTIONAL: ICD-10-CM

## 2019-12-10 DIAGNOSIS — J44.9 CHRONIC OBSTRUCTIVE PULMONARY DISEASE, UNSPECIFIED COPD TYPE (H): ICD-10-CM

## 2019-12-10 DIAGNOSIS — R06.09 DOE (DYSPNEA ON EXERTION): Primary | ICD-10-CM

## 2019-12-10 LAB
CV BLOOD PRESSURE: 70 %
CV STRESS MAX HR HE: 88
NUC STRESS EJECTION FRACTION: 66 %
RATE PRESSURE PRODUCT: NORMAL
STRESS ECHO BASELINE DIASTOLIC HE: 90
STRESS ECHO BASELINE HR: 73
STRESS ECHO BASELINE SYSTOLIC BP: 150
STRESS ECHO CALCULATED PERCENT HR: 58 %
STRESS ECHO LAST STRESS DIASTOLIC BP: 88
STRESS ECHO LAST STRESS SYSTOLIC BP: 150
STRESS ECHO POST EXERCISE DUR SEC: 55 SEC
STRESS ECHO TARGET HR: 153

## 2019-12-10 PROCEDURE — 78452 HT MUSCLE IMAGE SPECT MULT: CPT

## 2019-12-10 PROCEDURE — A9500 TC99M SESTAMIBI: HCPCS | Performed by: INTERNAL MEDICINE

## 2019-12-10 PROCEDURE — 25000128 H RX IP 250 OP 636: Performed by: INTERNAL MEDICINE

## 2019-12-10 PROCEDURE — 93017 CV STRESS TEST TRACING ONLY: CPT

## 2019-12-10 PROCEDURE — 34300033 ZZH RX 343: Performed by: INTERNAL MEDICINE

## 2019-12-10 PROCEDURE — 93018 CV STRESS TEST I&R ONLY: CPT | Performed by: INTERNAL MEDICINE

## 2019-12-10 PROCEDURE — 93016 CV STRESS TEST SUPVJ ONLY: CPT | Performed by: INTERNAL MEDICINE

## 2019-12-10 RX ORDER — AMINOPHYLLINE 25 MG/ML
50 INJECTION, SOLUTION INTRAVENOUS
Status: DISCONTINUED | OUTPATIENT
Start: 2019-12-10 | End: 2019-12-11 | Stop reason: HOSPADM

## 2019-12-10 RX ORDER — REGADENOSON 0.08 MG/ML
0.4 INJECTION, SOLUTION INTRAVENOUS ONCE
Status: COMPLETED | OUTPATIENT
Start: 2019-12-10 | End: 2019-12-10

## 2019-12-10 RX ADMIN — KIT FOR THE PREPARATION OF TECHNETIUM TC99M SESTAMIBI 8.13 MILLICURIE: 1 INJECTION, POWDER, LYOPHILIZED, FOR SOLUTION PARENTERAL at 11:10

## 2019-12-10 RX ADMIN — KIT FOR THE PREPARATION OF TECHNETIUM TC99M SESTAMIBI 31.2 MILLICURIE: 1 INJECTION, POWDER, LYOPHILIZED, FOR SOLUTION PARENTERAL at 13:10

## 2019-12-10 RX ADMIN — REGADENOSON 0.4 MG: 0.08 INJECTION, SOLUTION INTRAVENOUS at 13:12

## 2019-12-23 ENCOUNTER — TELEPHONE (OUTPATIENT)
Dept: GASTROENTEROLOGY | Facility: CLINIC | Age: 69
End: 2019-12-23

## 2019-12-27 ENCOUNTER — APPOINTMENT (OUTPATIENT)
Dept: LAB | Facility: CLINIC | Age: 69
End: 2019-12-27
Attending: INTERNAL MEDICINE
Payer: MEDICARE

## 2019-12-27 ENCOUNTER — OFFICE VISIT (OUTPATIENT)
Dept: GASTROENTEROLOGY | Facility: CLINIC | Age: 69
End: 2019-12-27
Attending: INTERNAL MEDICINE
Payer: MEDICARE

## 2019-12-27 VITALS
DIASTOLIC BLOOD PRESSURE: 81 MMHG | HEART RATE: 69 BPM | BODY MASS INDEX: 31.61 KG/M2 | OXYGEN SATURATION: 93 % | WEIGHT: 201.4 LBS | TEMPERATURE: 97.7 F | HEIGHT: 67 IN | SYSTOLIC BLOOD PRESSURE: 164 MMHG

## 2019-12-27 DIAGNOSIS — Z94.4 LIVER REPLACED BY TRANSPLANT (H): Primary | ICD-10-CM

## 2019-12-27 PROCEDURE — G0463 HOSPITAL OUTPT CLINIC VISIT: HCPCS | Mod: ZF

## 2019-12-27 ASSESSMENT — MIFFLIN-ST. JEOR: SCORE: 1647.17

## 2019-12-27 ASSESSMENT — PAIN SCALES - GENERAL: PAINLEVEL: NO PAIN (0)

## 2019-12-27 NOTE — PROGRESS NOTES
Hepatology Follow Up:    Ricki Sharp was seen for followup in the Liver Transplantation Clinic at the [facility] on 12/27/19 (last seen 12/10/2018).  Mr. Sharp returns for followup more than 11 years status post liver transplantation for cirrhosis caused by chronic hepatitis C and complicated by hepatopulmonary syndrome.  His post-transplant course was complicated by lymphoproliferative disease several years back, though now has been mostly unperturbed.      He feels fairly well today at this visit and is joined by his wife.     Tolerating diabetic regimen without issue aside from having some problems swallowing large metformin tabs in the evening. Notices some blood sugars in the 70s and 80s during which he feels weak.     Has been experiencing some drowsiness in the mornings approximately 1 hour after taking his AM medications. He attributes this to his PRN tramadol dose. Per patient and wife, not indicative of any HE.     Ongoing LE numbness/pain/neuropathy. Recently better characterized with cervical spine MRI and EMG. Has an upcoming outpatient consult with neurosurgery to discussion intervention. Most troublesome as it limits his ability to play guitar which he enjoys.    On review of systems; he denies any abdominal pain, itching or skin rash.  He does have a mild to moderate amount of fatigue, which is a chronic issue.  He denies any increased abdominal girth or lower extremity edema.  He has not had any gastrointestinal bleeding or any overt signs of hepatic encephalopathy.  He denies any fevers or chills.  He does have some chronic shortness of breath related to chronic lung disease which was recently worked up with a stress test which was negative for any signs of ischemia/infarct.  He denies any nausea or vomiting, diarrhea or constipation.  His appetite has been good, ironically lost a little weight over the holidays.      Current Outpatient Medications   Medication      "ACETAMINOPHEN PO     albuterol (PROAIR HFA, PROVENTIL HFA, VENTOLIN HFA) 108 (90 BASE) MCG/ACT inhaler     amLODIPine (NORVASC) 5 MG tablet     atorvastatin (LIPITOR) 10 MG tablet     B-D U/F 31G X 8 MM insulin pen needle     blood glucose (NO BRAND SPECIFIED) test strip     blood glucose monitoring (NO BRAND SPECIFIED) meter device kit     Blood Pressure Monitoring (BLOOD PRESSURE CUFF) MISC     calcium carbonate-vitamin D (OS-LIT) 500-400 MG-UNIT tablet     cyanocobalamin (VITAMIN B-12) 1000 MCG tablet     cyclobenzaprine (FLEXERIL) 5 MG tablet     desonide (DESOWEN) 0.05 % ointment     fluocinonide (LIDEX) 0.05 % external solution     glipiZIDE (GLUCOTROL XL) 10 MG 24 hr tablet     insulin glargine (LANTUS PEN) 100 UNIT/ML pen     levothyroxine (SYNTHROID/LEVOTHROID) 175 MCG tablet     losartan (COZAAR) 25 MG tablet     magnesium oxide (MAG-OX) 400 MG tablet     metFORMIN (GLUCOPHAGE-XR) 500 MG 24 hr tablet     metoprolol tartrate (LOPRESSOR) 100 MG tablet     omeprazole (PRILOSEC) 20 MG DR capsule     tacrolimus (GENERIC EQUIVALENT) 1 MG capsule     thin (NO BRAND SPECIFIED) lancets     tiotropium (SPIRIVA HANDIHALER) 18 MCG inhaled capsule     traMADol (ULTRAM) 50 MG tablet     No current facility-administered medications for this visit.      Vital signs:  Temp: 97.7  F (36.5  C) Temp src: Oral BP: (!) 164/81 Pulse: 69     SpO2: 93 %     Height: 170.2 cm (5' 7\") Weight: 91.4 kg (201 lb 6.4 oz)  Estimated body mass index is 31.54 kg/m  as calculated from the following:    Height as of this encounter: 1.702 m (5' 7\").    Weight as of this encounter: 91.4 kg (201 lb 6.4 oz).     Exam:  In general, he looks reasonably well.  HEENT exam shows no scleral icterus or temporal muscle wasting.  His chest is clear.  His abdominal exam shows no increase in girth.  No masses or tenderness to palpation are present.  His liver is 10 cm in span without left lobe enlargement.  No spleen tip is palpable, and extremity exam shows " no edema.  Skin exam shows no stigmata of chronic liver disease.  Neurologic exam shows no asterixis.    Assessment and Plan:  #. S/p liver transplantation (3/13/2008)     His most recent laboratory tests were from 12/2/19.  His white count was 9.1, hemoglobin 14.1, platelets are 150k.  Sodium 135, potassium 4.5, BUN is 25, creatinine 1.43.  AST is 14, ALT is 14, alkaline phosphatase is 96, albumin is 4.0, total protein of 7.5 and total bilirubin is 1.1. Hgb A1c was 6.6 (down from 7.0, 7.9. 8.4).     Mr. Sharp is now more than 11 years status post liver transplantation for cirrhosis caused by chronic hepatitis C.  He is a sustained virologic responder to hepatitis C treatment.  He did quit smoking, which obviously is very good for his chronic lung disease.     His diabetes is now well controlled to the point that likely can back off some of his oral hypoglycemics (latest A1c 6.6, would suggest that PCP consider dropping sulfonylurea is agreeable). Finds swallowing large metformin pills to be bothersome, recommended spacing them out.     His left upper extremity neuropathy has been lately better characterized by EMG and MRI finding canal stenosis in the cervical spine. He has an upcoming outpatient consultation with neurosurgery for this issue.     Both he and his wife have noticed that he is drowsy/foggy following his morning medications. He cannot identify an issue aside from his AM dose of PRN tramadol. This may explain at least a degree of his drowsiness. He will trial off of this medication to assess if it is the culprit.      Return to clinic in 1 year, with labs if none drawn proximal to the visit.      Patient seen and plan discussed with Dr. Gray.     David Davies MD  Internal Medicine, PGY2    The patient was seen and examined.  The above assessment and plan was developed jointly with the resident.      Mike Gray MD      Professor of Medicine  [employer]      Executive  Medical Director, Solid Organ Transplant Program  [facility]

## 2019-12-27 NOTE — NURSING NOTE
"Chief Complaint   Patient presents with     RECHECK     liver tx     BP (!) 164/81   Pulse 69   Temp 97.7  F (36.5  C) (Oral)   Ht 1.702 m (5' 7\")   Wt 91.4 kg (201 lb 6.4 oz)   SpO2 93%   BMI 31.54 kg/m    Mary Guevara [facility]  12/27/2019 11:06 AM    "

## 2019-12-27 NOTE — LETTER
12/27/2019      RE: Ricki Sharp  [address] [place] [address]       Hepatology Follow Up:    Ricki Sharp was seen for followup in the Liver Transplantation Clinic at the [facility] on 12/27/19 (last seen 12/10/2018).  Mr. Sharp returns for followup more than 11 years status post liver transplantation for cirrhosis caused by chronic hepatitis C and complicated by hepatopulmonary syndrome.  His post-transplant course was complicated by lymphoproliferative disease several years back, though now has been mostly unperturbed.      He feels fairly well today at this visit and is joined by his wife.     Tolerating diabetic regimen without issue aside from having some problems swallowing large metformin tabs in the evening. Notices some blood sugars in the 70s and 80s during which he feels weak.     Has been experiencing some drowsiness in the mornings approximately 1 hour after taking his AM medications. He attributes this to his PRN tramadol dose. Per patient and wife, not indicative of any HE.     Ongoing LE numbness/pain/neuropathy. Recently better characterized with cervical spine MRI and EMG. Has an upcoming outpatient consult with neurosurgery to discussion intervention. Most troublesome as it limits his ability to play guitar which he enjoys.    On review of systems; he denies any abdominal pain, itching or skin rash.  He does have a mild to moderate amount of fatigue, which is a chronic issue.  He denies any increased abdominal girth or lower extremity edema.  He has not had any gastrointestinal bleeding or any overt signs of hepatic encephalopathy.  He denies any fevers or chills.  He does have some chronic shortness of breath related to chronic lung disease which was recently worked up with a stress test which was negative for any signs of ischemia/infarct.  He denies any nausea or vomiting, diarrhea or constipation.  His appetite has been good,  "ironically lost a little weight over the holidays.      Current Outpatient Medications   Medication     ACETAMINOPHEN PO     albuterol (PROAIR HFA, PROVENTIL HFA, VENTOLIN HFA) 108 (90 BASE) MCG/ACT inhaler     amLODIPine (NORVASC) 5 MG tablet     atorvastatin (LIPITOR) 10 MG tablet     B-D U/F 31G X 8 MM insulin pen needle     blood glucose (NO BRAND SPECIFIED) test strip     blood glucose monitoring (NO BRAND SPECIFIED) meter device kit     Blood Pressure Monitoring (BLOOD PRESSURE CUFF) MISC     calcium carbonate-vitamin D (OS-LIT) 500-400 MG-UNIT tablet     cyanocobalamin (VITAMIN B-12) 1000 MCG tablet     cyclobenzaprine (FLEXERIL) 5 MG tablet     desonide (DESOWEN) 0.05 % ointment     fluocinonide (LIDEX) 0.05 % external solution     glipiZIDE (GLUCOTROL XL) 10 MG 24 hr tablet     insulin glargine (LANTUS PEN) 100 UNIT/ML pen     levothyroxine (SYNTHROID/LEVOTHROID) 175 MCG tablet     losartan (COZAAR) 25 MG tablet     magnesium oxide (MAG-OX) 400 MG tablet     metFORMIN (GLUCOPHAGE-XR) 500 MG 24 hr tablet     metoprolol tartrate (LOPRESSOR) 100 MG tablet     omeprazole (PRILOSEC) 20 MG DR capsule     tacrolimus (GENERIC EQUIVALENT) 1 MG capsule     thin (NO BRAND SPECIFIED) lancets     tiotropium (SPIRIVA HANDIHALER) 18 MCG inhaled capsule     traMADol (ULTRAM) 50 MG tablet     No current facility-administered medications for this visit.      Vital signs:  Temp: 97.7  F (36.5  C) Temp src: Oral BP: (!) 164/81 Pulse: 69     SpO2: 93 %     Height: 170.2 cm (5' 7\") Weight: 91.4 kg (201 lb 6.4 oz)  Estimated body mass index is 31.54 kg/m  as calculated from the following:    Height as of this encounter: 1.702 m (5' 7\").    Weight as of this encounter: 91.4 kg (201 lb 6.4 oz).     Exam:  In general, he looks reasonably well.  HEENT exam shows no scleral icterus or temporal muscle wasting.  His chest is clear.  His abdominal exam shows no increase in girth.  No masses or tenderness to palpation are present.  His " liver is 10 cm in span without left lobe enlargement.  No spleen tip is palpable, and extremity exam shows no edema.  Skin exam shows no stigmata of chronic liver disease.  Neurologic exam shows no asterixis.    Assessment and Plan:  #. S/p liver transplantation (3/13/2008)     His most recent laboratory tests were from 12/2/19.  His white count was 9.1, hemoglobin 14.1, platelets are 150k.  Sodium 135, potassium 4.5, BUN is 25, creatinine 1.43.  AST is 14, ALT is 14, alkaline phosphatase is 96, albumin is 4.0, total protein of 7.5 and total bilirubin is 1.1. Hgb A1c was 6.6 (down from 7.0, 7.9. 8.4).     Mr. Sharp is now more than 11 years status post liver transplantation for cirrhosis caused by chronic hepatitis C.  He is a sustained virologic responder to hepatitis C treatment.  He did quit smoking, which obviously is very good for his chronic lung disease.     His diabetes is now well controlled to the point that likely can back off some of his oral hypoglycemics (latest A1c 6.6, would suggest that PCP consider dropping sulfonylurea is agreeable). Finds swallowing large metformin pills to be bothersome, recommended spacing them out.     His left upper extremity neuropathy has been lately better characterized by EMG and MRI finding canal stenosis in the cervical spine. He has an upcoming outpatient consultation with neurosurgery for this issue.     Both he and his wife have noticed that he is drowsy/foggy following his morning medications. He cannot identify an issue aside from his AM dose of PRN tramadol. This may explain at least a degree of his drowsiness. He will trial off of this medication to assess if it is the culprit.      Return to clinic in 1 year, with labs if none drawn proximal to the visit.      Patient seen and plan discussed with Dr. Gray.     David Davies MD  Internal Medicine, PGY2    The patient was seen and examined.  The above assessment and plan was developed jointly with the resident.       Mike Gray MD      Professor of Medicine  [employer]      Executive Medical Director, Solid Organ Transplant Program  [facility]         Mike Gray MD

## 2019-12-27 NOTE — NURSING NOTE
Here for post liver transplant follow-up.  Accompanied by wife, Fide. Is doing well, newly diagnosed w/ diabetes this year, last Hgb A1C was <7.  Dr. Gray asked him to discuss the possibility of coming off glypizide.   Brendan is most concerned about the numbness in his left hand.  Imaging revealed a narrowing in his cervical spine.  He has an upcoming appointment w/ neurosurgery.  Wondering why he feels so sleepy much of the day, we noticed that he takes tramadol for general joint pain every morning.   Reviewed recent labs and assisted with interpretation.     Complaints / Concerns: left hand numbness which affect his guitar playing which is one of his best pleasures.    Current immunosuppression:   Prograf monotherapy     Med changes: none.  Updated patient's med card.    Lab frequency:  q 2-3 mos.    Follow-up:  Hepatology in 3 mos, derm annually and PCP regularly for diabetes management.  Reviewed my contact information, now to reach the transplant office during weekday and afterhours.

## 2020-01-01 ENCOUNTER — TELEPHONE (OUTPATIENT)
Dept: PEDIATRICS | Facility: OTHER | Age: 70
End: 2020-01-01

## 2020-01-01 ENCOUNTER — HEALTH MAINTENANCE LETTER (OUTPATIENT)
Age: 70
End: 2020-01-01

## 2020-01-01 DIAGNOSIS — M48.061 LUMBAR FORAMINAL STENOSIS: ICD-10-CM

## 2020-01-01 DIAGNOSIS — M48.061 SPINAL STENOSIS OF LUMBAR REGION, UNSPECIFIED WHETHER NEUROGENIC CLAUDICATION PRESENT: ICD-10-CM

## 2020-01-01 DIAGNOSIS — J44.9 CHRONIC OBSTRUCTIVE PULMONARY DISEASE, UNSPECIFIED COPD TYPE (H): ICD-10-CM

## 2020-01-01 DIAGNOSIS — M47.816 LUMBAR FACET ARTHROPATHY: ICD-10-CM

## 2020-01-01 DIAGNOSIS — E11.9 TYPE 2 DIABETES MELLITUS WITHOUT COMPLICATION, WITHOUT LONG-TERM CURRENT USE OF INSULIN (H): Chronic | ICD-10-CM

## 2020-01-01 RX ORDER — METFORMIN HCL 500 MG
TABLET, EXTENDED RELEASE 24 HR ORAL
Qty: 360 TABLET | Refills: 3 | Status: SHIPPED | OUTPATIENT
Start: 2020-01-01

## 2020-01-01 RX ORDER — TIOTROPIUM BROMIDE 18 UG/1
CAPSULE ORAL; RESPIRATORY (INHALATION)
Qty: 90 CAPSULE | Refills: 0 | Status: SHIPPED | OUTPATIENT
Start: 2020-01-01 | End: 2021-01-01

## 2020-01-01 RX ORDER — TRAMADOL HYDROCHLORIDE 50 MG/1
TABLET ORAL
Qty: 60 TABLET | OUTPATIENT
Start: 2020-01-01

## 2020-01-03 NOTE — TELEPHONE ENCOUNTER
The patient's wife called and stated they are waiting for a dermatology referral to [facility].  They called [facility] and it still is not there.    Please advise.     Jocelin

## 2020-01-08 ENCOUNTER — DOCUMENTATION ONLY (OUTPATIENT)
Dept: TRANSPLANT | Facility: CLINIC | Age: 70
End: 2020-01-08

## 2020-01-09 DIAGNOSIS — Z94.4 LIVER REPLACED BY TRANSPLANT (H): ICD-10-CM

## 2020-01-09 DIAGNOSIS — Z13.220 LIPID SCREENING: ICD-10-CM

## 2020-01-09 DIAGNOSIS — F10.11 ALCOHOL ABUSE, IN REMISSION: ICD-10-CM

## 2020-01-13 DIAGNOSIS — M54.16 LUMBAR RADICULOPATHY: ICD-10-CM

## 2020-01-13 DIAGNOSIS — M48.061 LUMBAR FORAMINAL STENOSIS: Primary | ICD-10-CM

## 2020-01-16 DIAGNOSIS — Z94.4 LIVER REPLACED BY TRANSPLANT (H): ICD-10-CM

## 2020-01-16 DIAGNOSIS — L40.9 PSORIASIS: ICD-10-CM

## 2020-01-16 DIAGNOSIS — D47.Z1 PTLD (POST-TRANSPLANT LYMPHOPROLIFERATIVE DISORDER) (H): ICD-10-CM

## 2020-01-16 DIAGNOSIS — K27.9 PEPTIC ULCER: ICD-10-CM

## 2020-01-17 DIAGNOSIS — D47.Z1 PTLD (POST-TRANSPLANT LYMPHOPROLIFERATIVE DISORDER) (H): ICD-10-CM

## 2020-01-17 DIAGNOSIS — Z94.4 LIVER REPLACED BY TRANSPLANT (H): ICD-10-CM

## 2020-01-17 RX ORDER — MAGNESIUM OXIDE 400 MG/1
TABLET ORAL
Qty: 360 TABLET | Refills: 11 | OUTPATIENT
Start: 2020-01-17

## 2020-01-17 RX ORDER — DESONIDE 0.5 MG/G
OINTMENT TOPICAL
Qty: 60 G | Refills: 3 | Status: SHIPPED | OUTPATIENT
Start: 2020-01-17 | End: 2021-01-01

## 2020-01-17 NOTE — TELEPHONE ENCOUNTER
"Routing refill request to provider for review/approval because:  Drug not on the [facility] refill protocol   A break in medication    Last refill:   12/30/14 60gram 1 refill  LOV: 12/27/19  No future appointments Denise Catalan RN on 1/17/2020 at 3:47 P    Requested Prescriptions   Pending Prescriptions Disp Refills     desonide (DESOWEN) 0.05 % external ointment [Pharmacy Med Name: DESONIDE 0.05% OINTMENT 60GM] 60 g 1     Sig: USE ON THE FACE DAILY       There is no refill protocol information for this order                                           Prescription approved per [facility] Refill Protocol.    Last refill: 1/8/2019 90# 3 refills     LOV: 12/27/19  Denise Catalan RN on 1/17/2020 at 3:46 PM         omeprazole (PRILOSEC) 20 MG DR capsule [Pharmacy Med Name: OMEPRAZOLE 20MG CAPSULES] 90 capsule 3     Sig: TAKE 1 CAPSULE BY MOUTH EVERY DAY BEFORE A MEAL       PPI Protocol Passed - 1/17/2020  3:44 PM        Passed - Not on Clopidogrel (unless Pantoprazole ordered)        Passed - No diagnosis of osteoporosis on record        Passed - Recent (12 mo) or future (30 days) visit within the authorizing provider's specialty     Patient has had an office visit with the authorizing provider or a provider within the authorizing providers department within the previous 12 mos or has a future within next 30 days. See \"Patient Info\" tab in inbasket, or \"Choose Columns\" in Meds & Orders section of the refill encounter.              Passed - Medication is active on med list        Passed - Patient is age 18 or older          "

## 2020-01-17 NOTE — TELEPHONE ENCOUNTER
Refill denied    Magnesium filled on 1/14/19 360# 11 refills       Oyster Can not on medication list Denise Catalan RN on 1/17/2020 at 3:59 PM

## 2020-01-18 ASSESSMENT — ENCOUNTER SYMPTOMS
WEAKNESS: 0
COUGH DISTURBING SLEEP: 0
SNORES LOUDLY: 0
SHORTNESS OF BREATH: 1
TREMORS: 0
ARTHRALGIAS: 0
MYALGIAS: 1
LOSS OF CONSCIOUSNESS: 0
NECK PAIN: 0
MUSCLE WEAKNESS: 0
HEMOPTYSIS: 0
NUMBNESS: 1
HEADACHES: 0
BACK PAIN: 1
TINGLING: 1
PARALYSIS: 0
SPEECH CHANGE: 0
WHEEZING: 1
SPUTUM PRODUCTION: 0
STIFFNESS: 1
SEIZURES: 0
COUGH: 0
MEMORY LOSS: 0
DIZZINESS: 1
MUSCLE CRAMPS: 0
JOINT SWELLING: 0
DYSPNEA ON EXERTION: 1
DISTURBANCES IN COORDINATION: 0
POSTURAL DYSPNEA: 1

## 2020-01-20 RX ORDER — MAGNESIUM OXIDE 400 MG/1
TABLET ORAL
Qty: 360 TABLET | Refills: 11 | Status: SHIPPED | OUTPATIENT
Start: 2020-01-20 | End: 2021-01-01

## 2020-01-20 NOTE — TELEPHONE ENCOUNTER
Routing refill request to provider for review/approval because:  Drug not on the [facility] refill protocol     LOV: 8/19/19    Last refill    Magneisum Oxide    1/14/19 180# 3 refills. Denise Catalan RN on 1/20/2020 at 9:14 AM

## 2020-01-20 NOTE — TELEPHONE ENCOUNTER
"Prescription approved per [facility] Refill Protocol.    LOV:8/19/19    Last refilled    Calcium Carbonate Vitamin D  180# 3 refills 2/15/2019    Denise Catalan RN on 1/20/2020 at 9:13 AM  Requested Prescriptions   Pending Prescriptions Disp Refills     calcium carbonate-vitamin D (OS-LIT) 500-400 MG-UNIT tablet 180 tablet 3     Sig: Take 1 tablet by mouth 2 times daily       Vitamin Supplements (Adult) Protocol Passed - 1/20/2020  8:31 AM        Passed - High dose Vitamin D not ordered        Passed - Recent (12 mo) or future (30 days) visit within the authorizing provider's specialty     Patient has had an office visit with the authorizing provider or a provider within the authorizing providers department within the previous 12 mos or has a future within next 30 days. See \"Patient Info\" tab in inbasket, or \"Choose Columns\" in Meds & Orders section of the refill encounter.              Passed - Medication is active on med list        magnesium oxide (MAG-OX) 400 MG tablet 360 tablet 11       There is no refill protocol information for this order          "

## 2020-01-21 ENCOUNTER — TELEPHONE (OUTPATIENT)
Dept: PEDIATRICS | Facility: OTHER | Age: 70
End: 2020-01-21

## 2020-01-21 NOTE — TELEPHONE ENCOUNTER
Pt returned call about medication. Informed him medication requests were take are of. Denise Catalan RN on 1/21/2020 at 2:15 PM

## 2020-01-23 ENCOUNTER — OFFICE VISIT (OUTPATIENT)
Dept: NEUROSURGERY | Facility: CLINIC | Age: 70
End: 2020-01-23
Payer: MEDICARE

## 2020-01-23 ENCOUNTER — ANCILLARY PROCEDURE (OUTPATIENT)
Dept: GENERAL RADIOLOGY | Facility: CLINIC | Age: 70
End: 2020-01-23
Attending: NEUROLOGICAL SURGERY
Payer: MEDICARE

## 2020-01-23 VITALS
RESPIRATION RATE: 16 BRPM | HEART RATE: 69 BPM | OXYGEN SATURATION: 96 % | DIASTOLIC BLOOD PRESSURE: 80 MMHG | SYSTOLIC BLOOD PRESSURE: 146 MMHG

## 2020-01-23 DIAGNOSIS — M54.16 LUMBAR RADICULOPATHY: ICD-10-CM

## 2020-01-23 DIAGNOSIS — M48.02 SPINAL STENOSIS IN CERVICAL REGION: Primary | ICD-10-CM

## 2020-01-23 ASSESSMENT — PAIN SCALES - GENERAL: PAINLEVEL: NO PAIN (0)

## 2020-01-23 NOTE — PROGRESS NOTES
Neurosurgery Clinic Note    Chief Complaint: left fingertip numbness     History of Present Illness:  It was a pleasure to evaluate Ricki Sharp in clinic today     Ricki Sharp is a 69 year old male with history of liver transplant for alcoholic cirrhosis with hepatitis C, non-Hodgkin's lymphoma (diffuse large B-cell) treated with CHOP, diabetes presenting with one year of left fingertip numbness in fingers 234 and 5.  This is not significantly progressive.  No right-sided symptoms.  There is no radiating pain or numbness down his arms.  Symptoms are constant without relieving or exacerbating factors.  No changes in balance which has been problematic for several decades.  No changes in urinary frequency or urgency, no urinary incontinence.          Review of Systems   Answers for HPI/ROS submitted by the patient on 1/18/2020   General Symptoms: No  Skin Symptoms: No  HENT Symptoms: No  EYE SYMPTOMS: No  HEART SYMPTOMS: No  LUNG SYMPTOMS: Yes  INTESTINAL SYMPTOMS: No  URINARY SYMPTOMS: No  REPRODUCTIVE SYMPTOMS: No  SKELETAL SYMPTOMS: Yes  BLOOD SYMPTOMS: No  NERVOUS SYSTEM SYMPTOMS: Yes  MENTAL HEALTH SYMPTOMS: No  Cough: No  Sputum or phlegm: No  Coughing up blood: No  Difficulty breating or shortness of breath: Yes  Snoring: No  Wheezing: Yes  Difficulty breathing on exertion: Yes  Nighttime Cough: No  Difficulty breathing when lying flat: Yes  Back pain: Yes  Muscle aches: Yes  Neck pain: No  Swollen joints: No  Joint pain: No  Bone pain: Yes  Muscle cramps: No  Muscle weakness: No  Joint stiffness: Yes  Bone fracture: No  Trouble with coordination: No  Dizziness or trouble with balance: Yes  Fainting or black-out spells: No  Memory loss: No  Headache: No  Seizures: No  Speech problems: No  Tingling: Yes  Tremor: No  Weakness: No  Difficulty walking: Yes  Paralysis: No  Numbness: Yes    Active Problems    Problem Noted Date   Hypomagnesemia 07/25/2017   Hypertriglyceridemia 11/22/2016    Thrombocytopenia 2016   Ground glass opacity present on imaging of lung 2016   Overview:     Side-effect of R-CHOP. Has been evaluated by [facility]. No follow-up recommended.  Jarad Bocanegra MD .................... 2016 9:57 AM       CKD (chronic kidney disease), stage III 2016   Non morbid obesity due to excess calories 2016   Status post left hip replacement 2016   Diabetes mellitus type 2, controlled, with complications 2016   Prolonged QT interval 2016   Chronic obstructive pulmonary disease 2016   Moderate aortic stenosis 2016   Immunosuppressed status 10/30/2015   Hypertriglyceridemia 10/29/2015   Tobacco use disorder 10/29/2015   LBBB (left bundle branch block) 10/29/2015   First degree AV block 10/29/2015   Hyperglycemia 2015   Inferior mesenteric vein thrombosis 12/10/2014   Anxiety 2014   Osteoarthritis 2013   S/P liver transplant 2012   Overview:     History of Hepatitis C, alcoholic cirrhosis     Benito's esophagus with dysplasia 2011   Overview:     [facility] endo 2010       LIVER DISEASE, CHRONIC 2010   Overview:     End Stage liver disease secondary to Hepatitis C and alcoholic cirrhosis. (Dr. Mike Gray, [facility]) S/p  liver transplant in 3/2008       HYPERTENSION 2009   DYSTHYMIA 2006   ALCOHOL ABUSE     CIRRHOSIS     Overview:            PEPTIC ULCER DISEASE     Overview:            THROMBOCYTOPENIA     HYPOALBUMINEMIA     PORTAL HYPERTENSION     ESOPHAGEAL VARICES     HYPOTHYROIDISM     Diffuse large B cell lymphoma     Overview:     Undergoing Rituxan     Portal vein stenosis of transplanted liver     Overview:     Stent 2014       Resolved Problems    Problem Noted Date Resolved Date   LBBB (left bundle branch block) 2016   Tobacco dependence 2015   INGROWN TOENAIL, INFECTED 2012   COPD 2012  04/21/2016   ELBOW PAIN 10/05/2010 04/21/2016   DIZZINESS 08/19/2010 09/30/2010     Immunizations    Name Administration Dates Next Due   Influenza Virus, Unspecified 11/21/2011     Influenza, High-dose Inactivated 11/09/2017     Influenza, IIV3 (Age >=3 years) 01/28/2013     Influenza, IIV4 11/22/2016, 10/29/2015     Pneumococcal Poly,23-Valent (Pneumovax) 04/26/2013, 11/04/2009     Pneumococcal conj 13-Valent (Prevnar 13) 12/19/2016     Tdap 02/26/2013       Surgical History    Surgery Date Site/Laterality Comments   [facility] TRANSPLANT LIVER ALLO TRANSPLANT 2008        ERCP          (IA) VA ESOPHAG XSECT/REPAIR VARICES          HIP REPLACEMENT     Right     CHOLECYSTECTOMY          VA EXTRAPLEURAL REMOVE EMPYEMA     Bilateral     WRIST FRACTURE TX 2000   ORIF Left     SCLEROTHERAPY 1995   Sclerosing esophageal varices, [facility]      OTHER 1996   Banding of esophageal varices, [facility]      HIP REPLACEMENT 03/06   Right total hip arthroplasty      ENDOSCOPIC PANCREATOSCOPY 05/06   Endoscopic retrograde cholangiopancreatography, [facility]      CHOLECYSTECTOMY 06/06   E. coli bacteremia, [facility]     PARACENTESIS 06/06 June 23, 2006;July 07, 2006.     ECHOCARDIOGRAM 07/06   Transthoracic echocardiogram with left ventricular ejection fraction of 60%, no wall motion abnormality, left atrial enlargement, no pericardial effusion, mild aortic stenosis with aortic valve area of 1.7 cm2     STRESS TEST EXERCISE 11/06   Exercise stress echo, within normal limits     ECHO COMPLETE WO CONTRAST 09/07   ([facility]), within normal limits, LVEF 55%     LIVER SURGERY 03/08   Status post liver transplant     DRAIN CARE 03/08   Status post bilateral thorascopic empyema drainage and decortication procedures.     ERCP 09/2008   Cholangitis secondary to biliary stricture status post ERCP and stenting     HIP REPLACEMENT 2016 Left      COLONOSCOPY 2013   normal       colonoscopy 12            Medical History    Medical History Date Comments   History of liver transplant (HC)      COPD (chronic obstructive pulmonary disease) (HC)       Portal vein stenosis of transplanted liver (HC)   Stent 2014   Benito esophagus       Hypertension       Hypothyroid       Diabetes mellitus type II       Esophageal varices (HC)       Portal hypertension (HC)       Hyperbilirubinemia       Hypoalbuminemia       Thrombocytopenia (HC)       PUD (peptic ulcer disease)       Aortic stenosis, mild   ECHO    Polysubstance abuse (HC) 1960s /IV drugs   History of alcohol abuse 1960s heavy use   End-stage liver disease (HC)  secondary to Hepatitis C and alcoholic cirrhosis. (Dr. Mike Gray, [facility]). Status post  donor liver transplant in 2008.   Diffuse large B cell lymphoma (HC)  Undergoing Rituxan/R-CHOP   Anxiety 6/3/2014       Family History    Medical History Relation Name Comments   Alcohol/Drug Brother    at 53; cirrhosis/heavy alcohol use   Heart Disease Father    at 62; myocardial infarction    Alcohol/Drug Mother    at 65; cirrhosis/heavy alcohol use   Heart Disease Other FHx + CAD, MI     Relation Name Status Comments   Brother    (Age 53) Etoh cirrhosis   Brother   Alive CABG   Brother         Father    (Age 62) MI   Mother    (Age 65) Etoh cirrhosis   Other FHx +         Social History    Tobacco Use Types Packs/Day Years Used Date   Former Smoker Cigarettes 1 20 Quit: 10/01/2015   Smokeless Tobacco: Never Used           Tobacco Cessation: Counseling Given: No     Alcohol Use Drinks/Week oz/Week Comments   No 0 Standard drinks or equivalent   0.0       Sex Assigned at Birth Date Recorded   Not on file       Job Start Date Occupation Industry   Not on file Not on file Not on file     Travel History Travel Start Travel End   No recent travel history available.           IMAGING   Mr Cervical Spine  W/o Contrast    Result Date: 11/11/2019  MR CERVICAL SPINE W/O CONTRAST HISTORY: 69 years Male Cervical radiculopathy. Patient reports numbness of left hand and fingers of 6 months duration. COMPARISONS: Outside CT neck dated 3/28/2016 TECHNIQUE: Sagittal T1, sagittal T2, sagittal STIR, axial gradient echo and axial T2 imaging of the cervical spine was performed. FINDINGS: There is advanced multilevel degenerative disc disease of the cervical spine. There is slight reversal of lordosis from C2 through C5. There is exaggerated kyphosis from C5 through T1. There is mild anterolisthesis of C7 are in relation to T1. There is cervical canal stenosis at C4-C5 and slight kinking  of the cervical cord above the C4 level.  C2-C3: There is loss of disc signal. The central canal and neuroforamen are widely patent. C3-C4: There is complete loss of disc height and possible bony ankylosis between the C3 and C4 vertebral bodies. The central canal is patent. There is redundant buckling of the cervical cord at this level. There is moderate to severe bilateral neuroforaminal narrowing. C4-C5: There is severe loss of disc height. There is posterior buckling of the posterior longitudinal ligament and slight retrolisthesis of C4 in relation to C5. There is severe central canal stenosis and anterior posterior flattening of the cervical spinal cord. There is severe neuroforaminal narrowing. There is facet osteoarthritis C5-C6: There is severe loss of disc height. There is a posterior broad-based disc bulge and buckling of the posterior longitudinal ligament. There is mild mass effect upon the ventral surface of the cervical cord. There is facet osteoarthritis with associated bone marrow edema on the right, right greater than left with moderate to severe right neuroforaminal narrowing. There is moderate to severe left neuroforaminal narrowing as well. C6-C7: There is moderate severe loss of disc height. There is a mild broad-based disc  bulge. The central canal is patent. There is neuroforaminal narrowing of moderate severity. C7-T1: There is mild degenerative anterolisthesis of C3 7 in relation is T1. This measures 3 mm. The central canal is patent. The neuroforamen are moderately  narrowed.     IMPRESSION: Advanced multilevel degenerative disc disease with severe central canal stenosis at the C4-C5 level with mass effect upon the cord and  redundant buckling of the cervical cord above this level. Advanced multilevel degenerative changes with multilevel advanced neural foraminal narrowing as detailed above. RDUY CLARK MD    Mr Lumbar Spine W/o Contrast    Result Date: 6/12/2018  MR LUMBAR SPINE W/O CONTRAST HISTORY: ; Lumbar facet arthropathy; Lumbar foraminal stenosis; Degeneration of lumbar intervertebral disc. . TECHNIQUE: Sagittal T1, T2 and STIR as well as axial T2 images of the lumbar spine were obtained. COMPARISON: 11/14/2017. FINDINGS:  Normal lumbar lordosis is maintained. Lumbar levoscoliotic curvature is redemonstrated. The vertebral body heights are preserved. The marrow signal is heterogeneous, reflecting some Modic type II change at L2-3 and probable generalized osteopenia. The distal cord and conus medullaris have a normal caliber and morphology.  The conus terminates at R1.  No abnormal cord signal is seen in the distal cord or conus. T2 hyperintense renal lesions are partially assessed. Degenerative disk disease includes diffuse desiccation and variable, asymmetric disc height loss, severe on the right at L2-3. At L1-2, there is a moderate diffuse disc bulge with mild facet and ligamentum flavum hypertrophy. Spinal stenosis is mild. Foraminal narrowing is minimal. At L2-3, there is a mild diffuse disc bulge with moderate facet and ligamentum flavum hypertrophy. Spinal stenosis is mild-to-moderate. Foraminal stenoses are minimal on the left and mild-to-moderate on the right. At L3-4, there is a mild diffuse disc bulge with  "moderate facet and ligamentum flavum hypertrophy. Spinal stenosis is mild to moderate. Foraminal stenosis is severe on the left due to a focal left foraminal protrusion, mild on the right. At L4-5, there is a mild diffuse disc bulge with moderate facet and ligamentum flavum hypertrophy. Spinal stenosis is mild. Foraminal stenosis is mild on the left. The right neural foramen is patent. At L5-S1, there is a moderate diffuse disc bulge with moderate facet hypertrophy. Spinal stenosis is mild. Foraminal stenoses are mild.     IMPRESSION: Focal left L3-4 foraminal disc protrusion, contributing to focal severe foraminal stenosis, similar to prior. Other diffuse degenerative changes are unchanged and do not result in severe nerve root impingement. OUSMANE PUTNAM MD      Vitamin D:  Vitamin D Deficiency Screening Results:  No results found for: VITDT  No results found for: CBH463, VFWX574, [id number], VITD3, D2VIT, D3VIT, DTOT, [id number], [id number], [id number], [id number], [id number], [id number]      Nutritional Status:  Estimated body mass index is 31.54 kg/m  as calculated from the following:    Height as of 12/27/19: 1.702 m (5' 7\").    Weight as of 12/27/19: 91.4 kg (201 lb 6.4 oz).    Lab Results   Component Value Date    ALBUMIN 4.0 12/02/2019       Diabetes Screening:  Lab Results   Component Value Date    A1C 6.6 12/02/2019    A1C 7.0 08/19/2019    A1C 7.9 05/30/2019    A1C 8.4 03/11/2019    A1C 6.6 03/05/2018       Nicotine Usage:    No                Physical Exam  BP (!) 146/80 (BP Location: Left arm, Patient Position: Chair, Cuff Size: Adult Regular)   Pulse 69   Resp 16   SpO2 96%     Constitutional: Oriented to person, place, and time. Appears well-developed and well-nourished. Cooperative. No distress.   HENT:   Head: Normocephalic and atraumatic.   Eyes: Conjunctivae are normal.  Neck: Normal range of motion. Neck supple. No spinous process tenderness and no muscular tenderness present. No " tracheal deviation present.  Cardiovascular: Normal rate and regular rhythm.    Pulmonary/Chest: Effort normal and breath sounds normal.  Abdominal: Soft. Bowel sounds are normal. Exhibits no distension. There is no tenderness.   Musculoskeletal:   Cervical flexion-extension range of motion: normal  Lumbar flexion/extension range of motion: normal    Neurological: alert and oriented to person, place, and time.   No cranial nerve deficit   sensory deficit left fingers 2/3/4/5  Gait normal, able to turn briskly without imbalance      Reflex Scores:   1+ biceps, brachialis, patellar, Achilles  No Mclean's   No ankle clonus  No Lhermitte's  No Spurling's      STRENGTH LEFT RIGHT   Deltoid 5 5   Bicep 5 5   Wrist Extensor 5 5   Tricep 5 5   Finger flexion 5 5   Finger abduction 5 5    5 5       Hip Flexion     5     5   Knee Extension 5 5   Ankle Dorsiflexion 5 5   Extensor Hallucis Longus 5 5   Plantar Flexion 5 5   Foot eversion 5 5   Foot inversion 5 5         Skin: Skin is warm, dry and intact.   Psychiatric: Normal mood and affect. Speech is normal and behavior is normal.        ASSESSMENT:  Ricki Sharp is a 69 year old male with liver transplant, diffuse B-cell lymphoma, diabetes, COPD, cervical canal stenosis, left fingertip numbness      PLAN:  Reviewed patient's imaging showing chronic degenerative arthritis with near autofusion of C3/4/5, and cervical canal stenosis; he does not have overt symptoms of myelopathy  Patient's symptoms are mild at this time, consisting of left finger numbness, no weakness; have been present for a year without significant change. Canal stenosis is chronic based on near auto-fusion of C3/4/5  We discussed that surgery is possible but very high risk in light of severe medical comorbidities, at current time goal would be preventative and not treating significant symptoms.  Medical risks of surgery reviewed with Anesthesia in PAC clinic in light of patient's complex  history including but not limited to Immunosuppressed on tacrolimus, history of portal venous thrombosis now stented, COPD, aortic stenosis, hypertension    Team assessment is we currently feel that risks of surgical intervention outweigh benefits for this patient based on risk of death or lift-threatening complication. If patient were to develop worsening symptoms and wanted to discuss surgery again he is welcome to return. Patient is welcome to pursue second surgical opinion elsewhere and we offered to make referral for him            Viji Roach MD    [facility] Department of Neurosurgery  Complex Spinal Deformity, Scoliosis, and Minimally Invasive Spine Surgery Specialist  Office: [phone]          I spent 30 minutes in patient care with greater than 50% spent in counseling and/or coordination of care.

## 2020-01-23 NOTE — LETTER
1/23/2020       RE: Ricki Sharp  [address] [facility] [id number]     Dear Colleague,    Thank you for referring your patient, Ricki Sharp, to the [facility] at [facility]. Please see a copy of my visit note below.        Neurosurgery Clinic Note    Chief Complaint: left fingertip numbness     History of Present Illness:  It was a pleasure to evaluate Ricki Sharp in clinic today     Ricki Sharp is a 69 year old male with history of liver transplant for alcoholic cirrhosis with hepatitis C, non-Hodgkin's lymphoma (diffuse large B-cell) treated with CHOP, diabetes presenting with one year of left fingertip numbness in fingers 234 and 5.  This is not significantly progressive.  No right-sided symptoms.  There is no radiating pain or numbness down his arms.  Symptoms are constant without relieving or exacerbating factors.  No changes in balance which has been problematic for several decades.  No changes in urinary frequency or urgency, no urinary incontinence.          Review of Systems   Answers for HPI/ROS submitted by the patient on 1/18/2020   General Symptoms: No  Skin Symptoms: No  HENT Symptoms: No  EYE SYMPTOMS: No  HEART SYMPTOMS: No  LUNG SYMPTOMS: Yes  INTESTINAL SYMPTOMS: No  URINARY SYMPTOMS: No  REPRODUCTIVE SYMPTOMS: No  SKELETAL SYMPTOMS: Yes  BLOOD SYMPTOMS: No  NERVOUS SYSTEM SYMPTOMS: Yes  MENTAL HEALTH SYMPTOMS: No  Cough: No  Sputum or phlegm: No  Coughing up blood: No  Difficulty breating or shortness of breath: Yes  Snoring: No  Wheezing: Yes  Difficulty breathing on exertion: Yes  Nighttime Cough: No  Difficulty breathing when lying flat: Yes  Back pain: Yes  Muscle aches: Yes  Neck pain: No  Swollen joints: No  Joint pain: No  Bone pain: Yes  Muscle cramps: No  Muscle weakness: No  Joint stiffness: Yes  Bone fracture: No  Trouble with coordination: No  Dizziness or trouble with balance: Yes  Fainting  or black-out spells: No  Memory loss: No  Headache: No  Seizures: No  Speech problems: No  Tingling: Yes  Tremor: No  Weakness: No  Difficulty walking: Yes  Paralysis: No  Numbness: Yes    Active Problems    Problem Noted Date   Hypomagnesemia 2017   Hypertriglyceridemia 2016   Thrombocytopenia 2016   Ground glass opacity present on imaging of lung 2016   Overview:     Side-effect of R-CHOP. Has been evaluated by [facility]. No follow-up recommended.  Jarad Bocanegra MD .................... 2016 9:57 AM       CKD (chronic kidney disease), stage III 2016   Non morbid obesity due to excess calories 2016   Status post left hip replacement 2016   Diabetes mellitus type 2, controlled, with complications 2016   Prolonged QT interval 2016   Chronic obstructive pulmonary disease 2016   Moderate aortic stenosis 2016   Immunosuppressed status 10/30/2015   Hypertriglyceridemia 10/29/2015   Tobacco use disorder 10/29/2015   LBBB (left bundle branch block) 10/29/2015   First degree AV block 10/29/2015   Hyperglycemia 2015   Inferior mesenteric vein thrombosis 12/10/2014   Anxiety 2014   Osteoarthritis 2013   S/P liver transplant 2012   Overview:     History of Hepatitis C, alcoholic cirrhosis     Benito's esophagus with dysplasia 2011   Overview:     [facility] endo 2010       LIVER DISEASE, CHRONIC 2010   Overview:     End Stage liver disease secondary to Hepatitis C and alcoholic cirrhosis. (Dr. Mike Gray, [facility]) S/p  liver transplant in 3/2008       HYPERTENSION 2009   DYSTHYMIA 2006   ALCOHOL ABUSE     CIRRHOSIS     Overview:            PEPTIC ULCER DISEASE     Overview:            THROMBOCYTOPENIA     HYPOALBUMINEMIA     PORTAL HYPERTENSION     ESOPHAGEAL VARICES     HYPOTHYROIDISM     Diffuse large B cell lymphoma     Overview:     Undergoing Rituxan     Portal vein  stenosis of transplanted liver     Overview:     Stent 11/2014       Resolved Problems    Problem Noted Date Resolved Date   LBBB (left bundle branch block) 03/08/2016 04/21/2016   Tobacco dependence 06/12/2015 04/21/2016   INGROWN TOENAIL, INFECTED 03/28/2012 04/21/2016   COPD 02/09/2012 04/21/2016   ELBOW PAIN 10/05/2010 04/21/2016   DIZZINESS 08/19/2010 09/30/2010     Immunizations    Name Administration Dates Next Due   Influenza Virus, Unspecified 11/21/2011     Influenza, High-dose Inactivated 11/09/2017     Influenza, IIV3 (Age >=3 years) 01/28/2013     Influenza, IIV4 11/22/2016, 10/29/2015     Pneumococcal Poly,23-Valent (Pneumovax) 04/26/2013, 11/04/2009     Pneumococcal conj 13-Valent (Prevnar 13) 12/19/2016     Tdap 02/26/2013       Surgical History    Surgery Date Site/Laterality Comments   NC TRANSPLANT LIVER ALLO TRANSPLANT 2008        ERCP          (IA) NC ESOPHAG XSECT/REPAIR VARICES          HIP REPLACEMENT     Right     CHOLECYSTECTOMY          NC EXTRAPLEURAL REMOVE EMPYEMA     Bilateral     WRIST FRACTURE TX 2000   ORIF Left     SCLEROTHERAPY 1995   Sclerosing esophageal varices, [facility]      OTHER 1996   Banding of esophageal varices, [facility]      HIP REPLACEMENT 03/06   Right total hip arthroplasty      ENDOSCOPIC PANCREATOSCOPY 05/06   Endoscopic retrograde cholangiopancreatography, [facility]      CHOLECYSTECTOMY 06/06   E. coli bacteremia, [facility]     PARACENTESIS 06/06 June 23, 2006;July 07, 2006.     ECHOCARDIOGRAM 07/06   Transthoracic echocardiogram with left ventricular ejection fraction of 60%, no wall motion abnormality, left atrial enlargement, no pericardial effusion, mild aortic stenosis with aortic valve area of 1.7 cm2     STRESS TEST EXERCISE 11/06   Exercise stress echo, within normal limits     ECHO COMPLETE WO CONTRAST 09/07   ([facility]), within normal limits, LVEF 55%     LIVER SURGERY 03/08    Status post liver transplant     DRAIN CARE    Status post bilateral thorascopic empyema drainage and decortication procedures.     ERCP 2008   Cholangitis secondary to biliary stricture status post ERCP and stenting     HIP REPLACEMENT 2016 Left      COLONOSCOPY    normal      colonoscopy 12            Medical History    Medical History Date Comments   History of liver transplant (HC)      COPD (chronic obstructive pulmonary disease) (HC)       Portal vein stenosis of transplanted liver (HC)   Stent 2014   Benito esophagus       Hypertension       Hypothyroid       Diabetes mellitus type II       Esophageal varices (HC)       Portal hypertension (HC)       Hyperbilirubinemia       Hypoalbuminemia       Thrombocytopenia (HC)       PUD (peptic ulcer disease)       Aortic stenosis, mild   ECHO    Polysubstance abuse (HC) 1960s /IV drugs   History of alcohol abuse 1960s heavy use   End-stage liver disease (HC)  secondary to Hepatitis C and alcoholic cirrhosis. (Dr. Mike Gray, [facility]). Status post  donor liver transplant in 2008.   Diffuse large B cell lymphoma (HC)  Undergoing Rituxan/R-CHOP   Anxiety 6/3/2014       Family History    Medical History Relation Name Comments   Alcohol/Drug Brother    at 53; cirrhosis/heavy alcohol use   Heart Disease Father    at 62; myocardial infarction    Alcohol/Drug Mother    at 65; cirrhosis/heavy alcohol use   Heart Disease Other FHx + CAD, MI     Relation Name Status Comments   Brother    (Age 53) Etoh cirrhosis   Brother   Alive CABG   Brother         Father    (Age 62) MI   Mother    (Age 65) Etoh cirrhosis   Other FHx +         Social History    Tobacco Use Types Packs/Day Years Used Date   Former Smoker Cigarettes 1 20 Quit: 10/01/2015   Smokeless Tobacco: Never Used           Tobacco Cessation: Counseling Given: No     Alcohol Use Drinks/Week oz/Week Comments   No 0  Standard drinks or equivalent   0.0       Sex Assigned at Birth Date Recorded   Not on file       Job Start Date Occupation Industry   Not on file Not on file Not on file     Travel History Travel Start Travel End   No recent travel history available.           IMAGING   Mr Cervical Spine W/o Contrast    Result Date: 11/11/2019  MR CERVICAL SPINE W/O CONTRAST HISTORY: 69 years Male Cervical radiculopathy. Patient reports numbness of left hand and fingers of 6 months duration. COMPARISONS: Outside CT neck dated 3/28/2016 TECHNIQUE: Sagittal T1, sagittal T2, sagittal STIR, axial gradient echo and axial T2 imaging of the cervical spine was performed. FINDINGS: There is advanced multilevel degenerative disc disease of the cervical spine. There is slight reversal of lordosis from C2 through C5. There is exaggerated kyphosis from C5 through T1. There is mild anterolisthesis of C7 are in relation to T1. There is cervical canal stenosis at C4-C5 and slight kinking  of the cervical cord above the C4 level.  C2-C3: There is loss of disc signal. The central canal and neuroforamen are widely patent. C3-C4: There is complete loss of disc height and possible bony ankylosis between the C3 and C4 vertebral bodies. The central canal is patent. There is redundant buckling of the cervical cord at this level. There is moderate to severe bilateral neuroforaminal narrowing. C4-C5: There is severe loss of disc height. There is posterior buckling of the posterior longitudinal ligament and slight retrolisthesis of C4 in relation to C5. There is severe central canal stenosis and anterior posterior flattening of the cervical spinal cord. There is severe neuroforaminal narrowing. There is facet osteoarthritis C5-C6: There is severe loss of disc height. There is a posterior broad-based disc bulge and buckling of the posterior longitudinal ligament. There is mild mass effect upon the ventral surface of the cervical cord. There is facet  osteoarthritis with associated bone marrow edema on the right, right greater than left with moderate to severe right neuroforaminal narrowing. There is moderate to severe left neuroforaminal narrowing as well. C6-C7: There is moderate severe loss of disc height. There is a mild broad-based disc bulge. The central canal is patent. There is neuroforaminal narrowing of moderate severity. C7-T1: There is mild degenerative anterolisthesis of C3 7 in relation is T1. This measures 3 mm. The central canal is patent. The neuroforamen are moderately  narrowed.     IMPRESSION: Advanced multilevel degenerative disc disease with severe central canal stenosis at the C4-C5 level with mass effect upon the cord and  redundant buckling of the cervical cord above this level. Advanced multilevel degenerative changes with multilevel advanced neural foraminal narrowing as detailed above. RUDY CLARK MD    Mr Lumbar Spine W/o Contrast    Result Date: 6/12/2018  MR LUMBAR SPINE W/O CONTRAST HISTORY: ; Lumbar facet arthropathy; Lumbar foraminal stenosis; Degeneration of lumbar intervertebral disc. . TECHNIQUE: Sagittal T1, T2 and STIR as well as axial T2 images of the lumbar spine were obtained. COMPARISON: 11/14/2017. FINDINGS:  Normal lumbar lordosis is maintained. Lumbar levoscoliotic curvature is redemonstrated. The vertebral body heights are preserved. The marrow signal is heterogeneous, reflecting some Modic type II change at L2-3 and probable generalized osteopenia. The distal cord and conus medullaris have a normal caliber and morphology.  The conus terminates at R1.  No abnormal cord signal is seen in the distal cord or conus. T2 hyperintense renal lesions are partially assessed. Degenerative disk disease includes diffuse desiccation and variable, asymmetric disc height loss, severe on the right at L2-3. At L1-2, there is a moderate diffuse disc bulge with mild facet and ligamentum flavum hypertrophy. Spinal stenosis is mild.  "Foraminal narrowing is minimal. At L2-3, there is a mild diffuse disc bulge with moderate facet and ligamentum flavum hypertrophy. Spinal stenosis is mild-to-moderate. Foraminal stenoses are minimal on the left and mild-to-moderate on the right. At L3-4, there is a mild diffuse disc bulge with moderate facet and ligamentum flavum hypertrophy. Spinal stenosis is mild to moderate. Foraminal stenosis is severe on the left due to a focal left foraminal protrusion, mild on the right. At L4-5, there is a mild diffuse disc bulge with moderate facet and ligamentum flavum hypertrophy. Spinal stenosis is mild. Foraminal stenosis is mild on the left. The right neural foramen is patent. At L5-S1, there is a moderate diffuse disc bulge with moderate facet hypertrophy. Spinal stenosis is mild. Foraminal stenoses are mild.     IMPRESSION: Focal left L3-4 foraminal disc protrusion, contributing to focal severe foraminal stenosis, similar to prior. Other diffuse degenerative changes are unchanged and do not result in severe nerve root impingement. OUSMANE PUTNAM MD      Vitamin D:  Vitamin D Deficiency Screening Results:  No results found for: VITDT  No results found for: UUH263, OLOD627, UPKS14XHZYL, VITD3, D2VIT, D3VIT, DTOT, [id number], [id number], [id number], [id number], [id number], [id number]      Nutritional Status:  Estimated body mass index is 31.54 kg/m  as calculated from the following:    Height as of 12/27/19: 1.702 m (5' 7\").    Weight as of 12/27/19: 91.4 kg (201 lb 6.4 oz).    Lab Results   Component Value Date    ALBUMIN 4.0 12/02/2019       Diabetes Screening:  Lab Results   Component Value Date    A1C 6.6 12/02/2019    A1C 7.0 08/19/2019    A1C 7.9 05/30/2019    A1C 8.4 03/11/2019    A1C 6.6 03/05/2018       Nicotine Usage:    No                Physical Exam  BP (!) 146/80 (BP Location: Left arm, Patient Position: Chair, Cuff Size: Adult Regular)   Pulse 69   Resp 16   SpO2 96%     Constitutional: Oriented " to person, place, and time. Appears well-developed and well-nourished. Cooperative. No distress.   HENT:   Head: Normocephalic and atraumatic.   Eyes: Conjunctivae are normal.  Neck: Normal range of motion. Neck supple. No spinous process tenderness and no muscular tenderness present. No tracheal deviation present.  Cardiovascular: Normal rate and regular rhythm.    Pulmonary/Chest: Effort normal and breath sounds normal.  Abdominal: Soft. Bowel sounds are normal. Exhibits no distension. There is no tenderness.   Musculoskeletal:   Cervical flexion-extension range of motion: normal  Lumbar flexion/extension range of motion: normal    Neurological: alert and oriented to person, place, and time.   No cranial nerve deficit   sensory deficit left fingers 2/3/4/5  Gait normal, able to turn briskly without imbalance      Reflex Scores:   1+ biceps, brachialis, patellar, Achilles  No Mclean's   No ankle clonus  No Lhermitte's  No Spurling's      STRENGTH LEFT RIGHT   Deltoid 5 5   Bicep 5 5   Wrist Extensor 5 5   Tricep 5 5   Finger flexion 5 5   Finger abduction 5 5    5 5       Hip Flexion     5     5   Knee Extension 5 5   Ankle Dorsiflexion 5 5   Extensor Hallucis Longus 5 5   Plantar Flexion 5 5   Foot eversion 5 5   Foot inversion 5 5         Skin: Skin is warm, dry and intact.   Psychiatric: Normal mood and affect. Speech is normal and behavior is normal.        ASSESSMENT:  Ricki Sharp is a 69 year old male with liver transplant, diffuse B-cell lymphoma, diabetes, COPD, cervical canal stenosis, left fingertip numbness      PLAN:  Reviewed patient's imaging showing chronic degenerative arthritis with near autofusion of C3/4/5, and cervical canal stenosis; he does not have overt symptoms of myelopathy  Patient's symptoms are mild at this time, consisting of left finger numbness, no weakness; have been present for a year without significant change. Canal stenosis is chronic based on near auto-fusion of  C3/4/5  We discussed that surgery is possible but very high risk in light of severe medical comorbidities, at current time goal would be preventative and not treating significant symptoms.  Medical risks of surgery reviewed with Anesthesia in PAC clinic in light of patient's complex history including but not limited to Immunosuppressed on tacrolimus, history of portal venous thrombosis now stented, COPD, aortic stenosis, hypertension    Team assessment is we currently feel that risks of surgical intervention outweigh benefits for this patient based on risk of death or lift-threatening complication. If patient were to develop worsening symptoms and wanted to discuss surgery again he is welcome to return. Patient is welcome to pursue second surgical opinion elsewhere and we offered to make referral for him            Viji Roach MD    [facility] Department of Neurosurgery  Complex Spinal Deformity, Scoliosis, and Minimally Invasive Spine Surgery Specialist  Office: [phone]          I spent 30 minutes in patient care with greater than 50% spent in counseling and/or coordination of care.      Again, thank you for allowing me to participate in the care of your patient.      Sincerely,    Viji Roach MD

## 2020-01-23 NOTE — LETTER
1/23/2020       RE: Ricki Sharp  [address] [facility] [id number]     Dear Colleague,    Thank you for referring your patient, Ricki Sharp, to the [facility] NEUROSURGERY at [facility]. Please see a copy of my visit note below.    Neurosurgery Clinic Note    Chief Complaint: left fingertip numbness     History of Present Illness:  It was a pleasure to evaluate Ricki Shrap in clinic today     Ricki Sharp is a 69 year old male with history of liver transplant for alcoholic cirrhosis with hepatitis C, non-Hodgkin's lymphoma (diffuse large B-cell) treated with CHOP, diabetes presenting with one year of left fingertip numbness in fingers 234 and 5.  This is not significantly progressive.  No right-sided symptoms.  There is no radiating pain or numbness down his arms.  Symptoms are constant without relieving or exacerbating factors.  No changes in balance which has been problematic for several decades.  No changes in urinary frequency or urgency, no urinary incontinence.          Review of Systems   Answers for HPI/ROS submitted by the patient on 1/18/2020   General Symptoms: No  Skin Symptoms: No  HENT Symptoms: No  EYE SYMPTOMS: No  HEART SYMPTOMS: No  LUNG SYMPTOMS: Yes  INTESTINAL SYMPTOMS: No  URINARY SYMPTOMS: No  REPRODUCTIVE SYMPTOMS: No  SKELETAL SYMPTOMS: Yes  BLOOD SYMPTOMS: No  NERVOUS SYSTEM SYMPTOMS: Yes  MENTAL HEALTH SYMPTOMS: No  Cough: No  Sputum or phlegm: No  Coughing up blood: No  Difficulty breating or shortness of breath: Yes  Snoring: No  Wheezing: Yes  Difficulty breathing on exertion: Yes  Nighttime Cough: No  Difficulty breathing when lying flat: Yes  Back pain: Yes  Muscle aches: Yes  Neck pain: No  Swollen joints: No  Joint pain: No  Bone pain: Yes  Muscle cramps: No  Muscle weakness: No  Joint stiffness: Yes  Bone fracture: No  Trouble with coordination: No  Dizziness or trouble with balance: Yes  Fainting or  black-out spells: No  Memory loss: No  Headache: No  Seizures: No  Speech problems: No  Tingling: Yes  Tremor: No  Weakness: No  Difficulty walking: Yes  Paralysis: No  Numbness: Yes    Active Problems    Problem Noted Date   Hypomagnesemia 2017   Hypertriglyceridemia 2016   Thrombocytopenia 2016   Ground glass opacity present on imaging of lung 2016   Overview:     Side-effect of R-CHOP. Has been evaluated by [facility]. No follow-up recommended.  Jarad Bocanegra MD .................... 2016 9:57 AM       CKD (chronic kidney disease), stage III 2016   Non morbid obesity due to excess calories 2016   Status post left hip replacement 2016   Diabetes mellitus type 2, controlled, with complications 2016   Prolonged QT interval 2016   Chronic obstructive pulmonary disease 2016   Moderate aortic stenosis 2016   Immunosuppressed status 10/30/2015   Hypertriglyceridemia 10/29/2015   Tobacco use disorder 10/29/2015   LBBB (left bundle branch block) 10/29/2015   First degree AV block 10/29/2015   Hyperglycemia 2015   Inferior mesenteric vein thrombosis 12/10/2014   Anxiety 2014   Osteoarthritis 2013   S/P liver transplant 2012   Overview:     History of Hepatitis C, alcoholic cirrhosis     Benito's esophagus with dysplasia 2011   Overview:     [facility] endo 2010       LIVER DISEASE, CHRONIC 2010   Overview:     End Stage liver disease secondary to Hepatitis C and alcoholic cirrhosis. (Dr. Mike Gray, [facility]) S/p  liver transplant in 3/2008       HYPERTENSION 2009   DYSTHYMIA 2006   ALCOHOL ABUSE     CIRRHOSIS     Overview:            PEPTIC ULCER DISEASE     Overview:            THROMBOCYTOPENIA     HYPOALBUMINEMIA     PORTAL HYPERTENSION     ESOPHAGEAL VARICES     HYPOTHYROIDISM     Diffuse large B cell lymphoma     Overview:     Undergoing Rituxan     Portal vein stenosis  of transplanted liver     Overview:     Stent 11/2014       Resolved Problems    Problem Noted Date Resolved Date   LBBB (left bundle branch block) 03/08/2016 04/21/2016   Tobacco dependence 06/12/2015 04/21/2016   INGROWN TOENAIL, INFECTED 03/28/2012 04/21/2016   COPD 02/09/2012 04/21/2016   ELBOW PAIN 10/05/2010 04/21/2016   DIZZINESS 08/19/2010 09/30/2010     Immunizations    Name Administration Dates Next Due   Influenza Virus, Unspecified 11/21/2011     Influenza, High-dose Inactivated 11/09/2017     Influenza, IIV3 (Age >=3 years) 01/28/2013     Influenza, IIV4 11/22/2016, 10/29/2015     Pneumococcal Poly,23-Valent (Pneumovax) 04/26/2013, 11/04/2009     Pneumococcal conj 13-Valent (Prevnar 13) 12/19/2016     Tdap 02/26/2013       Surgical History    Surgery Date Site/Laterality Comments   RI TRANSPLANT LIVER ALLO TRANSPLANT 2008        ERCP          (IA) RI ESOPHAG XSECT/REPAIR VARICES          HIP REPLACEMENT     Right     CHOLECYSTECTOMY          RI EXTRAPLEURAL REMOVE EMPYEMA     Bilateral     WRIST FRACTURE TX 2000   ORIF Left     SCLEROTHERAPY 1995   Sclerosing esophageal varices, [facility]      OTHER 1996   Banding of esophageal varices, [facility]      HIP REPLACEMENT 03/06   Right total hip arthroplasty      ENDOSCOPIC PANCREATOSCOPY 05/06   Endoscopic retrograde cholangiopancreatography, [facility]      CHOLECYSTECTOMY 06/06   E. coli bacteremia, [facility]     PARACENTESIS 06/06 June 23, 2006;July 07, 2006.     ECHOCARDIOGRAM 07/06   Transthoracic echocardiogram with left ventricular ejection fraction of 60%, no wall motion abnormality, left atrial enlargement, no pericardial effusion, mild aortic stenosis with aortic valve area of 1.7 cm2     STRESS TEST EXERCISE 11/06   Exercise stress echo, within normal limits     ECHO COMPLETE WO CONTRAST 09/07   ([facility]), within normal limits, LVEF 55%     LIVER SURGERY 03/08   Status post  liver transplant     DRAIN CARE    Status post bilateral thorascopic empyema drainage and decortication procedures.     ERCP 2008   Cholangitis secondary to biliary stricture status post ERCP and stenting     HIP REPLACEMENT 2016 Left      COLONOSCOPY    normal      colonoscopy 12            Medical History    Medical History Date Comments   History of liver transplant (HC)      COPD (chronic obstructive pulmonary disease) (HC)       Portal vein stenosis of transplanted liver (HC)   Stent 2014   Benito esophagus       Hypertension       Hypothyroid       Diabetes mellitus type II       Esophageal varices (HC)       Portal hypertension (HC)       Hyperbilirubinemia       Hypoalbuminemia       Thrombocytopenia (HC)       PUD (peptic ulcer disease)       Aortic stenosis, mild   ECHO    Polysubstance abuse (HC) 1960s /IV drugs   History of alcohol abuse 1960s heavy use   End-stage liver disease (HC)  secondary to Hepatitis C and alcoholic cirrhosis. (Dr. Mike Gray, [facility]). Status post  donor liver transplant in 2008.   Diffuse large B cell lymphoma (HC)  Undergoing Rituxan/R-CHOP   Anxiety 6/3/2014       Family History    Medical History Relation Name Comments   Alcohol/Drug Brother    at 53; cirrhosis/heavy alcohol use   Heart Disease Father    at 62; myocardial infarction    Alcohol/Drug Mother    at 65; cirrhosis/heavy alcohol use   Heart Disease Other FHx + CAD, MI     Relation Name Status Comments   Brother    (Age 53) Etoh cirrhosis   Brother   Alive CABG   Brother         Father    (Age 62) MI   Mother    (Age 65) Etoh cirrhosis   Other FHx +         Social History    Tobacco Use Types Packs/Day Years Used Date   Former Smoker Cigarettes 1 20 Quit: 10/01/2015   Smokeless Tobacco: Never Used           Tobacco Cessation: Counseling Given: No     Alcohol Use Drinks/Week oz/Week Comments   No 0 Standard  drinks or equivalent   0.0       Sex Assigned at Birth Date Recorded   Not on file       Job Start Date Occupation Industry   Not on file Not on file Not on file     Travel History Travel Start Travel End   No recent travel history available.           IMAGING   Mr Cervical Spine W/o Contrast    Result Date: 11/11/2019  MR CERVICAL SPINE W/O CONTRAST HISTORY: 69 years Male Cervical radiculopathy. Patient reports numbness of left hand and fingers of 6 months duration. COMPARISONS: Outside CT neck dated 3/28/2016 TECHNIQUE: Sagittal T1, sagittal T2, sagittal STIR, axial gradient echo and axial T2 imaging of the cervical spine was performed. FINDINGS: There is advanced multilevel degenerative disc disease of the cervical spine. There is slight reversal of lordosis from C2 through C5. There is exaggerated kyphosis from C5 through T1. There is mild anterolisthesis of C7 are in relation to T1. There is cervical canal stenosis at C4-C5 and slight kinking  of the cervical cord above the C4 level.  C2-C3: There is loss of disc signal. The central canal and neuroforamen are widely patent. C3-C4: There is complete loss of disc height and possible bony ankylosis between the C3 and C4 vertebral bodies. The central canal is patent. There is redundant buckling of the cervical cord at this level. There is moderate to severe bilateral neuroforaminal narrowing. C4-C5: There is severe loss of disc height. There is posterior buckling of the posterior longitudinal ligament and slight retrolisthesis of C4 in relation to C5. There is severe central canal stenosis and anterior posterior flattening of the cervical spinal cord. There is severe neuroforaminal narrowing. There is facet osteoarthritis C5-C6: There is severe loss of disc height. There is a posterior broad-based disc bulge and buckling of the posterior longitudinal ligament. There is mild mass effect upon the ventral surface of the cervical cord. There is facet osteoarthritis  with associated bone marrow edema on the right, right greater than left with moderate to severe right neuroforaminal narrowing. There is moderate to severe left neuroforaminal narrowing as well. C6-C7: There is moderate severe loss of disc height. There is a mild broad-based disc bulge. The central canal is patent. There is neuroforaminal narrowing of moderate severity. C7-T1: There is mild degenerative anterolisthesis of C3 7 in relation is T1. This measures 3 mm. The central canal is patent. The neuroforamen are moderately  narrowed.     IMPRESSION: Advanced multilevel degenerative disc disease with severe central canal stenosis at the C4-C5 level with mass effect upon the cord and  redundant buckling of the cervical cord above this level. Advanced multilevel degenerative changes with multilevel advanced neural foraminal narrowing as detailed above. RUDY CLARK MD    Mr Lumbar Spine W/o Contrast    Result Date: 6/12/2018  MR LUMBAR SPINE W/O CONTRAST HISTORY: ; Lumbar facet arthropathy; Lumbar foraminal stenosis; Degeneration of lumbar intervertebral disc. . TECHNIQUE: Sagittal T1, T2 and STIR as well as axial T2 images of the lumbar spine were obtained. COMPARISON: 11/14/2017. FINDINGS:  Normal lumbar lordosis is maintained. Lumbar levoscoliotic curvature is redemonstrated. The vertebral body heights are preserved. The marrow signal is heterogeneous, reflecting some Modic type II change at L2-3 and probable generalized osteopenia. The distal cord and conus medullaris have a normal caliber and morphology.  The conus terminates at R1.  No abnormal cord signal is seen in the distal cord or conus. T2 hyperintense renal lesions are partially assessed. Degenerative disk disease includes diffuse desiccation and variable, asymmetric disc height loss, severe on the right at L2-3. At L1-2, there is a moderate diffuse disc bulge with mild facet and ligamentum flavum hypertrophy. Spinal stenosis is mild. Foraminal  "narrowing is minimal. At L2-3, there is a mild diffuse disc bulge with moderate facet and ligamentum flavum hypertrophy. Spinal stenosis is mild-to-moderate. Foraminal stenoses are minimal on the left and mild-to-moderate on the right. At L3-4, there is a mild diffuse disc bulge with moderate facet and ligamentum flavum hypertrophy. Spinal stenosis is mild to moderate. Foraminal stenosis is severe on the left due to a focal left foraminal protrusion, mild on the right. At L4-5, there is a mild diffuse disc bulge with moderate facet and ligamentum flavum hypertrophy. Spinal stenosis is mild. Foraminal stenosis is mild on the left. The right neural foramen is patent. At L5-S1, there is a moderate diffuse disc bulge with moderate facet hypertrophy. Spinal stenosis is mild. Foraminal stenoses are mild.     IMPRESSION: Focal left L3-4 foraminal disc protrusion, contributing to focal severe foraminal stenosis, similar to prior. Other diffuse degenerative changes are unchanged and do not result in severe nerve root impingement. OUSMANE PUTNAM MD      Vitamin D:  Vitamin D Deficiency Screening Results:  No results found for: VITDT  No results found for: QRV214, UIHJ877, KDFY80VAKTF, VITD3, D2VIT, D3VIT, DTOT, [id number], [id number], [id number], [id number], [id number], [id number]      Nutritional Status:  Estimated body mass index is 31.54 kg/m  as calculated from the following:    Height as of 12/27/19: 1.702 m (5' 7\").    Weight as of 12/27/19: 91.4 kg (201 lb 6.4 oz).    Lab Results   Component Value Date    ALBUMIN 4.0 12/02/2019       Diabetes Screening:  Lab Results   Component Value Date    A1C 6.6 12/02/2019    A1C 7.0 08/19/2019    A1C 7.9 05/30/2019    A1C 8.4 03/11/2019    A1C 6.6 03/05/2018       Nicotine Usage:    No                Physical Exam  BP (!) 146/80 (BP Location: Left arm, Patient Position: Chair, Cuff Size: Adult Regular)   Pulse 69   Resp 16   SpO2 96%     Constitutional: Oriented to person, " place, and time. Appears well-developed and well-nourished. Cooperative. No distress.   HENT:   Head: Normocephalic and atraumatic.   Eyes: Conjunctivae are normal.  Neck: Normal range of motion. Neck supple. No spinous process tenderness and no muscular tenderness present. No tracheal deviation present.  Cardiovascular: Normal rate and regular rhythm.    Pulmonary/Chest: Effort normal and breath sounds normal.  Abdominal: Soft. Bowel sounds are normal. Exhibits no distension. There is no tenderness.   Musculoskeletal:   Cervical flexion-extension range of motion: normal  Lumbar flexion/extension range of motion: normal    Neurological: alert and oriented to person, place, and time.   No cranial nerve deficit   sensory deficit left fingers 2/3/4/5  Gait normal, able to turn briskly without imbalance      Reflex Scores:   1+ biceps, brachialis, patellar, Achilles  No Mclean's   No ankle clonus  No Lhermitte's  No Spurling's      STRENGTH LEFT RIGHT   Deltoid 5 5   Bicep 5 5   Wrist Extensor 5 5   Tricep 5 5   Finger flexion 5 5   Finger abduction 5 5    5 5       Hip Flexion     5     5   Knee Extension 5 5   Ankle Dorsiflexion 5 5   Extensor Hallucis Longus 5 5   Plantar Flexion 5 5   Foot eversion 5 5   Foot inversion 5 5     Skin: Skin is warm, dry and intact.   Psychiatric: Normal mood and affect. Speech is normal and behavior is normal.    ASSESSMENT:  Ricki Sharp is a 69 year old male with liver transplant, diffuse B-cell lymphoma, diabetes, COPD, cervical canal stenosis, left fingertip numbness      PLAN:  Reviewed patient's imaging showing chronic degenerative arthritis with near autofusion of C3/4/5, and cervical canal stenosis; he does not have overt symptoms of myelopathy  Patient's symptoms are mild at this time, consisting of left finger numbness, no weakness; have been present for a year without significant change. Canal stenosis is chronic based on near auto-fusion of C3/4/5  We discussed  that surgery is possible but very high risk in light of severe medical comorbidities, at current time goal would be preventative and not treating significant symptoms.  Medical risks of surgery reviewed with Anesthesia in PAC clinic in light of patient's complex history including but not limited to Immunosuppressed on tacrolimus, history of portal venous thrombosis now stented, COPD, aortic stenosis, hypertension    Team assessment is we currently feel that risks of surgical intervention outweigh benefits for this patient based on risk of death or lift-threatening complication. If patient were to develop worsening symptoms and wanted to discuss surgery again he is welcome to return. Patient is welcome to pursue second surgical opinion elsewhere and we offered to make referral for him            Viji Roach MD    [facility] Department of Neurosurgery  Complex Spinal Deformity, Scoliosis, and Minimally Invasive Spine Surgery Specialist  Office: [phone]    I spent 30 minutes in patient care with greater than 50% spent in counseling and/or coordination of care.

## 2020-01-23 NOTE — NURSING NOTE
Chief Complaint   Patient presents with     New Patient     [facility] NEW SPINAL STENOSIS OF LUMBAR REGION     Jojo Winn CMA

## 2020-02-08 ENCOUNTER — HEALTH MAINTENANCE LETTER (OUTPATIENT)
Age: 70
End: 2020-02-08

## 2020-03-11 DIAGNOSIS — E03.9 ACQUIRED HYPOTHYROIDISM: ICD-10-CM

## 2020-03-13 ENCOUNTER — TELEPHONE (OUTPATIENT)
Dept: TRANSPLANT | Facility: CLINIC | Age: 70
End: 2020-03-13

## 2020-03-13 RX ORDER — LEVOTHYROXINE SODIUM 175 UG/1
TABLET ORAL
Qty: 90 TABLET | Refills: 3 | Status: SHIPPED | OUTPATIENT
Start: 2020-03-13 | End: 2020-08-25

## 2020-03-13 NOTE — TELEPHONE ENCOUNTER
Call from Fide asking aobakari covid.  I told her:      The following instructions were provided to the patient:  The best prevention for avoiding all viral illness, including influenza and COVID-19, is thorough and frequent handwashing. You should avoid touching your face, nose, and mouth. Alcohol based hand sanitizers are also an effective method to clean your hands. For more information, the CDC website has numerous resources that are updated three times per week.

## 2020-03-13 NOTE — TELEPHONE ENCOUNTER
"Requested Prescriptions   Pending Prescriptions Disp Refills     levothyroxine (SYNTHROID/LEVOTHROID) 175 MCG tablet [Pharmacy Med Name: LEVOTHYROXINE 0.175MG (175MCG) TABS] 90 tablet 3     Sig: TAKE 1 TABLET(175 MCG) BY MOUTH DAILY       Thyroid Protocol Passed - 3/11/2020  1:26 PM        Passed - Patient is 12 years or older        Passed - Recent (12 mo) or future (30 days) visit within the authorizing provider's specialty     Patient has had an office visit with the authorizing provider or a provider within the authorizing providers department within the previous 12 mos or has a future within next 30 days. See \"Patient Info\" tab in inbasket, or \"Choose Columns\" in Meds & Orders section of the refill encounter.              Passed - Medication is active on med list        Passed - Normal TSH on file in past 12 months     Recent Labs   Lab Test 10/29/15  1727   TSH 5.65*               LOV 12/27/19  Prescription approved per [facility] Refill Protocol.  Brenda J. Goodell, RN on 3/13/2020 at 3:55 PM        "

## 2020-03-19 ENCOUNTER — TELEPHONE (OUTPATIENT)
Dept: PEDIATRICS | Facility: OTHER | Age: 70
End: 2020-03-19

## 2020-03-19 NOTE — TELEPHONE ENCOUNTER
Pt's wife states that he has labs that need to be done for his kidney transplant.  Was wondering if he should come in or not.  Did tell her to get ahold of the transplant team.      Seda Benito LPN ....................  3/19/2020   11:31 AM

## 2020-04-11 DIAGNOSIS — Z94.4 LIVER REPLACED BY TRANSPLANT (H): ICD-10-CM

## 2020-04-13 RX ORDER — METOPROLOL TARTRATE 100 MG
TABLET ORAL
Qty: 180 TABLET | Refills: 1 | Status: SHIPPED | OUTPATIENT
Start: 2020-04-13 | End: 2020-08-25

## 2020-04-13 NOTE — TELEPHONE ENCOUNTER
Prescription approved per [facility] Refill Protocol.  LOV: 12/2/19    Ne Farah RN on 4/13/2020 at 3:19 PM

## 2020-04-17 DIAGNOSIS — Z94.4 LIVER REPLACED BY TRANSPLANT (H): ICD-10-CM

## 2020-04-17 RX ORDER — TACROLIMUS 1 MG/1
CAPSULE ORAL
Qty: 60 CAPSULE | Refills: 11 | Status: SHIPPED | OUTPATIENT
Start: 2020-04-17 | End: 2021-01-01

## 2020-04-28 DIAGNOSIS — G89.29 CHRONIC LEFT-SIDED LOW BACK PAIN WITH LEFT-SIDED SCIATICA: ICD-10-CM

## 2020-04-28 DIAGNOSIS — M54.42 CHRONIC LEFT-SIDED LOW BACK PAIN WITH LEFT-SIDED SCIATICA: ICD-10-CM

## 2020-04-28 DIAGNOSIS — M47.816 LUMBAR FACET ARTHROPATHY: ICD-10-CM

## 2020-04-28 DIAGNOSIS — M51.369 LUMBAR DEGENERATIVE DISC DISEASE: ICD-10-CM

## 2020-04-28 DIAGNOSIS — M48.061 LUMBAR FORAMINAL STENOSIS: ICD-10-CM

## 2020-04-28 DIAGNOSIS — M48.061 SPINAL STENOSIS OF LUMBAR REGION, UNSPECIFIED WHETHER NEUROGENIC CLAUDICATION PRESENT: ICD-10-CM

## 2020-04-28 RX ORDER — CYCLOBENZAPRINE HCL 5 MG
TABLET ORAL
Qty: 45 TABLET | Refills: 0 | Status: SHIPPED | OUTPATIENT
Start: 2020-04-28 | End: 2020-06-15

## 2020-04-28 RX ORDER — TRAMADOL HYDROCHLORIDE 50 MG/1
TABLET ORAL
Qty: 60 TABLET | Refills: 5 | Status: SHIPPED | OUTPATIENT
Start: 2020-04-28 | End: 2020-08-25

## 2020-04-28 NOTE — TELEPHONE ENCOUNTER
Routing refill request to provider for review/approval because:  Drug not on the [facility] refill protocol     Last refill  Tramadol HCI 50MG  11/4/2019 60# 3 R    LOV: 12/2/2019  No future visits planned Denise Catalan RN on 4/28/2020 at 3:23 PM

## 2020-04-28 NOTE — TELEPHONE ENCOUNTER
-- If using more frequently, would refer to PT    Signed, Jarad Bocanegra MD, FAAP, FACP  Internal Medicine & Pediatrics

## 2020-04-28 NOTE — TELEPHONE ENCOUNTER
Routing refill request to provider for review/approval because:  Drug not on the [facility] refill protocol     Last refill  Flexeril 5mg tablet  10/25/2019  45# 3 Refills    LOV:12/2/2019  No future visits planned Denise Catalan RN on 4/28/2020 at 2:17 PM

## 2020-06-11 DIAGNOSIS — M47.816 LUMBAR FACET ARTHROPATHY: ICD-10-CM

## 2020-06-11 DIAGNOSIS — M51.369 LUMBAR DEGENERATIVE DISC DISEASE: ICD-10-CM

## 2020-06-11 DIAGNOSIS — G89.29 CHRONIC LEFT-SIDED LOW BACK PAIN WITH LEFT-SIDED SCIATICA: ICD-10-CM

## 2020-06-11 DIAGNOSIS — M54.42 CHRONIC LEFT-SIDED LOW BACK PAIN WITH LEFT-SIDED SCIATICA: ICD-10-CM

## 2020-06-15 RX ORDER — CYCLOBENZAPRINE HCL 5 MG
TABLET ORAL
Qty: 45 TABLET | Refills: 0 | Status: SHIPPED | OUTPATIENT
Start: 2020-06-15 | End: 2020-08-03

## 2020-06-15 NOTE — TELEPHONE ENCOUNTER
Disp  Refills  Start  End  JUAN DANIEL    cyclobenzaprine (FLEXERIL) 5 MG tablet  45 tablet  0  4/28/2020   No    Sig: TAKE 1 TO 2 TABLETS BY MOUTH AT NIGHT AS NEEDED        LOV: 12/2/2019  Future Office visit: No future appointment scheduled at this time.     Routing refill request to provider for review/approval because:  Drug not on the [facility] or [facility] refill protocol or controlled substance    Requested Prescriptions   Pending Prescriptions Disp Refills     cyclobenzaprine (FLEXERIL) 5 MG tablet [Pharmacy Med Name: CYCLOBENZAPRINE 5MG TABLETS] 45 tablet 0     Sig: TAKE 1 TO 2 TABLETS BY MOUTH AT NIGHT AS NEEDED       There is no refill protocol information for this order      Unable to complete prescription refill per RN Medication Refill Policy.................... Antoinette Weir RN ....................  6/15/2020   9:52 AM

## 2020-07-08 ENCOUNTER — TELEPHONE (OUTPATIENT)
Dept: TRANSPLANT | Facility: CLINIC | Age: 70
End: 2020-07-08

## 2020-07-08 NOTE — TELEPHONE ENCOUNTER
Call to Fide.  She wanted to tell me about her USP, had questions about COVID, wondering if Brendan should get labs.  Last labs were in December, I suggested he get them done taking careful COVID precautions.  She also asked about Brendan taking meprazole.  Told her he could decrease omeprazole to daily or every other day for a month and then take famotidine 20 mg daily if he wants.  She'll let me know.

## 2020-07-08 NOTE — TELEPHONE ENCOUNTER
Patient Call: Voicemail  Date/Time: 7/8/20 @ 2:22 PM  Reason for call: Would like coordinators personal number. Was told she can call main number and request it.

## 2020-07-24 NOTE — NURSING NOTE
LIDOCAINE ordered by MEAGHAN Zuñiga.  [facility]   Lot # [id number]  Exp. 07/2022  NDC 43365-081-44  Patient tolerated well. 2.1 mL's used for reconstitution with ROCEPHIN injection.   Yasemin Vasquez LPN............. October 17, 2018 4:04 PM   
Patient presents to the clinic for left wrist pain/swelling. States he first noticed the swelling on Monday and has increasingly got worse since.   Yasemin Vasquez LPN............. October 17, 2018 2:14 PM     
0

## 2020-07-29 DIAGNOSIS — M54.42 CHRONIC LEFT-SIDED LOW BACK PAIN WITH LEFT-SIDED SCIATICA: ICD-10-CM

## 2020-07-29 DIAGNOSIS — I10 ESSENTIAL HYPERTENSION: Primary | ICD-10-CM

## 2020-07-29 DIAGNOSIS — M47.816 LUMBAR FACET ARTHROPATHY: ICD-10-CM

## 2020-07-29 DIAGNOSIS — G89.29 CHRONIC LEFT-SIDED LOW BACK PAIN WITH LEFT-SIDED SCIATICA: ICD-10-CM

## 2020-07-29 DIAGNOSIS — M51.369 LUMBAR DEGENERATIVE DISC DISEASE: ICD-10-CM

## 2020-08-03 RX ORDER — LOSARTAN POTASSIUM 25 MG/1
TABLET ORAL
Qty: 90 TABLET | Refills: 0 | Status: SHIPPED | OUTPATIENT
Start: 2020-08-03 | End: 2020-08-25

## 2020-08-03 RX ORDER — CYCLOBENZAPRINE HCL 5 MG
TABLET ORAL
Qty: 45 TABLET | Refills: 0 | Status: SHIPPED | OUTPATIENT
Start: 2020-08-03 | End: 2020-08-25

## 2020-08-03 NOTE — TELEPHONE ENCOUNTER
[facility] sent Rx request for the following:      Requested Prescriptions   Pending Prescriptions Disp Refills   losartan (COZAAR) 25 MG tablet [Pharmacy Med Name: LOSARTAN 25MG TABLETS] 90 tablet 4    Sig: TAKE 1 TABLET(25 MG) BY MOUTH DAILY   Last Prescription Date:   5/30/19  Last Fill Qty/Refills:         90, R-4    Routing refill request to provider for review/approval because:   Angiotensin-II Receptors Failed - 8/3/2020  9:45 AM       Failed - Last blood pressure under 140/90 in past 12 months    BP Readings from Last 3 Encounters:   01/23/20 (!) 146/80   12/27/19 (!) 164/81   12/10/19 (!) 150/90          Failed - Normal serum creatinine on file in past 12 months      cyclobenzaprine (FLEXERIL) 5 MG tablet [Pharmacy Med Name: CYCLOBENZAPRINE 5MG TABLETS] 45 tablet 0    Sig: TAKE 1 TO 2 TABLETS BY MOUTH AT NIGHT AS NEEDED   Last Prescription Date:   6/15/20  Last Fill Qty/Refills:         45, R-0    Routing refill request to provider for review/approval because:  Drug not on the [facility] refill protocol     Last Office Visit:              12/2/19  Future Office visit:           None    Per LOV Note, Pt was instructed to follow up 3-6 months later. Pt overdue. Attempted reaching Pt, to assist him in scheduling appointment. Left message on machine to call back. Pauline Goldstein RN .............. 8/3/2020  10:08 AM

## 2020-08-03 NOTE — TELEPHONE ENCOUNTER
Patient returned call and his last name and  were verified. Pt reminded of the information below. Pt states he is a transplant Pt, and does not feel safe coming into clinic for appointment at this time. Pt also declined telephone or virtual visit for now. Pt has standing orders on file for labs, and verbalized plan to talk with his transplant coordinator, before scheduling lab only appointment. Pt requesting refill consideration by teamlet at this time. Dr. Bocanegra is out of the clinic the rest of the week.    Pauline Goldstein RN .............. 8/3/2020  3:34 PM

## 2020-08-04 DIAGNOSIS — F10.11 ALCOHOL ABUSE, IN REMISSION: ICD-10-CM

## 2020-08-04 DIAGNOSIS — Z94.4 LIVER REPLACED BY TRANSPLANT (H): ICD-10-CM

## 2020-08-04 DIAGNOSIS — Z13.220 LIPID SCREENING: ICD-10-CM

## 2020-08-04 LAB
ALBUMIN SERPL-MCNC: 3.9 G/DL (ref 3.5–5.7)
ALP SERPL-CCNC: 105 U/L (ref 34–104)
ALT SERPL W P-5'-P-CCNC: 11 U/L (ref 7–52)
ANION GAP SERPL CALCULATED.3IONS-SCNC: 8 MMOL/L (ref 3–14)
AST SERPL W P-5'-P-CCNC: 13 U/L (ref 13–39)
BILIRUB DIRECT SERPL-MCNC: 0.2 MG/DL (ref 0–0.2)
BILIRUB SERPL-MCNC: 1.2 MG/DL (ref 0.3–1)
BUN SERPL-MCNC: 24 MG/DL (ref 7–25)
CALCIUM SERPL-MCNC: 9.2 MG/DL (ref 8.6–10.3)
CHLORIDE SERPL-SCNC: 100 MMOL/L (ref 98–107)
CHOLEST SERPL-MCNC: 84 MG/DL
CO2 SERPL-SCNC: 26 MMOL/L (ref 21–31)
CREAT SERPL-MCNC: 1.26 MG/DL (ref 0.7–1.3)
ERYTHROCYTE [DISTWIDTH] IN BLOOD BY AUTOMATED COUNT: 14.7 % (ref 10–15)
GFR SERPL CREATININE-BSD FRML MDRD: 57 ML/MIN/{1.73_M2}
GLUCOSE SERPL-MCNC: 178 MG/DL (ref 70–105)
HCT VFR BLD AUTO: 39.5 % (ref 40–53)
HDLC SERPL-MCNC: 30 MG/DL (ref 23–92)
HGB BLD-MCNC: 12.9 G/DL (ref 13.3–17.7)
LDLC SERPL CALC-MCNC: 28 MG/DL
MCH RBC QN AUTO: 30.6 PG (ref 26.5–33)
MCHC RBC AUTO-ENTMCNC: 32.7 G/DL (ref 31.5–36.5)
MCV RBC AUTO: 94 FL (ref 78–100)
NONHDLC SERPL-MCNC: 54 MG/DL
PLATELET # BLD AUTO: 89 10E9/L (ref 150–450)
POTASSIUM SERPL-SCNC: 4.8 MMOL/L (ref 3.5–5.1)
PROT SERPL-MCNC: 7.1 G/DL (ref 6.4–8.9)
RBC # BLD AUTO: 4.22 10E12/L (ref 4.4–5.9)
SODIUM SERPL-SCNC: 134 MMOL/L (ref 134–144)
TRIGL SERPL-MCNC: 128 MG/DL
WBC # BLD AUTO: 6.6 10E9/L (ref 4–11)

## 2020-08-04 PROCEDURE — 80197 ASSAY OF TACROLIMUS: CPT | Mod: ZL | Performed by: INTERNAL MEDICINE

## 2020-08-04 PROCEDURE — 85027 COMPLETE CBC AUTOMATED: CPT | Mod: ZL | Performed by: INTERNAL MEDICINE

## 2020-08-04 PROCEDURE — 81001 URINALYSIS AUTO W/SCOPE: CPT | Mod: ZL | Performed by: INTERNAL MEDICINE

## 2020-08-04 PROCEDURE — 80321 ALCOHOLS BIOMARKERS 1OR 2: CPT | Mod: ZL | Performed by: INTERNAL MEDICINE

## 2020-08-04 PROCEDURE — 80076 HEPATIC FUNCTION PANEL: CPT | Mod: ZL | Performed by: INTERNAL MEDICINE

## 2020-08-04 PROCEDURE — 80048 BASIC METABOLIC PNL TOTAL CA: CPT | Mod: ZL | Performed by: INTERNAL MEDICINE

## 2020-08-04 PROCEDURE — 80061 LIPID PANEL: CPT | Mod: ZL | Performed by: INTERNAL MEDICINE

## 2020-08-04 PROCEDURE — 36415 COLL VENOUS BLD VENIPUNCTURE: CPT | Mod: ZL | Performed by: INTERNAL MEDICINE

## 2020-08-05 LAB
TACROLIMUS BLD-MCNC: 6.1 UG/L (ref 5–15)
TME LAST DOSE: NORMAL H

## 2020-08-08 DIAGNOSIS — I10 ESSENTIAL HYPERTENSION: ICD-10-CM

## 2020-08-08 DIAGNOSIS — J44.9 CHRONIC OBSTRUCTIVE PULMONARY DISEASE, UNSPECIFIED COPD TYPE (H): ICD-10-CM

## 2020-08-08 LAB — PETH BLD-MCNC: NEGATIVE NG/ML

## 2020-08-08 NOTE — LETTER
August 10, 2020      Ricki Sharp  [address] [facility] [address]        Dear Ricki,     A refill request was received from your pharmacy for Amlodipine.    Additional refills require an office visit with Dr. Bocanegra for diabetic follow and labs.    Please call [phone] to schedule appointment.        Sincerely,      Refill Nurse

## 2020-08-10 RX ORDER — TIOTROPIUM BROMIDE 18 UG/1
CAPSULE ORAL; RESPIRATORY (INHALATION)
Qty: 90 CAPSULE | Refills: 0 | Status: SHIPPED | OUTPATIENT
Start: 2020-08-10 | End: 2020-01-01

## 2020-08-10 RX ORDER — AMLODIPINE BESYLATE 5 MG/1
TABLET ORAL
Qty: 90 TABLET | Refills: 0 | Status: SHIPPED | OUTPATIENT
Start: 2020-08-10 | End: 2020-08-25

## 2020-08-10 NOTE — TELEPHONE ENCOUNTER
Routing refill request to provider for review/approval because:  Blood pressure under 140/90 in past 12 months         BP Readings from Last 3 Encounters:   01/23/20 (!) 146/80   12/27/19 (!) 164/81   12/10/19 (!) 150/90         LOV: 12/2/2019    Patient due for diabetic follow up and labs.  Letter and my chart message sent.    Ne Farah RN on 8/10/2020 at 9:56 AM                Prescription approved per [facility] Refill Protocol.  For Spiriva    Ne Farah RN on 8/10/2020 at 9:56 AM

## 2020-08-25 ENCOUNTER — VIRTUAL VISIT (OUTPATIENT)
Dept: PEDIATRICS | Facility: OTHER | Age: 70
End: 2020-08-25
Attending: INTERNAL MEDICINE
Payer: MEDICARE

## 2020-08-25 ENCOUNTER — TELEPHONE (OUTPATIENT)
Dept: TRANSPLANT | Facility: CLINIC | Age: 70
End: 2020-08-25

## 2020-08-25 ENCOUNTER — TELEPHONE (OUTPATIENT)
Dept: PEDIATRICS | Facility: OTHER | Age: 70
End: 2020-08-25

## 2020-08-25 VITALS
SYSTOLIC BLOOD PRESSURE: 117 MMHG | DIASTOLIC BLOOD PRESSURE: 75 MMHG | HEIGHT: 67 IN | WEIGHT: 200 LBS | HEART RATE: 70 BPM | BODY MASS INDEX: 31.39 KG/M2

## 2020-08-25 DIAGNOSIS — E11.9 TYPE 2 DIABETES MELLITUS WITHOUT COMPLICATION, WITHOUT LONG-TERM CURRENT USE OF INSULIN (H): Chronic | ICD-10-CM

## 2020-08-25 DIAGNOSIS — M48.061 LUMBAR FORAMINAL STENOSIS: ICD-10-CM

## 2020-08-25 DIAGNOSIS — M47.816 LUMBAR FACET ARTHROPATHY: ICD-10-CM

## 2020-08-25 DIAGNOSIS — G89.29 CHRONIC LEFT-SIDED LOW BACK PAIN WITH LEFT-SIDED SCIATICA: ICD-10-CM

## 2020-08-25 DIAGNOSIS — E03.9 ACQUIRED HYPOTHYROIDISM: ICD-10-CM

## 2020-08-25 DIAGNOSIS — N40.1 BENIGN PROSTATIC HYPERPLASIA WITH LOWER URINARY TRACT SYMPTOMS, SYMPTOM DETAILS UNSPECIFIED: ICD-10-CM

## 2020-08-25 DIAGNOSIS — K27.9 PEPTIC ULCER: ICD-10-CM

## 2020-08-25 DIAGNOSIS — M51.369 LUMBAR DEGENERATIVE DISC DISEASE: ICD-10-CM

## 2020-08-25 DIAGNOSIS — M54.42 CHRONIC LEFT-SIDED LOW BACK PAIN WITH LEFT-SIDED SCIATICA: ICD-10-CM

## 2020-08-25 DIAGNOSIS — Z94.4 LIVER REPLACED BY TRANSPLANT (H): ICD-10-CM

## 2020-08-25 DIAGNOSIS — I10 ESSENTIAL HYPERTENSION: Primary | ICD-10-CM

## 2020-08-25 DIAGNOSIS — M48.061 SPINAL STENOSIS OF LUMBAR REGION, UNSPECIFIED WHETHER NEUROGENIC CLAUDICATION PRESENT: ICD-10-CM

## 2020-08-25 PROCEDURE — 99441 ZZC PHYSICIAN TELEPHONE EVALUATION 5-10 MIN: CPT | Performed by: INTERNAL MEDICINE

## 2020-08-25 RX ORDER — LOSARTAN POTASSIUM 25 MG/1
25 TABLET ORAL DAILY
Qty: 90 TABLET | Refills: 3 | Status: SHIPPED | OUTPATIENT
Start: 2020-08-25 | End: 2021-01-01

## 2020-08-25 RX ORDER — LEVOTHYROXINE SODIUM 175 UG/1
175 TABLET ORAL DAILY
Qty: 90 TABLET | Refills: 3 | Status: SHIPPED | OUTPATIENT
Start: 2020-08-25 | End: 2021-01-01

## 2020-08-25 RX ORDER — INSULIN GLARGINE 100 [IU]/ML
INJECTION, SOLUTION SUBCUTANEOUS
Qty: 15 ML | Refills: 3 | Status: SHIPPED | OUTPATIENT
Start: 2020-08-25 | End: 2021-01-01

## 2020-08-25 RX ORDER — TAMSULOSIN HYDROCHLORIDE 0.4 MG/1
0.4 CAPSULE ORAL DAILY
Qty: 90 CAPSULE | Refills: 3 | Status: SHIPPED | OUTPATIENT
Start: 2020-08-25 | End: 2021-01-01

## 2020-08-25 RX ORDER — METOPROLOL TARTRATE 100 MG
100 TABLET ORAL 2 TIMES DAILY
Qty: 180 TABLET | Refills: 3 | Status: ON HOLD | OUTPATIENT
Start: 2020-08-25 | End: 2021-01-01

## 2020-08-25 RX ORDER — TRAMADOL HYDROCHLORIDE 50 MG/1
50 TABLET ORAL 2 TIMES DAILY
Qty: 60 TABLET | Refills: 5 | Status: SHIPPED | OUTPATIENT
Start: 2020-08-25 | End: 2021-01-01

## 2020-08-25 RX ORDER — ATORVASTATIN CALCIUM 10 MG/1
10 TABLET, FILM COATED ORAL DAILY
Qty: 90 TABLET | Refills: 3 | Status: SHIPPED | OUTPATIENT
Start: 2020-08-25 | End: 2021-01-01

## 2020-08-25 RX ORDER — CYCLOBENZAPRINE HCL 5 MG
TABLET ORAL
Qty: 45 TABLET | Refills: 3 | Status: SHIPPED | OUTPATIENT
Start: 2020-08-25 | End: 2020-10-26

## 2020-08-25 RX ORDER — AMLODIPINE BESYLATE 5 MG/1
5 TABLET ORAL DAILY
Qty: 90 TABLET | Refills: 3 | Status: SHIPPED | OUTPATIENT
Start: 2020-08-25

## 2020-08-25 ASSESSMENT — PAIN SCALES - GENERAL: PAINLEVEL: NO PAIN (0)

## 2020-08-25 ASSESSMENT — MIFFLIN-ST. JEOR: SCORE: 1635.82

## 2020-08-25 NOTE — TELEPHONE ENCOUNTER
Patient Call: Voicemail  Date/Time: 8/25/20 / 2:25 pm  Reason for call: Patient would like a return call from Kiana

## 2020-08-25 NOTE — NURSING NOTE
"Chief Complaint   Patient presents with     Recheck Medication     Patient following up on medications. He also needs refills.     Initial /75   Pulse 70   Ht 1.702 m (5' 7\")   Wt 90.7 kg (200 lb)   BMI 31.32 kg/m   Estimated body mass index is 31.32 kg/m  as calculated from the following:    Height as of this encounter: 1.702 m (5' 7\").    Weight as of this encounter: 90.7 kg (200 lb).  Medication Reconciliation: complete    Chasity Holden MA  "

## 2020-08-25 NOTE — TELEPHONE ENCOUNTER
Routing refill request to provider for review/approval because:    Patient noted have had appt today   Will route to Dr. Bocanegra for review and consideration of refills.  Ne Farah RN on 8/25/2020 at 2:58 PM

## 2020-08-25 NOTE — PROGRESS NOTES
"Ricki Sharp is a 70 year old male who is being evaluated via a billable telephone visit.      The patient has been notified of following:     \"This telephone visit will be conducted via a call between you and your physician/provider. We have found that certain health care needs can be provided without the need for a physical exam.  This service lets us provide the care you need with a short phone conversation.  If a prescription is necessary we can send it directly to your pharmacy.  If lab work is needed we can place an order for that and you can then stop by our lab to have the test done at a later time.    Telephone visits are billed at different rates depending on your insurance coverage. During this emergency period, for some insurers they may be billed the same as an in-person visit.  Please reach out to your insurance provider with any questions.    If during the course of the call the physician/provider feels a telephone visit is not appropriate, you will not be charged for this service.\"    Patient has given verbal consent for Telephone visit?  Yes    What phone number would you like to be contacted at? [phone]    How would you like to obtain your AVS? David Bah     Ricki Sharp is a 70 year old male who presents via phone visit today for the following health issues:    HPI    Medication management.  He he basically needs refills.  He has been feeling well.  He has been remaining at home during the COVID-19 pandemic.  He has a history of hypertension which is well controlled with metoprolol, losartan, amlodipine.  History of diabetes mellitus type 2 which is well controlled with metformin, Lantus.  History of hyperlipidemia which is well controlled with atorvastatin.  He has a liver transplant and is followed by Dr. Gray at the [facility].  When he had his visit there the medical student said he should take a pill for his prostate and Dr. Gray agrees.  He has " noticed that his urinary stream has been more narrowed lately.       Objective   Vitals - Patient Reported  Pain Score: No Pain (0)        healthy, alert and no distress  PSYCH: Alert and oriented times 3; coherent speech, normal   rate and volume, able to articulate logical thoughts, able   to abstract reason, no tangential thoughts, no hallucinations   or delusions  His affect is normal  RESP: No cough, no audible wheezing, able to talk in full sentences  Remainder of exam unable to be completed due to telephone visits    Labs:  Lab Results   Component Value Date    WBC 6.6 08/04/2020    HGB 12.9 (L) 08/04/2020    HCT 39.5 (L) 08/04/2020    PLT 89 (L) 08/04/2020    CHOL 84 08/04/2020    TRIG 128 08/04/2020    HDL 30 08/04/2020    ALT 11 08/04/2020    AST 13 08/04/2020     08/04/2020    BUN 24 08/04/2020    CO2 26 08/04/2020    TSH 5.65 (H) 10/29/2015    INR 1.4 04/11/2016       Glucose   Date Value Ref Range Status   08/04/2020 178 (H) 70 - 105 mg/dL Final   12/02/2019 212 (H) 70 - 105 mg/dL Final     Hemoglobin A1C   Date Value Ref Range Status   12/02/2019 6.6 (H) 4.0 - 6.0 % Final   08/19/2019 7.0 (H) 4.0 - 6.0 % Final     Creatinine   Date Value Ref Range Status   08/04/2020 1.26 0.70 - 1.30 mg/dL Final   12/02/2019 1.43 (H) 0.70 - 1.30 mg/dL Final     LDL Cholesterol Calculated   Date Value Ref Range Status   08/04/2020 28 <100 mg/dL Final     Comment:     Desirable:       <100 mg/dl     Thyrotropin   Date Value Ref Range Status   12/02/2019 0.77 0.34 - 5.60 IU/mL Final   05/30/2019 2.68 0.34 - 5.60 IU/mL Final   03/11/2019 10.13 (H) 0.34 - 5.60 IU/mL Final                   Assessment/Plan:      ICD-10-CM    1. Essential hypertension  I10 amLODIPine (NORVASC) 5 MG tablet     losartan (COZAAR) 25 MG tablet   2. Lumbar degenerative disc disease  M51.36 cyclobenzaprine (FLEXERIL) 5 MG tablet   3. Lumbar facet arthropathy  M47.816 cyclobenzaprine (FLEXERIL) 5 MG tablet     traMADol (ULTRAM) 50 MG tablet    4. Chronic left-sided low back pain with left-sided sciatica  M54.42 cyclobenzaprine (FLEXERIL) 5 MG tablet    G89.29    5. Liver replaced by transplant (H)  Z94.4 metoprolol tartrate (LOPRESSOR) 100 MG tablet   6. Peptic ulcer  K27.9 omeprazole (PRILOSEC) 20 MG DR capsule   7. Spinal stenosis of lumbar region, unspecified whether neurogenic claudication present  M48.061 traMADol (ULTRAM) 50 MG tablet   8. Lumbar foraminal stenosis  M48.061 traMADol (ULTRAM) 50 MG tablet   9. Type 2 diabetes mellitus without complication, without long-term current use of insulin (H)  E11.9 atorvastatin (LIPITOR) 10 MG tablet   10. Acquired hypothyroidism  E03.9 levothyroxine (SYNTHROID/LEVOTHROID) 175 MCG tablet     Thyrotropin GH   11. Benign prostatic hyperplasia with lower urinary tract symptoms, symptom details unspecified  N40.1 tamsulosin (FLOMAX) 0.4 MG capsule     Prostate Specific Antigen GH     Medications are reviewed, renewed.  Screening and surveillance lab work is obtained as outlined above.    We discussed the use of tamsulosin for the treatment of benign prostate enlargement and decided on a trial.    Signed, Jarad Bocanegra MD, FAAP, FACP  Internal Medicine & Pediatrics      Phone call duration:  8 minutes

## 2020-08-25 NOTE — TELEPHONE ENCOUNTER
Spoke with Fide. He is wanting to stop prilosec and his PCP said he may have a hard time coming off. Patient will try stopping. Discussed rebound reflux. Fide will call back with any further questions.

## 2020-09-04 ENCOUNTER — TELEPHONE (OUTPATIENT)
Dept: TRANSPLANT | Facility: CLINIC | Age: 70
End: 2020-09-04

## 2020-09-04 NOTE — TELEPHONE ENCOUNTER
"Call from Fide.  Brendan had large emesis last night - Brendan recently started flomax, Fide was wondering if this could be a sign of COVID or recurring lymphoma.  He was fine yesterday, had a \"weird coffee\" yesterday.  No fevers, no other symptoms.  Told her this would not be likely - told her if he continues to vomit or develops fever / pain he should go to ER or be seen.  "

## 2020-09-21 DIAGNOSIS — E11.9 TYPE 2 DIABETES MELLITUS WITHOUT COMPLICATION, WITHOUT LONG-TERM CURRENT USE OF INSULIN (H): Chronic | ICD-10-CM

## 2020-09-23 NOTE — TELEPHONE ENCOUNTER
" Disp  Refills  Start  End  JUAN DANIEL    blood glucose (NO BRAND SPECIFIED) test strip  200 strip  11  6/10/2019   --    Sig: Use to test blood sugar 2 times daily.  Please dispense item covered by insurance.        LOV: 8/25/2020  Future Office visit: No future appointment scheduled at this time.    Requested Prescriptions   Pending Prescriptions Disp Refills     CONTOUR NEXT TEST test strip [Pharmacy Med Name: CONTOUR NEXT TEST [id number]] 200 strip 11     Sig: TESTING 2 TIMES DAILY       Diabetic Supplies Protocol Passed - 9/21/2020  1:08 PM        Passed - Medication is active on med list        Passed - Patient is 18 years of age or older        Passed - Recent (6 mo) or future (30 days) visit within the authorizing provider's specialty     Patient had office visit in the last 6 months or has a visit in the next 30 days with authorizing provider.  See \"Patient Info\" tab in inbasket, or \"Choose Columns\" in Meds & Orders section of the refill encounter.             Unable to complete prescription refill per RN Medication Refill Policy.................... Antoinette Weir RN ....................  9/23/2020   2:27 PM        "

## 2020-10-08 ENCOUNTER — ALLIED HEALTH/NURSE VISIT (OUTPATIENT)
Dept: FAMILY MEDICINE | Facility: OTHER | Age: 70
End: 2020-10-08
Payer: MEDICARE

## 2020-10-08 DIAGNOSIS — Z23 NEED FOR PROPHYLACTIC VACCINATION AND INOCULATION AGAINST INFLUENZA: Primary | ICD-10-CM

## 2020-10-08 PROCEDURE — 90662 IIV NO PRSV INCREASED AG IM: CPT

## 2020-10-15 DIAGNOSIS — Z94.4 LIVER REPLACED BY TRANSPLANT (H): ICD-10-CM

## 2020-10-15 NOTE — TELEPHONE ENCOUNTER
[facility] pharmacy sent Rx request for the following:    calcium carbonate-vitamin D (OS-LEIA) 500-400 MG-UNIT tablet  Last Prescription Date:   01/20/2020  Last Fill Qty/Refills:         180, R-1    Last Office Visit:              08/25/2020   Future Office visit:           None     Routing refill request to provider for review/approval because:      Vitamin Supplements (Adult) Protocol Failed - 10/15/2020 12:42 PM        Failed - Medication is active on med list     Medication is on active med list as os-leia. Refilled as protocol would be passed.    Amie Reyes RN on 10/15/2020 at 12:50 PM

## 2020-10-25 DIAGNOSIS — M47.816 LUMBAR FACET ARTHROPATHY: ICD-10-CM

## 2020-10-25 DIAGNOSIS — G89.29 CHRONIC LEFT-SIDED LOW BACK PAIN WITH LEFT-SIDED SCIATICA: ICD-10-CM

## 2020-10-25 DIAGNOSIS — M54.42 CHRONIC LEFT-SIDED LOW BACK PAIN WITH LEFT-SIDED SCIATICA: ICD-10-CM

## 2020-10-25 DIAGNOSIS — L40.9 PSORIASIS: Primary | ICD-10-CM

## 2020-10-25 DIAGNOSIS — M51.369 LUMBAR DEGENERATIVE DISC DISEASE: Primary | ICD-10-CM

## 2020-10-26 RX ORDER — CYCLOBENZAPRINE HCL 5 MG
TABLET ORAL
Qty: 45 TABLET | Refills: 3 | Status: SHIPPED | OUTPATIENT
Start: 2020-10-26 | End: 2021-01-01

## 2020-10-26 RX ORDER — FLUOCINONIDE TOPICAL SOLUTION USP, 0.05% 0.5 MG/ML
SOLUTION TOPICAL
Qty: 60 ML | Refills: 3 | Status: SHIPPED | OUTPATIENT
Start: 2020-10-26 | End: 2021-01-01

## 2020-10-26 NOTE — TELEPHONE ENCOUNTER
[facility] sent Rx request for the following:      Requested Prescriptions   Pending Prescriptions Disp Refills   fluocinonide (LIDEX) 0.05 % external solution [Pharmacy Med Name: FLUOCINONIDE 0.05% SOLN 20ML] 60 mL 3    Sig: APPLY TOPICALLY TO THE AFFECTED SCALP TWICE DAILY   Last Prescription Date:   12/2/19  Last Fill Qty/Refills:         60 ml, R-3    Last Office Visit:             8/25/20  Future Office visit:           None  Routing refill request to provider for review/approval because:   Topical Steroids and Nonsteroidals Protocol Failed - 10/26/2020  4:37 PM       Failed - High potency steroid not ordered     Unable to complete prescription refill per RN Medication Refill Policy. Pauline Goldstein RN .............. 10/26/2020  4:39 PM

## 2020-10-26 NOTE — TELEPHONE ENCOUNTER
[facility] sent Rx request for the following:      Requested Prescriptions   Pending Prescriptions Disp Refills   cyclobenzaprine (FLEXERIL) 5 MG tablet [Pharmacy Med Name: CYCLOBENZAPRINE 5MG TABLETS] 45 tablet 3    Sig: TAKE 1 TO 2 TABLETS BY MOUTH AT NIGHT AS NEEDED   Last Prescription Date:   8/25/20  Last Fill Qty/Refills:         45, R-3    Last Office Visit:              8/25/20  Future Office visit:           None  Routing refill request to provider for review/approval because:  Drug not on the G, P or [facility] refill protocol or controlled substance    Unable to complete prescription refill per RN Medication Refill Policy. Pauline Goldstein RN .............. 10/26/2020  4:41 PM

## 2020-12-16 NOTE — TELEPHONE ENCOUNTER
[facility] sent Rx request for the following:   SPIRIVA HANDIHALER 18 MCG inhaled capsule   Sig: INHALE THE CONTENTS OF 1 CAPSULE(18 MCG) INTO THE LUNGS DAILY    Last Prescription Date:   08/10/2020  Last Fill Qty/Refills:         90, R-0    Last Office Visit:              08/25/2020 (Daljit)   Future Office visit:           None noted   Asthma Maintenance Inhalers - Anticholinergics Passed - 12/16/2020  2:38 PM       Inhaled Steroids Protocol Passed - 12/16/2020  2:38 PM     Prescription approved per [facility] Refill Protocol.  Alfreda Camacho RN ....................  12/16/2020   4:29 PM

## 2020-12-21 NOTE — TELEPHONE ENCOUNTER
Patient was informed that we do not have COVID vaccine yet so we do not know what category he would fall into. He was also informed once it is available patients will be informed. Understanding voiced.     Chasity Holden CMA on 12/21/2020 at 1:53 PM

## 2020-12-23 NOTE — TELEPHONE ENCOUNTER
[facility] sent Rx request for the following:      TRAMADOL 50MG TABLETS  Sig: TAKE 1 TABLET BY MOUTH TWICE DAILY      Last Prescription Date:   8/25/2020  Last Fill Qty/Refills:         60, R-5    Last Office Visit:              8/25/2020   Future Office visit:           None    Redundant refill request refused: Too soon: patient should not need a refill until February 2021           METFORMIN ER 500MG 24HR TABS  Sig: TAKE 4 TABLETS(2000 MG) BY MOUTH DAILY WITH DINNER      Last Prescription Date:   8/19/2019  Last Fill Qty/Refills:         360, R-3        Prescription refilled per RN Medication Refill Policy.................... Ignacio Mccoy RN ....................  12/23/2020   3:53 PM

## 2021-01-01 ENCOUNTER — TELEPHONE (OUTPATIENT)
Dept: CARDIOLOGY | Facility: CLINIC | Age: 71
End: 2021-01-01

## 2021-01-01 ENCOUNTER — HOSPITAL ENCOUNTER (OUTPATIENT)
Dept: INFUSION THERAPY | Facility: OTHER | Age: 71
Discharge: HOME | End: 2021-09-08
Attending: INTERNAL MEDICINE | Admitting: FAMILY MEDICINE
Payer: MEDICARE

## 2021-01-01 ENCOUNTER — PATIENT OUTREACH (OUTPATIENT)
Dept: PEDIATRICS | Facility: OTHER | Age: 71
End: 2021-01-01

## 2021-01-01 ENCOUNTER — TELEPHONE (OUTPATIENT)
Dept: TRANSPLANT | Facility: CLINIC | Age: 71
End: 2021-01-01

## 2021-01-01 ENCOUNTER — APPOINTMENT (OUTPATIENT)
Dept: CT IMAGING | Facility: OTHER | Age: 71
End: 2021-01-01
Attending: EMERGENCY MEDICINE
Payer: MEDICARE

## 2021-01-01 ENCOUNTER — OFFICE VISIT (OUTPATIENT)
Dept: PEDIATRICS | Facility: OTHER | Age: 71
End: 2021-01-01
Attending: INTERNAL MEDICINE
Payer: MEDICARE

## 2021-01-01 ENCOUNTER — HOSPITAL ENCOUNTER (EMERGENCY)
Facility: OTHER | Age: 71
Discharge: ANOTHER HOSPITAL | End: 2021-09-12
Attending: PHYSICIAN ASSISTANT | Admitting: PHYSICIAN ASSISTANT
Payer: MEDICARE

## 2021-01-01 ENCOUNTER — MYC MEDICAL ADVICE (OUTPATIENT)
Dept: GASTROENTEROLOGY | Facility: CLINIC | Age: 71
End: 2021-01-01

## 2021-01-01 ENCOUNTER — HOSPITAL ENCOUNTER (OUTPATIENT)
Dept: RESPIRATORY THERAPY | Facility: CLINIC | Age: 71
End: 2021-09-16
Attending: INTERNAL MEDICINE
Payer: MEDICARE

## 2021-01-01 ENCOUNTER — HOSPITAL ENCOUNTER (OUTPATIENT)
Dept: CARDIAC REHAB | Facility: OTHER | Age: 71
End: 2021-08-13
Attending: INTERNAL MEDICINE
Payer: MEDICARE

## 2021-01-01 ENCOUNTER — OFFICE VISIT (OUTPATIENT)
Dept: FAMILY MEDICINE | Facility: OTHER | Age: 71
End: 2021-01-01
Attending: INTERNAL MEDICINE
Payer: MEDICARE

## 2021-01-01 ENCOUNTER — HOSPITAL ENCOUNTER (OUTPATIENT)
Dept: CARDIOLOGY | Facility: CLINIC | Age: 71
DRG: 266 | End: 2021-07-14
Attending: NURSE PRACTITIONER | Admitting: INTERNAL MEDICINE
Payer: MEDICARE

## 2021-01-01 ENCOUNTER — APPOINTMENT (OUTPATIENT)
Dept: GENERAL RADIOLOGY | Facility: OTHER | Age: 71
End: 2021-01-01
Attending: STUDENT IN AN ORGANIZED HEALTH CARE EDUCATION/TRAINING PROGRAM
Payer: MEDICARE

## 2021-01-01 ENCOUNTER — VIRTUAL VISIT (OUTPATIENT)
Dept: GASTROENTEROLOGY | Facility: CLINIC | Age: 71
End: 2021-01-01
Attending: INTERNAL MEDICINE
Payer: MEDICARE

## 2021-01-01 ENCOUNTER — PATIENT OUTREACH (OUTPATIENT)
Dept: INTERNAL MEDICINE | Facility: OTHER | Age: 71
End: 2021-01-01

## 2021-01-01 ENCOUNTER — TRANSFERRED RECORDS (OUTPATIENT)
Dept: HEALTH INFORMATION MANAGEMENT | Facility: OTHER | Age: 71
End: 2021-01-01

## 2021-01-01 ENCOUNTER — MYC MEDICAL ADVICE (OUTPATIENT)
Dept: CARDIOLOGY | Facility: OTHER | Age: 71
End: 2021-01-01

## 2021-01-01 ENCOUNTER — PRE VISIT (OUTPATIENT)
Dept: ENDOCRINOLOGY | Facility: CLINIC | Age: 71
End: 2021-01-01

## 2021-01-01 ENCOUNTER — HOSPITAL ENCOUNTER (OUTPATIENT)
Facility: OTHER | Age: 71
Setting detail: OBSERVATION
Discharge: HOME | End: 2021-04-09
Attending: STUDENT IN AN ORGANIZED HEALTH CARE EDUCATION/TRAINING PROGRAM | Admitting: FAMILY MEDICINE
Payer: MEDICARE

## 2021-01-01 ENCOUNTER — MYC MEDICAL ADVICE (OUTPATIENT)
Dept: PEDIATRICS | Facility: OTHER | Age: 71
End: 2021-01-01

## 2021-01-01 ENCOUNTER — CARE COORDINATION (OUTPATIENT)
Dept: CARDIOLOGY | Facility: CLINIC | Age: 71
End: 2021-01-01

## 2021-01-01 ENCOUNTER — PRE VISIT (OUTPATIENT)
Dept: PULMONOLOGY | Facility: CLINIC | Age: 71
End: 2021-01-01

## 2021-01-01 ENCOUNTER — OFFICE VISIT (OUTPATIENT)
Dept: CARDIOLOGY | Facility: CLINIC | Age: 71
End: 2021-01-01
Attending: INTERNAL MEDICINE
Payer: MEDICARE

## 2021-01-01 ENCOUNTER — MYC MEDICAL ADVICE (OUTPATIENT)
Dept: TRANSPLANT | Facility: CLINIC | Age: 71
End: 2021-01-01

## 2021-01-01 ENCOUNTER — OFFICE VISIT (OUTPATIENT)
Dept: CARDIOLOGY | Facility: OTHER | Age: 71
End: 2021-01-01
Attending: INTERNAL MEDICINE
Payer: MEDICARE

## 2021-01-01 ENCOUNTER — TELEPHONE (OUTPATIENT)
Dept: NEPHROLOGY | Facility: CLINIC | Age: 71
End: 2021-01-01

## 2021-01-01 ENCOUNTER — HEALTH MAINTENANCE LETTER (OUTPATIENT)
Age: 71
End: 2021-01-01

## 2021-01-01 ENCOUNTER — APPOINTMENT (OUTPATIENT)
Dept: CT IMAGING | Facility: OTHER | Age: 71
End: 2021-01-01
Attending: STUDENT IN AN ORGANIZED HEALTH CARE EDUCATION/TRAINING PROGRAM
Payer: MEDICARE

## 2021-01-01 ENCOUNTER — APPOINTMENT (OUTPATIENT)
Dept: GENERAL RADIOLOGY | Facility: CLINIC | Age: 71
DRG: 266 | End: 2021-01-01
Attending: INTERNAL MEDICINE
Payer: MEDICARE

## 2021-01-01 ENCOUNTER — NURSE TRIAGE (OUTPATIENT)
Dept: NURSING | Facility: CLINIC | Age: 71
End: 2021-01-01

## 2021-01-01 ENCOUNTER — HOSPITAL ENCOUNTER (OUTPATIENT)
Dept: ULTRASOUND IMAGING | Facility: CLINIC | Age: 71
End: 2021-05-20
Attending: INTERNAL MEDICINE
Payer: MEDICARE

## 2021-01-01 ENCOUNTER — ANESTHESIA (OUTPATIENT)
Dept: SURGERY | Facility: CLINIC | Age: 71
DRG: 266 | End: 2021-01-01
Payer: MEDICARE

## 2021-01-01 ENCOUNTER — TELEPHONE (OUTPATIENT)
Dept: PEDIATRICS | Facility: OTHER | Age: 71
End: 2021-01-01

## 2021-01-01 ENCOUNTER — ANESTHESIA (OUTPATIENT)
Dept: EMERGENCY MEDICINE | Facility: OTHER | Age: 71
End: 2021-01-01
Payer: MEDICARE

## 2021-01-01 ENCOUNTER — APPOINTMENT (OUTPATIENT)
Dept: CARDIOLOGY | Facility: OTHER | Age: 71
End: 2021-01-01
Attending: FAMILY MEDICINE
Payer: MEDICARE

## 2021-01-01 ENCOUNTER — HOSPITAL ENCOUNTER (OUTPATIENT)
Dept: CARDIAC REHAB | Facility: OTHER | Age: 71
End: 2021-08-16
Attending: INTERNAL MEDICINE
Payer: MEDICARE

## 2021-01-01 ENCOUNTER — APPOINTMENT (OUTPATIENT)
Dept: GENERAL RADIOLOGY | Facility: OTHER | Age: 71
End: 2021-01-01
Attending: PHYSICIAN ASSISTANT
Payer: MEDICARE

## 2021-01-01 ENCOUNTER — LAB (OUTPATIENT)
Dept: LAB | Facility: OTHER | Age: 71
End: 2021-01-01
Payer: MEDICARE

## 2021-01-01 ENCOUNTER — TELEPHONE (OUTPATIENT)
Dept: CARDIOLOGY | Facility: OTHER | Age: 71
End: 2021-01-01

## 2021-01-01 ENCOUNTER — VIRTUAL VISIT (OUTPATIENT)
Dept: PHARMACY | Facility: CLINIC | Age: 71
End: 2021-01-01
Attending: INTERNAL MEDICINE
Payer: COMMERCIAL

## 2021-01-01 ENCOUNTER — ALLIED HEALTH/NURSE VISIT (OUTPATIENT)
Dept: FAMILY MEDICINE | Facility: OTHER | Age: 71
End: 2021-01-01
Attending: FAMILY MEDICINE
Payer: MEDICARE

## 2021-01-01 ENCOUNTER — OFFICE VISIT (OUTPATIENT)
Dept: CARDIOLOGY | Facility: OTHER | Age: 71
End: 2021-01-01
Attending: NURSE PRACTITIONER
Payer: MEDICARE

## 2021-01-01 ENCOUNTER — TELEPHONE (OUTPATIENT)
Dept: CARDIAC REHAB | Facility: OTHER | Age: 71
End: 2021-01-01

## 2021-01-01 ENCOUNTER — HOSPITAL ENCOUNTER (OUTPATIENT)
Dept: NUCLEAR MEDICINE | Facility: OTHER | Age: 71
Setting detail: NUCLEAR MEDICINE
End: 2021-09-02
Attending: INTERNAL MEDICINE
Payer: MEDICARE

## 2021-01-01 ENCOUNTER — OFFICE VISIT (OUTPATIENT)
Dept: UROLOGY | Facility: OTHER | Age: 71
End: 2021-01-01
Attending: NURSE PRACTITIONER
Payer: MEDICARE

## 2021-01-01 ENCOUNTER — ANESTHESIA EVENT (OUTPATIENT)
Dept: EMERGENCY MEDICINE | Facility: OTHER | Age: 71
End: 2021-01-01
Payer: MEDICARE

## 2021-01-01 ENCOUNTER — HOSPITAL ENCOUNTER (OUTPATIENT)
Dept: CT IMAGING | Facility: CLINIC | Age: 71
End: 2021-05-20
Attending: INTERNAL MEDICINE
Payer: MEDICARE

## 2021-01-01 ENCOUNTER — HOSPITAL ENCOUNTER (OUTPATIENT)
Dept: CARDIAC REHAB | Facility: OTHER | Age: 71
End: 2021-08-09
Attending: INTERNAL MEDICINE
Payer: MEDICARE

## 2021-01-01 ENCOUNTER — HOSPITAL ENCOUNTER (INPATIENT)
Facility: CLINIC | Age: 71
LOS: 1 days | Discharge: HOME | DRG: 266 | End: 2021-07-15
Attending: INTERNAL MEDICINE | Admitting: INTERNAL MEDICINE
Payer: MEDICARE

## 2021-01-01 ENCOUNTER — VIRTUAL VISIT (OUTPATIENT)
Dept: NEPHROLOGY | Facility: CLINIC | Age: 71
End: 2021-01-01
Payer: MEDICARE

## 2021-01-01 ENCOUNTER — APPOINTMENT (OUTPATIENT)
Dept: MEDSURG UNIT | Facility: CLINIC | Age: 71
End: 2021-01-01
Attending: INTERNAL MEDICINE
Payer: MEDICARE

## 2021-01-01 ENCOUNTER — APPOINTMENT (OUTPATIENT)
Dept: CARDIOLOGY | Facility: CLINIC | Age: 71
DRG: 266 | End: 2021-01-01
Attending: STUDENT IN AN ORGANIZED HEALTH CARE EDUCATION/TRAINING PROGRAM
Payer: MEDICARE

## 2021-01-01 ENCOUNTER — APPOINTMENT (OUTPATIENT)
Dept: OCCUPATIONAL THERAPY | Facility: CLINIC | Age: 71
DRG: 266 | End: 2021-01-01
Attending: STUDENT IN AN ORGANIZED HEALTH CARE EDUCATION/TRAINING PROGRAM
Payer: MEDICARE

## 2021-01-01 ENCOUNTER — HOSPITAL ENCOUNTER (OUTPATIENT)
Dept: GENERAL RADIOLOGY | Facility: OTHER | Age: 71
End: 2021-08-31
Attending: INTERNAL MEDICINE
Payer: MEDICARE

## 2021-01-01 ENCOUNTER — TELEPHONE (OUTPATIENT)
Dept: GASTROENTEROLOGY | Facility: CLINIC | Age: 71
End: 2021-01-01

## 2021-01-01 ENCOUNTER — HOSPITAL ENCOUNTER (OUTPATIENT)
Dept: CARDIAC REHAB | Facility: OTHER | Age: 71
End: 2021-08-25
Attending: INTERNAL MEDICINE
Payer: MEDICARE

## 2021-01-01 ENCOUNTER — ANCILLARY PROCEDURE (OUTPATIENT)
Dept: CARDIOLOGY | Facility: CLINIC | Age: 71
DRG: 266 | End: 2021-01-01
Attending: INTERNAL MEDICINE
Payer: MEDICARE

## 2021-01-01 ENCOUNTER — HOSPITAL ENCOUNTER (OUTPATIENT)
Facility: CLINIC | Age: 71
Discharge: HOME | End: 2021-06-17
Attending: INTERNAL MEDICINE | Admitting: INTERNAL MEDICINE
Payer: MEDICARE

## 2021-01-01 ENCOUNTER — HOSPITAL ENCOUNTER (OUTPATIENT)
Dept: GENERAL RADIOLOGY | Facility: OTHER | Age: 71
End: 2021-09-02
Attending: INTERNAL MEDICINE
Payer: MEDICARE

## 2021-01-01 ENCOUNTER — APPOINTMENT (OUTPATIENT)
Dept: GENERAL RADIOLOGY | Facility: OTHER | Age: 71
End: 2021-01-01
Attending: EMERGENCY MEDICINE
Payer: MEDICARE

## 2021-01-01 ENCOUNTER — HOSPITAL ENCOUNTER (OUTPATIENT)
Dept: CARDIAC REHAB | Facility: OTHER | Age: 71
End: 2021-08-06
Attending: INTERNAL MEDICINE
Payer: MEDICARE

## 2021-01-01 ENCOUNTER — VIRTUAL VISIT (OUTPATIENT)
Dept: CARDIOLOGY | Facility: CLINIC | Age: 71
End: 2021-01-01
Attending: NURSE PRACTITIONER
Payer: MEDICARE

## 2021-01-01 ENCOUNTER — HOSPITAL ENCOUNTER (OUTPATIENT)
Dept: GENERAL RADIOLOGY | Facility: OTHER | Age: 71
End: 2021-04-05
Attending: INTERNAL MEDICINE
Payer: MEDICARE

## 2021-01-01 ENCOUNTER — HOSPITAL ENCOUNTER (OUTPATIENT)
Dept: CARDIAC REHAB | Facility: OTHER | Age: 71
End: 2021-08-20
Attending: INTERNAL MEDICINE
Payer: MEDICARE

## 2021-01-01 ENCOUNTER — APPOINTMENT (OUTPATIENT)
Dept: LAB | Facility: CLINIC | Age: 71
End: 2021-01-01
Attending: INTERNAL MEDICINE
Payer: MEDICARE

## 2021-01-01 ENCOUNTER — HOSPITAL ENCOUNTER (EMERGENCY)
Facility: OTHER | Age: 71
Discharge: HOME | End: 2021-08-23
Attending: EMERGENCY MEDICINE | Admitting: EMERGENCY MEDICINE
Payer: MEDICARE

## 2021-01-01 ENCOUNTER — TRANSFERRED RECORDS (OUTPATIENT)
Dept: HEALTH INFORMATION MANAGEMENT | Facility: OTHER | Age: 71
End: 2021-01-01
Payer: MEDICARE

## 2021-01-01 ENCOUNTER — HOSPITAL ENCOUNTER (OUTPATIENT)
Dept: GENERAL RADIOLOGY | Facility: OTHER | Age: 71
End: 2021-09-10
Attending: INTERNAL MEDICINE
Payer: MEDICARE

## 2021-01-01 ENCOUNTER — HOSPITAL ENCOUNTER (OUTPATIENT)
Dept: INFUSION THERAPY | Facility: OTHER | Age: 71
End: 2021-04-05
Attending: INTERNAL MEDICINE
Payer: MEDICARE

## 2021-01-01 ENCOUNTER — ANESTHESIA EVENT (OUTPATIENT)
Dept: SURGERY | Facility: CLINIC | Age: 71
DRG: 266 | End: 2021-01-01
Payer: MEDICARE

## 2021-01-01 ENCOUNTER — HOSPITAL ENCOUNTER (OUTPATIENT)
Dept: CARDIOLOGY | Facility: OTHER | Age: 71
End: 2021-09-02
Attending: EMERGENCY MEDICINE
Payer: MEDICARE

## 2021-01-01 ENCOUNTER — HOSPITAL ENCOUNTER (OUTPATIENT)
Dept: INFUSION THERAPY | Facility: OTHER | Age: 71
Discharge: HOME | End: 2021-09-01
Attending: INTERNAL MEDICINE | Admitting: FAMILY MEDICINE
Payer: MEDICARE

## 2021-01-01 ENCOUNTER — TELEPHONE (OUTPATIENT)
Dept: PHARMACY | Facility: CLINIC | Age: 71
End: 2021-01-01

## 2021-01-01 ENCOUNTER — LAB (OUTPATIENT)
Dept: LAB | Facility: CLINIC | Age: 71
End: 2021-01-01

## 2021-01-01 ENCOUNTER — HOSPITAL ENCOUNTER (OUTPATIENT)
Dept: CARDIAC REHAB | Facility: OTHER | Age: 71
End: 2021-08-11
Attending: INTERNAL MEDICINE
Payer: MEDICARE

## 2021-01-01 ENCOUNTER — LAB (OUTPATIENT)
Dept: LAB | Facility: OTHER | Age: 71
End: 2021-01-01
Attending: EMERGENCY MEDICINE
Payer: MEDICARE

## 2021-01-01 ENCOUNTER — HOSPITAL ENCOUNTER (OUTPATIENT)
Dept: CARDIAC REHAB | Facility: OTHER | Age: 71
End: 2021-08-23
Attending: INTERNAL MEDICINE
Payer: MEDICARE

## 2021-01-01 ENCOUNTER — VIRTUAL VISIT (OUTPATIENT)
Dept: ENDOCRINOLOGY | Facility: CLINIC | Age: 71
End: 2021-01-01
Payer: MEDICARE

## 2021-01-01 ENCOUNTER — DOCUMENTATION ONLY (OUTPATIENT)
Dept: TRANSPLANT | Facility: CLINIC | Age: 71
End: 2021-01-01

## 2021-01-01 ENCOUNTER — TELEPHONE (OUTPATIENT)
Dept: ENDOCRINOLOGY | Facility: CLINIC | Age: 71
End: 2021-01-01

## 2021-01-01 ENCOUNTER — TELEPHONE (OUTPATIENT)
Dept: PULMONOLOGY | Facility: CLINIC | Age: 71
End: 2021-01-01

## 2021-01-01 ENCOUNTER — APPOINTMENT (OUTPATIENT)
Dept: CT IMAGING | Facility: OTHER | Age: 71
End: 2021-01-01
Attending: FAMILY MEDICINE
Payer: MEDICARE

## 2021-01-01 ENCOUNTER — HOSPITAL ENCOUNTER (OUTPATIENT)
Dept: CARDIAC REHAB | Facility: OTHER | Age: 71
End: 2021-08-04
Attending: STUDENT IN AN ORGANIZED HEALTH CARE EDUCATION/TRAINING PROGRAM
Payer: MEDICARE

## 2021-01-01 VITALS
OXYGEN SATURATION: 98 % | HEART RATE: 88 BPM | SYSTOLIC BLOOD PRESSURE: 136 MMHG | HEIGHT: 65 IN | BODY MASS INDEX: 33.49 KG/M2 | WEIGHT: 201 LBS | DIASTOLIC BLOOD PRESSURE: 80 MMHG

## 2021-01-01 VITALS
HEART RATE: 88 BPM | SYSTOLIC BLOOD PRESSURE: 136 MMHG | DIASTOLIC BLOOD PRESSURE: 80 MMHG | WEIGHT: 201 LBS | HEIGHT: 65 IN | OXYGEN SATURATION: 98 % | BODY MASS INDEX: 33.49 KG/M2

## 2021-01-01 VITALS
HEART RATE: 98 BPM | DIASTOLIC BLOOD PRESSURE: 77 MMHG | TEMPERATURE: 97.6 F | OXYGEN SATURATION: 95 % | HEIGHT: 67 IN | SYSTOLIC BLOOD PRESSURE: 134 MMHG | WEIGHT: 198.2 LBS | BODY MASS INDEX: 31.11 KG/M2 | RESPIRATION RATE: 17 BRPM

## 2021-01-01 VITALS
DIASTOLIC BLOOD PRESSURE: 78 MMHG | HEART RATE: 70 BPM | SYSTOLIC BLOOD PRESSURE: 120 MMHG | WEIGHT: 201 LBS | HEIGHT: 65 IN | BODY MASS INDEX: 33.49 KG/M2

## 2021-01-01 VITALS
DIASTOLIC BLOOD PRESSURE: 73 MMHG | TEMPERATURE: 98.3 F | WEIGHT: 196.21 LBS | SYSTOLIC BLOOD PRESSURE: 144 MMHG | RESPIRATION RATE: 15 BRPM | BODY MASS INDEX: 30.8 KG/M2 | HEIGHT: 67 IN | OXYGEN SATURATION: 100 % | HEART RATE: 89 BPM

## 2021-01-01 VITALS
BODY MASS INDEX: 30.67 KG/M2 | HEART RATE: 89 BPM | WEIGHT: 190 LBS | RESPIRATION RATE: 24 BRPM | DIASTOLIC BLOOD PRESSURE: 62 MMHG | SYSTOLIC BLOOD PRESSURE: 130 MMHG | OXYGEN SATURATION: 96 % | TEMPERATURE: 98.9 F

## 2021-01-01 VITALS
SYSTOLIC BLOOD PRESSURE: 120 MMHG | OXYGEN SATURATION: 96 % | HEART RATE: 79 BPM | BODY MASS INDEX: 32.05 KG/M2 | TEMPERATURE: 97.7 F | RESPIRATION RATE: 16 BRPM | HEIGHT: 67 IN | DIASTOLIC BLOOD PRESSURE: 76 MMHG | WEIGHT: 204.2 LBS

## 2021-01-01 VITALS
HEART RATE: 102 BPM | WEIGHT: 188.5 LBS | BODY MASS INDEX: 29.58 KG/M2 | HEIGHT: 67 IN | SYSTOLIC BLOOD PRESSURE: 141 MMHG | DIASTOLIC BLOOD PRESSURE: 73 MMHG | RESPIRATION RATE: 26 BRPM | OXYGEN SATURATION: 92 %

## 2021-01-01 VITALS
RESPIRATION RATE: 16 BRPM | DIASTOLIC BLOOD PRESSURE: 72 MMHG | HEART RATE: 94 BPM | HEIGHT: 65 IN | WEIGHT: 189.4 LBS | SYSTOLIC BLOOD PRESSURE: 130 MMHG | BODY MASS INDEX: 31.56 KG/M2 | OXYGEN SATURATION: 94 % | TEMPERATURE: 97.5 F

## 2021-01-01 VITALS
DIASTOLIC BLOOD PRESSURE: 82 MMHG | WEIGHT: 202 LBS | SYSTOLIC BLOOD PRESSURE: 132 MMHG | HEART RATE: 62 BPM | BODY MASS INDEX: 31.64 KG/M2 | TEMPERATURE: 97.6 F | OXYGEN SATURATION: 95 % | RESPIRATION RATE: 24 BRPM

## 2021-01-01 VITALS
SYSTOLIC BLOOD PRESSURE: 149 MMHG | TEMPERATURE: 97.9 F | WEIGHT: 206.57 LBS | DIASTOLIC BLOOD PRESSURE: 74 MMHG | HEART RATE: 111 BPM | RESPIRATION RATE: 16 BRPM | BODY MASS INDEX: 33.2 KG/M2 | OXYGEN SATURATION: 91 % | HEIGHT: 66 IN

## 2021-01-01 VITALS
BODY MASS INDEX: 32.65 KG/M2 | HEIGHT: 65 IN | TEMPERATURE: 96.8 F | DIASTOLIC BLOOD PRESSURE: 64 MMHG | OXYGEN SATURATION: 98 % | SYSTOLIC BLOOD PRESSURE: 120 MMHG | WEIGHT: 196 LBS | HEART RATE: 80 BPM | RESPIRATION RATE: 16 BRPM

## 2021-01-01 VITALS
OXYGEN SATURATION: 98 % | RESPIRATION RATE: 16 BRPM | DIASTOLIC BLOOD PRESSURE: 80 MMHG | SYSTOLIC BLOOD PRESSURE: 165 MMHG | HEART RATE: 86 BPM | WEIGHT: 201 LBS | TEMPERATURE: 98.1 F | HEIGHT: 65 IN | BODY MASS INDEX: 33.49 KG/M2

## 2021-01-01 VITALS
OXYGEN SATURATION: 98 % | HEART RATE: 88 BPM | BODY MASS INDEX: 33.49 KG/M2 | HEIGHT: 65 IN | WEIGHT: 201 LBS | SYSTOLIC BLOOD PRESSURE: 136 MMHG | DIASTOLIC BLOOD PRESSURE: 80 MMHG

## 2021-01-01 VITALS
DIASTOLIC BLOOD PRESSURE: 78 MMHG | RESPIRATION RATE: 18 BRPM | WEIGHT: 198 LBS | HEIGHT: 67 IN | OXYGEN SATURATION: 97 % | BODY MASS INDEX: 31.08 KG/M2 | TEMPERATURE: 97.8 F | SYSTOLIC BLOOD PRESSURE: 138 MMHG | HEART RATE: 75 BPM

## 2021-01-01 VITALS
DIASTOLIC BLOOD PRESSURE: 74 MMHG | WEIGHT: 189 LBS | RESPIRATION RATE: 24 BRPM | BODY MASS INDEX: 29.6 KG/M2 | SYSTOLIC BLOOD PRESSURE: 146 MMHG | OXYGEN SATURATION: 95 % | HEART RATE: 117 BPM

## 2021-01-01 VITALS
SYSTOLIC BLOOD PRESSURE: 136 MMHG | TEMPERATURE: 96.4 F | HEART RATE: 95 BPM | RESPIRATION RATE: 18 BRPM | DIASTOLIC BLOOD PRESSURE: 67 MMHG

## 2021-01-01 VITALS
RESPIRATION RATE: 22 BRPM | OXYGEN SATURATION: 97 % | HEART RATE: 104 BPM | DIASTOLIC BLOOD PRESSURE: 78 MMHG | SYSTOLIC BLOOD PRESSURE: 132 MMHG | TEMPERATURE: 97.2 F | WEIGHT: 188.8 LBS | BODY MASS INDEX: 29.57 KG/M2

## 2021-01-01 VITALS
WEIGHT: 194 LBS | BODY MASS INDEX: 31.31 KG/M2 | TEMPERATURE: 98.2 F | HEART RATE: 76 BPM | DIASTOLIC BLOOD PRESSURE: 70 MMHG | RESPIRATION RATE: 18 BRPM | SYSTOLIC BLOOD PRESSURE: 138 MMHG | OXYGEN SATURATION: 97 %

## 2021-01-01 VITALS
TEMPERATURE: 97.6 F | HEART RATE: 48 BPM | DIASTOLIC BLOOD PRESSURE: 62 MMHG | RESPIRATION RATE: 18 BRPM | SYSTOLIC BLOOD PRESSURE: 134 MMHG

## 2021-01-01 VITALS — HEART RATE: 77 BPM | SYSTOLIC BLOOD PRESSURE: 136 MMHG | DIASTOLIC BLOOD PRESSURE: 67 MMHG

## 2021-01-01 DIAGNOSIS — E87.20 ACIDOSIS: ICD-10-CM

## 2021-01-01 DIAGNOSIS — E83.42 HYPOMAGNESEMIA: ICD-10-CM

## 2021-01-01 DIAGNOSIS — R53.83 FATIGUE, UNSPECIFIED TYPE: ICD-10-CM

## 2021-01-01 DIAGNOSIS — J84.10 PULMONARY FIBROSIS (H): ICD-10-CM

## 2021-01-01 DIAGNOSIS — R05.9 COUGH: ICD-10-CM

## 2021-01-01 DIAGNOSIS — J44.9 COPD, SEVERE (H): Chronic | ICD-10-CM

## 2021-01-01 DIAGNOSIS — J44.1 COPD EXACERBATION (H): ICD-10-CM

## 2021-01-01 DIAGNOSIS — R00.0 SINUS TACHYCARDIA: ICD-10-CM

## 2021-01-01 DIAGNOSIS — I35.0 AORTIC VALVE STENOSIS, ETIOLOGY OF CARDIAC VALVE DISEASE UNSPECIFIED: Primary | ICD-10-CM

## 2021-01-01 DIAGNOSIS — R39.14 BENIGN PROSTATIC HYPERPLASIA WITH INCOMPLETE BLADDER EMPTYING: ICD-10-CM

## 2021-01-01 DIAGNOSIS — Z86.19 HISTORY OF HEPATITIS C: ICD-10-CM

## 2021-01-01 DIAGNOSIS — E78.2 MIXED HYPERLIPIDEMIA: ICD-10-CM

## 2021-01-01 DIAGNOSIS — I35.0 SEVERE AORTIC STENOSIS: ICD-10-CM

## 2021-01-01 DIAGNOSIS — E83.42 HYPOMAGNESEMIA: Primary | ICD-10-CM

## 2021-01-01 DIAGNOSIS — I51.89 OTHER ILL-DEFINED HEART DISEASES: ICD-10-CM

## 2021-01-01 DIAGNOSIS — L40.9 PSORIASIS: Primary | ICD-10-CM

## 2021-01-01 DIAGNOSIS — I35.0 AORTIC VALVE STENOSIS, ETIOLOGY OF CARDIAC VALVE DISEASE UNSPECIFIED: ICD-10-CM

## 2021-01-01 DIAGNOSIS — E11.9 TYPE 2 DIABETES MELLITUS WITHOUT COMPLICATION, WITHOUT LONG-TERM CURRENT USE OF INSULIN (H): Chronic | ICD-10-CM

## 2021-01-01 DIAGNOSIS — E11.65 TYPE 2 DIABETES MELLITUS WITH HYPERGLYCEMIA, WITH LONG-TERM CURRENT USE OF INSULIN (H): ICD-10-CM

## 2021-01-01 DIAGNOSIS — Z94.4 HX OF LIVER TRANSPLANT (H): ICD-10-CM

## 2021-01-01 DIAGNOSIS — Z94.4 LIVER REPLACED BY TRANSPLANT (H): ICD-10-CM

## 2021-01-01 DIAGNOSIS — I25.10 CORONARY ARTERY DISEASE INVOLVING NATIVE CORONARY ARTERY OF NATIVE HEART WITHOUT ANGINA PECTORIS: Primary | ICD-10-CM

## 2021-01-01 DIAGNOSIS — R06.09 DOE (DYSPNEA ON EXERTION): ICD-10-CM

## 2021-01-01 DIAGNOSIS — Z95.2 S/P TAVR (TRANSCATHETER AORTIC VALVE REPLACEMENT): Primary | ICD-10-CM

## 2021-01-01 DIAGNOSIS — D53.9 MACROCYTIC ANEMIA: ICD-10-CM

## 2021-01-01 DIAGNOSIS — E03.9 ACQUIRED HYPOTHYROIDISM: ICD-10-CM

## 2021-01-01 DIAGNOSIS — R79.89 ELEVATED D-DIMER: ICD-10-CM

## 2021-01-01 DIAGNOSIS — Z09 HOSPITAL DISCHARGE FOLLOW-UP: Primary | ICD-10-CM

## 2021-01-01 DIAGNOSIS — Z11.59 ENCOUNTER FOR SCREENING FOR OTHER VIRAL DISEASES: ICD-10-CM

## 2021-01-01 DIAGNOSIS — Z94.4 LIVER REPLACED BY TRANSPLANT (H): Primary | ICD-10-CM

## 2021-01-01 DIAGNOSIS — N18.31 CHRONIC KIDNEY DISEASE, STAGE 3A (H): Chronic | ICD-10-CM

## 2021-01-01 DIAGNOSIS — Z95.0 PACEMAKER: ICD-10-CM

## 2021-01-01 DIAGNOSIS — I35.0 AORTIC STENOSIS, SEVERE: ICD-10-CM

## 2021-01-01 DIAGNOSIS — I50.30 HEART FAILURE WITH PRESERVED EJECTION FRACTION, NYHA CLASS I (H): ICD-10-CM

## 2021-01-01 DIAGNOSIS — I50.32: ICD-10-CM

## 2021-01-01 DIAGNOSIS — J44.9 COPD, SEVERE (H): ICD-10-CM

## 2021-01-01 DIAGNOSIS — Z95.2 S/P TAVR (TRANSCATHETER AORTIC VALVE REPLACEMENT): ICD-10-CM

## 2021-01-01 DIAGNOSIS — E11.9 TYPE 2 DIABETES MELLITUS WITHOUT COMPLICATION, UNSPECIFIED WHETHER LONG TERM INSULIN USE (H): ICD-10-CM

## 2021-01-01 DIAGNOSIS — Z79.4 TYPE 2 DIABETES MELLITUS WITH HYPERGLYCEMIA, WITH LONG-TERM CURRENT USE OF INSULIN (H): ICD-10-CM

## 2021-01-01 DIAGNOSIS — Z85.71 HISTORY OF HODGKIN'S LYMPHOMA: ICD-10-CM

## 2021-01-01 DIAGNOSIS — Z94.4 LIVER TRANSPLANT RECIPIENT (H): ICD-10-CM

## 2021-01-01 DIAGNOSIS — I97.89 SURGICAL COMPLETE HEART BLOCK (H): ICD-10-CM

## 2021-01-01 DIAGNOSIS — E11.65 TYPE 2 DIABETES MELLITUS WITH HYPERGLYCEMIA, WITHOUT LONG-TERM CURRENT USE OF INSULIN (H): ICD-10-CM

## 2021-01-01 DIAGNOSIS — M54.42 CHRONIC LEFT-SIDED LOW BACK PAIN WITH LEFT-SIDED SCIATICA: ICD-10-CM

## 2021-01-01 DIAGNOSIS — I10 ESSENTIAL HYPERTENSION: ICD-10-CM

## 2021-01-01 DIAGNOSIS — Z13.220 LIPID SCREENING: ICD-10-CM

## 2021-01-01 DIAGNOSIS — N40.1 BENIGN PROSTATIC HYPERPLASIA WITH LOWER URINARY TRACT SYMPTOMS, SYMPTOM DETAILS UNSPECIFIED: ICD-10-CM

## 2021-01-01 DIAGNOSIS — R09.89 CAROTID BRUIT: ICD-10-CM

## 2021-01-01 DIAGNOSIS — E87.1 HYPONATREMIA: ICD-10-CM

## 2021-01-01 DIAGNOSIS — N18.31 STAGE 3A CHRONIC KIDNEY DISEASE (H): ICD-10-CM

## 2021-01-01 DIAGNOSIS — J44.9 CHRONIC OBSTRUCTIVE PULMONARY DISEASE, UNSPECIFIED COPD TYPE (H): ICD-10-CM

## 2021-01-01 DIAGNOSIS — S06.A0XA MIDLINE SHIFT OF BRAIN DUE TO HEMATOMA (H): ICD-10-CM

## 2021-01-01 DIAGNOSIS — E87.5 HYPERPOTASSEMIA: ICD-10-CM

## 2021-01-01 DIAGNOSIS — I50.23 ACUTE ON CHRONIC SYSTOLIC (CONGESTIVE) HEART FAILURE (H): ICD-10-CM

## 2021-01-01 DIAGNOSIS — I44.2 SURGICAL COMPLETE HEART BLOCK (H): ICD-10-CM

## 2021-01-01 DIAGNOSIS — I35.0 SEVERE AORTIC STENOSIS: Primary | ICD-10-CM

## 2021-01-01 DIAGNOSIS — Q23.81 BICUSPID AORTIC VALVE: ICD-10-CM

## 2021-01-01 DIAGNOSIS — M48.061 LUMBAR FORAMINAL STENOSIS: ICD-10-CM

## 2021-01-01 DIAGNOSIS — I25.10 CORONARY ARTERY DISEASE INVOLVING NATIVE CORONARY ARTERY OF NATIVE HEART WITHOUT ANGINA PECTORIS: ICD-10-CM

## 2021-01-01 DIAGNOSIS — R00.0 TACHYCARDIA: ICD-10-CM

## 2021-01-01 DIAGNOSIS — M47.816 LUMBAR FACET ARTHROPATHY: ICD-10-CM

## 2021-01-01 DIAGNOSIS — M51.369 DEGENERATION OF LUMBAR INTERVERTEBRAL DISC: ICD-10-CM

## 2021-01-01 DIAGNOSIS — R06.02 SHORTNESS OF BREATH: Primary | ICD-10-CM

## 2021-01-01 DIAGNOSIS — I10 ESSENTIAL HYPERTENSION, MALIGNANT: ICD-10-CM

## 2021-01-01 DIAGNOSIS — N18.31 STAGE 3A CHRONIC KIDNEY DISEASE (H): Primary | ICD-10-CM

## 2021-01-01 DIAGNOSIS — E03.9 HYPOTHYROIDISM, UNSPECIFIED TYPE: ICD-10-CM

## 2021-01-01 DIAGNOSIS — I25.10 CORONARY ARTERY DISEASE INVOLVING NATIVE HEART, ANGINA PRESENCE UNSPECIFIED, UNSPECIFIED VESSEL OR LESION TYPE: ICD-10-CM

## 2021-01-01 DIAGNOSIS — E87.5 HYPERKALEMIA: ICD-10-CM

## 2021-01-01 DIAGNOSIS — Z87.891 HISTORY OF TOBACCO ABUSE: ICD-10-CM

## 2021-01-01 DIAGNOSIS — Z94.4 HX OF LIVER TRANSPLANT (H): Primary | ICD-10-CM

## 2021-01-01 DIAGNOSIS — E11.9 WELL CONTROLLED TYPE 2 DIABETES MELLITUS (H): ICD-10-CM

## 2021-01-01 DIAGNOSIS — Z95.3 S/P TAVR (TRANSCATHETER AORTIC VALVE REPLACEMENT), BIOPROSTHETIC: ICD-10-CM

## 2021-01-01 DIAGNOSIS — Z79.4 TYPE 2 DIABETES MELLITUS WITHOUT COMPLICATION, WITH LONG-TERM CURRENT USE OF INSULIN (H): Primary | ICD-10-CM

## 2021-01-01 DIAGNOSIS — D47.Z1 PTLD (POST-TRANSPLANT LYMPHOPROLIFERATIVE DISORDER) (H): ICD-10-CM

## 2021-01-01 DIAGNOSIS — J44.9 COPD (CHRONIC OBSTRUCTIVE PULMONARY DISEASE) (H): Primary | ICD-10-CM

## 2021-01-01 DIAGNOSIS — N40.1 BENIGN PROSTATIC HYPERPLASIA WITH INCOMPLETE BLADDER EMPTYING: ICD-10-CM

## 2021-01-01 DIAGNOSIS — I35.0 AORTIC STENOSIS, SEVERE: Primary | ICD-10-CM

## 2021-01-01 DIAGNOSIS — R06.09 DOE (DYSPNEA ON EXERTION): Primary | ICD-10-CM

## 2021-01-01 DIAGNOSIS — Z95.5 HISTORY OF CORONARY ARTERY STENT PLACEMENT: ICD-10-CM

## 2021-01-01 DIAGNOSIS — N18.9 CHRONIC KIDNEY DISEASE, UNSPECIFIED: ICD-10-CM

## 2021-01-01 DIAGNOSIS — Z87.891 PERSONAL HISTORY OF TOBACCO USE, PRESENTING HAZARDS TO HEALTH: ICD-10-CM

## 2021-01-01 DIAGNOSIS — R68.83 CHILLS (WITHOUT FEVER): ICD-10-CM

## 2021-01-01 DIAGNOSIS — D69.6 THROMBOCYTOPENIA (H): ICD-10-CM

## 2021-01-01 DIAGNOSIS — E87.1 HYPOSMOLALITY SYNDROME: ICD-10-CM

## 2021-01-01 DIAGNOSIS — Z95.0 S/P PLACEMENT OF CARDIAC PACEMAKER: ICD-10-CM

## 2021-01-01 DIAGNOSIS — G89.29 CHRONIC LEFT-SIDED LOW BACK PAIN WITH LEFT-SIDED SCIATICA: ICD-10-CM

## 2021-01-01 DIAGNOSIS — Z01.818 PRE-OP EXAM: ICD-10-CM

## 2021-01-01 DIAGNOSIS — Z78.9 TAKES DIETARY SUPPLEMENTS: ICD-10-CM

## 2021-01-01 DIAGNOSIS — N40.1 BENIGN PROSTATIC HYPERPLASIA WITH URINARY FREQUENCY: Primary | ICD-10-CM

## 2021-01-01 DIAGNOSIS — I35.0 AORTIC VALVE STENOSIS, ETIOLOGY OF CARDIAC VALVE DISEASE UNSPECIFIED: Chronic | ICD-10-CM

## 2021-01-01 DIAGNOSIS — R35.0 BENIGN PROSTATIC HYPERPLASIA WITH URINARY FREQUENCY: Primary | ICD-10-CM

## 2021-01-01 DIAGNOSIS — S06.2XAA MIDLINE SHIFT OF BRAIN DUE TO HEMATOMA (H): ICD-10-CM

## 2021-01-01 DIAGNOSIS — I50.9 ACUTE CONGESTIVE HEART FAILURE, UNSPECIFIED HEART FAILURE TYPE (H): ICD-10-CM

## 2021-01-01 DIAGNOSIS — Z11.52 ENCOUNTER FOR SCREENING LABORATORY TESTING FOR SEVERE ACUTE RESPIRATORY SYNDROME CORONAVIRUS 2 (SARS-COV-2): ICD-10-CM

## 2021-01-01 DIAGNOSIS — I34.2 NONRHEUMATIC MITRAL VALVE STENOSIS: ICD-10-CM

## 2021-01-01 DIAGNOSIS — J44.9 COPD (CHRONIC OBSTRUCTIVE PULMONARY DISEASE) (H): ICD-10-CM

## 2021-01-01 DIAGNOSIS — N18.31 CHRONIC KIDNEY DISEASE, STAGE 3A (H): ICD-10-CM

## 2021-01-01 DIAGNOSIS — N32.81 OAB (OVERACTIVE BLADDER): ICD-10-CM

## 2021-01-01 DIAGNOSIS — E11.9 TYPE 2 DIABETES MELLITUS WITHOUT COMPLICATION, WITH LONG-TERM CURRENT USE OF INSULIN (H): Primary | ICD-10-CM

## 2021-01-01 DIAGNOSIS — S06.5XAA SUBDURAL HEMATOMA (H): ICD-10-CM

## 2021-01-01 DIAGNOSIS — J44.9 COPD, SEVERE (H): Primary | Chronic | ICD-10-CM

## 2021-01-01 DIAGNOSIS — M50.30 DEGENERATION OF CERVICAL INTERVERTEBRAL DISC: ICD-10-CM

## 2021-01-01 DIAGNOSIS — M51.369 LUMBAR DEGENERATIVE DISC DISEASE: ICD-10-CM

## 2021-01-01 DIAGNOSIS — M48.061 SPINAL STENOSIS OF LUMBAR REGION, UNSPECIFIED WHETHER NEUROGENIC CLAUDICATION PRESENT: Primary | ICD-10-CM

## 2021-01-01 DIAGNOSIS — Z01.818 PRE-OP EXAM: Primary | ICD-10-CM

## 2021-01-01 LAB
ABO/RH(D): NORMAL
ACT BLD: 114 SECONDS (ref 74–150)
ACT BLD: 279 SECONDS (ref 74–150)
ACT BLD: 296 SECONDS (ref 74–150)
ALBUMIN MFR UR ELPH: 92 MG/DL (ref 1–14)
ALBUMIN SERPL-MCNC: 2.6 G/DL (ref 3.4–5)
ALBUMIN SERPL-MCNC: 3.3 G/DL (ref 3.4–5)
ALBUMIN SERPL-MCNC: 3.5 G/DL (ref 3.4–5)
ALBUMIN SERPL-MCNC: 3.6 G/DL (ref 3.5–5.7)
ALBUMIN SERPL-MCNC: 3.6 G/DL (ref 3.5–5.7)
ALBUMIN SERPL-MCNC: 3.7 G/DL (ref 3.5–5.7)
ALBUMIN SERPL-MCNC: 3.7 G/DL (ref 3.5–5.7)
ALBUMIN SERPL-MCNC: 3.8 G/DL (ref 3.5–5.7)
ALBUMIN SERPL-MCNC: 3.9 G/DL (ref 3.5–5.7)
ALBUMIN SERPL-MCNC: 4.1 G/DL (ref 3.5–5.7)
ALBUMIN SERPL-MCNC: NORMAL G/DL (ref 3.4–5)
ALBUMIN UR-MCNC: 70 MG/DL
ALBUMIN UR-MCNC: NEGATIVE MG/DL
ALP SERPL-CCNC: 104 U/L (ref 34–104)
ALP SERPL-CCNC: 107 U/L (ref 34–104)
ALP SERPL-CCNC: 109 U/L (ref 34–104)
ALP SERPL-CCNC: 110 U/L (ref 34–104)
ALP SERPL-CCNC: 120 U/L (ref 34–104)
ALP SERPL-CCNC: 122 U/L (ref 40–150)
ALP SERPL-CCNC: 83 U/L (ref 34–104)
ALP SERPL-CCNC: 89 U/L (ref 34–104)
ALP SERPL-CCNC: 93 U/L (ref 40–150)
ALP SERPL-CCNC: 94 U/L (ref 34–104)
ALP SERPL-CCNC: NORMAL U/L (ref 40–150)
ALT SERPL W P-5'-P-CCNC: 10 U/L (ref 7–52)
ALT SERPL W P-5'-P-CCNC: 11 U/L (ref 7–52)
ALT SERPL W P-5'-P-CCNC: 14 U/L (ref 7–52)
ALT SERPL W P-5'-P-CCNC: 15 U/L (ref 0–70)
ALT SERPL W P-5'-P-CCNC: 17 U/L (ref 7–52)
ALT SERPL W P-5'-P-CCNC: 18 U/L (ref 0–70)
ALT SERPL W P-5'-P-CCNC: 18 U/L (ref 7–52)
ALT SERPL W P-5'-P-CCNC: 25 U/L (ref 7–52)
ALT SERPL W P-5'-P-CCNC: 36 U/L (ref 7–52)
ALT SERPL W P-5'-P-CCNC: 8 U/L (ref 7–52)
ALT SERPL W P-5'-P-CCNC: NORMAL U/L (ref 0–70)
ANION GAP SERPL CALCULATED.3IONS-SCNC: 10 MMOL/L (ref 3–14)
ANION GAP SERPL CALCULATED.3IONS-SCNC: 11 MMOL/L (ref 3–14)
ANION GAP SERPL CALCULATED.3IONS-SCNC: 13 MMOL/L (ref 3–14)
ANION GAP SERPL CALCULATED.3IONS-SCNC: 14 MMOL/L (ref 3–14)
ANION GAP SERPL CALCULATED.3IONS-SCNC: 14 MMOL/L (ref 3–14)
ANION GAP SERPL CALCULATED.3IONS-SCNC: 5 MMOL/L (ref 3–14)
ANION GAP SERPL CALCULATED.3IONS-SCNC: 5 MMOL/L (ref 3–14)
ANION GAP SERPL CALCULATED.3IONS-SCNC: 6 MMOL/L (ref 3–14)
ANION GAP SERPL CALCULATED.3IONS-SCNC: 6 MMOL/L (ref 3–14)
ANION GAP SERPL CALCULATED.3IONS-SCNC: 7 MMOL/L (ref 3–14)
ANION GAP SERPL CALCULATED.3IONS-SCNC: 8 MMOL/L (ref 3–14)
ANION GAP SERPL CALCULATED.3IONS-SCNC: 9 MMOL/L (ref 3–14)
ANION GAP SERPL CALCULATED.3IONS-SCNC: NORMAL MMOL/L (ref 6–17)
ANTIBODY SCREEN: NEGATIVE
APPEARANCE UR: CLEAR
APPEARANCE UR: CLEAR
AST SERPL W P-5'-P-CCNC: 11 U/L (ref 13–39)
AST SERPL W P-5'-P-CCNC: 11 U/L (ref 13–39)
AST SERPL W P-5'-P-CCNC: 12 U/L (ref 0–45)
AST SERPL W P-5'-P-CCNC: 12 U/L (ref 13–39)
AST SERPL W P-5'-P-CCNC: 12 U/L (ref 13–39)
AST SERPL W P-5'-P-CCNC: 14 U/L (ref 13–39)
AST SERPL W P-5'-P-CCNC: 15 U/L (ref 0–45)
AST SERPL W P-5'-P-CCNC: 16 U/L (ref 13–39)
AST SERPL W P-5'-P-CCNC: 19 U/L (ref 13–39)
AST SERPL W P-5'-P-CCNC: 36 U/L (ref 13–39)
AST SERPL W P-5'-P-CCNC: NORMAL U/L (ref 0–45)
ATRIAL RATE - MUSE: 56 BPM
ATRIAL RATE - MUSE: 75 BPM
ATRIAL RATE - MUSE: 80 BPM
ATRIAL RATE - MUSE: 95 BPM
BACTERIA #/AREA URNS HPF: ABNORMAL /HPF
BACTERIA BLD CULT: NO GROWTH
BACTERIA BLD CULT: NO GROWTH
BACTERIA UR CULT: NO GROWTH
BASE EXCESS BLDA CALC-SCNC: -3.8 MMOL/L (ref -9–1.8)
BASE EXCESS BLDA CALC-SCNC: -3.9 MMOL/L (ref -9–1.8)
BASOPHILS # BLD AUTO: 0 10E3/UL (ref 0–0.2)
BASOPHILS # BLD AUTO: 0 10E3/UL (ref 0–0.2)
BASOPHILS # BLD AUTO: 0 10E9/L (ref 0–0.2)
BASOPHILS NFR BLD AUTO: 0 %
BASOPHILS NFR BLD AUTO: 0 %
BASOPHILS NFR BLD AUTO: 0.1 %
BILIRUB DIRECT SERPL-MCNC: 0.2 MG/DL (ref 0–0.2)
BILIRUB DIRECT SERPL-MCNC: 0.3 MG/DL (ref 0–0.2)
BILIRUB DIRECT SERPL-MCNC: 0.3 MG/DL (ref 0–0.2)
BILIRUB DIRECT SERPL-MCNC: NORMAL MG/DL (ref 0–0.2)
BILIRUB SERPL-MCNC: 0.9 MG/DL (ref 0.3–1)
BILIRUB SERPL-MCNC: 1 MG/DL (ref 0.3–1)
BILIRUB SERPL-MCNC: 1.1 MG/DL (ref 0.3–1)
BILIRUB SERPL-MCNC: 1.2 MG/DL (ref 0.2–1.3)
BILIRUB SERPL-MCNC: 1.2 MG/DL (ref 0.3–1)
BILIRUB SERPL-MCNC: 1.3 MG/DL (ref 0.2–1.3)
BILIRUB SERPL-MCNC: 1.3 MG/DL (ref 0.3–1)
BILIRUB SERPL-MCNC: 1.3 MG/DL (ref 0.3–1)
BILIRUB SERPL-MCNC: NORMAL MG/DL (ref 0.2–1.3)
BILIRUB UR QL STRIP: NEGATIVE
BILIRUB UR QL STRIP: NEGATIVE
BLD PROD TYP BPU: NORMAL
BLOOD COMPONENT TYPE: NORMAL
BUN SERPL-MCNC: 17 MG/DL (ref 7–25)
BUN SERPL-MCNC: 18 MG/DL (ref 7–25)
BUN SERPL-MCNC: 19 MG/DL (ref 7–30)
BUN SERPL-MCNC: 20 MG/DL (ref 7–25)
BUN SERPL-MCNC: 20 MG/DL (ref 7–25)
BUN SERPL-MCNC: 22 MG/DL (ref 7–25)
BUN SERPL-MCNC: 24 MG/DL (ref 7–30)
BUN SERPL-MCNC: 26 MG/DL (ref 7–25)
BUN SERPL-MCNC: 26 MG/DL (ref 7–30)
BUN SERPL-MCNC: 27 MG/DL (ref 7–25)
BUN SERPL-MCNC: 30 MG/DL (ref 7–25)
BUN SERPL-MCNC: 36 MG/DL (ref 7–25)
BUN SERPL-MCNC: 39 MG/DL (ref 7–25)
BUN SERPL-MCNC: 40 MG/DL (ref 7–25)
BUN SERPL-MCNC: 40 MG/DL (ref 7–25)
BUN SERPL-MCNC: 44 MG/DL (ref 7–25)
BUN SERPL-MCNC: NORMAL MG/DL (ref 7–30)
CA-I SERPL ISE-MCNC: 4.7 MG/DL (ref 4.4–5.2)
CALCIUM SERPL-MCNC: 8 MG/DL (ref 8.5–10.1)
CALCIUM SERPL-MCNC: 8.2 MG/DL (ref 8.5–10.1)
CALCIUM SERPL-MCNC: 8.4 MG/DL (ref 8.6–10.3)
CALCIUM SERPL-MCNC: 8.6 MG/DL (ref 8.6–10.3)
CALCIUM SERPL-MCNC: 8.7 MG/DL (ref 8.6–10.3)
CALCIUM SERPL-MCNC: 8.7 MG/DL (ref 8.6–10.3)
CALCIUM SERPL-MCNC: 8.8 MG/DL (ref 8.5–10.1)
CALCIUM SERPL-MCNC: 8.8 MG/DL (ref 8.6–10.3)
CALCIUM SERPL-MCNC: 8.9 MG/DL (ref 8.6–10.3)
CALCIUM SERPL-MCNC: 9 MG/DL (ref 8.6–10.3)
CALCIUM SERPL-MCNC: 9.2 MG/DL (ref 8.6–10.3)
CALCIUM SERPL-MCNC: 9.2 MG/DL (ref 8.6–10.3)
CALCIUM SERPL-MCNC: 9.3 MG/DL (ref 8.6–10.3)
CALCIUM SERPL-MCNC: 9.4 MG/DL (ref 8.6–10.3)
CALCIUM SERPL-MCNC: 9.5 MG/DL (ref 8.6–10.3)
CALCIUM SERPL-MCNC: 9.7 MG/DL (ref 8.6–10.3)
CALCIUM SERPL-MCNC: NORMAL MG/DL (ref 8.5–10.1)
CHLORIDE BLD-SCNC: 101 MMOL/L (ref 98–107)
CHLORIDE BLD-SCNC: 104 MMOL/L (ref 98–107)
CHLORIDE BLD-SCNC: 105 MMOL/L (ref 98–107)
CHLORIDE BLD-SCNC: 108 MMOL/L (ref 94–109)
CHLORIDE BLD-SCNC: 108 MMOL/L (ref 98–107)
CHLORIDE BLD-SCNC: 108 MMOL/L (ref 98–107)
CHLORIDE BLD-SCNC: 109 MMOL/L (ref 94–109)
CHLORIDE BLD-SCNC: 109 MMOL/L (ref 94–109)
CHLORIDE BLD-SCNC: 95 MMOL/L (ref 98–107)
CHLORIDE SERPL-SCNC: 101 MMOL/L (ref 98–107)
CHLORIDE SERPL-SCNC: 103 MMOL/L (ref 98–107)
CHLORIDE SERPL-SCNC: 104 MMOL/L (ref 98–107)
CHLORIDE SERPL-SCNC: 106 MMOL/L (ref 98–107)
CHLORIDE SERPL-SCNC: 110 MMOL/L (ref 94–109)
CHLORIDE SERPL-SCNC: 110 MMOL/L (ref 94–109)
CHLORIDE SERPL-SCNC: 95 MMOL/L (ref 98–107)
CHLORIDE SERPL-SCNC: 96 MMOL/L (ref 98–107)
CHLORIDE SERPL-SCNC: 99 MMOL/L (ref 98–107)
CHLORIDE SERPL-SCNC: NORMAL MMOL/L (ref 94–109)
CHOLEST SERPL-MCNC: 103 MG/DL
CHOLEST SERPL-MCNC: 87 MG/DL
CO2 SERPL-SCNC: 18 MMOL/L (ref 21–31)
CO2 SERPL-SCNC: 19 MMOL/L (ref 21–31)
CO2 SERPL-SCNC: 19 MMOL/L (ref 21–31)
CO2 SERPL-SCNC: 20 MMOL/L (ref 21–31)
CO2 SERPL-SCNC: 20 MMOL/L (ref 21–31)
CO2 SERPL-SCNC: 21 MMOL/L (ref 21–31)
CO2 SERPL-SCNC: 22 MMOL/L (ref 20–32)
CO2 SERPL-SCNC: 22 MMOL/L (ref 20–32)
CO2 SERPL-SCNC: 22 MMOL/L (ref 21–31)
CO2 SERPL-SCNC: 22 MMOL/L (ref 21–31)
CO2 SERPL-SCNC: 23 MMOL/L (ref 21–31)
CO2 SERPL-SCNC: 24 MMOL/L (ref 20–32)
CO2 SERPL-SCNC: 24 MMOL/L (ref 21–31)
CO2 SERPL-SCNC: 25 MMOL/L (ref 21–31)
CO2 SERPL-SCNC: 27 MMOL/L (ref 21–31)
CO2 SERPL-SCNC: 30 MMOL/L (ref 21–31)
CO2 SERPL-SCNC: NORMAL MMOL/L (ref 20–32)
CODING SYSTEM: NORMAL
COLOR UR AUTO: ABNORMAL
COLOR UR AUTO: YELLOW
CREAT SERPL-MCNC: 1.06 MG/DL (ref 0.66–1.25)
CREAT SERPL-MCNC: 1.06 MG/DL (ref 0.66–1.25)
CREAT SERPL-MCNC: 1.08 MG/DL (ref 0.7–1.3)
CREAT SERPL-MCNC: 1.14 MG/DL (ref 0.7–1.3)
CREAT SERPL-MCNC: 1.15 MG/DL (ref 0.7–1.3)
CREAT SERPL-MCNC: 1.16 MG/DL (ref 0.7–1.3)
CREAT SERPL-MCNC: 1.18 MG/DL (ref 0.66–1.25)
CREAT SERPL-MCNC: 1.18 MG/DL (ref 0.66–1.25)
CREAT SERPL-MCNC: 1.2 MG/DL (ref 0.66–1.25)
CREAT SERPL-MCNC: 1.2 MG/DL (ref 0.7–1.3)
CREAT SERPL-MCNC: 1.27 MG/DL (ref 0.7–1.3)
CREAT SERPL-MCNC: 1.3 MG/DL (ref 0.7–1.3)
CREAT SERPL-MCNC: 1.34 MG/DL (ref 0.7–1.3)
CREAT SERPL-MCNC: 1.46 MG/DL (ref 0.7–1.3)
CREAT SERPL-MCNC: 1.47 MG/DL (ref 0.7–1.3)
CREAT SERPL-MCNC: 1.52 MG/DL (ref 0.7–1.3)
CREAT SERPL-MCNC: NORMAL MG/DL (ref 0.66–1.25)
CREAT UR-MCNC: 154 MG/DL
CREATININE (EXTERNAL): 1.13 MG/DL (ref 0.7–1.2)
CROSSMATCH: NORMAL
CRP SERPL-MCNC: 11 MG/L
CRP SERPL-MCNC: 39.8 MG/L
D DIMER PPP FEU-MCNC: 1.07 UG/ML FEU (ref 0–0.5)
D DIMER PPP FEU-MCNC: 1.31 UG/ML FEU (ref 0–0.5)
D DIMER PPP FEU-MCNC: 1.5 UG/ML FEU (ref 0–0.5)
DEPRECATED CALCIDIOL+CALCIFEROL SERPL-MC: 24.2 NG/ML
DIASTOLIC BLOOD PRESSURE - MUSE: NORMAL MMHG
DIFFERENTIAL METHOD BLD: ABNORMAL
EOSINOPHIL # BLD AUTO: 0 10E3/UL (ref 0–0.7)
EOSINOPHIL # BLD AUTO: 0 10E3/UL (ref 0–0.7)
EOSINOPHIL # BLD AUTO: 0 10E9/L (ref 0–0.7)
EOSINOPHIL NFR BLD AUTO: 0 %
EOSINOPHIL NFR BLD AUTO: 0 %
EOSINOPHIL NFR BLD AUTO: 0.1 %
ERYTHROCYTE [DISTWIDTH] IN BLOOD BY AUTOMATED COUNT: 14.5 % (ref 10–15)
ERYTHROCYTE [DISTWIDTH] IN BLOOD BY AUTOMATED COUNT: 14.6 % (ref 10–15)
ERYTHROCYTE [DISTWIDTH] IN BLOOD BY AUTOMATED COUNT: 14.6 % (ref 10–15)
ERYTHROCYTE [DISTWIDTH] IN BLOOD BY AUTOMATED COUNT: 14.9 % (ref 10–15)
ERYTHROCYTE [DISTWIDTH] IN BLOOD BY AUTOMATED COUNT: 14.9 % (ref 10–15)
ERYTHROCYTE [DISTWIDTH] IN BLOOD BY AUTOMATED COUNT: 15 % (ref 10–15)
ERYTHROCYTE [DISTWIDTH] IN BLOOD BY AUTOMATED COUNT: 15.2 % (ref 10–15)
ERYTHROCYTE [DISTWIDTH] IN BLOOD BY AUTOMATED COUNT: 15.3 % (ref 10–15)
ERYTHROCYTE [DISTWIDTH] IN BLOOD BY AUTOMATED COUNT: 15.3 % (ref 10–15)
ERYTHROCYTE [DISTWIDTH] IN BLOOD BY AUTOMATED COUNT: 15.5 % (ref 10–15)
ERYTHROCYTE [DISTWIDTH] IN BLOOD BY AUTOMATED COUNT: 16 % (ref 10–15)
ERYTHROCYTE [DISTWIDTH] IN BLOOD BY AUTOMATED COUNT: 16.2 % (ref 10–15)
ERYTHROCYTE [SEDIMENTATION RATE] IN BLOOD BY WESTERGREN METHOD: 66 MM/H (ref 1–10)
FASTING STATUS PATIENT QL REPORTED: YES
FERRITIN SERPL-MCNC: 107 NG/ML (ref 24–336)
FOLATE SERPL-MCNC: 11 NG/ML
GFR ESTIMATED (EXTERNAL): >60 ML/MIN/1.73M2
GFR ESTIMATED (IF AFRICAN AMERICAN) (EXTERNAL): NORMAL ML/MIN/1.73M2
GFR SERPL CREATININE-BSD FRML MDRD: 46 ML/MIN/1.73M2
GFR SERPL CREATININE-BSD FRML MDRD: 47 ML/MIN/{1.73_M2}
GFR SERPL CREATININE-BSD FRML MDRD: 48 ML/MIN/{1.73_M2}
GFR SERPL CREATININE-BSD FRML MDRD: 54 ML/MIN/1.73M2
GFR SERPL CREATININE-BSD FRML MDRD: 56 ML/MIN/1.73M2
GFR SERPL CREATININE-BSD FRML MDRD: 56 ML/MIN/{1.73_M2}
GFR SERPL CREATININE-BSD FRML MDRD: 60 ML/MIN/{1.73_M2}
GFR SERPL CREATININE-BSD FRML MDRD: 61 ML/MIN/1.73M2
GFR SERPL CREATININE-BSD FRML MDRD: 63 ML/MIN/1.73M2
GFR SERPL CREATININE-BSD FRML MDRD: 63 ML/MIN/{1.73_M2}
GFR SERPL CREATININE-BSD FRML MDRD: 64 ML/MIN/1.73M2
GFR SERPL CREATININE-BSD FRML MDRD: 64 ML/MIN/{1.73_M2}
GFR SERPL CREATININE-BSD FRML MDRD: 65 ML/MIN/1.73M2
GFR SERPL CREATININE-BSD FRML MDRD: 70 ML/MIN/1.73M2
GFR SERPL CREATININE-BSD FRML MDRD: 71 ML/MIN/1.73M2
GFR SERPL CREATININE-BSD FRML MDRD: 71 ML/MIN/{1.73_M2}
GFR SERPL CREATININE-BSD FRML MDRD: NORMAL ML/MIN/{1.73_M2}
GLUCOSE (EXTERNAL): 117 MG/DL (ref 70–99)
GLUCOSE (EXTERNAL): 241 MG/DL (ref 70–99)
GLUCOSE BLD-MCNC: 117 MG/DL (ref 70–99)
GLUCOSE BLD-MCNC: 118 MG/DL (ref 70–99)
GLUCOSE BLD-MCNC: 153 MG/DL (ref 70–99)
GLUCOSE BLD-MCNC: 161 MG/DL (ref 70–105)
GLUCOSE BLD-MCNC: 168 MG/DL (ref 70–105)
GLUCOSE BLD-MCNC: 198 MG/DL (ref 70–105)
GLUCOSE BLD-MCNC: 225 MG/DL (ref 70–105)
GLUCOSE BLD-MCNC: 372 MG/DL (ref 70–105)
GLUCOSE BLD-MCNC: 461 MG/DL (ref 70–105)
GLUCOSE BLDC GLUCOMTR-MCNC: 141 MG/DL (ref 70–99)
GLUCOSE BLDC GLUCOMTR-MCNC: 147 MG/DL (ref 70–99)
GLUCOSE BLDC GLUCOMTR-MCNC: 151 MG/DL (ref 70–99)
GLUCOSE BLDC GLUCOMTR-MCNC: 167 MG/DL (ref 70–99)
GLUCOSE SERPL-MCNC: 147 MG/DL (ref 70–99)
GLUCOSE SERPL-MCNC: 172 MG/DL (ref 70–99)
GLUCOSE SERPL-MCNC: 193 MG/DL (ref 70–105)
GLUCOSE SERPL-MCNC: 201 MG/DL (ref 70–105)
GLUCOSE SERPL-MCNC: 217 MG/DL (ref 70–105)
GLUCOSE SERPL-MCNC: 235 MG/DL (ref 70–105)
GLUCOSE SERPL-MCNC: 247 MG/DL (ref 70–105)
GLUCOSE SERPL-MCNC: 259 MG/DL (ref 70–105)
GLUCOSE SERPL-MCNC: 369 MG/DL (ref 70–105)
GLUCOSE SERPL-MCNC: NORMAL MG/DL (ref 70–99)
GLUCOSE UR STRIP-MCNC: >1000 MG/DL
GLUCOSE UR STRIP-MCNC: NEGATIVE MG/DL
HBA1C MFR BLD: 7.2 % (ref 4–6)
HCO3 BLD-SCNC: 20 MMOL/L (ref 21–28)
HCO3 BLD-SCNC: 21 MMOL/L (ref 21–28)
HCT VFR BLD AUTO: 27.8 % (ref 40–53)
HCT VFR BLD AUTO: 29.8 % (ref 40–53)
HCT VFR BLD AUTO: 30.4 % (ref 40–53)
HCT VFR BLD AUTO: 31.2 % (ref 40–53)
HCT VFR BLD AUTO: 33.4 % (ref 40–53)
HCT VFR BLD AUTO: 34.9 % (ref 40–53)
HCT VFR BLD AUTO: 35.5 % (ref 40–53)
HCT VFR BLD AUTO: 36.2 % (ref 40–53)
HCT VFR BLD AUTO: 36.8 % (ref 40–53)
HCT VFR BLD AUTO: 38 % (ref 40–53)
HCT VFR BLD AUTO: 38.7 % (ref 40–53)
HCT VFR BLD AUTO: 39.1 % (ref 40–53)
HCT VFR BLD AUTO: 39.4 % (ref 40–53)
HCT VFR BLD AUTO: 39.8 % (ref 40–53)
HDLC SERPL-MCNC: 31 MG/DL (ref 23–92)
HDLC SERPL-MCNC: 41 MG/DL
HGB BLD-MCNC: 10.7 G/DL (ref 13.3–17.7)
HGB BLD-MCNC: 10.9 G/DL (ref 13.3–17.7)
HGB BLD-MCNC: 11.3 G/DL (ref 13.3–17.7)
HGB BLD-MCNC: 11.4 G/DL (ref 13.3–17.7)
HGB BLD-MCNC: 12.3 G/DL (ref 13.3–17.7)
HGB BLD-MCNC: 12.4 G/DL (ref 13.3–17.7)
HGB BLD-MCNC: 12.4 G/DL (ref 13.3–17.7)
HGB BLD-MCNC: 12.5 G/DL (ref 13.3–17.7)
HGB BLD-MCNC: 12.7 G/DL (ref 13.3–17.7)
HGB BLD-MCNC: 12.9 G/DL (ref 13.3–17.7)
HGB BLD-MCNC: 13 G/DL (ref 13.3–17.7)
HGB BLD-MCNC: 13 G/DL (ref 13.3–17.7)
HGB BLD-MCNC: 8.9 G/DL (ref 13.3–17.7)
HGB BLD-MCNC: 9.3 G/DL (ref 13.3–17.7)
HGB BLD-MCNC: 9.8 G/DL (ref 13.3–17.7)
HGB BLD-MCNC: 9.9 G/DL (ref 13.3–17.7)
HGB UR QL STRIP: NEGATIVE
HGB UR QL STRIP: NEGATIVE
HOLD SPECIMEN: NORMAL
HYALINE CASTS: 18 /LPF
HYALINE CASTS: 5 /LPF
IMM GRANULOCYTES # BLD: 0 10E3/UL
IMM GRANULOCYTES # BLD: 0.1 10E3/UL
IMM GRANULOCYTES # BLD: 0.1 10E9/L (ref 0–0.4)
IMM GRANULOCYTES NFR BLD: 0 %
IMM GRANULOCYTES NFR BLD: 0.8 %
IMM GRANULOCYTES NFR BLD: 1 %
INTERPRETATION ECG - MUSE: NORMAL
IRON SATN MFR SERPL: 14 % (ref 20–55)
IRON SATN MFR SERPL: 21 % (ref 20–55)
IRON SERPL-MCNC: 37 UG/DL (ref 50–212)
IRON SERPL-MCNC: 52 UG/DL (ref 50–212)
ISSUE DATE AND TIME: NORMAL
ISSUE DATE AND TIME: NORMAL
KCT BLD-ACNC: 251 SEC (ref 75–150)
KCT BLD-ACNC: 312 SEC (ref 75–150)
KETONES UR STRIP-MCNC: NEGATIVE MG/DL
KETONES UR STRIP-MCNC: NEGATIVE MG/DL
LABORATORY COMMENT REPORT: NORMAL
LABORATORY COMMENT REPORT: NORMAL
LACTATE SERPL-SCNC: 4.5 MMOL/L (ref 0.7–2)
LACTATE SERPL-SCNC: 4.7 MMOL/L (ref 0.7–2)
LACTATE SERPL-SCNC: 6.7 MMOL/L (ref 0.7–2)
LDH SERPL L TO P-CCNC: 147 U/L (ref 140–271)
LDLC SERPL CALC-MCNC: 25 MG/DL
LDLC SERPL CALC-MCNC: 37 MG/DL
LEUKOCYTE ESTERASE UR QL STRIP: NEGATIVE
LEUKOCYTE ESTERASE UR QL STRIP: NEGATIVE
LVEF ECHO: NORMAL
LYMPHOCYTES # BLD AUTO: 0.5 10E3/UL (ref 0.8–5.3)
LYMPHOCYTES # BLD AUTO: 0.5 10E3/UL (ref 0.8–5.3)
LYMPHOCYTES # BLD AUTO: 0.9 10E9/L (ref 0.8–5.3)
LYMPHOCYTES NFR BLD AUTO: 3 %
LYMPHOCYTES NFR BLD AUTO: 5 %
LYMPHOCYTES NFR BLD AUTO: 8.8 %
MAGNESIUM 24H UR-MRATE: 0.08 G/G CR
MAGNESIUM SERPL-MCNC: 0.7 MG/DL (ref 1.9–2.7)
MAGNESIUM SERPL-MCNC: 0.9 MG/DL (ref 1.9–2.7)
MAGNESIUM SERPL-MCNC: 1.1 MG/DL (ref 1.9–2.7)
MAGNESIUM SERPL-MCNC: 1.3 MG/DL (ref 1.9–2.7)
MAGNESIUM SERPL-MCNC: 1.4 MG/DL (ref 1.6–2.3)
MAGNESIUM SERPL-MCNC: 1.5 MG/DL (ref 1.6–2.3)
MAGNESIUM SERPL-MCNC: 1.5 MG/DL (ref 1.6–2.3)
MAGNESIUM SERPL-MCNC: 1.5 MG/DL (ref 1.9–2.7)
MAGNESIUM SERPL-MCNC: 1.5 MG/DL (ref 1.9–2.7)
MAGNESIUM SERPL-MCNC: 1.6 MG/DL (ref 1.9–2.7)
MAGNESIUM SERPL-MCNC: 1.7 MG/DL (ref 1.9–2.7)
MAGNESIUM SERPL-MCNC: 1.8 MG/DL (ref 1.9–2.7)
MAGNESIUM SERPL-MCNC: 2 MG/DL (ref 1.6–2.3)
MAGNESIUM SERPL-MCNC: 2.1 MG/DL (ref 1.9–2.7)
MAGNESIUM SERPL-MCNC: NORMAL MG/DL (ref 1.6–2.3)
MAGNESIUM UR-MCNC: 19.9 MG/DL
MCH RBC QN AUTO: 29.7 PG (ref 26.5–33)
MCH RBC QN AUTO: 30.1 PG (ref 26.5–33)
MCH RBC QN AUTO: 30.2 PG (ref 26.5–33)
MCH RBC QN AUTO: 30.2 PG (ref 26.5–33)
MCH RBC QN AUTO: 30.5 PG (ref 26.5–33)
MCH RBC QN AUTO: 30.7 PG (ref 26.5–33)
MCH RBC QN AUTO: 30.7 PG (ref 26.5–33)
MCH RBC QN AUTO: 30.8 PG (ref 26.5–33)
MCH RBC QN AUTO: 30.9 PG (ref 26.5–33)
MCH RBC QN AUTO: 30.9 PG (ref 26.5–33)
MCH RBC QN AUTO: 31.5 PG (ref 26.5–33)
MCH RBC QN AUTO: 31.8 PG (ref 26.5–33)
MCH RBC QN AUTO: 31.9 PG (ref 26.5–33)
MCH RBC QN AUTO: 31.9 PG (ref 26.5–33)
MCHC RBC AUTO-ENTMCNC: 31.2 G/DL (ref 31.5–36.5)
MCHC RBC AUTO-ENTMCNC: 31.4 G/DL (ref 31.5–36.5)
MCHC RBC AUTO-ENTMCNC: 31.4 G/DL (ref 31.5–36.5)
MCHC RBC AUTO-ENTMCNC: 32 G/DL (ref 31.5–36.5)
MCHC RBC AUTO-ENTMCNC: 32 G/DL (ref 31.5–36.5)
MCHC RBC AUTO-ENTMCNC: 32.1 G/DL (ref 31.5–36.5)
MCHC RBC AUTO-ENTMCNC: 32.2 G/DL (ref 31.5–36.5)
MCHC RBC AUTO-ENTMCNC: 32.2 G/DL (ref 31.5–36.5)
MCHC RBC AUTO-ENTMCNC: 32.4 G/DL (ref 31.5–36.5)
MCHC RBC AUTO-ENTMCNC: 32.6 G/DL (ref 31.5–36.5)
MCHC RBC AUTO-ENTMCNC: 33.2 G/DL (ref 31.5–36.5)
MCHC RBC AUTO-ENTMCNC: 33.4 G/DL (ref 31.5–36.5)
MCHC RBC AUTO-ENTMCNC: 33.6 G/DL (ref 31.5–36.5)
MCHC RBC AUTO-ENTMCNC: 34.3 G/DL (ref 31.5–36.5)
MCV RBC AUTO: 111 FL (ref 78–100)
MCV RBC AUTO: 92 FL (ref 78–100)
MCV RBC AUTO: 93 FL (ref 78–100)
MCV RBC AUTO: 93 FL (ref 78–100)
MCV RBC AUTO: 94 FL (ref 78–100)
MCV RBC AUTO: 95 FL (ref 78–100)
MCV RBC AUTO: 95 FL (ref 78–100)
MCV RBC AUTO: 96 FL (ref 78–100)
MCV RBC AUTO: 97 FL (ref 78–100)
MCV RBC AUTO: 98 FL (ref 78–100)
MCV RBC AUTO: 98 FL (ref 78–100)
MDC_IDC_LEAD_IMPLANT_DT: NORMAL
MDC_IDC_LEAD_LOCATION: NORMAL
MDC_IDC_LEAD_LOCATION_DETAIL_1: NORMAL
MDC_IDC_LEAD_MFG: NORMAL
MDC_IDC_LEAD_MODEL: NORMAL
MDC_IDC_LEAD_POLARITY_TYPE: NORMAL
MDC_IDC_LEAD_SERIAL: NORMAL
MDC_IDC_LEAD_SPECIAL_FUNCTION: NORMAL
MDC_IDC_MSMT_BATTERY_DTM: NORMAL
MDC_IDC_MSMT_BATTERY_REMAINING_LONGEVITY: 119 MO
MDC_IDC_MSMT_BATTERY_REMAINING_LONGEVITY: 135 MO
MDC_IDC_MSMT_BATTERY_RRT_TRIGGER: 2.62
MDC_IDC_MSMT_BATTERY_STATUS: NORMAL
MDC_IDC_MSMT_BATTERY_VOLTAGE: 3.19 V
MDC_IDC_MSMT_BATTERY_VOLTAGE: 3.2 V
MDC_IDC_MSMT_BATTERY_VOLTAGE: 3.22 V
MDC_IDC_MSMT_LEADCHNL_RA_IMPEDANCE_VALUE: 285 OHM
MDC_IDC_MSMT_LEADCHNL_RA_IMPEDANCE_VALUE: 285 OHM
MDC_IDC_MSMT_LEADCHNL_RA_IMPEDANCE_VALUE: 323 OHM
MDC_IDC_MSMT_LEADCHNL_RA_IMPEDANCE_VALUE: 399 OHM
MDC_IDC_MSMT_LEADCHNL_RA_PACING_THRESHOLD_AMPLITUDE: 0.5 V
MDC_IDC_MSMT_LEADCHNL_RA_PACING_THRESHOLD_AMPLITUDE: 0.62 V
MDC_IDC_MSMT_LEADCHNL_RA_PACING_THRESHOLD_AMPLITUDE: 0.75 V
MDC_IDC_MSMT_LEADCHNL_RA_PACING_THRESHOLD_PULSEWIDTH: 0.4 MS
MDC_IDC_MSMT_LEADCHNL_RA_SENSING_INTR_AMPL: 3.5 MV
MDC_IDC_MSMT_LEADCHNL_RA_SENSING_INTR_AMPL: 3.5 MV
MDC_IDC_MSMT_LEADCHNL_RA_SENSING_INTR_AMPL: 3.88 MV
MDC_IDC_MSMT_LEADCHNL_RA_SENSING_INTR_AMPL: 5 MV
MDC_IDC_MSMT_LEADCHNL_RV_IMPEDANCE_VALUE: 323 OHM
MDC_IDC_MSMT_LEADCHNL_RV_IMPEDANCE_VALUE: 342 OHM
MDC_IDC_MSMT_LEADCHNL_RV_IMPEDANCE_VALUE: 418 OHM
MDC_IDC_MSMT_LEADCHNL_RV_IMPEDANCE_VALUE: 437 OHM
MDC_IDC_MSMT_LEADCHNL_RV_IMPEDANCE_VALUE: 475 OHM
MDC_IDC_MSMT_LEADCHNL_RV_IMPEDANCE_VALUE: 589 OHM
MDC_IDC_MSMT_LEADCHNL_RV_PACING_THRESHOLD_AMPLITUDE: 0.5 V
MDC_IDC_MSMT_LEADCHNL_RV_PACING_THRESHOLD_AMPLITUDE: 0.5 V
MDC_IDC_MSMT_LEADCHNL_RV_PACING_THRESHOLD_AMPLITUDE: 0.75 V
MDC_IDC_MSMT_LEADCHNL_RV_PACING_THRESHOLD_PULSEWIDTH: 0.4 MS
MDC_IDC_MSMT_LEADCHNL_RV_SENSING_INTR_AMPL: 14.62 MV
MDC_IDC_PG_IMPLANT_DTM: NORMAL
MDC_IDC_PG_MFG: NORMAL
MDC_IDC_PG_MODEL: NORMAL
MDC_IDC_PG_SERIAL: NORMAL
MDC_IDC_PG_TYPE: NORMAL
MDC_IDC_SESS_CLINIC_NAME: NORMAL
MDC_IDC_SESS_DTM: NORMAL
MDC_IDC_SESS_TYPE: NORMAL
MDC_IDC_SET_BRADY_AT_MODE_SWITCH_RATE: 171 {BEATS}/MIN
MDC_IDC_SET_BRADY_HYSTRATE: NORMAL
MDC_IDC_SET_BRADY_LOWRATE: 70 {BEATS}/MIN
MDC_IDC_SET_BRADY_MAX_SENSOR_RATE: 140 {BEATS}/MIN
MDC_IDC_SET_BRADY_MAX_TRACKING_RATE: 140 {BEATS}/MIN
MDC_IDC_SET_BRADY_MODE: NORMAL
MDC_IDC_SET_BRADY_PAV_DELAY_LOW: 180 MS
MDC_IDC_SET_BRADY_SAV_DELAY_LOW: 150 MS
MDC_IDC_SET_LEADCHNL_RA_PACING_AMPLITUDE: 1.75 V
MDC_IDC_SET_LEADCHNL_RA_PACING_AMPLITUDE: 3.5 V
MDC_IDC_SET_LEADCHNL_RA_PACING_AMPLITUDE: 3.5 V
MDC_IDC_SET_LEADCHNL_RA_PACING_ANODE_ELECTRODE_1: NORMAL
MDC_IDC_SET_LEADCHNL_RA_PACING_ANODE_LOCATION_1: NORMAL
MDC_IDC_SET_LEADCHNL_RA_PACING_CAPTURE_MODE: NORMAL
MDC_IDC_SET_LEADCHNL_RA_PACING_CATHODE_ELECTRODE_1: NORMAL
MDC_IDC_SET_LEADCHNL_RA_PACING_CATHODE_LOCATION_1: NORMAL
MDC_IDC_SET_LEADCHNL_RA_PACING_POLARITY: NORMAL
MDC_IDC_SET_LEADCHNL_RA_PACING_PULSEWIDTH: 0.4 MS
MDC_IDC_SET_LEADCHNL_RA_SENSING_ANODE_ELECTRODE_1: NORMAL
MDC_IDC_SET_LEADCHNL_RA_SENSING_ANODE_LOCATION_1: NORMAL
MDC_IDC_SET_LEADCHNL_RA_SENSING_CATHODE_ELECTRODE_1: NORMAL
MDC_IDC_SET_LEADCHNL_RA_SENSING_CATHODE_LOCATION_1: NORMAL
MDC_IDC_SET_LEADCHNL_RA_SENSING_POLARITY: NORMAL
MDC_IDC_SET_LEADCHNL_RA_SENSING_SENSITIVITY: 0.3 MV
MDC_IDC_SET_LEADCHNL_RV_PACING_AMPLITUDE: 2 V
MDC_IDC_SET_LEADCHNL_RV_PACING_AMPLITUDE: 3.5 V
MDC_IDC_SET_LEADCHNL_RV_PACING_AMPLITUDE: 3.5 V
MDC_IDC_SET_LEADCHNL_RV_PACING_ANODE_ELECTRODE_1: NORMAL
MDC_IDC_SET_LEADCHNL_RV_PACING_CAPTURE_MODE: NORMAL
MDC_IDC_SET_LEADCHNL_RV_PACING_CATHODE_ELECTRODE_1: NORMAL
MDC_IDC_SET_LEADCHNL_RV_PACING_CATHODE_LOCATION_1: NORMAL
MDC_IDC_SET_LEADCHNL_RV_PACING_POLARITY: NORMAL
MDC_IDC_SET_LEADCHNL_RV_PACING_PULSEWIDTH: 0.4 MS
MDC_IDC_SET_LEADCHNL_RV_SENSING_ANODE_ELECTRODE_1: NORMAL
MDC_IDC_SET_LEADCHNL_RV_SENSING_ANODE_LOCATION_1: NORMAL
MDC_IDC_SET_LEADCHNL_RV_SENSING_CATHODE_ELECTRODE_1: NORMAL
MDC_IDC_SET_LEADCHNL_RV_SENSING_CATHODE_LOCATION_1: NORMAL
MDC_IDC_SET_LEADCHNL_RV_SENSING_POLARITY: NORMAL
MDC_IDC_SET_LEADCHNL_RV_SENSING_SENSITIVITY: 2 MV
MDC_IDC_SET_ZONE_DETECTION_INTERVAL: 350 MS
MDC_IDC_SET_ZONE_DETECTION_INTERVAL: 400 MS
MDC_IDC_SET_ZONE_TYPE: NORMAL
MDC_IDC_STAT_AT_BURDEN_PERCENT: 0 %
MDC_IDC_STAT_AT_BURDEN_PERCENT: 0 %
MDC_IDC_STAT_AT_DTM_END: NORMAL
MDC_IDC_STAT_AT_DTM_END: NORMAL
MDC_IDC_STAT_AT_DTM_START: NORMAL
MDC_IDC_STAT_AT_DTM_START: NORMAL
MDC_IDC_STAT_BRADY_AP_VP_PERCENT: 0.03 %
MDC_IDC_STAT_BRADY_AP_VP_PERCENT: 11.05 %
MDC_IDC_STAT_BRADY_AP_VP_PERCENT: 23.68 %
MDC_IDC_STAT_BRADY_AP_VS_PERCENT: 0 %
MDC_IDC_STAT_BRADY_AS_VP_PERCENT: 76.3 %
MDC_IDC_STAT_BRADY_AS_VP_PERCENT: 88.39 %
MDC_IDC_STAT_BRADY_AS_VP_PERCENT: 99.96 %
MDC_IDC_STAT_BRADY_AS_VS_PERCENT: 0.01 %
MDC_IDC_STAT_BRADY_AS_VS_PERCENT: 0.01 %
MDC_IDC_STAT_BRADY_AS_VS_PERCENT: 0.56 %
MDC_IDC_STAT_BRADY_DTM_END: NORMAL
MDC_IDC_STAT_BRADY_DTM_START: NORMAL
MDC_IDC_STAT_BRADY_RA_PERCENT_PACED: 0.03 %
MDC_IDC_STAT_BRADY_RA_PERCENT_PACED: 11.25 %
MDC_IDC_STAT_BRADY_RA_PERCENT_PACED: 23.64 %
MDC_IDC_STAT_BRADY_RV_PERCENT_PACED: 99.44 %
MDC_IDC_STAT_BRADY_RV_PERCENT_PACED: 99.98 %
MDC_IDC_STAT_BRADY_RV_PERCENT_PACED: 99.99 %
MDC_IDC_STAT_EPISODE_RECENT_COUNT: 0
MDC_IDC_STAT_EPISODE_RECENT_COUNT_DTM_END: NORMAL
MDC_IDC_STAT_EPISODE_RECENT_COUNT_DTM_START: NORMAL
MDC_IDC_STAT_EPISODE_TOTAL_COUNT: 0
MDC_IDC_STAT_EPISODE_TOTAL_COUNT_DTM_END: NORMAL
MDC_IDC_STAT_EPISODE_TOTAL_COUNT_DTM_START: NORMAL
MDC_IDC_STAT_EPISODE_TYPE: NORMAL
MONOCYTES # BLD AUTO: 0.1 10E3/UL (ref 0–1.3)
MONOCYTES # BLD AUTO: 0.2 10E9/L (ref 0–1.3)
MONOCYTES # BLD AUTO: 0.8 10E3/UL (ref 0–1.3)
MONOCYTES NFR BLD AUTO: 1 %
MONOCYTES NFR BLD AUTO: 1.8 %
MONOCYTES NFR BLD AUTO: 5 %
MUCOUS THREADS #/AREA URNS LPF: PRESENT /LPF
NEUTROPHILS # BLD AUTO: 14.7 10E3/UL (ref 1.6–8.3)
NEUTROPHILS # BLD AUTO: 8.5 10E3/UL (ref 1.6–8.3)
NEUTROPHILS # BLD AUTO: 8.6 10E9/L (ref 1.6–8.3)
NEUTROPHILS NFR BLD AUTO: 88.4 %
NEUTROPHILS NFR BLD AUTO: 91 %
NEUTROPHILS NFR BLD AUTO: 94 %
NITRATE UR QL: NEGATIVE
NITRATE UR QL: NEGATIVE
NONHDLC SERPL-MCNC: 56 MG/DL
NONHDLC SERPL-MCNC: 62 MG/DL
NRBC # BLD AUTO: 0 10E3/UL
NRBC # BLD AUTO: 0 10E3/UL
NRBC BLD AUTO-RTO: 0 /100
NRBC BLD AUTO-RTO: 0 /100
NT-PROBNP SERPL-MCNC: 1017 PG/ML (ref 0–125)
NT-PROBNP SERPL-MCNC: 115 PG/ML (ref 0–100)
NT-PROBNP SERPL-MCNC: 1320 PG/ML (ref 0–100)
NT-PROBNP SERPL-MCNC: 322 PG/ML (ref 0–100)
NT-PROBNP SERPL-MCNC: 844 PG/ML (ref 0–100)
NT-PROBNP SERPL-MCNC: 95 PG/ML (ref 0–100)
NT-PROBNP SERPL-MCNC: NORMAL PG/ML (ref 0–125)
O2/TOTAL GAS SETTING VFR VENT: 0 %
O2/TOTAL GAS SETTING VFR VENT: 0 %
OXYHGB MFR BLD: 95 % (ref 92–100)
OXYHGB MFR BLD: 96 % (ref 92–100)
OXYHGB MFR BLDV: 72 %
P AXIS - MUSE: 54 DEGREES
P AXIS - MUSE: 58 DEGREES
P AXIS - MUSE: 62 DEGREES
P AXIS - MUSE: 64 DEGREES
PCO2 BLD: 33 MM HG (ref 35–45)
PCO2 BLD: 36 MM HG (ref 35–45)
PH BLD: 7.37 [PH] (ref 7.35–7.45)
PH BLD: 7.39 [PH] (ref 7.35–7.45)
PH UR STRIP: 5 [PH] (ref 5–9)
PH UR STRIP: 5.5 [PH] (ref 5–9)
PHOSPHATE SERPL-MCNC: 2.9 MG/DL (ref 2.5–5)
PHOSPHATE SERPL-MCNC: 3 MG/DL (ref 2.5–4.5)
PHOSPHATE SERPL-MCNC: 4.1 MG/DL (ref 2.5–4.5)
PLATELET # BLD AUTO: 101 10E9/L (ref 150–450)
PLATELET # BLD AUTO: 103 10E3/UL (ref 150–450)
PLATELET # BLD AUTO: 109 10E9/L (ref 150–450)
PLATELET # BLD AUTO: 111 10E9/L (ref 150–450)
PLATELET # BLD AUTO: 114 10E3/UL (ref 150–450)
PLATELET # BLD AUTO: 121 10E3/UL (ref 150–450)
PLATELET # BLD AUTO: 134 10E3/UL (ref 150–450)
PLATELET # BLD AUTO: 51 10E3/UL (ref 150–450)
PLATELET # BLD AUTO: 68 10E3/UL (ref 150–450)
PLATELET # BLD AUTO: 78 10E9/L (ref 150–450)
PLATELET # BLD AUTO: 85 10E9/L (ref 150–450)
PLATELET # BLD AUTO: 92 10E3/UL (ref 150–450)
PLATELET # BLD AUTO: 92 10E9/L (ref 150–450)
PLATELET # BLD AUTO: 94 10E3/UL (ref 150–450)
PO2 BLD: 87 MM HG (ref 80–105)
PO2 BLD: 96 MM HG (ref 80–105)
POTASSIUM (EXTERNAL): 4.8 MMOL/L (ref 3.4–5.2)
POTASSIUM BLD-SCNC: 4.4 MMOL/L (ref 3.5–5.1)
POTASSIUM BLD-SCNC: 4.5 MMOL/L (ref 3.5–5.1)
POTASSIUM BLD-SCNC: 4.5 MMOL/L (ref 3.5–5.1)
POTASSIUM BLD-SCNC: 4.6 MMOL/L (ref 3.4–5.3)
POTASSIUM BLD-SCNC: 4.6 MMOL/L (ref 3.5–5.1)
POTASSIUM BLD-SCNC: 4.7 MMOL/L (ref 3.5–5.1)
POTASSIUM BLD-SCNC: 4.9 MMOL/L (ref 3.5–5.1)
POTASSIUM BLD-SCNC: 5 MMOL/L (ref 3.4–5.3)
POTASSIUM BLD-SCNC: 5.1 MMOL/L (ref 3.4–5.3)
POTASSIUM SERPL-SCNC: 4 MMOL/L (ref 3.5–5.1)
POTASSIUM SERPL-SCNC: 4.2 MMOL/L (ref 3.4–5.3)
POTASSIUM SERPL-SCNC: 4.2 MMOL/L (ref 3.5–5.1)
POTASSIUM SERPL-SCNC: 4.3 MMOL/L (ref 3.5–5.1)
POTASSIUM SERPL-SCNC: 4.3 MMOL/L (ref 3.5–5.1)
POTASSIUM SERPL-SCNC: 4.4 MMOL/L (ref 3.5–5.1)
POTASSIUM SERPL-SCNC: 4.5 MMOL/L (ref 3.5–5.1)
POTASSIUM SERPL-SCNC: 4.5 MMOL/L (ref 3.5–5.1)
POTASSIUM SERPL-SCNC: 4.6 MMOL/L (ref 3.4–5.3)
POTASSIUM SERPL-SCNC: 5.1 MMOL/L (ref 3.5–5.1)
POTASSIUM SERPL-SCNC: 5.4 MMOL/L (ref 3.5–5.1)
POTASSIUM SERPL-SCNC: NORMAL MMOL/L (ref 3.4–5.3)
PR INTERVAL - MUSE: 160 MS
PR INTERVAL - MUSE: 170 MS
PR INTERVAL - MUSE: 182 MS
PR INTERVAL - MUSE: 226 MS
PROCALCITONIN SERPL-MCNC: 0.79 NG/ML
PROT SERPL-MCNC: 5.6 G/DL (ref 6.8–8.8)
PROT SERPL-MCNC: 6.6 G/DL (ref 6.4–8.9)
PROT SERPL-MCNC: 6.7 G/DL (ref 6.4–8.9)
PROT SERPL-MCNC: 6.9 G/DL (ref 6.4–8.9)
PROT SERPL-MCNC: 7 G/DL (ref 6.8–8.8)
PROT SERPL-MCNC: 7.3 G/DL (ref 6.4–8.9)
PROT SERPL-MCNC: NORMAL G/DL (ref 6.8–8.8)
PROT/CREAT 24H UR: 0.6 MG/MG CR
PSA SERPL-MCNC: 0.19 NG/ML
PTH RELATED PROT SERPL-SCNC: <2 PMOL/L (ref 0–2.3)
PTH-INTACT SERPL-MCNC: 31 PG/ML (ref 12–88)
PTH-INTACT SERPL-MCNC: 38 PG/ML (ref 18–80)
QRS DURATION - MUSE: 130 MS
QRS DURATION - MUSE: 130 MS
QRS DURATION - MUSE: 142 MS
QRS DURATION - MUSE: 144 MS
QT - MUSE: 390 MS
QT - MUSE: 428 MS
QT - MUSE: 448 MS
QT - MUSE: 482 MS
QTC - MUSE: 465 MS
QTC - MUSE: 490 MS
QTC - MUSE: 493 MS
QTC - MUSE: 500 MS
R AXIS - MUSE: -49 DEGREES
R AXIS - MUSE: 139 DEGREES
RBC # BLD AUTO: 2.95 10E6/UL (ref 4.4–5.9)
RBC # BLD AUTO: 3.13 10E6/UL (ref 4.4–5.9)
RBC # BLD AUTO: 3.14 10E6/UL (ref 4.4–5.9)
RBC # BLD AUTO: 3.17 10E6/UL (ref 4.4–5.9)
RBC # BLD AUTO: 3.42 10E6/UL (ref 4.4–5.9)
RBC # BLD AUTO: 3.56 10E6/UL (ref 4.4–5.9)
RBC # BLD AUTO: 3.7 10E12/L (ref 4.4–5.9)
RBC # BLD AUTO: 3.77 10E6/UL (ref 4.4–5.9)
RBC # BLD AUTO: 3.9 10E12/L (ref 4.4–5.9)
RBC # BLD AUTO: 3.98 10E12/L (ref 4.4–5.9)
RBC # BLD AUTO: 4.07 10E12/L (ref 4.4–5.9)
RBC # BLD AUTO: 4.07 10E6/UL (ref 4.4–5.9)
RBC # BLD AUTO: 4.12 10E12/L (ref 4.4–5.9)
RBC # BLD AUTO: 4.24 10E12/L (ref 4.4–5.9)
RBC URINE: 1 /HPF
RBC URINE: 1 /HPF
SARS-COV-2 AB PNL SERPL IA: POSITIVE
SARS-COV-2 AB SERPL QL IA: NEGATIVE
SARS-COV-2 IGG SERPL IA-ACNC: NORMAL
SARS-COV-2 RNA RESP QL NAA+PROBE: NEGATIVE
SARS-COV-2 RNA RESP QL NAA+PROBE: NORMAL
SARS-COV-2 RNA RESP QL NAA+PROBE: NOT DETECTED
SODIUM SERPL-SCNC: 130 MMOL/L (ref 134–144)
SODIUM SERPL-SCNC: 131 MMOL/L (ref 134–144)
SODIUM SERPL-SCNC: 131 MMOL/L (ref 134–144)
SODIUM SERPL-SCNC: 132 MMOL/L (ref 134–144)
SODIUM SERPL-SCNC: 134 MMOL/L (ref 134–144)
SODIUM SERPL-SCNC: 135 MMOL/L (ref 134–144)
SODIUM SERPL-SCNC: 136 MMOL/L (ref 134–144)
SODIUM SERPL-SCNC: 136 MMOL/L (ref 134–144)
SODIUM SERPL-SCNC: 137 MMOL/L (ref 133–144)
SODIUM SERPL-SCNC: 137 MMOL/L (ref 133–144)
SODIUM SERPL-SCNC: 137 MMOL/L (ref 134–144)
SODIUM SERPL-SCNC: 138 MMOL/L (ref 133–144)
SODIUM SERPL-SCNC: 139 MMOL/L (ref 133–144)
SODIUM SERPL-SCNC: 139 MMOL/L (ref 133–144)
SODIUM SERPL-SCNC: NORMAL MMOL/L (ref 133–144)
SP GR UR STRIP: 1.02 (ref 1–1.03)
SP GR UR STRIP: 1.02 (ref 1–1.03)
SPECIMEN EXPIRATION DATE: NORMAL
SPECIMEN SOURCE: NORMAL
SYSTOLIC BLOOD PRESSURE - MUSE: NORMAL MMHG
T AXIS - MUSE: -3 DEGREES
T AXIS - MUSE: -5 DEGREES
T AXIS - MUSE: 104 DEGREES
T AXIS - MUSE: 40 DEGREES
T4 FREE SERPL-MCNC: 1.57 NG/DL (ref 0.6–1.6)
T4 FREE SERPL-MCNC: 1.68 NG/DL (ref 0.6–1.6)
TACROLIMUS BLD-MCNC: 5.5 UG/L (ref 5–15)
TACROLIMUS BLD-MCNC: 6.7 UG/L (ref 5–15)
TIBC SERPL-MCNC: 245 UG/DL (ref 245–400)
TIBC SERPL-MCNC: 257.6 UG/DL (ref 245–400)
TME LAST DOSE: NORMAL H
TRANSFERRIN SERPL-MCNC: 175 MG/DL (ref 203–362)
TRANSFERRIN SERPL-MCNC: 184 MG/DL (ref 203–362)
TRIGL SERPL-MCNC: 128 MG/DL
TRIGL SERPL-MCNC: 157 MG/DL
TROPONIN I SERPL-MCNC: 13 PG/ML (ref 0–34)
TROPONIN I SERPL-MCNC: 19.7 PG/ML
TROPONIN I SERPL-MCNC: 26.8 PG/ML (ref 0–34)
TSH SERPL DL<=0.005 MIU/L-ACNC: 0.05 MU/L (ref 0.4–4)
TSH SERPL DL<=0.005 MIU/L-ACNC: 0.08 MU/L (ref 0.4–4)
TSH SERPL DL<=0.05 MIU/L-ACNC: 0.9 IU/ML (ref 0.34–5.6)
TSH SERPL DL<=0.05 MIU/L-ACNC: 2.65 IU/ML (ref 0.34–5.6)
UIBC (UNSATURATED): 193 MG/DL
UIBC (UNSATURATED): 220.6 MG/DL
UNIT ABO/RH: NORMAL
UNIT NUMBER: NORMAL
UNIT STATUS: NORMAL
UNIT TYPE ISBT: 5100
URATE SERPL-MCNC: 6.1 MG/DL (ref 4.4–7.6)
UROBILINOGEN UR STRIP-MCNC: NORMAL MG/DL
UROBILINOGEN UR STRIP-MCNC: NORMAL MG/DL
VENTRICULAR RATE- MUSE: 56 BPM
VENTRICULAR RATE- MUSE: 75 BPM
VENTRICULAR RATE- MUSE: 80 BPM
VENTRICULAR RATE- MUSE: 95 BPM
VIT B12 SERPL-MCNC: >1500 PG/ML (ref 180–914)
WBC # BLD AUTO: 16.1 10E3/UL (ref 4–11)
WBC # BLD AUTO: 6.4 10E9/L (ref 4–11)
WBC # BLD AUTO: 6.9 10E9/L (ref 4–11)
WBC # BLD AUTO: 7.2 10E3/UL (ref 4–11)
WBC # BLD AUTO: 7.2 10E3/UL (ref 4–11)
WBC # BLD AUTO: 7.7 10E3/UL (ref 4–11)
WBC # BLD AUTO: 7.9 10E3/UL (ref 4–11)
WBC # BLD AUTO: 7.9 10E3/UL (ref 4–11)
WBC # BLD AUTO: 8 10E3/UL (ref 4–11)
WBC # BLD AUTO: 8 10E9/L (ref 4–11)
WBC # BLD AUTO: 8.1 10E9/L (ref 4–11)
WBC # BLD AUTO: 8.2 10E9/L (ref 4–11)
WBC # BLD AUTO: 9.1 10E3/UL (ref 4–11)
WBC # BLD AUTO: 9.7 10E9/L (ref 4–11)
WBC URINE: 2 /HPF
WBC URINE: <1 /HPF

## 2021-01-01 PROCEDURE — 250N000011 HC RX IP 250 OP 636: Performed by: STUDENT IN AN ORGANIZED HEALTH CARE EDUCATION/TRAINING PROGRAM

## 2021-01-01 PROCEDURE — 83735 ASSAY OF MAGNESIUM: CPT | Performed by: STUDENT IN AN ORGANIZED HEALTH CARE EDUCATION/TRAINING PROGRAM

## 2021-01-01 PROCEDURE — 83735 ASSAY OF MAGNESIUM: CPT | Performed by: INTERNAL MEDICINE

## 2021-01-01 PROCEDURE — 85027 COMPLETE CBC AUTOMATED: CPT | Mod: ZL

## 2021-01-01 PROCEDURE — 36600 WITHDRAWAL OF ARTERIAL BLOOD: CPT | Mod: ZL | Performed by: INTERNAL MEDICINE

## 2021-01-01 PROCEDURE — 85347 COAGULATION TIME ACTIVATED: CPT | Performed by: INTERNAL MEDICINE

## 2021-01-01 PROCEDURE — 93306 TTE W/DOPPLER COMPLETE: CPT | Mod: 26 | Performed by: INTERNAL MEDICINE

## 2021-01-01 PROCEDURE — 33208 INSRT HEART PM ATRIAL & VENT: CPT | Mod: 58 | Performed by: INTERNAL MEDICINE

## 2021-01-01 PROCEDURE — 0JH606Z INSERTION OF PACEMAKER, DUAL CHAMBER INTO CHEST SUBCUTANEOUS TISSUE AND FASCIA, OPEN APPROACH: ICD-10-PCS | Performed by: INTERNAL MEDICINE

## 2021-01-01 PROCEDURE — C1725 CATH, TRANSLUMIN NON-LASER: HCPCS | Performed by: INTERNAL MEDICINE

## 2021-01-01 PROCEDURE — 80197 ASSAY OF TACROLIMUS: CPT | Mod: ZL | Performed by: INTERNAL MEDICINE

## 2021-01-01 PROCEDURE — 80053 COMPREHEN METABOLIC PANEL: CPT | Performed by: FAMILY MEDICINE

## 2021-01-01 PROCEDURE — 250N000012 HC RX MED GY IP 250 OP 636 PS 637: Performed by: EMERGENCY MEDICINE

## 2021-01-01 PROCEDURE — 93005 ELECTROCARDIOGRAM TRACING: CPT

## 2021-01-01 PROCEDURE — 71046 X-RAY EXAM CHEST 2 VIEWS: CPT | Mod: 26 | Performed by: RADIOLOGY

## 2021-01-01 PROCEDURE — 999N000015 HC STATISTIC ARTERIAL MONITORING DAILY

## 2021-01-01 PROCEDURE — 36415 COLL VENOUS BLD VENIPUNCTURE: CPT | Mod: ZL | Performed by: INTERNAL MEDICINE

## 2021-01-01 PROCEDURE — 999N000109 HC STATISTIC OP CR VISIT

## 2021-01-01 PROCEDURE — 36415 COLL VENOUS BLD VENIPUNCTURE: CPT | Mod: ZL

## 2021-01-01 PROCEDURE — 250N000011 HC RX IP 250 OP 636: Performed by: INTERNAL MEDICINE

## 2021-01-01 PROCEDURE — 99239 HOSP IP/OBS DSCHRG MGMT >30: CPT | Mod: 25 | Performed by: PHYSICIAN ASSISTANT

## 2021-01-01 PROCEDURE — C1894 INTRO/SHEATH, NON-LASER: HCPCS | Performed by: INTERNAL MEDICINE

## 2021-01-01 PROCEDURE — 85027 COMPLETE CBC AUTOMATED: CPT | Performed by: FAMILY MEDICINE

## 2021-01-01 PROCEDURE — U0005 INFEC AGEN DETEC AMPLI PROBE: HCPCS | Mod: ZL | Performed by: INTERNAL MEDICINE

## 2021-01-01 PROCEDURE — 250N000009 HC RX 250: Performed by: NURSE ANESTHETIST, CERTIFIED REGISTERED

## 2021-01-01 PROCEDURE — 96365 THER/PROPH/DIAG IV INF INIT: CPT | Performed by: STUDENT IN AN ORGANIZED HEALTH CARE EDUCATION/TRAINING PROGRAM

## 2021-01-01 PROCEDURE — 83735 ASSAY OF MAGNESIUM: CPT | Performed by: PATHOLOGY

## 2021-01-01 PROCEDURE — 36415 COLL VENOUS BLD VENIPUNCTURE: CPT | Mod: ZL | Performed by: NURSE PRACTITIONER

## 2021-01-01 PROCEDURE — 85014 HEMATOCRIT: CPT | Mod: ZL | Performed by: NURSE PRACTITIONER

## 2021-01-01 PROCEDURE — G0463 HOSPITAL OUTPT CLINIC VISIT: HCPCS

## 2021-01-01 PROCEDURE — G0463 HOSPITAL OUTPT CLINIC VISIT: HCPCS | Mod: 25 | Performed by: INTERNAL MEDICINE

## 2021-01-01 PROCEDURE — 99153 MOD SED SAME PHYS/QHP EA: CPT | Performed by: INTERNAL MEDICINE

## 2021-01-01 PROCEDURE — 99285 EMERGENCY DEPT VISIT HI MDM: CPT | Mod: 25 | Performed by: PHYSICIAN ASSISTANT

## 2021-01-01 PROCEDURE — 999N000105 HC STATISTIC NO DOCUMENTATION TO SUPPORT CHARGE

## 2021-01-01 PROCEDURE — 250N000013 HC RX MED GY IP 250 OP 250 PS 637: Performed by: FAMILY MEDICINE

## 2021-01-01 PROCEDURE — 99495 TRANSJ CARE MGMT MOD F2F 14D: CPT | Performed by: INTERNAL MEDICINE

## 2021-01-01 PROCEDURE — 36415 COLL VENOUS BLD VENIPUNCTURE: CPT | Performed by: PHYSICIAN ASSISTANT

## 2021-01-01 PROCEDURE — 250N000012 HC RX MED GY IP 250 OP 636 PS 637: Performed by: STUDENT IN AN ORGANIZED HEALTH CARE EDUCATION/TRAINING PROGRAM

## 2021-01-01 PROCEDURE — C9803 HOPD COVID-19 SPEC COLLECT: HCPCS | Performed by: STUDENT IN AN ORGANIZED HEALTH CARE EDUCATION/TRAINING PROGRAM

## 2021-01-01 PROCEDURE — 74176 CT ABD & PELVIS W/O CONTRAST: CPT | Mod: TC,MG,XU

## 2021-01-01 PROCEDURE — 250N000011 HC RX IP 250 OP 636: Performed by: EMERGENCY MEDICINE

## 2021-01-01 PROCEDURE — 999N000157 HC STATISTIC RCP TIME EA 10 MIN

## 2021-01-01 PROCEDURE — 86900 BLOOD TYPING SEROLOGIC ABO: CPT | Performed by: ANESTHESIOLOGY

## 2021-01-01 PROCEDURE — 96375 TX/PRO/DX INJ NEW DRUG ADDON: CPT | Mod: XU | Performed by: PHYSICIAN ASSISTANT

## 2021-01-01 PROCEDURE — 96365 THER/PROPH/DIAG IV INF INIT: CPT | Performed by: EMERGENCY MEDICINE

## 2021-01-01 PROCEDURE — 82962 GLUCOSE BLOOD TEST: CPT

## 2021-01-01 PROCEDURE — 250N000011 HC RX IP 250 OP 636: Performed by: PHYSICIAN ASSISTANT

## 2021-01-01 PROCEDURE — 343N000001 HC RX 343: Mod: ZNDC | Performed by: INTERNAL MEDICINE

## 2021-01-01 PROCEDURE — 250N000009 HC RX 250: Performed by: FAMILY MEDICINE

## 2021-01-01 PROCEDURE — 82248 BILIRUBIN DIRECT: CPT | Performed by: PATHOLOGY

## 2021-01-01 PROCEDURE — U0005 INFEC AGEN DETEC AMPLI PROBE: HCPCS | Performed by: EMERGENCY MEDICINE

## 2021-01-01 PROCEDURE — 80053 COMPREHEN METABOLIC PANEL: CPT | Performed by: PATHOLOGY

## 2021-01-01 PROCEDURE — 93798 PHYS/QHP OP CAR RHAB W/ECG: CPT

## 2021-01-01 PROCEDURE — 80048 BASIC METABOLIC PNL TOTAL CA: CPT | Performed by: NURSE PRACTITIONER

## 2021-01-01 PROCEDURE — 86769 SARS-COV-2 COVID-19 ANTIBODY: CPT | Mod: ZL | Performed by: INTERNAL MEDICINE

## 2021-01-01 PROCEDURE — 85018 HEMOGLOBIN: CPT | Mod: ZL

## 2021-01-01 PROCEDURE — 250N000011 HC RX IP 250 OP 636: Performed by: FAMILY MEDICINE

## 2021-01-01 PROCEDURE — 82542 COL CHROMOTOGRAPHY QUAL/QUAN: CPT | Mod: ZL | Performed by: INTERNAL MEDICINE

## 2021-01-01 PROCEDURE — G0378 HOSPITAL OBSERVATION PER HR: HCPCS

## 2021-01-01 PROCEDURE — 83735 ASSAY OF MAGNESIUM: CPT | Performed by: FAMILY MEDICINE

## 2021-01-01 PROCEDURE — 99215 OFFICE O/P EST HI 40 MIN: CPT | Performed by: INTERNAL MEDICINE

## 2021-01-01 PROCEDURE — 93280 PM DEVICE PROGR EVAL DUAL: CPT | Mod: 26 | Performed by: INTERNAL MEDICINE

## 2021-01-01 PROCEDURE — 93296 REM INTERROG EVL PM/IDS: CPT

## 2021-01-01 PROCEDURE — 99207 CARDIAC DEVICE CHECK - REMOTE: CPT | Performed by: INTERNAL MEDICINE

## 2021-01-01 PROCEDURE — 85018 HEMOGLOBIN: CPT | Performed by: INTERNAL MEDICINE

## 2021-01-01 PROCEDURE — 85027 COMPLETE CBC AUTOMATED: CPT | Performed by: NURSE PRACTITIONER

## 2021-01-01 PROCEDURE — C9803 HOPD COVID-19 SPEC COLLECT: HCPCS

## 2021-01-01 PROCEDURE — A9567 TECHNETIUM TC-99M AEROSOL: HCPCS | Mod: ZNDC | Performed by: INTERNAL MEDICINE

## 2021-01-01 PROCEDURE — 82810 BLOOD GASES O2 SAT ONLY: CPT

## 2021-01-01 PROCEDURE — 93797 PHYS/QHP OP CAR RHAB WO ECG: CPT

## 2021-01-01 PROCEDURE — 999N000142 HC STATISTIC PROCEDURE PREP ONLY

## 2021-01-01 PROCEDURE — C1874 STENT, COATED/COV W/DEL SYS: HCPCS | Performed by: INTERNAL MEDICINE

## 2021-01-01 PROCEDURE — 80048 BASIC METABOLIC PNL TOTAL CA: CPT | Performed by: STUDENT IN AN ORGANIZED HEALTH CARE EDUCATION/TRAINING PROGRAM

## 2021-01-01 PROCEDURE — 36415 COLL VENOUS BLD VENIPUNCTURE: CPT | Performed by: ANESTHESIOLOGY

## 2021-01-01 PROCEDURE — U0003 INFECTIOUS AGENT DETECTION BY NUCLEIC ACID (DNA OR RNA); SEVERE ACUTE RESPIRATORY SYNDROME CORONAVIRUS 2 (SARS-COV-2) (CORONAVIRUS DISEASE [COVID-19]), AMPLIFIED PROBE TECHNIQUE, MAKING USE OF HIGH THROUGHPUT TECHNOLOGIES AS DESCRIBED BY CMS-2020-01-R: HCPCS | Mod: ZL

## 2021-01-01 PROCEDURE — G0463 HOSPITAL OUTPT CLINIC VISIT: HCPCS | Mod: 25

## 2021-01-01 PROCEDURE — G1004 CDSM NDSC: HCPCS | Performed by: STUDENT IN AN ORGANIZED HEALTH CARE EDUCATION/TRAINING PROGRAM

## 2021-01-01 PROCEDURE — 71275 CT ANGIOGRAPHY CHEST: CPT | Mod: 26 | Performed by: STUDENT IN AN ORGANIZED HEALTH CARE EDUCATION/TRAINING PROGRAM

## 2021-01-01 PROCEDURE — G0463 HOSPITAL OUTPT CLINIC VISIT: HCPCS | Mod: 25,27

## 2021-01-01 PROCEDURE — 272N000002 HC OR SUPPLY OTHER OPNP: Performed by: INTERNAL MEDICINE

## 2021-01-01 PROCEDURE — 272N000035 NM LUNG SCAN VENTILATION AND PERFUSION: Mod: ME

## 2021-01-01 PROCEDURE — 80076 HEPATIC FUNCTION PANEL: CPT | Mod: ZL | Performed by: INTERNAL MEDICINE

## 2021-01-01 PROCEDURE — 85379 FIBRIN DEGRADATION QUANT: CPT | Mod: ZL | Performed by: INTERNAL MEDICINE

## 2021-01-01 PROCEDURE — 258N000003 HC RX IP 258 OP 636: Performed by: FAMILY MEDICINE

## 2021-01-01 PROCEDURE — 93005 ELECTROCARDIOGRAM TRACING: CPT | Performed by: PHYSICIAN ASSISTANT

## 2021-01-01 PROCEDURE — 250N000013 HC RX MED GY IP 250 OP 250 PS 637

## 2021-01-01 PROCEDURE — 96367 TX/PROPH/DG ADDL SEQ IV INF: CPT | Mod: XU | Performed by: PHYSICIAN ASSISTANT

## 2021-01-01 PROCEDURE — 99285 EMERGENCY DEPT VISIT HI MDM: CPT | Mod: 25 | Performed by: EMERGENCY MEDICINE

## 2021-01-01 PROCEDURE — 83880 ASSAY OF NATRIURETIC PEPTIDE: CPT | Mod: ZL | Performed by: INTERNAL MEDICINE

## 2021-01-01 PROCEDURE — 36415 COLL VENOUS BLD VENIPUNCTURE: CPT | Performed by: STUDENT IN AN ORGANIZED HEALTH CARE EDUCATION/TRAINING PROGRAM

## 2021-01-01 PROCEDURE — 96365 THER/PROPH/DIAG IV INF INIT: CPT | Mod: XU | Performed by: PHYSICIAN ASSISTANT

## 2021-01-01 PROCEDURE — 93880 EXTRACRANIAL BILAT STUDY: CPT | Mod: 26 | Performed by: RADIOLOGY

## 2021-01-01 PROCEDURE — 999N000065 XR CHEST PORT 1 VIEW

## 2021-01-01 PROCEDURE — 85025 COMPLETE CBC W/AUTO DIFF WBC: CPT | Performed by: STUDENT IN AN ORGANIZED HEALTH CARE EDUCATION/TRAINING PROGRAM

## 2021-01-01 PROCEDURE — 93306 TTE W/DOPPLER COMPLETE: CPT

## 2021-01-01 PROCEDURE — 99203 OFFICE O/P NEW LOW 30 MIN: CPT | Performed by: UROLOGY

## 2021-01-01 PROCEDURE — 84439 ASSAY OF FREE THYROXINE: CPT | Performed by: PHYSICIAN ASSISTANT

## 2021-01-01 PROCEDURE — 99214 OFFICE O/P EST MOD 30 MIN: CPT | Performed by: FAMILY MEDICINE

## 2021-01-01 PROCEDURE — 272N000001 HC OR GENERAL SUPPLY STERILE: Performed by: INTERNAL MEDICINE

## 2021-01-01 PROCEDURE — 99219 PR INITIAL OBSERVATION CARE,LEVEL II: CPT | Performed by: FAMILY MEDICINE

## 2021-01-01 PROCEDURE — 84484 ASSAY OF TROPONIN QUANT: CPT | Performed by: EMERGENCY MEDICINE

## 2021-01-01 PROCEDURE — 71045 X-RAY EXAM CHEST 1 VIEW: CPT

## 2021-01-01 PROCEDURE — 97535 SELF CARE MNGMENT TRAINING: CPT | Mod: GO

## 2021-01-01 PROCEDURE — 83735 ASSAY OF MAGNESIUM: CPT | Mod: ZL

## 2021-01-01 PROCEDURE — 96366 THER/PROPH/DIAG IV INF ADDON: CPT

## 2021-01-01 PROCEDURE — 250N000011 HC RX IP 250 OP 636: Performed by: NURSE ANESTHETIST, CERTIFIED REGISTERED

## 2021-01-01 PROCEDURE — 96372 THER/PROPH/DIAG INJ SC/IM: CPT | Performed by: EMERGENCY MEDICINE

## 2021-01-01 PROCEDURE — U0003 INFECTIOUS AGENT DETECTION BY NUCLEIC ACID (DNA OR RNA); SEVERE ACUTE RESPIRATORY SYNDROME CORONAVIRUS 2 (SARS-COV-2) (CORONAVIRUS DISEASE [COVID-19]), AMPLIFIED PROBE TECHNIQUE, MAKING USE OF HIGH THROUGHPUT TECHNOLOGIES AS DESCRIBED BY CMS-2020-01-R: HCPCS | Mod: ZL | Performed by: INTERNAL MEDICINE

## 2021-01-01 PROCEDURE — 84153 ASSAY OF PSA TOTAL: CPT | Mod: ZL | Performed by: INTERNAL MEDICINE

## 2021-01-01 PROCEDURE — 74177 CT ABD & PELVIS W/CONTRAST: CPT | Mod: TC,MG

## 2021-01-01 PROCEDURE — C1898 LEAD, PMKR, OTHER THAN TRANS: HCPCS | Performed by: INTERNAL MEDICINE

## 2021-01-01 PROCEDURE — 82805 BLOOD GASES W/O2 SATURATION: CPT | Mod: ZL | Performed by: INTERNAL MEDICINE

## 2021-01-01 PROCEDURE — 74174 CTA ABD&PLVS W/CONTRAST: CPT | Mod: 26 | Performed by: STUDENT IN AN ORGANIZED HEALTH CARE EDUCATION/TRAINING PROGRAM

## 2021-01-01 PROCEDURE — 71046 X-RAY EXAM CHEST 2 VIEWS: CPT

## 2021-01-01 PROCEDURE — 93880 EXTRACRANIAL BILAT STUDY: CPT

## 2021-01-01 PROCEDURE — 93010 ELECTROCARDIOGRAM REPORT: CPT | Performed by: INTERNAL MEDICINE

## 2021-01-01 PROCEDURE — 250N000013 HC RX MED GY IP 250 OP 250 PS 637: Performed by: EMERGENCY MEDICINE

## 2021-01-01 PROCEDURE — 96368 THER/DIAG CONCURRENT INF: CPT | Mod: XU | Performed by: PHYSICIAN ASSISTANT

## 2021-01-01 PROCEDURE — 278N000051 HC OR IMPLANT GENERAL: Performed by: INTERNAL MEDICINE

## 2021-01-01 PROCEDURE — 93456 R HRT CORONARY ARTERY ANGIO: CPT | Performed by: INTERNAL MEDICINE

## 2021-01-01 PROCEDURE — 96366 THER/PROPH/DIAG IV INF ADDON: CPT | Performed by: EMERGENCY MEDICINE

## 2021-01-01 PROCEDURE — 99214 OFFICE O/P EST MOD 30 MIN: CPT | Mod: 95 | Performed by: INTERNAL MEDICINE

## 2021-01-01 PROCEDURE — U0005 INFEC AGEN DETEC AMPLI PROBE: HCPCS | Performed by: STUDENT IN AN ORGANIZED HEALTH CARE EDUCATION/TRAINING PROGRAM

## 2021-01-01 PROCEDURE — 99204 OFFICE O/P NEW MOD 45 MIN: CPT | Mod: 95 | Performed by: INTERNAL MEDICINE

## 2021-01-01 PROCEDURE — 410N000003 HC PER-PERFUSION 1ST 30 MIN: Performed by: INTERNAL MEDICINE

## 2021-01-01 PROCEDURE — 02HK3JZ INSERTION OF PACEMAKER LEAD INTO RIGHT VENTRICLE, PERCUTANEOUS APPROACH: ICD-10-PCS | Performed by: INTERNAL MEDICINE

## 2021-01-01 PROCEDURE — 99285 EMERGENCY DEPT VISIT HI MDM: CPT | Performed by: STUDENT IN AN ORGANIZED HEALTH CARE EDUCATION/TRAINING PROGRAM

## 2021-01-01 PROCEDURE — 80053 COMPREHEN METABOLIC PANEL: CPT | Mod: ZL | Performed by: INTERNAL MEDICINE

## 2021-01-01 PROCEDURE — 84145 PROCALCITONIN (PCT): CPT | Performed by: EMERGENCY MEDICINE

## 2021-01-01 PROCEDURE — C1887 CATHETER, GUIDING: HCPCS | Performed by: INTERNAL MEDICINE

## 2021-01-01 PROCEDURE — 83615 LACTATE (LD) (LDH) ENZYME: CPT | Mod: ZL | Performed by: INTERNAL MEDICINE

## 2021-01-01 PROCEDURE — 80053 COMPREHEN METABOLIC PANEL: CPT | Mod: ZL | Performed by: NURSE PRACTITIONER

## 2021-01-01 PROCEDURE — 94640 AIRWAY INHALATION TREATMENT: CPT | Mod: 76

## 2021-01-01 PROCEDURE — 93571 IV DOP VEL&/PRESS C FLO 1ST: CPT | Mod: 52,LC | Performed by: INTERNAL MEDICINE

## 2021-01-01 PROCEDURE — 83970 ASSAY OF PARATHORMONE: CPT | Performed by: INTERNAL MEDICINE

## 2021-01-01 PROCEDURE — 258N000003 HC RX IP 258 OP 636: Performed by: INTERNAL MEDICINE

## 2021-01-01 PROCEDURE — 99607 MTMS BY PHARM ADDL 15 MIN: CPT | Performed by: PHARMACIST

## 2021-01-01 PROCEDURE — 250N000009 HC RX 250: Performed by: INTERNAL MEDICINE

## 2021-01-01 PROCEDURE — 83735 ASSAY OF MAGNESIUM: CPT | Performed by: PHYSICIAN ASSISTANT

## 2021-01-01 PROCEDURE — 250N000013 HC RX MED GY IP 250 OP 250 PS 637: Performed by: INTERNAL MEDICINE

## 2021-01-01 PROCEDURE — 87040 BLOOD CULTURE FOR BACTERIA: CPT | Mod: 91 | Performed by: PHYSICIAN ASSISTANT

## 2021-01-01 PROCEDURE — 84100 ASSAY OF PHOSPHORUS: CPT | Performed by: STUDENT IN AN ORGANIZED HEALTH CARE EDUCATION/TRAINING PROGRAM

## 2021-01-01 PROCEDURE — 80053 COMPREHEN METABOLIC PANEL: CPT | Mod: ZL

## 2021-01-01 PROCEDURE — 36600 WITHDRAWAL OF ARTERIAL BLOOD: CPT | Performed by: PHYSICIAN ASSISTANT

## 2021-01-01 PROCEDURE — A9540 TC99M MAA: HCPCS | Performed by: INTERNAL MEDICINE

## 2021-01-01 PROCEDURE — 85027 COMPLETE CBC AUTOMATED: CPT | Performed by: PATHOLOGY

## 2021-01-01 PROCEDURE — 80061 LIPID PANEL: CPT | Performed by: INTERNAL MEDICINE

## 2021-01-01 PROCEDURE — 83550 IRON BINDING TEST: CPT | Mod: ZL

## 2021-01-01 PROCEDURE — 84132 ASSAY OF SERUM POTASSIUM: CPT | Performed by: STUDENT IN AN ORGANIZED HEALTH CARE EDUCATION/TRAINING PROGRAM

## 2021-01-01 PROCEDURE — 84484 ASSAY OF TROPONIN QUANT: CPT | Performed by: STUDENT IN AN ORGANIZED HEALTH CARE EDUCATION/TRAINING PROGRAM

## 2021-01-01 PROCEDURE — 80048 BASIC METABOLIC PNL TOTAL CA: CPT | Performed by: EMERGENCY MEDICINE

## 2021-01-01 PROCEDURE — 120N000003 HC R&B IMCU UMMC

## 2021-01-01 PROCEDURE — 93010 ELECTROCARDIOGRAM REPORT: CPT | Mod: 59 | Performed by: INTERNAL MEDICINE

## 2021-01-01 PROCEDURE — 83880 ASSAY OF NATRIURETIC PEPTIDE: CPT | Performed by: PATHOLOGY

## 2021-01-01 PROCEDURE — 84156 ASSAY OF PROTEIN URINE: CPT | Mod: ZL

## 2021-01-01 PROCEDURE — 93306 TTE W/DOPPLER COMPLETE: CPT | Mod: 26 | Performed by: STUDENT IN AN ORGANIZED HEALTH CARE EDUCATION/TRAINING PROGRAM

## 2021-01-01 PROCEDURE — 99205 OFFICE O/P NEW HI 60 MIN: CPT | Mod: 95 | Performed by: INTERNAL MEDICINE

## 2021-01-01 PROCEDURE — 99204 OFFICE O/P NEW MOD 45 MIN: CPT | Performed by: INTERNAL MEDICINE

## 2021-01-01 PROCEDURE — 87086 URINE CULTURE/COLONY COUNT: CPT | Performed by: EMERGENCY MEDICINE

## 2021-01-01 PROCEDURE — 82607 VITAMIN B-12: CPT | Mod: ZL

## 2021-01-01 PROCEDURE — 84484 ASSAY OF TROPONIN QUANT: CPT | Performed by: PHYSICIAN ASSISTANT

## 2021-01-01 PROCEDURE — C1769 GUIDE WIRE: HCPCS | Performed by: INTERNAL MEDICINE

## 2021-01-01 PROCEDURE — 258N000003 HC RX IP 258 OP 636: Performed by: NURSE ANESTHETIST, CERTIFIED REGISTERED

## 2021-01-01 PROCEDURE — 85027 COMPLETE CBC AUTOMATED: CPT | Mod: ZL | Performed by: INTERNAL MEDICINE

## 2021-01-01 PROCEDURE — 80048 BASIC METABOLIC PNL TOTAL CA: CPT | Mod: ZL | Performed by: INTERNAL MEDICINE

## 2021-01-01 PROCEDURE — 83735 ASSAY OF MAGNESIUM: CPT | Performed by: NURSE PRACTITIONER

## 2021-01-01 PROCEDURE — 84443 ASSAY THYROID STIM HORMONE: CPT | Performed by: STUDENT IN AN ORGANIZED HEALTH CARE EDUCATION/TRAINING PROGRAM

## 2021-01-01 PROCEDURE — 99285 EMERGENCY DEPT VISIT HI MDM: CPT | Mod: 25 | Performed by: STUDENT IN AN ORGANIZED HEALTH CARE EDUCATION/TRAINING PROGRAM

## 2021-01-01 PROCEDURE — 999N000108 HC STATISTIC OP CARDIAC VISIT #2

## 2021-01-01 PROCEDURE — 85347 COAGULATION TIME ACTIVATED: CPT

## 2021-01-01 PROCEDURE — 82465 ASSAY BLD/SERUM CHOLESTEROL: CPT | Mod: ZL

## 2021-01-01 PROCEDURE — 83036 HEMOGLOBIN GLYCOSYLATED A1C: CPT | Mod: ZL | Performed by: INTERNAL MEDICINE

## 2021-01-01 PROCEDURE — 84443 ASSAY THYROID STIM HORMONE: CPT | Performed by: PHYSICIAN ASSISTANT

## 2021-01-01 PROCEDURE — 71275 CT ANGIOGRAPHY CHEST: CPT | Mod: TC,ME

## 2021-01-01 PROCEDURE — 710N000011 HC RECOVERY PHASE 1, LEVEL 3, PER MIN: Performed by: INTERNAL MEDICINE

## 2021-01-01 PROCEDURE — 360N000079 HC SURGERY LEVEL 6, PER MIN: Performed by: INTERNAL MEDICINE

## 2021-01-01 PROCEDURE — 99214 OFFICE O/P EST MOD 30 MIN: CPT | Performed by: NURSE PRACTITIONER

## 2021-01-01 PROCEDURE — 93005 ELECTROCARDIOGRAM TRACING: CPT | Performed by: EMERGENCY MEDICINE

## 2021-01-01 PROCEDURE — 82330 ASSAY OF CALCIUM: CPT | Mod: ZL | Performed by: INTERNAL MEDICINE

## 2021-01-01 PROCEDURE — 86923 COMPATIBILITY TEST ELECTRIC: CPT | Performed by: INTERNAL MEDICINE

## 2021-01-01 PROCEDURE — U0003 INFECTIOUS AGENT DETECTION BY NUCLEIC ACID (DNA OR RNA); SEVERE ACUTE RESPIRATORY SYNDROME CORONAVIRUS 2 (SARS-COV-2) (CORONAVIRUS DISEASE [COVID-19]), AMPLIFIED PROBE TECHNIQUE, MAKING USE OF HIGH THROUGHPUT TECHNOLOGIES AS DESCRIBED BY CMS-2020-01-R: HCPCS | Performed by: STUDENT IN AN ORGANIZED HEALTH CARE EDUCATION/TRAINING PROGRAM

## 2021-01-01 PROCEDURE — 86769 SARS-COV-2 COVID-19 ANTIBODY: CPT | Mod: ZL,91 | Performed by: INTERNAL MEDICINE

## 2021-01-01 PROCEDURE — 99152 MOD SED SAME PHYS/QHP 5/>YRS: CPT | Performed by: INTERNAL MEDICINE

## 2021-01-01 PROCEDURE — 86140 C-REACTIVE PROTEIN: CPT | Mod: ZL | Performed by: INTERNAL MEDICINE

## 2021-01-01 PROCEDURE — 31500 INSERT EMERGENCY AIRWAY: CPT | Performed by: NURSE ANESTHETIST, CERTIFIED REGISTERED

## 2021-01-01 PROCEDURE — 255N000002 HC RX 255 OP 636: Performed by: EMERGENCY MEDICINE

## 2021-01-01 PROCEDURE — 83735 ASSAY OF MAGNESIUM: CPT | Mod: ZL | Performed by: INTERNAL MEDICINE

## 2021-01-01 PROCEDURE — C1785 PMKR, DUAL, RATE-RESP: HCPCS | Performed by: INTERNAL MEDICINE

## 2021-01-01 PROCEDURE — 02RF38Z REPLACEMENT OF AORTIC VALVE WITH ZOOPLASTIC TISSUE, PERCUTANEOUS APPROACH: ICD-10-PCS | Performed by: SURGERY

## 2021-01-01 PROCEDURE — 81001 URINALYSIS AUTO W/SCOPE: CPT | Performed by: PHYSICIAN ASSISTANT

## 2021-01-01 PROCEDURE — 96372 THER/PROPH/DIAG INJ SC/IM: CPT | Performed by: FAMILY MEDICINE

## 2021-01-01 PROCEDURE — 999N000054 HC STATISTIC EKG NON-CHARGEABLE

## 2021-01-01 PROCEDURE — 97530 THERAPEUTIC ACTIVITIES: CPT | Mod: GO

## 2021-01-01 PROCEDURE — 96375 TX/PRO/DX INJ NEW DRUG ADDON: CPT | Performed by: STUDENT IN AN ORGANIZED HEALTH CARE EDUCATION/TRAINING PROGRAM

## 2021-01-01 PROCEDURE — 36415 COLL VENOUS BLD VENIPUNCTURE: CPT | Performed by: INTERNAL MEDICINE

## 2021-01-01 PROCEDURE — 258N000003 HC RX IP 258 OP 636: Performed by: EMERGENCY MEDICINE

## 2021-01-01 PROCEDURE — 99283 EMERGENCY DEPT VISIT LOW MDM: CPT | Performed by: EMERGENCY MEDICINE

## 2021-01-01 PROCEDURE — 250N000012 HC RX MED GY IP 250 OP 636 PS 637: Performed by: FAMILY MEDICINE

## 2021-01-01 PROCEDURE — 96365 THER/PROPH/DIAG IV INF INIT: CPT

## 2021-01-01 PROCEDURE — 80197 ASSAY OF TACROLIMUS: CPT | Mod: ZL

## 2021-01-01 PROCEDURE — 80069 RENAL FUNCTION PANEL: CPT | Mod: ZL | Performed by: INTERNAL MEDICINE

## 2021-01-01 PROCEDURE — 81001 URINALYSIS AUTO W/SCOPE: CPT | Mod: ZL

## 2021-01-01 PROCEDURE — 255N000002 HC RX 255 OP 636: Performed by: FAMILY MEDICINE

## 2021-01-01 PROCEDURE — 82728 ASSAY OF FERRITIN: CPT | Mod: ZL

## 2021-01-01 PROCEDURE — 250N000009 HC RX 250: Performed by: NURSE PRACTITIONER

## 2021-01-01 PROCEDURE — 85379 FIBRIN DEGRADATION QUANT: CPT | Performed by: PHYSICIAN ASSISTANT

## 2021-01-01 PROCEDURE — 85025 COMPLETE CBC W/AUTO DIFF WBC: CPT | Performed by: EMERGENCY MEDICINE

## 2021-01-01 PROCEDURE — 258N000003 HC RX IP 258 OP 636: Performed by: NURSE PRACTITIONER

## 2021-01-01 PROCEDURE — 51798 US URINE CAPACITY MEASURE: CPT | Performed by: UROLOGY

## 2021-01-01 PROCEDURE — 83970 ASSAY OF PARATHORMONE: CPT | Mod: ZL | Performed by: INTERNAL MEDICINE

## 2021-01-01 PROCEDURE — 99214 OFFICE O/P EST MOD 30 MIN: CPT | Mod: 95 | Performed by: NURSE PRACTITIONER

## 2021-01-01 PROCEDURE — 83605 ASSAY OF LACTIC ACID: CPT | Performed by: PHYSICIAN ASSISTANT

## 2021-01-01 PROCEDURE — 99605 MTMS BY PHARM NP 15 MIN: CPT | Performed by: PHARMACIST

## 2021-01-01 PROCEDURE — 85004 AUTOMATED DIFF WBC COUNT: CPT | Performed by: PHYSICIAN ASSISTANT

## 2021-01-01 PROCEDURE — 99214 OFFICE O/P EST MOD 30 MIN: CPT | Mod: 25 | Performed by: NURSE PRACTITIONER

## 2021-01-01 PROCEDURE — 86140 C-REACTIVE PROTEIN: CPT | Performed by: PHYSICIAN ASSISTANT

## 2021-01-01 PROCEDURE — 96376 TX/PRO/DX INJ SAME DRUG ADON: CPT | Performed by: PHYSICIAN ASSISTANT

## 2021-01-01 PROCEDURE — 94640 AIRWAY INHALATION TREATMENT: CPT

## 2021-01-01 PROCEDURE — 80048 BASIC METABOLIC PNL TOTAL CA: CPT | Mod: ZL

## 2021-01-01 PROCEDURE — U0005 INFEC AGEN DETEC AMPLI PROBE: HCPCS | Performed by: NURSE PRACTITIONER

## 2021-01-01 PROCEDURE — 93010 ELECTROCARDIOGRAM REPORT: CPT | Mod: 76 | Performed by: INTERNAL MEDICINE

## 2021-01-01 PROCEDURE — 82306 VITAMIN D 25 HYDROXY: CPT | Mod: ZL | Performed by: INTERNAL MEDICINE

## 2021-01-01 PROCEDURE — 83735 ASSAY OF MAGNESIUM: CPT | Mod: ZL | Performed by: NURSE PRACTITIONER

## 2021-01-01 PROCEDURE — 999N000141 HC STATISTIC PRE-PROCEDURE NURSING ASSESSMENT: Performed by: INTERNAL MEDICINE

## 2021-01-01 PROCEDURE — G0463 HOSPITAL OUTPT CLINIC VISIT: HCPCS | Performed by: FAMILY MEDICINE

## 2021-01-01 PROCEDURE — 70450 CT HEAD/BRAIN W/O DYE: CPT | Mod: TC,ME

## 2021-01-01 PROCEDURE — 93294 REM INTERROG EVL PM/LDLS PM: CPT | Performed by: INTERNAL MEDICINE

## 2021-01-01 PROCEDURE — 83605 ASSAY OF LACTIC ACID: CPT | Performed by: EMERGENCY MEDICINE

## 2021-01-01 PROCEDURE — C1760 CLOSURE DEV, VASC: HCPCS | Performed by: INTERNAL MEDICINE

## 2021-01-01 PROCEDURE — 5A1223Z PERFORMANCE OF CARDIAC PACING, CONTINUOUS: ICD-10-PCS | Performed by: INTERNAL MEDICINE

## 2021-01-01 PROCEDURE — 36415 COLL VENOUS BLD VENIPUNCTURE: CPT | Performed by: FAMILY MEDICINE

## 2021-01-01 PROCEDURE — 84443 ASSAY THYROID STIM HORMONE: CPT | Mod: ZL | Performed by: INTERNAL MEDICINE

## 2021-01-01 PROCEDURE — 36415 COLL VENOUS BLD VENIPUNCTURE: CPT | Performed by: PATHOLOGY

## 2021-01-01 PROCEDURE — 83880 ASSAY OF NATRIURETIC PEPTIDE: CPT | Performed by: PHYSICIAN ASSISTANT

## 2021-01-01 PROCEDURE — 82248 BILIRUBIN DIRECT: CPT | Mod: ZL

## 2021-01-01 PROCEDURE — 250N000025 HC SEVOFLURANE, PER MIN: Performed by: INTERNAL MEDICINE

## 2021-01-01 PROCEDURE — 02H63JZ INSERTION OF PACEMAKER LEAD INTO RIGHT ATRIUM, PERCUTANEOUS APPROACH: ICD-10-PCS | Performed by: INTERNAL MEDICINE

## 2021-01-01 PROCEDURE — 97166 OT EVAL MOD COMPLEX 45 MIN: CPT | Mod: GO

## 2021-01-01 PROCEDURE — 82805 BLOOD GASES W/O2 SATURATION: CPT | Performed by: PHYSICIAN ASSISTANT

## 2021-01-01 PROCEDURE — 36415 COLL VENOUS BLD VENIPUNCTURE: CPT | Performed by: NURSE PRACTITIONER

## 2021-01-01 PROCEDURE — 85379 FIBRIN DEGRADATION QUANT: CPT | Performed by: STUDENT IN AN ORGANIZED HEALTH CARE EDUCATION/TRAINING PROGRAM

## 2021-01-01 PROCEDURE — 82746 ASSAY OF FOLIC ACID SERUM: CPT | Mod: ZL

## 2021-01-01 PROCEDURE — 85027 COMPLETE CBC AUTOMATED: CPT | Performed by: INTERNAL MEDICINE

## 2021-01-01 PROCEDURE — 93005 ELECTROCARDIOGRAM TRACING: CPT | Performed by: STUDENT IN AN ORGANIZED HEALTH CARE EDUCATION/TRAINING PROGRAM

## 2021-01-01 PROCEDURE — 80069 RENAL FUNCTION PANEL: CPT | Performed by: INTERNAL MEDICINE

## 2021-01-01 PROCEDURE — 99217 PR OBSERVATION CARE DISCHARGE: CPT | Performed by: FAMILY MEDICINE

## 2021-01-01 PROCEDURE — 96372 THER/PROPH/DIAG INJ SC/IM: CPT | Mod: XU | Performed by: FAMILY MEDICINE

## 2021-01-01 PROCEDURE — 250N000013 HC RX MED GY IP 250 OP 250 PS 637: Mod: GY | Performed by: FAMILY MEDICINE

## 2021-01-01 PROCEDURE — C9803 HOPD COVID-19 SPEC COLLECT: HCPCS | Performed by: PHYSICIAN ASSISTANT

## 2021-01-01 PROCEDURE — 250N000013 HC RX MED GY IP 250 OP 250 PS 637: Performed by: STUDENT IN AN ORGANIZED HEALTH CARE EDUCATION/TRAINING PROGRAM

## 2021-01-01 PROCEDURE — 71275 CT ANGIOGRAPHY CHEST: CPT | Mod: MG

## 2021-01-01 PROCEDURE — 84439 ASSAY OF FREE THYROXINE: CPT | Mod: ZL | Performed by: INTERNAL MEDICINE

## 2021-01-01 PROCEDURE — 250N000013 HC RX MED GY IP 250 OP 250 PS 637: Performed by: NURSE PRACTITIONER

## 2021-01-01 PROCEDURE — 94664 DEMO&/EVAL PT USE INHALER: CPT

## 2021-01-01 PROCEDURE — C1730 CATH, EP, 19 OR FEW ELECT: HCPCS | Performed by: INTERNAL MEDICINE

## 2021-01-01 PROCEDURE — 71045 X-RAY EXAM CHEST 1 VIEW: CPT | Mod: 26 | Performed by: RADIOLOGY

## 2021-01-01 PROCEDURE — 84550 ASSAY OF BLOOD/URIC ACID: CPT | Mod: ZL | Performed by: INTERNAL MEDICINE

## 2021-01-01 PROCEDURE — 80053 COMPREHEN METABOLIC PANEL: CPT | Performed by: PHYSICIAN ASSISTANT

## 2021-01-01 PROCEDURE — C9600 PERC DRUG-EL COR STENT SING: HCPCS | Performed by: INTERNAL MEDICINE

## 2021-01-01 PROCEDURE — 85018 HEMOGLOBIN: CPT | Mod: 91

## 2021-01-01 PROCEDURE — 83880 ASSAY OF NATRIURETIC PEPTIDE: CPT | Performed by: STUDENT IN AN ORGANIZED HEALTH CARE EDUCATION/TRAINING PROGRAM

## 2021-01-01 PROCEDURE — 96366 THER/PROPH/DIAG IV INF ADDON: CPT | Performed by: STUDENT IN AN ORGANIZED HEALTH CARE EDUCATION/TRAINING PROGRAM

## 2021-01-01 PROCEDURE — 31500 INSERT EMERGENCY AIRWAY: CPT | Performed by: PHYSICIAN ASSISTANT

## 2021-01-01 PROCEDURE — 96366 THER/PROPH/DIAG IV INF ADDON: CPT | Performed by: PHYSICIAN ASSISTANT

## 2021-01-01 PROCEDURE — 85652 RBC SED RATE AUTOMATED: CPT | Mod: ZL | Performed by: INTERNAL MEDICINE

## 2021-01-01 PROCEDURE — 36415 COLL VENOUS BLD VENIPUNCTURE: CPT | Performed by: EMERGENCY MEDICINE

## 2021-01-01 PROCEDURE — 99203 OFFICE O/P NEW LOW 30 MIN: CPT | Performed by: THORACIC SURGERY (CARDIOTHORACIC VASCULAR SURGERY)

## 2021-01-01 PROCEDURE — 85027 COMPLETE CBC AUTOMATED: CPT | Performed by: STUDENT IN AN ORGANIZED HEALTH CARE EDUCATION/TRAINING PROGRAM

## 2021-01-01 PROCEDURE — 80053 COMPREHEN METABOLIC PANEL: CPT | Performed by: STUDENT IN AN ORGANIZED HEALTH CARE EDUCATION/TRAINING PROGRAM

## 2021-01-01 PROCEDURE — 82310 ASSAY OF CALCIUM: CPT | Mod: ZL

## 2021-01-01 PROCEDURE — 93280 PM DEVICE PROGR EVAL DUAL: CPT

## 2021-01-01 PROCEDURE — 370N000017 HC ANESTHESIA TECHNICAL FEE, PER MIN: Performed by: INTERNAL MEDICINE

## 2021-01-01 PROCEDURE — P9016 RBC LEUKOCYTES REDUCED: HCPCS | Performed by: INTERNAL MEDICINE

## 2021-01-01 DEVICE — LEAD PACEMAKER SELECTSECURE 69CM MD  383069: Type: IMPLANTABLE DEVICE | Site: CHEST | Status: FUNCTIONAL

## 2021-01-01 DEVICE — STENT SYNERGY DRUG ELUTING 2.75X32MM  H7493926032270: Type: IMPLANTABLE DEVICE | Status: FUNCTIONAL

## 2021-01-01 DEVICE — 29MM EVOLUT PROPLUS TRANSCATHETER AORTIC VALVE EVPROPLUS-29U: Type: IMPLANTABLE DEVICE | Site: CHEST | Status: FUNCTIONAL

## 2021-01-01 DEVICE — IMP LEAD PACING BIPOLAR CAPSUREFIX NOVUS 52CM 5076-52: Type: IMPLANTABLE DEVICE | Site: CHEST | Status: FUNCTIONAL

## 2021-01-01 DEVICE — PACEMAKER AZURE MRI XT DR: Type: IMPLANTABLE DEVICE | Site: CHEST | Status: FUNCTIONAL

## 2021-01-01 DEVICE — STENT SYNERGY DRUG ELUTING 3.00X12MM  H7493926012300: Type: IMPLANTABLE DEVICE | Status: FUNCTIONAL

## 2021-01-01 DEVICE — STENT SYNERGY DRUG ELUTING 2.75X16MM  H7493926016270: Type: IMPLANTABLE DEVICE | Status: FUNCTIONAL

## 2021-01-01 RX ORDER — DOBUTAMINE HYDROCHLORIDE 200 MG/100ML
2-20 INJECTION INTRAVENOUS CONTINUOUS PRN
Status: DISCONTINUED | OUTPATIENT
Start: 2021-01-01 | End: 2021-01-01 | Stop reason: HOSPADM

## 2021-01-01 RX ORDER — LIDOCAINE HYDROCHLORIDE 20 MG/ML
INJECTION, SOLUTION INFILTRATION; PERINEURAL PRN
Status: DISCONTINUED | OUTPATIENT
Start: 2021-01-01 | End: 2021-01-01

## 2021-01-01 RX ORDER — MAGNESIUM OXIDE 400 MG/1
800 TABLET ORAL 2 TIMES DAILY
Status: DISCONTINUED | OUTPATIENT
Start: 2021-01-01 | End: 2021-01-01 | Stop reason: HOSPADM

## 2021-01-01 RX ORDER — TRAMADOL HYDROCHLORIDE 50 MG/1
TABLET ORAL
Qty: 60 TABLET | Refills: 5 | Status: SHIPPED | OUTPATIENT
Start: 2021-01-01

## 2021-01-01 RX ORDER — METOPROLOL SUCCINATE 50 MG/1
50 TABLET, EXTENDED RELEASE ORAL 2 TIMES DAILY
Qty: 180 TABLET | Refills: 3 | Status: SHIPPED | OUTPATIENT
Start: 2021-01-01 | End: 2021-01-01

## 2021-01-01 RX ORDER — IOPAMIDOL 755 MG/ML
100 INJECTION, SOLUTION INTRAVASCULAR ONCE
Status: COMPLETED | OUTPATIENT
Start: 2021-01-01 | End: 2021-01-01

## 2021-01-01 RX ORDER — SODIUM CHLORIDE, SODIUM LACTATE, POTASSIUM CHLORIDE, CALCIUM CHLORIDE 600; 310; 30; 20 MG/100ML; MG/100ML; MG/100ML; MG/100ML
INJECTION, SOLUTION INTRAVENOUS CONTINUOUS
Status: DISCONTINUED | OUTPATIENT
Start: 2021-01-01 | End: 2021-01-01 | Stop reason: HOSPADM

## 2021-01-01 RX ORDER — ONDANSETRON 4 MG/1
4 TABLET, ORALLY DISINTEGRATING ORAL EVERY 30 MIN PRN
Status: DISCONTINUED | OUTPATIENT
Start: 2021-01-01 | End: 2021-01-01 | Stop reason: HOSPADM

## 2021-01-01 RX ORDER — BUPIVACAINE HYDROCHLORIDE 2.5 MG/ML
INJECTION, SOLUTION EPIDURAL; INFILTRATION; INTRACAUDAL
Status: DISCONTINUED | OUTPATIENT
Start: 2021-01-01 | End: 2021-01-01 | Stop reason: HOSPADM

## 2021-01-01 RX ORDER — DEXTROSE MONOHYDRATE 25 G/50ML
25-50 INJECTION, SOLUTION INTRAVENOUS
Status: DISCONTINUED | OUTPATIENT
Start: 2021-01-01 | End: 2021-01-01

## 2021-01-01 RX ORDER — MEPERIDINE HYDROCHLORIDE 25 MG/ML
25 INJECTION INTRAMUSCULAR; INTRAVENOUS; SUBCUTANEOUS EVERY 30 MIN PRN
Status: CANCELLED | OUTPATIENT
Start: 2021-01-01

## 2021-01-01 RX ORDER — IOPAMIDOL 755 MG/ML
INJECTION, SOLUTION INTRAVASCULAR
Status: DISCONTINUED | OUTPATIENT
Start: 2021-01-01 | End: 2021-01-01 | Stop reason: HOSPADM

## 2021-01-01 RX ORDER — NALOXONE HYDROCHLORIDE 0.4 MG/ML
0.4 INJECTION, SOLUTION INTRAMUSCULAR; INTRAVENOUS; SUBCUTANEOUS
Status: DISCONTINUED | OUTPATIENT
Start: 2021-01-01 | End: 2021-01-01

## 2021-01-01 RX ORDER — MAGNESIUM SULFATE HEPTAHYDRATE 40 MG/ML
2 INJECTION, SOLUTION INTRAVENOUS ONCE
Status: COMPLETED | OUTPATIENT
Start: 2021-01-01 | End: 2021-01-01

## 2021-01-01 RX ORDER — LIDOCAINE 40 MG/G
CREAM TOPICAL
Status: DISCONTINUED | OUTPATIENT
Start: 2021-01-01 | End: 2021-01-01 | Stop reason: HOSPADM

## 2021-01-01 RX ORDER — METOPROLOL SUCCINATE 25 MG/1
25 TABLET, EXTENDED RELEASE ORAL DAILY
Qty: 90 TABLET | Refills: 3 | Status: SHIPPED | OUTPATIENT
Start: 2021-01-01 | End: 2021-01-01

## 2021-01-01 RX ORDER — SODIUM CHLORIDE, SODIUM LACTATE, POTASSIUM CHLORIDE, CALCIUM CHLORIDE 600; 310; 30; 20 MG/100ML; MG/100ML; MG/100ML; MG/100ML
INJECTION, SOLUTION INTRAVENOUS CONTINUOUS PRN
Status: DISCONTINUED | OUTPATIENT
Start: 2021-01-01 | End: 2021-01-01

## 2021-01-01 RX ORDER — ATORVASTATIN CALCIUM 40 MG/1
40 TABLET, FILM COATED ORAL DAILY
Qty: 90 TABLET | Refills: 3 | Status: SHIPPED | OUTPATIENT
Start: 2021-01-01

## 2021-01-01 RX ORDER — SODIUM CHLORIDE 9 MG/ML
INJECTION, SOLUTION INTRAVENOUS CONTINUOUS
Status: DISCONTINUED | OUTPATIENT
Start: 2021-01-01 | End: 2021-01-01 | Stop reason: HOSPADM

## 2021-01-01 RX ORDER — MAGNESIUM CHLORIDE 71.5 G/G
3 TABLET ORAL 4 TIMES DAILY
Qty: 180 TABLET | Refills: 3 | Status: SHIPPED | OUTPATIENT
Start: 2021-01-01 | End: 2021-01-01

## 2021-01-01 RX ORDER — NITROGLYCERIN 20 MG/100ML
10-200 INJECTION INTRAVENOUS CONTINUOUS PRN
Status: DISCONTINUED | OUTPATIENT
Start: 2021-01-01 | End: 2021-01-01 | Stop reason: HOSPADM

## 2021-01-01 RX ORDER — NALOXONE HYDROCHLORIDE 0.4 MG/ML
0.2 INJECTION, SOLUTION INTRAMUSCULAR; INTRAVENOUS; SUBCUTANEOUS
Status: CANCELLED | OUTPATIENT
Start: 2021-01-01

## 2021-01-01 RX ORDER — NALOXONE HYDROCHLORIDE 0.4 MG/ML
0.2 INJECTION, SOLUTION INTRAMUSCULAR; INTRAVENOUS; SUBCUTANEOUS
Status: DISCONTINUED | OUTPATIENT
Start: 2021-01-01 | End: 2021-01-01 | Stop reason: HOSPADM

## 2021-01-01 RX ORDER — AZITHROMYCIN 250 MG/1
TABLET, FILM COATED ORAL
Qty: 6 TABLET | Refills: 0 | Status: SHIPPED | OUTPATIENT
Start: 2021-01-01 | End: 2021-01-01

## 2021-01-01 RX ORDER — LORAZEPAM 2 MG/ML
1 INJECTION INTRAMUSCULAR ONCE
Status: COMPLETED | OUTPATIENT
Start: 2021-01-01 | End: 2021-01-01

## 2021-01-01 RX ORDER — ASPIRIN 81 MG/1
81 TABLET ORAL DAILY
Status: DISCONTINUED | OUTPATIENT
Start: 2021-01-01 | End: 2021-01-01 | Stop reason: HOSPADM

## 2021-01-01 RX ORDER — ONDANSETRON 2 MG/ML
4 INJECTION INTRAMUSCULAR; INTRAVENOUS EVERY 30 MIN PRN
Status: DISCONTINUED | OUTPATIENT
Start: 2021-01-01 | End: 2021-01-01 | Stop reason: HOSPADM

## 2021-01-01 RX ORDER — OXYCODONE AND ACETAMINOPHEN 5; 325 MG/1; MG/1
1 TABLET ORAL EVERY 4 HOURS PRN
Status: DISCONTINUED | OUTPATIENT
Start: 2021-01-01 | End: 2021-01-01 | Stop reason: HOSPADM

## 2021-01-01 RX ORDER — CEFAZOLIN SODIUM 2 G/50ML
2 SOLUTION INTRAVENOUS
Status: CANCELLED | OUTPATIENT
Start: 2021-01-01

## 2021-01-01 RX ORDER — METOPROLOL SUCCINATE 50 MG/1
50 TABLET, EXTENDED RELEASE ORAL DAILY
Qty: 90 TABLET | Refills: 3 | Status: SHIPPED | OUTPATIENT
Start: 2021-01-01 | End: 2021-01-01

## 2021-01-01 RX ORDER — NALOXONE HYDROCHLORIDE 0.4 MG/ML
0.4 INJECTION, SOLUTION INTRAMUSCULAR; INTRAVENOUS; SUBCUTANEOUS
Status: DISCONTINUED | OUTPATIENT
Start: 2021-01-01 | End: 2021-01-01 | Stop reason: HOSPADM

## 2021-01-01 RX ORDER — ATROPINE SULFATE 0.1 MG/ML
0.5 INJECTION INTRAVENOUS
Status: DISCONTINUED | OUTPATIENT
Start: 2021-01-01 | End: 2021-01-01 | Stop reason: HOSPADM

## 2021-01-01 RX ORDER — PROPOFOL 10 MG/ML
INJECTION, EMULSION INTRAVENOUS PRN
Status: DISCONTINUED | OUTPATIENT
Start: 2021-01-01 | End: 2021-01-01

## 2021-01-01 RX ORDER — NITROGLYCERIN 0.4 MG/1
0.4 TABLET SUBLINGUAL EVERY 5 MIN PRN
Status: DISCONTINUED | OUTPATIENT
Start: 2021-01-01 | End: 2021-01-01 | Stop reason: HOSPADM

## 2021-01-01 RX ORDER — MAGNESIUM SULFATE HEPTAHYDRATE 40 MG/ML
4 INJECTION, SOLUTION INTRAVENOUS
Status: ON HOLD
Start: 2021-01-01 | End: 2021-01-01

## 2021-01-01 RX ORDER — PROPOFOL 10 MG/ML
5-75 INJECTION, EMULSION INTRAVENOUS CONTINUOUS
Status: DISCONTINUED | OUTPATIENT
Start: 2021-01-01 | End: 2021-01-01 | Stop reason: HOSPADM

## 2021-01-01 RX ORDER — MAGNESIUM SULFATE HEPTAHYDRATE 40 MG/ML
4 INJECTION, SOLUTION INTRAVENOUS
Status: CANCELLED
Start: 2021-01-01

## 2021-01-01 RX ORDER — ACETAMINOPHEN 500 MG
500 TABLET ORAL DAILY PRN
Status: DISCONTINUED | OUTPATIENT
Start: 2021-01-01 | End: 2021-01-01 | Stop reason: HOSPADM

## 2021-01-01 RX ORDER — LIDOCAINE 40 MG/G
CREAM TOPICAL
Status: DISCONTINUED | OUTPATIENT
Start: 2021-01-01 | End: 2021-01-01

## 2021-01-01 RX ORDER — ONDANSETRON 2 MG/ML
INJECTION INTRAMUSCULAR; INTRAVENOUS PRN
Status: DISCONTINUED | OUTPATIENT
Start: 2021-01-01 | End: 2021-01-01

## 2021-01-01 RX ORDER — HYDRALAZINE HYDROCHLORIDE 20 MG/ML
10 INJECTION INTRAMUSCULAR; INTRAVENOUS EVERY 4 HOURS PRN
Status: DISCONTINUED | OUTPATIENT
Start: 2021-01-01 | End: 2021-01-01 | Stop reason: HOSPADM

## 2021-01-01 RX ORDER — UREA 10 %
1000 LOTION (ML) TOPICAL DAILY
Status: DISCONTINUED | OUTPATIENT
Start: 2021-01-01 | End: 2021-01-01 | Stop reason: HOSPADM

## 2021-01-01 RX ORDER — AMOXICILLIN 500 MG/1
CAPSULE ORAL
Qty: 4 CAPSULE | Refills: 3 | Status: SHIPPED | OUTPATIENT
Start: 2021-01-01

## 2021-01-01 RX ORDER — TACROLIMUS 1 MG/1
CAPSULE ORAL
Qty: 60 CAPSULE | Refills: 11 | Status: SHIPPED | OUTPATIENT
Start: 2021-01-01

## 2021-01-01 RX ORDER — FENTANYL CITRATE 50 UG/ML
50 INJECTION, SOLUTION INTRAMUSCULAR; INTRAVENOUS ONCE
Status: COMPLETED | OUTPATIENT
Start: 2021-01-01 | End: 2021-01-01

## 2021-01-01 RX ORDER — EPTIFIBATIDE 2 MG/ML
180 INJECTION, SOLUTION INTRAVENOUS EVERY 10 MIN PRN
Status: DISCONTINUED | OUTPATIENT
Start: 2021-01-01 | End: 2021-01-01 | Stop reason: HOSPADM

## 2021-01-01 RX ORDER — ALBUTEROL SULFATE 90 UG/1
1-2 AEROSOL, METERED RESPIRATORY (INHALATION)
Status: CANCELLED
Start: 2021-01-01

## 2021-01-01 RX ORDER — FLUOCINONIDE TOPICAL SOLUTION USP, 0.05% 0.5 MG/ML
SOLUTION TOPICAL
Qty: 60 ML | Refills: 3 | Status: SHIPPED | OUTPATIENT
Start: 2021-01-01

## 2021-01-01 RX ORDER — LOSARTAN POTASSIUM 25 MG/1
TABLET ORAL
Qty: 90 TABLET | Refills: 3 | Status: SHIPPED | OUTPATIENT
Start: 2021-01-01

## 2021-01-01 RX ORDER — HEPARIN SODIUM 10000 [USP'U]/100ML
100-1000 INJECTION, SOLUTION INTRAVENOUS CONTINUOUS PRN
Status: DISCONTINUED | OUTPATIENT
Start: 2021-01-01 | End: 2021-01-01 | Stop reason: HOSPADM

## 2021-01-01 RX ORDER — PROTAMINE SULFATE 10 MG/ML
INJECTION, SOLUTION INTRAVENOUS PRN
Status: DISCONTINUED | OUTPATIENT
Start: 2021-01-01 | End: 2021-01-01

## 2021-01-01 RX ORDER — ARGATROBAN 1 MG/ML
150 INJECTION, SOLUTION INTRAVENOUS
Status: DISCONTINUED | OUTPATIENT
Start: 2021-01-01 | End: 2021-01-01 | Stop reason: HOSPADM

## 2021-01-01 RX ORDER — AMLODIPINE BESYLATE 5 MG/1
5 TABLET ORAL DAILY
Status: DISCONTINUED | OUTPATIENT
Start: 2021-01-01 | End: 2021-01-01 | Stop reason: HOSPADM

## 2021-01-01 RX ORDER — PHENYLEPHRINE HCL IN 0.9% NACL 50MG/250ML
.1-1 PLASTIC BAG, INJECTION (ML) INTRAVENOUS CONTINUOUS
Status: DISCONTINUED | OUTPATIENT
Start: 2021-01-01 | End: 2021-01-01 | Stop reason: HOSPADM

## 2021-01-01 RX ORDER — ONDANSETRON 4 MG/1
4 TABLET, ORALLY DISINTEGRATING ORAL EVERY 6 HOURS PRN
Status: DISCONTINUED | OUTPATIENT
Start: 2021-01-01 | End: 2021-01-01 | Stop reason: HOSPADM

## 2021-01-01 RX ORDER — MAGNESIUM SULFATE HEPTAHYDRATE 40 MG/ML
4 INJECTION, SOLUTION INTRAVENOUS ONCE
Status: COMPLETED | OUTPATIENT
Start: 2021-01-01 | End: 2021-01-01

## 2021-01-01 RX ORDER — NICARDIPINE HYDROCHLORIDE 2.5 MG/ML
INJECTION INTRAVENOUS
Status: DISCONTINUED | OUTPATIENT
Start: 2021-01-01 | End: 2021-01-01 | Stop reason: HOSPADM

## 2021-01-01 RX ORDER — FLUMAZENIL 0.1 MG/ML
0.2 INJECTION, SOLUTION INTRAVENOUS
Status: DISCONTINUED | OUTPATIENT
Start: 2021-01-01 | End: 2021-01-01 | Stop reason: HOSPADM

## 2021-01-01 RX ORDER — SODIUM CHLORIDE 9 MG/ML
INJECTION, SOLUTION INTRAVENOUS CONTINUOUS
Status: CANCELLED | OUTPATIENT
Start: 2021-01-01

## 2021-01-01 RX ORDER — LIDOCAINE 40 MG/G
CREAM TOPICAL
Status: CANCELLED | OUTPATIENT
Start: 2021-01-01

## 2021-01-01 RX ORDER — VITAMIN B COMPLEX
1 TABLET ORAL DAILY
COMMUNITY

## 2021-01-01 RX ORDER — ARGATROBAN 1 MG/ML
350 INJECTION, SOLUTION INTRAVENOUS
Status: DISCONTINUED | OUTPATIENT
Start: 2021-01-01 | End: 2021-01-01 | Stop reason: HOSPADM

## 2021-01-01 RX ORDER — DOPAMINE HYDROCHLORIDE 160 MG/100ML
2-20 INJECTION, SOLUTION INTRAVENOUS CONTINUOUS PRN
Status: DISCONTINUED | OUTPATIENT
Start: 2021-01-01 | End: 2021-01-01 | Stop reason: HOSPADM

## 2021-01-01 RX ORDER — LOSARTAN POTASSIUM 25 MG/1
25 TABLET ORAL DAILY
Status: DISCONTINUED | OUTPATIENT
Start: 2021-01-01 | End: 2021-01-01 | Stop reason: HOSPADM

## 2021-01-01 RX ORDER — OXYCODONE HYDROCHLORIDE 10 MG/1
10 TABLET ORAL EVERY 4 HOURS PRN
Status: DISCONTINUED | OUTPATIENT
Start: 2021-01-01 | End: 2021-01-01 | Stop reason: HOSPADM

## 2021-01-01 RX ORDER — FUROSEMIDE 10 MG/ML
80 INJECTION INTRAMUSCULAR; INTRAVENOUS ONCE
Status: COMPLETED | OUTPATIENT
Start: 2021-01-01 | End: 2021-01-01

## 2021-01-01 RX ORDER — DILTIAZEM HYDROCHLORIDE 120 MG/1
120 TABLET, FILM COATED ORAL DAILY
Qty: 90 TABLET | Refills: 3 | Status: SHIPPED | OUTPATIENT
Start: 2021-01-01

## 2021-01-01 RX ORDER — MEPERIDINE HYDROCHLORIDE 50 MG/ML
25 INJECTION INTRAMUSCULAR; INTRAVENOUS; SUBCUTANEOUS EVERY 30 MIN PRN
Status: CANCELLED | OUTPATIENT
Start: 2021-01-01

## 2021-01-01 RX ORDER — ASPIRIN 81 MG/1
81 TABLET ORAL DAILY
Status: DISCONTINUED | OUTPATIENT
Start: 2021-01-01 | End: 2021-01-01

## 2021-01-01 RX ORDER — FENTANYL CITRATE 50 UG/ML
50 INJECTION, SOLUTION INTRAMUSCULAR; INTRAVENOUS EVERY 5 MIN PRN
Status: DISCONTINUED | OUTPATIENT
Start: 2021-01-01 | End: 2021-01-01 | Stop reason: HOSPADM

## 2021-01-01 RX ORDER — FLUOCINONIDE TOPICAL SOLUTION USP, 0.05% 0.5 MG/ML
SOLUTION TOPICAL
COMMUNITY
End: 2021-01-01

## 2021-01-01 RX ORDER — ONDANSETRON 2 MG/ML
4 INJECTION INTRAMUSCULAR; INTRAVENOUS ONCE
Status: COMPLETED | OUTPATIENT
Start: 2021-01-01 | End: 2021-01-01

## 2021-01-01 RX ORDER — DIPHENHYDRAMINE HYDROCHLORIDE 50 MG/ML
50 INJECTION INTRAMUSCULAR; INTRAVENOUS
Status: CANCELLED
Start: 2021-01-01

## 2021-01-01 RX ORDER — HYDROMORPHONE HCL IN WATER/PF 6 MG/30 ML
0.2 PATIENT CONTROLLED ANALGESIA SYRINGE INTRAVENOUS EVERY 5 MIN PRN
Status: DISCONTINUED | OUTPATIENT
Start: 2021-01-01 | End: 2021-01-01 | Stop reason: HOSPADM

## 2021-01-01 RX ORDER — OXYCODONE HYDROCHLORIDE 5 MG/1
5 TABLET ORAL EVERY 4 HOURS PRN
Status: DISCONTINUED | OUTPATIENT
Start: 2021-01-01 | End: 2021-01-01 | Stop reason: HOSPADM

## 2021-01-01 RX ORDER — TACROLIMUS 1 MG/1
1 CAPSULE ORAL 2 TIMES DAILY
Status: DISCONTINUED | OUTPATIENT
Start: 2021-01-01 | End: 2021-01-01

## 2021-01-01 RX ORDER — CYCLOBENZAPRINE HCL 10 MG
10 TABLET ORAL EVERY 8 HOURS PRN
Status: DISCONTINUED | OUTPATIENT
Start: 2021-01-01 | End: 2021-01-01 | Stop reason: HOSPADM

## 2021-01-01 RX ORDER — CEPHALEXIN 500 MG/1
500 CAPSULE ORAL EVERY 8 HOURS SCHEDULED
Status: DISCONTINUED | OUTPATIENT
Start: 2021-01-01 | End: 2021-01-01 | Stop reason: HOSPADM

## 2021-01-01 RX ORDER — METOPROLOL TARTRATE 1 MG/ML
5-10 INJECTION, SOLUTION INTRAVENOUS
Status: DISCONTINUED | OUTPATIENT
Start: 2021-01-01 | End: 2021-01-01 | Stop reason: HOSPADM

## 2021-01-01 RX ORDER — NICOTINE POLACRILEX 4 MG
15-30 LOZENGE BUCCAL
Status: DISCONTINUED | OUTPATIENT
Start: 2021-01-01 | End: 2021-01-01

## 2021-01-01 RX ORDER — ACETAMINOPHEN 325 MG/1
650 TABLET ORAL EVERY 4 HOURS PRN
Status: DISCONTINUED | OUTPATIENT
Start: 2021-01-01 | End: 2021-01-01 | Stop reason: HOSPADM

## 2021-01-01 RX ORDER — INSULIN GLARGINE 100 [IU]/ML
INJECTION, SOLUTION SUBCUTANEOUS
Qty: 15 ML | Refills: 3 | Status: SHIPPED | OUTPATIENT
Start: 2021-01-01

## 2021-01-01 RX ORDER — CLOPIDOGREL BISULFATE 75 MG/1
75 TABLET ORAL DAILY
Qty: 94 TABLET | Refills: 3 | Status: SHIPPED | OUTPATIENT
Start: 2021-01-01

## 2021-01-01 RX ORDER — CYCLOBENZAPRINE HCL 5 MG
TABLET ORAL
Qty: 45 TABLET | Refills: 3 | Status: SHIPPED | OUTPATIENT
Start: 2021-01-01

## 2021-01-01 RX ORDER — EPTIFIBATIDE 2 MG/ML
2 INJECTION, SOLUTION INTRAVENOUS CONTINUOUS PRN
Status: DISCONTINUED | OUTPATIENT
Start: 2021-01-01 | End: 2021-01-01 | Stop reason: HOSPADM

## 2021-01-01 RX ORDER — METOPROLOL SUCCINATE 25 MG/1
25 TABLET, EXTENDED RELEASE ORAL DAILY
Status: DISCONTINUED | OUTPATIENT
Start: 2021-01-01 | End: 2021-01-01 | Stop reason: HOSPADM

## 2021-01-01 RX ORDER — ACETAMINOPHEN 500 MG
500 TABLET ORAL DAILY PRN
COMMUNITY

## 2021-01-01 RX ORDER — DEXMEDETOMIDINE HYDROCHLORIDE 4 UG/ML
0.2-0.7 INJECTION, SOLUTION INTRAVENOUS CONTINUOUS
Status: DISCONTINUED | OUTPATIENT
Start: 2021-01-01 | End: 2021-01-01 | Stop reason: HOSPADM

## 2021-01-01 RX ORDER — MAGNESIUM SULFATE HEPTAHYDRATE 40 MG/ML
4 INJECTION, SOLUTION INTRAVENOUS EVERY 4 HOURS
Status: COMPLETED | OUTPATIENT
Start: 2021-01-01 | End: 2021-01-01

## 2021-01-01 RX ORDER — NALOXONE HYDROCHLORIDE 0.4 MG/ML
0.2 INJECTION, SOLUTION INTRAMUSCULAR; INTRAVENOUS; SUBCUTANEOUS
Status: DISCONTINUED | OUTPATIENT
Start: 2021-01-01 | End: 2021-01-01

## 2021-01-01 RX ORDER — DEXTROSE MONOHYDRATE 25 G/50ML
25-50 INJECTION, SOLUTION INTRAVENOUS
Status: DISCONTINUED | OUTPATIENT
Start: 2021-01-01 | End: 2021-01-01 | Stop reason: HOSPADM

## 2021-01-01 RX ORDER — ACETAMINOPHEN 500 MG
500 TABLET ORAL ONCE
Status: COMPLETED | OUTPATIENT
Start: 2021-01-01 | End: 2021-01-01

## 2021-01-01 RX ORDER — ALBUTEROL SULFATE 0.83 MG/ML
2.5 SOLUTION RESPIRATORY (INHALATION)
Status: CANCELLED | OUTPATIENT
Start: 2021-01-01

## 2021-01-01 RX ORDER — SODIUM CHLORIDE 9 MG/ML
INJECTION, SOLUTION INTRAVENOUS CONTINUOUS
Status: ACTIVE | OUTPATIENT
Start: 2021-01-01 | End: 2021-01-01

## 2021-01-01 RX ORDER — POTASSIUM CHLORIDE 750 MG/1
20 TABLET, EXTENDED RELEASE ORAL
Status: DISCONTINUED | OUTPATIENT
Start: 2021-01-01 | End: 2021-01-01 | Stop reason: HOSPADM

## 2021-01-01 RX ORDER — MAGNESIUM CHLORIDE 71.5 G/G
2 TABLET ORAL 2 TIMES DAILY
Qty: 180 TABLET | Refills: 3 | Status: SHIPPED | OUTPATIENT
Start: 2021-01-01 | End: 2021-01-01

## 2021-01-01 RX ORDER — METOPROLOL TARTRATE 100 MG
100 TABLET ORAL 2 TIMES DAILY
Status: DISCONTINUED | OUTPATIENT
Start: 2021-01-01 | End: 2021-01-01 | Stop reason: HOSPADM

## 2021-01-01 RX ORDER — TRAMADOL HYDROCHLORIDE 50 MG/1
50 TABLET ORAL EVERY MORNING
Status: DISCONTINUED | OUTPATIENT
Start: 2021-01-01 | End: 2021-01-01 | Stop reason: HOSPADM

## 2021-01-01 RX ORDER — HYDRALAZINE HYDROCHLORIDE 20 MG/ML
10 INJECTION INTRAMUSCULAR; INTRAVENOUS
Status: DISCONTINUED | OUTPATIENT
Start: 2021-01-01 | End: 2021-01-01 | Stop reason: HOSPADM

## 2021-01-01 RX ORDER — ATORVASTATIN CALCIUM 40 MG/1
40 TABLET, FILM COATED ORAL DAILY
Status: DISCONTINUED | OUTPATIENT
Start: 2021-01-01 | End: 2021-01-01 | Stop reason: HOSPADM

## 2021-01-01 RX ORDER — HALOPERIDOL 5 MG/ML
2 INJECTION INTRAMUSCULAR
Status: DISCONTINUED | OUTPATIENT
Start: 2021-01-01 | End: 2021-01-01 | Stop reason: HOSPADM

## 2021-01-01 RX ORDER — PREDNISONE 20 MG/1
TABLET ORAL
Qty: 14 TABLET | Refills: 0 | Status: SHIPPED | OUTPATIENT
Start: 2021-01-01

## 2021-01-01 RX ORDER — TIOTROPIUM BROMIDE 18 UG/1
CAPSULE ORAL; RESPIRATORY (INHALATION)
Qty: 90 CAPSULE | Refills: 0 | Status: SHIPPED | OUTPATIENT
Start: 2021-01-01

## 2021-01-01 RX ORDER — LANOLIN ALCOHOL/MO/W.PET/CERES
1000 CREAM (GRAM) TOPICAL 2 TIMES DAILY
COMMUNITY

## 2021-01-01 RX ORDER — EPINEPHRINE 1 MG/ML
0.3 INJECTION, SOLUTION INTRAMUSCULAR; SUBCUTANEOUS EVERY 5 MIN PRN
Status: CANCELLED | OUTPATIENT
Start: 2021-01-01

## 2021-01-01 RX ORDER — ALBUTEROL SULFATE 90 UG/1
2 AEROSOL, METERED RESPIRATORY (INHALATION) EVERY 4 HOURS PRN
Status: DISCONTINUED | OUTPATIENT
Start: 2021-01-01 | End: 2021-01-01 | Stop reason: HOSPADM

## 2021-01-01 RX ORDER — MANNITOL 20 G/100ML
100 INJECTION, SOLUTION INTRAVENOUS CONTINUOUS
Status: DISCONTINUED | OUTPATIENT
Start: 2021-01-01 | End: 2021-01-01 | Stop reason: HOSPADM

## 2021-01-01 RX ORDER — AZITHROMYCIN 250 MG/1
250 TABLET, FILM COATED ORAL DAILY
Status: COMPLETED | OUTPATIENT
Start: 2021-01-01 | End: 2021-01-01

## 2021-01-01 RX ORDER — METHYLPREDNISOLONE SODIUM SUCCINATE 125 MG/2ML
125 INJECTION, POWDER, LYOPHILIZED, FOR SOLUTION INTRAMUSCULAR; INTRAVENOUS
Status: CANCELLED
Start: 2021-01-01

## 2021-01-01 RX ORDER — FENTANYL CITRATE 50 UG/ML
25-50 INJECTION, SOLUTION INTRAMUSCULAR; INTRAVENOUS
Status: ACTIVE | OUTPATIENT
Start: 2021-01-01 | End: 2021-01-01

## 2021-01-01 RX ORDER — ASPIRIN 81 MG/1
81 TABLET ORAL DAILY
Status: CANCELLED | OUTPATIENT
Start: 2021-01-01

## 2021-01-01 RX ORDER — ATORVASTATIN CALCIUM 20 MG/1
40 TABLET, FILM COATED ORAL DAILY
Status: DISCONTINUED | OUTPATIENT
Start: 2021-01-01 | End: 2021-01-01 | Stop reason: HOSPADM

## 2021-01-01 RX ORDER — FENTANYL CITRATE 50 UG/ML
INJECTION, SOLUTION INTRAMUSCULAR; INTRAVENOUS PRN
Status: DISCONTINUED | OUTPATIENT
Start: 2021-01-01 | End: 2021-01-01

## 2021-01-01 RX ORDER — TAMSULOSIN HYDROCHLORIDE 0.4 MG/1
CAPSULE ORAL
Qty: 90 CAPSULE | Refills: 3 | Status: SHIPPED | OUTPATIENT
Start: 2021-01-01

## 2021-01-01 RX ORDER — METFORMIN HCL 500 MG
1500 TABLET, EXTENDED RELEASE 24 HR ORAL DAILY
Status: DISCONTINUED | OUTPATIENT
Start: 2021-01-01 | End: 2021-01-01

## 2021-01-01 RX ORDER — MAGNESIUM CHLORIDE 71.5 G/G
2 TABLET ORAL 4 TIMES DAILY
Qty: 180 TABLET | Refills: 3 | Status: SHIPPED | OUTPATIENT
Start: 2021-01-01 | End: 2021-01-01

## 2021-01-01 RX ORDER — ACETAMINOPHEN 325 MG/1
975 TABLET ORAL ONCE
Status: DISCONTINUED | OUTPATIENT
Start: 2021-01-01 | End: 2021-01-01 | Stop reason: HOSPADM

## 2021-01-01 RX ORDER — NITROGLYCERIN 5 MG/ML
VIAL (ML) INTRAVENOUS
Status: DISCONTINUED | OUTPATIENT
Start: 2021-01-01 | End: 2021-01-01 | Stop reason: HOSPADM

## 2021-01-01 RX ORDER — PANTOPRAZOLE SODIUM 40 MG/1
40 TABLET, DELAYED RELEASE ORAL
Status: DISCONTINUED | OUTPATIENT
Start: 2021-01-01 | End: 2021-01-01 | Stop reason: HOSPADM

## 2021-01-01 RX ORDER — INSULIN GLARGINE 100 [IU]/ML
5 INJECTION, SOLUTION SUBCUTANEOUS AT BEDTIME
Status: DISCONTINUED | OUTPATIENT
Start: 2021-01-01 | End: 2021-01-01 | Stop reason: HOSPADM

## 2021-01-01 RX ORDER — ASCORBIC ACID 125 MG
4 TABLET,CHEWABLE ORAL 2 TIMES DAILY
Qty: 320 CAPSULE | Refills: 11 | Status: SHIPPED | OUTPATIENT
Start: 2021-01-01

## 2021-01-01 RX ORDER — TACROLIMUS 1 MG/1
1 CAPSULE ORAL EVERY 12 HOURS
Status: DISCONTINUED | OUTPATIENT
Start: 2021-01-01 | End: 2021-01-01 | Stop reason: HOSPADM

## 2021-01-01 RX ORDER — MAGNESIUM CHLORIDE 71.5 G/G
TABLET ORAL
Qty: 180 TABLET | Refills: 3 | Status: SHIPPED | OUTPATIENT
Start: 2021-01-01 | End: 2021-01-01

## 2021-01-01 RX ORDER — TAMSULOSIN HYDROCHLORIDE 0.4 MG/1
0.4 CAPSULE ORAL DAILY
Status: DISCONTINUED | OUTPATIENT
Start: 2021-01-01 | End: 2021-01-01 | Stop reason: HOSPADM

## 2021-01-01 RX ORDER — AZITHROMYCIN 250 MG/1
TABLET, FILM COATED ORAL
Qty: 6 TABLET | Refills: 0 | Status: SHIPPED | OUTPATIENT
Start: 2021-01-01

## 2021-01-01 RX ORDER — HEPARIN SODIUM 1000 [USP'U]/ML
INJECTION, SOLUTION INTRAVENOUS; SUBCUTANEOUS
Status: DISCONTINUED | OUTPATIENT
Start: 2021-01-01 | End: 2021-01-01 | Stop reason: HOSPADM

## 2021-01-01 RX ORDER — TACROLIMUS 1 MG/1
1 CAPSULE ORAL ONCE
Status: COMPLETED | OUTPATIENT
Start: 2021-01-01 | End: 2021-01-01

## 2021-01-01 RX ORDER — MAGNESIUM SULFATE HEPTAHYDRATE 40 MG/ML
2 INJECTION, SOLUTION INTRAVENOUS
Status: CANCELLED
Start: 2021-01-01

## 2021-01-01 RX ORDER — TIROFIBAN HYDROCHLORIDE 50 UG/ML
0.15 INJECTION INTRAVENOUS CONTINUOUS PRN
Status: DISCONTINUED | OUTPATIENT
Start: 2021-01-01 | End: 2021-01-01 | Stop reason: HOSPADM

## 2021-01-01 RX ORDER — TIOTROPIUM BROMIDE 18 UG/1
CAPSULE ORAL; RESPIRATORY (INHALATION)
Qty: 90 CAPSULE | Refills: 0 | Status: SHIPPED | OUTPATIENT
Start: 2021-01-01 | End: 2021-01-01

## 2021-01-01 RX ORDER — TACROLIMUS 1 MG/1
1 CAPSULE ORAL 2 TIMES DAILY
Status: DISCONTINUED | OUTPATIENT
Start: 2021-01-01 | End: 2021-01-01 | Stop reason: HOSPADM

## 2021-01-01 RX ORDER — MAGNESIUM SULFATE HEPTAHYDRATE 40 MG/ML
4 INJECTION, SOLUTION INTRAVENOUS
Status: DISCONTINUED | OUTPATIENT
Start: 2021-01-01 | End: 2021-01-01 | Stop reason: HOSPADM

## 2021-01-01 RX ORDER — ONDANSETRON 2 MG/ML
4 INJECTION INTRAMUSCULAR; INTRAVENOUS EVERY 6 HOURS PRN
Status: DISCONTINUED | OUTPATIENT
Start: 2021-01-01 | End: 2021-01-01

## 2021-01-01 RX ORDER — FUROSEMIDE 10 MG/ML
20 INJECTION INTRAMUSCULAR; INTRAVENOUS ONCE
Status: COMPLETED | OUTPATIENT
Start: 2021-01-01 | End: 2021-01-01

## 2021-01-01 RX ORDER — IPRATROPIUM BROMIDE AND ALBUTEROL SULFATE 2.5; .5 MG/3ML; MG/3ML
3 SOLUTION RESPIRATORY (INHALATION)
Status: DISCONTINUED | OUTPATIENT
Start: 2021-01-01 | End: 2021-01-01 | Stop reason: HOSPADM

## 2021-01-01 RX ORDER — PREDNISONE 20 MG/1
40 TABLET ORAL DAILY
Status: COMPLETED | OUTPATIENT
Start: 2021-01-01 | End: 2021-01-01

## 2021-01-01 RX ORDER — MAGNESIUM OXIDE 400 MG/1
TABLET ORAL
Qty: 120 TABLET | Refills: 11 | Status: SHIPPED | OUTPATIENT
Start: 2021-01-01 | End: 2021-01-01

## 2021-01-01 RX ORDER — PREDNISONE 20 MG/1
40 TABLET ORAL DAILY
Qty: 10 TABLET | Refills: 0 | Status: SHIPPED | OUTPATIENT
Start: 2021-01-01 | End: 2021-01-01

## 2021-01-01 RX ORDER — FENTANYL CITRATE 50 UG/ML
INJECTION, SOLUTION INTRAMUSCULAR; INTRAVENOUS
Status: DISCONTINUED | OUTPATIENT
Start: 2021-01-01 | End: 2021-01-01 | Stop reason: HOSPADM

## 2021-01-01 RX ORDER — HEPARIN SODIUM 1000 [USP'U]/ML
INJECTION, SOLUTION INTRAVENOUS; SUBCUTANEOUS PRN
Status: DISCONTINUED | OUTPATIENT
Start: 2021-01-01 | End: 2021-01-01

## 2021-01-01 RX ORDER — CEFAZOLIN SODIUM 2 G/100ML
2 INJECTION, SOLUTION INTRAVENOUS
Status: COMPLETED | OUTPATIENT
Start: 2021-01-01 | End: 2021-01-01

## 2021-01-01 RX ORDER — LANOLIN ALCOHOL/MO/W.PET/CERES
1000 CREAM (GRAM) TOPICAL 2 TIMES DAILY
Status: DISCONTINUED | OUTPATIENT
Start: 2021-01-01 | End: 2021-01-01 | Stop reason: HOSPADM

## 2021-01-01 RX ORDER — NICOTINE POLACRILEX 4 MG
15-30 LOZENGE BUCCAL
Status: DISCONTINUED | OUTPATIENT
Start: 2021-01-01 | End: 2021-01-01 | Stop reason: HOSPADM

## 2021-01-01 RX ORDER — ACETAMINOPHEN 500 MG
500-1000 TABLET ORAL EVERY 6 HOURS PRN
Status: DISCONTINUED | OUTPATIENT
Start: 2021-01-01 | End: 2021-01-01 | Stop reason: HOSPADM

## 2021-01-01 RX ORDER — METOPROLOL TARTRATE 100 MG
1 TABLET ORAL DAILY
COMMUNITY
Start: 2021-01-01 | End: 2021-01-01

## 2021-01-01 RX ORDER — POTASSIUM CHLORIDE 750 MG/1
40 TABLET, EXTENDED RELEASE ORAL
Status: DISCONTINUED | OUTPATIENT
Start: 2021-01-01 | End: 2021-01-01 | Stop reason: HOSPADM

## 2021-01-01 RX ORDER — CEPHALEXIN 500 MG/1
500 CAPSULE ORAL EVERY 8 HOURS
Qty: 20 CAPSULE | Refills: 0 | Status: SHIPPED | OUTPATIENT
Start: 2021-01-01 | End: 2021-01-01

## 2021-01-01 RX ORDER — LEVOTHYROXINE SODIUM 150 UG/1
150 TABLET ORAL DAILY
Qty: 90 TABLET | Refills: 3 | Status: SHIPPED | OUTPATIENT
Start: 2021-01-01

## 2021-01-01 RX ORDER — EPINEPHRINE 1 MG/ML
0.3 INJECTION, SOLUTION, CONCENTRATE INTRAVENOUS EVERY 5 MIN PRN
Status: CANCELLED | OUTPATIENT
Start: 2021-01-01

## 2021-01-01 RX ORDER — ASPIRIN 81 MG/1
81 TABLET ORAL AT BEDTIME
COMMUNITY

## 2021-01-01 RX ORDER — IOPAMIDOL 755 MG/ML
120 INJECTION, SOLUTION INTRAVASCULAR ONCE
Status: COMPLETED | OUTPATIENT
Start: 2021-01-01 | End: 2021-01-01

## 2021-01-01 RX ORDER — DESONIDE 0.5 MG/G
OINTMENT TOPICAL
Qty: 60 G | Refills: 2 | Status: SHIPPED | OUTPATIENT
Start: 2021-01-01

## 2021-01-01 RX ORDER — ONDANSETRON 2 MG/ML
4 INJECTION INTRAMUSCULAR; INTRAVENOUS EVERY 6 HOURS PRN
Status: DISCONTINUED | OUTPATIENT
Start: 2021-01-01 | End: 2021-01-01 | Stop reason: HOSPADM

## 2021-01-01 RX ORDER — METOPROLOL SUCCINATE 50 MG/1
50 TABLET, EXTENDED RELEASE ORAL DAILY
Qty: 90 TABLET | Refills: 3 | Status: SHIPPED | OUTPATIENT
Start: 2021-01-01

## 2021-01-01 RX ORDER — SODIUM CHLORIDE, SODIUM GLUCONATE, SODIUM ACETATE, POTASSIUM CHLORIDE AND MAGNESIUM CHLORIDE 526; 502; 368; 37; 30 MG/100ML; MG/100ML; MG/100ML; MG/100ML; MG/100ML
INJECTION, SOLUTION INTRAVENOUS CONTINUOUS PRN
Status: DISCONTINUED | OUTPATIENT
Start: 2021-01-01 | End: 2021-01-01

## 2021-01-01 RX ADMIN — PANTOPRAZOLE SODIUM 40 MG: 40 TABLET, DELAYED RELEASE ORAL at 08:02

## 2021-01-01 RX ADMIN — FENTANYL CITRATE 50 MCG: 50 INJECTION, SOLUTION INTRAMUSCULAR; INTRAVENOUS at 02:22

## 2021-01-01 RX ADMIN — TACROLIMUS 1 MG: 1 CAPSULE ORAL at 11:38

## 2021-01-01 RX ADMIN — OMEPRAZOLE 20 MG: 20 CAPSULE, DELAYED RELEASE ORAL at 08:35

## 2021-01-01 RX ADMIN — SODIUM CHLORIDE: 9 INJECTION, SOLUTION INTRAVENOUS at 09:36

## 2021-01-01 RX ADMIN — OXYCODONE HYDROCHLORIDE 5 MG: 5 TABLET ORAL at 14:26

## 2021-01-01 RX ADMIN — FENTANYL CITRATE 50 MCG: 50 INJECTION, SOLUTION INTRAMUSCULAR; INTRAVENOUS at 15:30

## 2021-01-01 RX ADMIN — TAMSULOSIN HYDROCHLORIDE 0.4 MG: 0.4 CAPSULE ORAL at 08:36

## 2021-01-01 RX ADMIN — FENTANYL CITRATE 50 MCG: 50 INJECTION, SOLUTION INTRAMUSCULAR; INTRAVENOUS at 16:20

## 2021-01-01 RX ADMIN — FUROSEMIDE 20 MG: 10 INJECTION, SOLUTION INTRAVENOUS at 08:37

## 2021-01-01 RX ADMIN — LORAZEPAM 1 MG: 2 INJECTION, SOLUTION INTRAMUSCULAR; INTRAVENOUS at 07:06

## 2021-01-01 RX ADMIN — PHENYLEPHRINE HYDROCHLORIDE 150 MCG: 10 INJECTION INTRAVENOUS at 18:23

## 2021-01-01 RX ADMIN — ASPIRIN 81 MG: 81 TABLET, DELAYED RELEASE ORAL at 09:28

## 2021-01-01 RX ADMIN — INSULIN HUMAN 5 UNITS: 100 INJECTION, SOLUTION PARENTERAL at 02:24

## 2021-01-01 RX ADMIN — ASPIRIN 325 MG ORAL TABLET 325 MG: 325 PILL ORAL at 12:04

## 2021-01-01 RX ADMIN — LOSARTAN POTASSIUM 25 MG: 25 TABLET, FILM COATED ORAL at 08:36

## 2021-01-01 RX ADMIN — MAGNESIUM SULFATE HEPTAHYDRATE 2 G: 40 INJECTION, SOLUTION INTRAVENOUS at 02:23

## 2021-01-01 RX ADMIN — LOSARTAN POTASSIUM 25 MG: 25 TABLET ORAL at 09:28

## 2021-01-01 RX ADMIN — SODIUM CHLORIDE: 9 INJECTION, SOLUTION INTRAVENOUS at 13:41

## 2021-01-01 RX ADMIN — OXYCODONE HYDROCHLORIDE AND ACETAMINOPHEN 1 TABLET: 5; 325 TABLET ORAL at 08:35

## 2021-01-01 RX ADMIN — KIT FOR THE PREPARATION OF TECHNETIUM TC 99M PENTETATE 30.5 MILLICURIE: 20 INJECTION, POWDER, LYOPHILIZED, FOR SOLUTION INTRAVENOUS; RESPIRATORY (INHALATION) at 11:15

## 2021-01-01 RX ADMIN — ENOXAPARIN SODIUM 40 MG: 100 INJECTION SUBCUTANEOUS at 09:32

## 2021-01-01 RX ADMIN — AZITHROMYCIN MONOHYDRATE 250 MG: 250 TABLET ORAL at 09:22

## 2021-01-01 RX ADMIN — PREDNISONE 40 MG: 20 TABLET ORAL at 09:22

## 2021-01-01 RX ADMIN — FENTANYL CITRATE 50 MCG: 50 INJECTION, SOLUTION INTRAMUSCULAR; INTRAVENOUS at 17:18

## 2021-01-01 RX ADMIN — METOPROLOL SUCCINATE 25 MG: 25 TABLET, EXTENDED RELEASE ORAL at 08:36

## 2021-01-01 RX ADMIN — TAMSULOSIN HYDROCHLORIDE 0.4 MG: 0.4 CAPSULE ORAL at 09:22

## 2021-01-01 RX ADMIN — TAMSULOSIN HYDROCHLORIDE 0.4 MG: 0.4 CAPSULE ORAL at 09:28

## 2021-01-01 RX ADMIN — Medication 800 MG: at 22:08

## 2021-01-01 RX ADMIN — SODIUM CHLORIDE 500 ML: 9 INJECTION, SOLUTION INTRAVENOUS at 06:27

## 2021-01-01 RX ADMIN — MAGNESIUM SULFATE HEPTAHYDRATE 4 G: 40 INJECTION, SOLUTION INTRAVENOUS at 17:14

## 2021-01-01 RX ADMIN — IPRATROPIUM BROMIDE AND ALBUTEROL SULFATE 3 ML: .5; 3 SOLUTION RESPIRATORY (INHALATION) at 11:05

## 2021-01-01 RX ADMIN — IOPAMIDOL 100 ML: 755 INJECTION, SOLUTION INTRAVENOUS at 22:57

## 2021-01-01 RX ADMIN — MAGNESIUM SULFATE HEPTAHYDRATE 4 G: 40 INJECTION, SOLUTION INTRAVENOUS at 10:50

## 2021-01-01 RX ADMIN — AMLODIPINE BESYLATE 5 MG: 5 TABLET ORAL at 09:21

## 2021-01-01 RX ADMIN — INSULIN ASPART 3 UNITS: 100 INJECTION, SOLUTION INTRAVENOUS; SUBCUTANEOUS at 09:23

## 2021-01-01 RX ADMIN — LEVOTHYROXINE SODIUM 175 MCG: 0.15 TABLET ORAL at 09:27

## 2021-01-01 RX ADMIN — CYANOCOBALAMIN TAB 1000 MCG 1000 MCG: 1000 TAB at 08:36

## 2021-01-01 RX ADMIN — SODIUM CHLORIDE: 9 INJECTION, SOLUTION INTRAVENOUS at 07:40

## 2021-01-01 RX ADMIN — Medication 2 G: at 19:52

## 2021-01-01 RX ADMIN — SODIUM CHLORIDE 500 ML: 9 INJECTION, SOLUTION INTRAVENOUS at 00:38

## 2021-01-01 RX ADMIN — TACROLIMUS 1 MG: 1 CAPSULE ORAL at 01:42

## 2021-01-01 RX ADMIN — ASPIRIN 81 MG: 81 TABLET, DELAYED RELEASE ORAL at 09:22

## 2021-01-01 RX ADMIN — CEPHALEXIN 500 MG: 500 CAPSULE ORAL at 13:02

## 2021-01-01 RX ADMIN — PHENYLEPHRINE HYDROCHLORIDE 150 MCG: 10 INJECTION INTRAVENOUS at 17:15

## 2021-01-01 RX ADMIN — PHENYLEPHRINE HYDROCHLORIDE 200 MCG: 10 INJECTION INTRAVENOUS at 18:32

## 2021-01-01 RX ADMIN — LIDOCAINE HYDROCHLORIDE 100 MG: 20 INJECTION, SOLUTION INFILTRATION; PERINEURAL at 18:13

## 2021-01-01 RX ADMIN — IPRATROPIUM BROMIDE AND ALBUTEROL SULFATE 3 ML: .5; 3 SOLUTION RESPIRATORY (INHALATION) at 07:35

## 2021-01-01 RX ADMIN — PERFLUTREN 1 ML: 6.52 INJECTION, SUSPENSION INTRAVENOUS at 12:00

## 2021-01-01 RX ADMIN — PROPOFOL 100 MG: 10 INJECTION, EMULSION INTRAVENOUS at 08:16

## 2021-01-01 RX ADMIN — HYDROMORPHONE HYDROCHLORIDE 0.5 MG: 1 INJECTION, SOLUTION INTRAMUSCULAR; INTRAVENOUS; SUBCUTANEOUS at 20:14

## 2021-01-01 RX ADMIN — TAZOBACTAM SODIUM AND PIPERACILLIN SODIUM 4.5 G: 500; 4 INJECTION, SOLUTION INTRAVENOUS at 05:28

## 2021-01-01 RX ADMIN — MAGNESIUM SULFATE HEPTAHYDRATE 4 G: 4 INJECTION, SOLUTION INTRAVENOUS at 13:24

## 2021-01-01 RX ADMIN — OXYCODONE HYDROCHLORIDE AND ACETAMINOPHEN 1 TABLET: 5; 325 TABLET ORAL at 04:11

## 2021-01-01 RX ADMIN — OXYCODONE HYDROCHLORIDE AND ACETAMINOPHEN 1 TABLET: 5; 325 TABLET ORAL at 00:05

## 2021-01-01 RX ADMIN — ACETAMINOPHEN 650 MG: 325 TABLET, FILM COATED ORAL at 03:46

## 2021-01-01 RX ADMIN — IOPAMIDOL 100 ML: 755 INJECTION, SOLUTION INTRAVENOUS at 07:36

## 2021-01-01 RX ADMIN — TICAGRELOR 90 MG: 90 TABLET ORAL at 08:37

## 2021-01-01 RX ADMIN — CYANOCOBALAMIN TAB 500 MCG 1000 MCG: 500 TAB at 09:22

## 2021-01-01 RX ADMIN — LIDOCAINE: 40 CREAM TOPICAL at 12:03

## 2021-01-01 RX ADMIN — HEPARIN SODIUM 13000 ML: 1000 INJECTION, SOLUTION INTRAVENOUS; SUBCUTANEOUS at 16:22

## 2021-01-01 RX ADMIN — IOPAMIDOL 120 ML: 755 INJECTION, SOLUTION INTRAVENOUS at 10:10

## 2021-01-01 RX ADMIN — PERFLUTREN 2 ML: 6.52 INJECTION, SUSPENSION INTRAVENOUS at 12:47

## 2021-01-01 RX ADMIN — PHENYLEPHRINE HYDROCHLORIDE 200 MCG: 10 INJECTION INTRAVENOUS at 18:38

## 2021-01-01 RX ADMIN — ATORVASTATIN CALCIUM 40 MG: 20 TABLET, FILM COATED ORAL at 08:36

## 2021-01-01 RX ADMIN — ACETAMINOPHEN 500 MG: 500 TABLET, FILM COATED ORAL at 22:52

## 2021-01-01 RX ADMIN — LORAZEPAM 1 MG: 2 INJECTION, SOLUTION INTRAMUSCULAR; INTRAVENOUS at 05:15

## 2021-01-01 RX ADMIN — AMLODIPINE BESYLATE 5 MG: 5 TABLET ORAL at 09:28

## 2021-01-01 RX ADMIN — PHENYLEPHRINE HYDROCHLORIDE 200 MCG: 10 INJECTION INTRAVENOUS at 19:11

## 2021-01-01 RX ADMIN — ROCURONIUM BROMIDE 5 MG: 10 INJECTION INTRAVENOUS at 08:16

## 2021-01-01 RX ADMIN — MAGNESIUM SULFATE HEPTAHYDRATE 4 G: 40 INJECTION, SOLUTION INTRAVENOUS at 14:49

## 2021-01-01 RX ADMIN — SODIUM CHLORIDE 250 ML: 9 INJECTION, SOLUTION INTRAVENOUS at 13:22

## 2021-01-01 RX ADMIN — MAGNESIUM SULFATE IN WATER 2 G: 40 INJECTION, SOLUTION INTRAVENOUS at 15:48

## 2021-01-01 RX ADMIN — SODIUM CHLORIDE, POTASSIUM CHLORIDE, SODIUM LACTATE AND CALCIUM CHLORIDE: 600; 310; 30; 20 INJECTION, SOLUTION INTRAVENOUS at 15:21

## 2021-01-01 RX ADMIN — TICAGRELOR 90 MG: 90 TABLET ORAL at 00:06

## 2021-01-01 RX ADMIN — ONDANSETRON 4 MG: 2 INJECTION INTRAMUSCULAR; INTRAVENOUS at 18:25

## 2021-01-01 RX ADMIN — FENTANYL CITRATE 50 MCG: 50 INJECTION, SOLUTION INTRAMUSCULAR; INTRAVENOUS at 23:59

## 2021-01-01 RX ADMIN — PROTAMINE SULFATE 100 MG: 10 INJECTION, SOLUTION INTRAVENOUS at 17:10

## 2021-01-01 RX ADMIN — PROPOFOL 5 MCG/KG/MIN: 10 INJECTION, EMULSION INTRAVENOUS at 08:20

## 2021-01-01 RX ADMIN — CEPHALEXIN 500 MG: 500 CAPSULE ORAL at 06:40

## 2021-01-01 RX ADMIN — VANCOMYCIN HYDROCHLORIDE 1750 MG: 10 INJECTION, POWDER, LYOPHILIZED, FOR SOLUTION INTRAVENOUS at 06:21

## 2021-01-01 RX ADMIN — INSULIN ASPART 1 UNITS: 100 INJECTION, SOLUTION INTRAVENOUS; SUBCUTANEOUS at 12:55

## 2021-01-01 RX ADMIN — LOSARTAN POTASSIUM 25 MG: 25 TABLET ORAL at 09:22

## 2021-01-01 RX ADMIN — IOPAMIDOL 100 ML: 755 INJECTION, SOLUTION INTRAVENOUS at 02:10

## 2021-01-01 RX ADMIN — INSULIN ASPART 1 UNITS: 100 INJECTION, SOLUTION INTRAVENOUS; SUBCUTANEOUS at 08:05

## 2021-01-01 RX ADMIN — TACROLIMUS 1 MG: 1 CAPSULE ORAL at 22:09

## 2021-01-01 RX ADMIN — DESMOPRESSIN ACETATE 20 MCG: 4 SOLUTION INTRAVENOUS at 08:37

## 2021-01-01 RX ADMIN — MAGNESIUM SULFATE HEPTAHYDRATE 4 G: 40 INJECTION, SOLUTION INTRAVENOUS at 14:08

## 2021-01-01 RX ADMIN — CYANOCOBALAMIN TAB 500 MCG 1000 MCG: 500 TAB at 09:28

## 2021-01-01 RX ADMIN — AZITHROMYCIN MONOHYDRATE 250 MG: 250 TABLET ORAL at 09:27

## 2021-01-01 RX ADMIN — KIT FOR THE PREPARATION OF TECHNETIUM TC 99M ALBUMIN AGGREGATED 5.13 MILLICURIE: 2.5 INJECTION, POWDER, FOR SOLUTION INTRAVENOUS at 11:45

## 2021-01-01 RX ADMIN — SODIUM CHLORIDE 250 ML: 9 INJECTION, SOLUTION INTRAVENOUS at 10:50

## 2021-01-01 RX ADMIN — Medication 12 MCG: at 20:38

## 2021-01-01 RX ADMIN — PANTOPRAZOLE SODIUM 40 MG: 40 TABLET, DELAYED RELEASE ORAL at 09:22

## 2021-01-01 RX ADMIN — SUGAMMADEX 200 MG: 100 INJECTION, SOLUTION INTRAVENOUS at 20:08

## 2021-01-01 RX ADMIN — MAGNESIUM SULFATE HEPTAHYDRATE 2 G: 40 INJECTION, SOLUTION INTRAVENOUS at 23:29

## 2021-01-01 RX ADMIN — IPRATROPIUM BROMIDE AND ALBUTEROL SULFATE 3 ML: .5; 3 SOLUTION RESPIRATORY (INHALATION) at 18:45

## 2021-01-01 RX ADMIN — Medication 12 MCG: at 20:19

## 2021-01-01 RX ADMIN — PREDNISONE 40 MG: 20 TABLET ORAL at 09:28

## 2021-01-01 RX ADMIN — MAGNESIUM SULFATE HEPTAHYDRATE 2 G: 40 INJECTION, SOLUTION INTRAVENOUS at 13:41

## 2021-01-01 RX ADMIN — LEVOTHYROXINE SODIUM 175 MCG: 0.15 TABLET ORAL at 09:22

## 2021-01-01 RX ADMIN — SODIUM CHLORIDE 500 ML: 9 INJECTION, SOLUTION INTRAVENOUS at 22:27

## 2021-01-01 RX ADMIN — ONDANSETRON 4 MG: 2 INJECTION INTRAMUSCULAR; INTRAVENOUS at 03:14

## 2021-01-01 RX ADMIN — SUCCINYLCHOLINE CHLORIDE 120 MG: 20 INJECTION, SOLUTION INTRAMUSCULAR; INTRAVENOUS at 08:16

## 2021-01-01 RX ADMIN — ASPIRIN 81 MG: 81 TABLET, DELAYED RELEASE ORAL at 08:36

## 2021-01-01 RX ADMIN — TACROLIMUS 1 MG: 1 CAPSULE ORAL at 09:29

## 2021-01-01 RX ADMIN — TRAMADOL HYDROCHLORIDE 50 MG: 50 TABLET, FILM COATED ORAL at 08:02

## 2021-01-01 RX ADMIN — Medication 800 MG: at 09:22

## 2021-01-01 RX ADMIN — SODIUM CHLORIDE 500 ML: 9 INJECTION, SOLUTION INTRAVENOUS at 02:06

## 2021-01-01 RX ADMIN — PHENYLEPHRINE HYDROCHLORIDE 0.5 MCG/KG/MIN: 10 INJECTION INTRAVENOUS at 18:28

## 2021-01-01 RX ADMIN — TACROLIMUS 1 MG: 1 CAPSULE ORAL at 10:34

## 2021-01-01 RX ADMIN — INSULIN GLARGINE 5 UNITS: 100 INJECTION, SOLUTION SUBCUTANEOUS at 22:08

## 2021-01-01 RX ADMIN — AMLODIPINE BESYLATE 5 MG: 5 TABLET ORAL at 08:35

## 2021-01-01 RX ADMIN — ONDANSETRON 4 MG: 2 INJECTION INTRAMUSCULAR; INTRAVENOUS at 23:55

## 2021-01-01 RX ADMIN — MANNITOL 100 G: 20 INJECTION, SOLUTION INTRAVENOUS at 08:20

## 2021-01-01 RX ADMIN — LEVOTHYROXINE SODIUM 175 MCG: 0.05 TABLET ORAL at 08:35

## 2021-01-01 RX ADMIN — METOPROLOL TARTRATE 100 MG: 100 TABLET, FILM COATED ORAL at 22:08

## 2021-01-01 RX ADMIN — IPRATROPIUM BROMIDE AND ALBUTEROL SULFATE 3 ML: .5; 3 SOLUTION RESPIRATORY (INHALATION) at 12:04

## 2021-01-01 RX ADMIN — SODIUM CHLORIDE, SODIUM GLUCONATE, SODIUM ACETATE, POTASSIUM CHLORIDE AND MAGNESIUM CHLORIDE: 526; 502; 368; 37; 30 INJECTION, SOLUTION INTRAVENOUS at 18:38

## 2021-01-01 RX ADMIN — INSULIN ASPART 5 UNITS: 100 INJECTION, SOLUTION INTRAVENOUS; SUBCUTANEOUS at 17:24

## 2021-01-01 RX ADMIN — CEPHALEXIN 500 MG: 500 CAPSULE ORAL at 01:42

## 2021-01-01 RX ADMIN — INSULIN HUMAN 5 UNITS: 100 INJECTION, SOLUTION PARENTERAL at 01:52

## 2021-01-01 RX ADMIN — Medication 800 MG: at 09:28

## 2021-01-01 RX ADMIN — ENOXAPARIN SODIUM 40 MG: 100 INJECTION SUBCUTANEOUS at 09:23

## 2021-01-01 RX ADMIN — ROCURONIUM BROMIDE 100 MG: 10 INJECTION INTRAVENOUS at 18:13

## 2021-01-01 RX ADMIN — TRAMADOL HYDROCHLORIDE 50 MG: 50 TABLET, FILM COATED ORAL at 09:22

## 2021-01-01 RX ADMIN — SODIUM CHLORIDE: 9 INJECTION, SOLUTION INTRAVENOUS at 12:03

## 2021-01-01 RX ADMIN — METOPROLOL TARTRATE 100 MG: 100 TABLET, FILM COATED ORAL at 09:22

## 2021-01-01 RX ADMIN — METOPROLOL TARTRATE 100 MG: 100 TABLET, FILM COATED ORAL at 09:27

## 2021-01-01 RX ADMIN — PROPOFOL 150 MG: 10 INJECTION, EMULSION INTRAVENOUS at 18:13

## 2021-01-01 RX ADMIN — DEXMEDETOMIDINE HYDROCHLORIDE 2 MCG/KG/HR: 100 INJECTION, SOLUTION INTRAVENOUS at 15:30

## 2021-01-01 RX ADMIN — FENTANYL CITRATE 50 MCG: 50 INJECTION, SOLUTION INTRAMUSCULAR; INTRAVENOUS at 17:33

## 2021-01-01 RX ADMIN — OXYCODONE HYDROCHLORIDE AND ACETAMINOPHEN 1 TABLET: 5; 325 TABLET ORAL at 13:02

## 2021-01-01 RX ADMIN — IPRATROPIUM BROMIDE AND ALBUTEROL SULFATE 3 ML: .5; 3 SOLUTION RESPIRATORY (INHALATION) at 15:47

## 2021-01-01 RX ADMIN — FUROSEMIDE 80 MG: 10 INJECTION, SOLUTION INTRAMUSCULAR; INTRAVENOUS at 01:53

## 2021-01-01 RX ADMIN — Medication 2 G: at 15:49

## 2021-01-01 ASSESSMENT — PAIN SCALES - GENERAL
PAINLEVEL: NO PAIN (0)
PAINLEVEL: MILD PAIN (2)
PAINLEVEL: NO PAIN (0)

## 2021-01-01 ASSESSMENT — MIFFLIN-ST. JEOR
SCORE: 1613.63
SCORE: 1639.9
SCORE: 1603.61
SCORE: 1578.66
SCORE: 1603.61
SCORE: 1622.66
SCORE: 1550.37
SCORE: 1592.75
SCORE: 1644.75
SCORE: 1580.93
SCORE: 1603.61
SCORE: 1650
SCORE: 1620.62

## 2021-01-01 ASSESSMENT — ANXIETY QUESTIONNAIRES
3. WORRYING TOO MUCH ABOUT DIFFERENT THINGS: NOT AT ALL
GAD7 TOTAL SCORE: 1
GAD7 TOTAL SCORE: 1
1. FEELING NERVOUS, ANXIOUS, OR ON EDGE: NOT AT ALL
7. FEELING AFRAID AS IF SOMETHING AWFUL MIGHT HAPPEN: NOT AT ALL
2. NOT BEING ABLE TO STOP OR CONTROL WORRYING: NOT AT ALL
5. BEING SO RESTLESS THAT IT IS HARD TO SIT STILL: NOT AT ALL
IF YOU CHECKED OFF ANY PROBLEMS ON THIS QUESTIONNAIRE, HOW DIFFICULT HAVE THESE PROBLEMS MADE IT FOR YOU TO DO YOUR WORK, TAKE CARE OF THINGS AT HOME, OR GET ALONG WITH OTHER PEOPLE: NOT DIFFICULT AT ALL
6. BECOMING EASILY ANNOYED OR IRRITABLE: SEVERAL DAYS

## 2021-01-01 ASSESSMENT — ENCOUNTER SYMPTOMS
HYPOTENSION: 0
CONSTIPATION: 0
ORTHOPNEA: 0
HEADACHES: 0
BACK PAIN: 1
WEAKNESS: 1
DIZZINESS: 1
HALLUCINATIONS: 0
CHILLS: 0
DYSURIA: 0
SYNCOPE: 0
POSTURAL DYSPNEA: 0
DYSURIA: 0
JAUNDICE: 0
TINGLING: 1
SNORES LOUDLY: 0
FLANK PAIN: 0
FLANK PAIN: 0
FACIAL SWELLING: 0
CHEST TIGHTNESS: 0
SHORTNESS OF BREATH: 1
FACIAL ASYMMETRY: 0
NAUSEA: 0
NAUSEA: 0
JOINT SWELLING: 0
MYALGIAS: 1
SEIZURES: 0
SHORTNESS OF BREATH: 1
NECK PAIN: 0
PARALYSIS: 0
HEARTBURN: 0
SHORTNESS OF BREATH: 1
LIGHT-HEADEDNESS: 0
FEVER: 0
HEADACHES: 0
COUGH: 0
ARTHRALGIAS: 1
DISTURBANCES IN COORDINATION: 0
WEAKNESS: 1
COUGH DISTURBING SLEEP: 0
SPEECH CHANGE: 0
DIARRHEA: 1
SEIZURES: 0
POLYDIPSIA: 0
BOWEL INCONTINENCE: 0
VOMITING: 0
NUMBNESS: 1
WHEEZING: 0
BACK PAIN: 0
WEIGHT LOSS: 0
FATIGUE: 1
NIGHT SWEATS: 0
TREMORS: 0
LOSS OF CONSCIOUSNESS: 0
STIFFNESS: 0
ARTHRALGIAS: 0
DYSPNEA ON EXERTION: 1
LIGHT-HEADEDNESS: 1
VOMITING: 0
AGITATION: 0
DECREASED APPETITE: 0
MUSCLE CRAMPS: 0
MEMORY LOSS: 0
NAUSEA: 0
ALTERED TEMPERATURE REGULATION: 0
BLOATING: 0
HYPERTENSION: 0
HEMATURIA: 0
WEIGHT GAIN: 0
EXERCISE INTOLERANCE: 1
FATIGUE: 1
FEVER: 0
STRIDOR: 0
EYE PAIN: 0
CHILLS: 0
LEG PAIN: 1
BLOOD IN STOOL: 0
SLEEP DISTURBANCES DUE TO BREATHING: 0
PALPITATIONS: 1
LIGHT-HEADEDNESS: 1
INCREASED ENERGY: 1
MUSCLE WEAKNESS: 1
POLYPHAGIA: 0
CONFUSION: 0
SPUTUM PRODUCTION: 0
HEMOPTYSIS: 0
DIFFICULTY URINATING: 0
VOMITING: 0
AGITATION: 0
ABDOMINAL PAIN: 0
TREMORS: 0
ABDOMINAL PAIN: 0
RECTAL PAIN: 0
FEVER: 0

## 2021-01-01 ASSESSMENT — PATIENT HEALTH QUESTIONNAIRE - PHQ9
SUM OF ALL RESPONSES TO PHQ QUESTIONS 1-9: 6
5. POOR APPETITE OR OVEREATING: NOT AT ALL

## 2021-01-01 ASSESSMENT — ACTIVITIES OF DAILY LIVING (ADL)
ADLS_ACUITY_SCORE: 12
ADLS_ACUITY_SCORE: 11
ADLS_ACUITY_SCORE: 13

## 2021-01-01 ASSESSMENT — COPD QUESTIONNAIRES: COPD: 1

## 2021-02-08 NOTE — LETTER
2/8/2021         RE: Ricki Sharp  [address] [place] [address]        Dear Colleague,    Thank you for referring your patient, Ricki Sharp, to the [facility] [place]. Please see a copy of my visit note below.    Brendan is a 70 year old who is being evaluated via a billable video visit.      How would you like to obtain your AVS? Mail a copy  If the video visit is dropped, the invitation should be resent by: Text to cell phone: [phone]  Will anyone else be joining your video visit? 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    HPI     He is currently 13 years s/p liver transplantation (3/13/2008) for cirrhosis 2/2 chronic hepatitis C and complicated by hepatopulmonary syndrome. 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    Patient was seen last 12/27/2019 by Dr. Gray. During that visit, he was feeling fairly well. Was having some drowsiness after PRN tramadol, but per patient and wife, not indicative of any HE.    Since that last visit, he has had MRI 11/2019 for ongoing left hand numbness, which showed advanced multilevel degenerative disc diseases with severe central canal stenosis at the C4-5 level with mass effect upon the cord and redundant buckling of the cervical cord above this level. He stated that his hand numbness is getting worse, since it is now in his right hand. His neurosurgeon wanted him to get surgery on his spine to alleviate the numnbess from his diagnosis of spinal stenosis, but they were worried about his low plts and increased risk of infection.  He also stated that he was worried about his labile BG, hasn't seen his PCP in months due to COVID.  Is on the waiting list for COVID. He denied abdominal pain, bloody stools, emesis. Eating well. He continues to take PTA tacrolimus 1 mg daily, without any issues.     He had recent lab drawn prior to this visit: Total bili 1.1, Alk phos 107, wbc 6.9, hgb 13, plt 85. Tacrolimus 5.5.    6 months ago, total bili 1.2, Alk phos 105, wbc 6.6, hgb 12.9, plt 89, tacrolimus 6.1.    Review of Systems   Negative unless specified above in HPI.      Objective    Vitals - Patient Reported  Pain Score: No Pain (0)    Physical Exam   GENERAL: Healthy, alert and no distress  EYES: Eyes grossly normal to inspection.  No discharge or erythema, or obvious scleral/conjunctival abnormalities.  RESP: No audible wheeze, cough, or visible cyanosis.  No visible retractions or increased work of breathing.    SKIN: Visible skin clear. No significant rash, abnormal pigmentation or lesions.  NEURO: Cranial nerves grossly intact.  Mentation and speech appropriate for age.  PSYCH: Mentation appears normal, affect normal/bright, judgement and insight intact, normal speech and appearance well-groomed.    Recent Results (from the past 168 hour(s))   Tacrolimus level    Collection Time: 02/02/21 12:25 PM   Result Value Ref Range    Tacrolimus Last Dose 02/01/21 2400     Tacrolimus Level 5.5 5.0 - 15.0 ug/L   Hepatic panel    Collection Time: 02/02/21 12:25 PM   Result Value Ref Range    Bilirubin Direct 0.2 0.0 - 0.2 mg/dL    Bilirubin Total 1.1 (H) 0.3 - 1.0 mg/dL    Albumin 3.9 3.5 - 5.7 g/dL    Protein Total 6.7 6.4 - 8.9 g/dL    Alkaline Phosphatase 107 (H) 34 - 104 U/L    ALT 11 7 - 52 U/L    AST 11 (L) 13 - 39 U/L   CBC with platelets    Collection Time: 02/02/21 12:25 PM   Result Value Ref Range    WBC 6.9 4.0 - 11.0 10e9/L    RBC Count 4.24 (L) 4.4 - 5.9 10e12/L    Hemoglobin 13.0 (L) 13.3 - 17.7 g/dL    Hematocrit 39.1 (L) 40.0 - 53.0 %    MCV 92 78 - 100 fl    MCH 30.7 26.5 - 33.0 pg    MCHC 33.2 31.5 - 36.5 g/dL    RDW 14.9 10.0 - 15.0 %     Platelet Count 85 (L) 150 - 450 10e9/L   Basic metabolic panel    Collection Time: 02/02/21 12:25 PM   Result Value Ref Range    Sodium 135 134 - 144 mmol/L    Potassium 4.3 3.5 - 5.1 mmol/L    Chloride 101 98 - 107 mmol/L    Carbon Dioxide 27 21 - 31 mmol/L    Anion Gap 7 3 - 14 mmol/L    Glucose 201 (H) 70 - 105 mg/dL    Urea Nitrogen 20 7 - 25 mg/dL    Creatinine 1.14 0.70 - 1.30 mg/dL    GFR Estimate 64 >60 mL/min/[1.73_m2]    GFR Estimate If Black 77 >60 mL/min/[1.73_m2]    Calcium 8.7 8.6 - 10.3 mg/dL      Assessment and Plan:    Cirrhosis 2/2 HCV s/p liver transplantation (3/13/2008)  He is currently 13 years s/p liver transplantation (3/13/2008) for cirrhosis 2/2 chronic hepatitis C and complicated by hepatopulmonary syndrome. 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     He continues to be doing well from his liver transplantation standpoint. His blood labs remain stable.  - Continue PTA tacrolimus  - Will talk with neuro-anesthesiologist here at the  (pt stated that he was not optimized for cervical spine surgery due to his co-morbidities, like HTN, aortic stenosis, chronic thrombocytopenia), but all of these conditions are stable.   - Will follow up in 1 yr  - Encouraged patient to see his PCP for his DM regimen    Video-Visit Details    Type of service:  Video Visit    Video End Time:3:35 PM    Originating Location (pt. Location): Home    Distant Location (provider location):  [facility] [place]     Platform used for Video Visit: "[employer]       This patient was staffed and discussed with Dr. Gray.    Sybil Wang MD  PGY2  IM resident  [phone]    The patient was seen and examined.  The above assessment and plan was developed jointly with the fellow.      Thank you very much for allowing me to participate in the care of this patient.  If you have any questions regarding my  recommendations, please do not hesitate to contact me.         Mike Gray MD      Professor of Medicine  [employer]      Executive Medical Director, Solid Organ Transplant Program  [facility]          Again, thank you for allowing me to participate in the care of your patient.        Sincerely,        Mike Gray MD

## 2021-02-08 NOTE — LETTER
2/8/2021      RE: Ricki Sharp  [address] [place] [address]       Brendan is a 70 year old who is being evaluated via a billable video visit.      How would you like to obtain your AVS? Mail a copy  If the video visit is dropped, the invitation should be resent by: Text to cell phone: [phone]  Will anyone else be joining your video visit? 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    HPI     He is currently 13 years s/p liver transplantation (3/13/2008) for cirrhosis 2/2 chronic hepatitis C and complicated by hepatopulmonary syndrome. 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    Patient was seen last 12/27/2019 by Dr. Gray. During that visit, he was feeling fairly well. Was having some drowsiness after PRN tramadol, but per patient and wife, not indicative of any HE.    Since that last visit, he has had MRI 11/2019 for ongoing left hand numbness, which showed advanced multilevel degenerative disc diseases with severe central canal stenosis at the C4-5 level with mass effect upon the cord and redundant buckling of the cervical cord above this level. He stated that his hand numbness is getting worse, since it is now in his right hand. His neurosurgeon wanted him to get surgery on his spine to alleviate the numnbess from his diagnosis of spinal stenosis, but they were worried about his low plts and increased risk of infection.  He also stated that he was worried about his labile BG, hasn't seen his PCP in months due to COVID. Is on the waiting list for COVID. He denied abdominal pain, bloody stools, emesis. Eating well. He continues to take PTA tacrolimus 1 mg daily, without any issues.     He had recent lab  drawn prior to this visit: Total bili 1.1, Alk phos 107, wbc 6.9, hgb 13, plt 85. Tacrolimus 5.5.    6 months ago, total bili 1.2, Alk phos 105, wbc 6.6, hgb 12.9, plt 89, tacrolimus 6.1.    Review of Systems   Negative unless specified above in HPI.      Objective    Vitals - Patient Reported  Pain Score: No Pain (0)    Physical Exam   GENERAL: Healthy, alert and no distress  EYES: Eyes grossly normal to inspection.  No discharge or erythema, or obvious scleral/conjunctival abnormalities.  RESP: No audible wheeze, cough, or visible cyanosis.  No visible retractions or increased work of breathing.    SKIN: Visible skin clear. No significant rash, abnormal pigmentation or lesions.  NEURO: Cranial nerves grossly intact.  Mentation and speech appropriate for age.  PSYCH: Mentation appears normal, affect normal/bright, judgement and insight intact, normal speech and appearance well-groomed.    Recent Results (from the past 168 hour(s))   Tacrolimus level    Collection Time: 02/02/21 12:25 PM   Result Value Ref Range    Tacrolimus Last Dose 02/01/21 2400     Tacrolimus Level 5.5 5.0 - 15.0 ug/L   Hepatic panel    Collection Time: 02/02/21 12:25 PM   Result Value Ref Range    Bilirubin Direct 0.2 0.0 - 0.2 mg/dL    Bilirubin Total 1.1 (H) 0.3 - 1.0 mg/dL    Albumin 3.9 3.5 - 5.7 g/dL    Protein Total 6.7 6.4 - 8.9 g/dL    Alkaline Phosphatase 107 (H) 34 - 104 U/L    ALT 11 7 - 52 U/L    AST 11 (L) 13 - 39 U/L   CBC with platelets    Collection Time: 02/02/21 12:25 PM   Result Value Ref Range    WBC 6.9 4.0 - 11.0 10e9/L    RBC Count 4.24 (L) 4.4 - 5.9 10e12/L    Hemoglobin 13.0 (L) 13.3 - 17.7 g/dL    Hematocrit 39.1 (L) 40.0 - 53.0 %    MCV 92 78 - 100 fl    MCH 30.7 26.5 - 33.0 pg    MCHC 33.2 31.5 - 36.5 g/dL    RDW 14.9 10.0 - 15.0 %    Platelet Count 85 (L) 150 - 450 10e9/L   Basic metabolic panel    Collection Time: 02/02/21 12:25 PM   Result Value Ref Range    Sodium 135 134 - 144 mmol/L    Potassium 4.3 3.5 - 5.1  mmol/L    Chloride 101 98 - 107 mmol/L    Carbon Dioxide 27 21 - 31 mmol/L    Anion Gap 7 3 - 14 mmol/L    Glucose 201 (H) 70 - 105 mg/dL    Urea Nitrogen 20 7 - 25 mg/dL    Creatinine 1.14 0.70 - 1.30 mg/dL    GFR Estimate 64 >60 mL/min/[1.73_m2]    GFR Estimate If Black 77 >60 mL/min/[1.73_m2]    Calcium 8.7 8.6 - 10.3 mg/dL      Assessment and Plan:    Cirrhosis 2/2 HCV s/p liver transplantation (3/13/2008)  He is currently 13 years s/p liver transplantation (3/13/2008) for cirrhosis 2/2 chronic hepatitis C and complicated by hepatopulmonary syndrome. 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     He continues to be doing well from his liver transplantation standpoint. His blood labs remain stable.  - Continue PTA tacrolimus  - Will talk with neuro-anesthesiologist here at the  (pt stated that he was not optimized for cervical spine surgery due to his co-morbidities, like HTN, aortic stenosis, chronic thrombocytopenia), but all of these conditions are stable.   - Will follow up in 1 yr  - Encouraged patient to see his PCP for his DM regimen    Video-Visit Details    Type of service:  Video Visit    Video End Time:3:35 PM    Originating Location (pt. Location): Home    Distant Location (provider location):  [facility] [place]     Platform used for Video Visit: [employer]       This patient was staffed and discussed with Dr. Gray.    Sybil Wang MD  PGY2  IM resident  [phone]    The patient was seen and examined.  The above assessment and plan was developed jointly with the fellow.      Thank you very much for allowing me to participate in the care of this patient.  If you have any questions regarding my recommendations, please do not hesitate to contact me.         Mike Gray MD      Professor of Medicine  [employer]      Executive Medical Director, Solid Organ  Transplant Program  [facility]

## 2021-02-08 NOTE — PROGRESS NOTES
Brendan is a 70 year old who is being evaluated via a billable video visit.      How would you like to obtain your AVS? Mail a copy  If the video visit is dropped, the invitation should be resent by: Text to cell phone: [phone]  Will anyone else be joining your video visit? 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    HPI     He is currently 13 years s/p liver transplantation (3/13/2008) for cirrhosis 2/2 chronic hepatitis C and complicated by hepatopulmonary syndrome. 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    Patient was seen last 12/27/2019 by Dr. Gray. During that visit, he was feeling fairly well. Was having some drowsiness after PRN tramadol, but per patient and wife, not indicative of any HE.    Since that last visit, he has had MRI 11/2019 for ongoing left hand numbness, which showed advanced multilevel degenerative disc diseases with severe central canal stenosis at the C4-5 level with mass effect upon the cord and redundant buckling of the cervical cord above this level. He stated that his hand numbness is getting worse, since it is now in his right hand. His neurosurgeon wanted him to get surgery on his spine to alleviate the numnbess from his diagnosis of spinal stenosis, but they were worried about his low plts and increased risk of infection.  He also stated that he was worried about his labile BG, hasn't seen his PCP in months due to COVID. Is on the waiting list for COVID. He denied abdominal pain, bloody stools, emesis. Eating well. He continues to take PTA tacrolimus 1 mg daily, without any issues.     He had recent lab drawn prior to this visit: Total bili 1.1, Alk phos 107, wbc 6.9, hgb 13, plt 85. Tacrolimus  5.5.    6 months ago, total bili 1.2, Alk phos 105, wbc 6.6, hgb 12.9, plt 89, tacrolimus 6.1.    Review of Systems   Negative unless specified above in HPI.      Objective    Vitals - Patient Reported  Pain Score: No Pain (0)    Physical Exam   GENERAL: Healthy, alert and no distress  EYES: Eyes grossly normal to inspection.  No discharge or erythema, or obvious scleral/conjunctival abnormalities.  RESP: No audible wheeze, cough, or visible cyanosis.  No visible retractions or increased work of breathing.    SKIN: Visible skin clear. No significant rash, abnormal pigmentation or lesions.  NEURO: Cranial nerves grossly intact.  Mentation and speech appropriate for age.  PSYCH: Mentation appears normal, affect normal/bright, judgement and insight intact, normal speech and appearance well-groomed.    Recent Results (from the past 168 hour(s))   Tacrolimus level    Collection Time: 02/02/21 12:25 PM   Result Value Ref Range    Tacrolimus Last Dose 02/01/21 2400     Tacrolimus Level 5.5 5.0 - 15.0 ug/L   Hepatic panel    Collection Time: 02/02/21 12:25 PM   Result Value Ref Range    Bilirubin Direct 0.2 0.0 - 0.2 mg/dL    Bilirubin Total 1.1 (H) 0.3 - 1.0 mg/dL    Albumin 3.9 3.5 - 5.7 g/dL    Protein Total 6.7 6.4 - 8.9 g/dL    Alkaline Phosphatase 107 (H) 34 - 104 U/L    ALT 11 7 - 52 U/L    AST 11 (L) 13 - 39 U/L   CBC with platelets    Collection Time: 02/02/21 12:25 PM   Result Value Ref Range    WBC 6.9 4.0 - 11.0 10e9/L    RBC Count 4.24 (L) 4.4 - 5.9 10e12/L    Hemoglobin 13.0 (L) 13.3 - 17.7 g/dL    Hematocrit 39.1 (L) 40.0 - 53.0 %    MCV 92 78 - 100 fl    MCH 30.7 26.5 - 33.0 pg    MCHC 33.2 31.5 - 36.5 g/dL    RDW 14.9 10.0 - 15.0 %    Platelet Count 85 (L) 150 - 450 10e9/L   Basic metabolic panel    Collection Time: 02/02/21 12:25 PM   Result Value Ref Range    Sodium 135 134 - 144 mmol/L    Potassium 4.3 3.5 - 5.1 mmol/L    Chloride 101 98 - 107 mmol/L    Carbon Dioxide 27 21 - 31 mmol/L    Anion Gap 7 3 -  14 mmol/L    Glucose 201 (H) 70 - 105 mg/dL    Urea Nitrogen 20 7 - 25 mg/dL    Creatinine 1.14 0.70 - 1.30 mg/dL    GFR Estimate 64 >60 mL/min/[1.73_m2]    GFR Estimate If Black 77 >60 mL/min/[1.73_m2]    Calcium 8.7 8.6 - 10.3 mg/dL      Assessment and Plan:    Cirrhosis 2/2 HCV s/p liver transplantation (3/13/2008)  He is currently 13 years s/p liver transplantation (3/13/2008) for cirrhosis 2/2 chronic hepatitis C and complicated by hepatopulmonary syndrome. 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     He continues to be doing well from his liver transplantation standpoint. His blood labs remain stable.  - Continue PTA tacrolimus  - Will talk with neuro-anesthesiologist here at the  (pt stated that he was not optimized for cervical spine surgery due to his co-morbidities, like HTN, aortic stenosis, chronic thrombocytopenia), but all of these conditions are stable.   - Will follow up in 1 yr  - Encouraged patient to see his PCP for his DM regimen    Video-Visit Details    Type of service:  Video Visit    Video End Time:3:35 PM    Originating Location (pt. Location): Home    Distant Location (provider location):  [facility] [place]     Platform used for Video Visit: [employer]       This patient was staffed and discussed with Dr. Gray.    Sybil Wang MD  PGY2  IM resident  [phone]    The patient was seen and examined.  The above assessment and plan was developed jointly with the fellow.      Thank you very much for allowing me to participate in the care of this patient.  If you have any questions regarding my recommendations, please do not hesitate to contact me.         Mike Gray MD      Professor of Medicine  [employer]      Executive Medical Director, Solid Organ Transplant Program  [facility]

## 2021-02-22 NOTE — TELEPHONE ENCOUNTER
Noted. Will discuss in next OV.    Signed, Jarad Bocanegra MD, FAAP, FACP  Internal Medicine & Pediatrics

## 2021-03-18 NOTE — TELEPHONE ENCOUNTER
Prescription approved per [facility] Refill Protocol.  LOV: 8/25/2020  Ne Farah RN on 3/18/2021 at 2:45 PM

## 2021-04-05 PROBLEM — J84.10 PULMONARY FIBROSIS (H): Status: ACTIVE | Noted: 2021-01-01

## 2021-04-05 NOTE — PATIENT INSTRUCTIONS
-- Prednisone x 5 days   -- Azithromycin     -- Echocardiogram   -- Consider Cardiology consult     -- Obtain COVID-19 test   -- Quarantine starting now, and at least until results are known

## 2021-04-05 NOTE — NURSING NOTE
"Patient presents to follow up and go over breathing issues.  Chief Complaint   Patient presents with     Clinic Care Coordination - Follow-up       Initial There were no vitals taken for this visit. Estimated body mass index is 31.32 kg/m  as calculated from the following:    Height as of 8/25/20: 1.702 m (5' 7\").    Weight as of 8/25/20: 90.7 kg (200 lb).  Medication Reconciliation: complete    Maria L Doshi LPN  "

## 2021-04-05 NOTE — PROGRESS NOTES
Subjective   Ricki Sharp is a 71 year old male who presents for breathing problems.  He has been feeling ill for the last several days.  He has chills.  He feels like he cannot quite catch his breath.  It is worse if he exerts himself.  No chest pain.  He feels fatigued.  His wife is upset because he stopped his magnesium and does not really know why.  He had some pain in the right back but that got a little better.  He is coughing a little bit more.  He is bringing up a little more phlegm.  He stopped using his Spiriva about 6 weeks ago but then decided he should start taking it again 2 days ago.  He stopped it due to cost.  He did not think it was really doing that much anyway.  No fever.  No loss of smell or taste.  No rash.  He has received both doses of the Covid vaccine.    Objective   Vitals: /82 (BP Location: Right arm, Patient Position: Sitting, Cuff Size: Adult Regular)   Pulse 62   Temp 97.6  F (36.4  C) (Tympanic)   Resp 24   Wt 91.6 kg (202 lb)   SpO2 95%   BMI 31.64 kg/m      General: well appearing  Neck: No carotid bruits  CV: Regular rate and rhythm, grade 3/6 systolic murmur.  Pulm: Rales at the bases, no wheezing or rhonchi  Neuro: Grossly intact  Musculoskeletal: No lower extremity edema  Skin: No rash  Psychiatry: Normal affect and insight.    Review and Analysis of Data   I personally reviewed the following:  External notes: No  Results: Yes  Use of an independent historian: No  Independent review of a test performed by another physician: No  Discussion of management with another physician: No  Moderate risk of morbidity from additional diagnostic testing and/or treatment.    Assessment & Plan   1. WOOD (dyspnea on exertion)  Differential diagnosis includes progression of aortic stenosis, systolic or diastolic heart failure, coronary artery disease with angina, pneumonia, progression of pulmonary fibrosis, progression of COPD, COVID-19 infection, hypomagnesemia, others.    -  XR Chest 2 Views; Future  - N terminal pro BNP outpatient; Future  - Magnesium; Future  - CBC with platelets; Future  - Basic metabolic panel; Future  - CRP inflammation; Future  - Erythrocyte sedimentation rate auto; Future  - Erythrocyte sedimentation rate auto  - CRP inflammation  - Basic metabolic panel  - CBC with platelets  - Magnesium  - N terminal pro BNP outpatient  - Symptomatic COVID-19 Virus (Coronavirus) by PCR; Future  - Echocardiogram Complete; Future  - Symptomatic COVID-19 Virus (Coronavirus) by PCR    2. PTLD (post-transplant lymphoproliferative disorder) (H)  - magnesium oxide (MAG-OX) 400 MG tablet; TAKE TWO TABLETS BY MOUTH TWICE A DAY  Dispense: 120 tablet; Refill: 11    3. Liver replaced by transplant (H)  - magnesium oxide (MAG-OX) 400 MG tablet; TAKE TWO TABLETS BY MOUTH TWICE A DAY  Dispense: 120 tablet; Refill: 11    4. Hypomagnesemia  His magnesium level is quite low today.  We will obtain a magnesium infusion in the infusion center, followed by restarting his oral magnesium supplementation.    5. Chronic obstructive pulmonary disease, unspecified COPD type (H)  6. 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  We may consider repeating pulmonary function testing versus return to pulmonary medicine, I had like to see the echocardiogram first.    7. Benign prostatic hyperplasia with lower urinary tract symptoms, symptom details unspecified  At goal, no change.  - Prostate Specific Antigen GH; Future  - Prostate Specific Antigen GH    8. Chills (without fever)  Inflammatory markers are elevated however there is no other blood count, LDH or uric acid abnormality that would make me concerned for lymphoma.  - Lactate Dehydrogenase; Future  - Uric acid; Future  - Uric acid  - Lactate Dehydrogenase    9. Type 2 diabetes mellitus without complication, without long-term current use of insulin (H)  At goal, no change.  - Hemoglobin A1c; Future  - Hemoglobin A1c    10. COPD  exacerbation (H)  I think a component of his symptoms may be from COPD exacerbation which we will treat.  - predniSONE (DELTASONE) 20 MG tablet; Take 2 tablets (40 mg) by mouth daily for 5 days  Dispense: 10 tablet; Refill: 0  - azithromycin (ZITHROMAX) 250 MG tablet; Take 2 tablets (500 mg) by mouth daily for 1 day, THEN 1 tablet (250 mg) daily for 4 days.  Dispense: 6 tablet; Refill: 0    11. Degeneration of cervical intervertebral disc  He has severe cervical degenerative disc disease.  Surgery would be the best option.  Until he is medically stable to consider surgery we will try some therapy possibly traction.  - PHYSICAL THERAPY REFERRAL; Future  - OCCUPATIONAL THERAPY REFERRAL; Future      Patient Instructions    -- Prednisone x 5 days   -- Azithromycin     -- Echocardiogram   -- Consider Cardiology consult     -- Obtain COVID-19 test   -- Quarantine starting now, and at least until results are known        Signed, Jarad Bocanegra MD, FAAP, FACP  Internal Medicine & Pediatrics

## 2021-04-05 NOTE — PROGRESS NOTES
Infusion Nursing Note:  Ricki Sharp presents today for Magnesium infusion.    Patient seen by provider today: Yes: Jarad Bocanegra   present during visit today: Not Applicable.    Note: N/A.  Patient did meet criteria for an asymptomatic covid-19 PCR test in infusion today. Patient had covid-19 test prior to infusion appt.    Intravenous Access:  Peripheral IV placed.    Treatment Conditions:  Not Applicable.      Post Infusion Assessment:  Patient tolerated infusion without incident.  Blood return noted pre and post infusion.  Site patent and intact, free from redness, edema or discomfort.  No evidence of extravasations.  Access discontinued per protocol.       Discharge Plan:   Discharge instructions reviewed with: Patient and Family.  Patient discharged in stable condition accompanied by: wife.  Departure Mode: Wheelchair will follow up with Jarad Bocanegra as needed    Pamela Cueto RN

## 2021-04-07 NOTE — TELEPHONE ENCOUNTER
[facility]-pt wondering what to do following his magnesium transfusion? Please call. Thank you.  Sylvia Noe

## 2021-04-07 NOTE — TELEPHONE ENCOUNTER
Spoke to [place].  Brendan had an episode early this week where it was hard to move, he was really tired.  He had quit taking his magnesium, magnesium went to 0.7.  He was given IV magnesium, is back on oral 800 mg po bid.  He is following w/ Dr. Bocanegra for this.

## 2021-04-08 PROBLEM — Z87.891 PERSONAL HISTORY OF TOBACCO USE, PRESENTING HAZARDS TO HEALTH: Status: ACTIVE | Noted: 2021-01-01

## 2021-04-08 PROBLEM — E87.5 HYPERPOTASSEMIA: Status: ACTIVE | Noted: 2021-01-01

## 2021-04-08 PROBLEM — E87.1 HYPONATREMIA: Status: ACTIVE | Noted: 2021-01-01

## 2021-04-08 PROBLEM — E87.1 HYPOSMOLALITY SYNDROME: Status: ACTIVE | Noted: 2021-01-01

## 2021-04-08 PROBLEM — Z11.52 ENCOUNTER FOR SCREENING LABORATORY TESTING FOR SEVERE ACUTE RESPIRATORY SYNDROME CORONAVIRUS 2 (SARS-COV-2): Status: ACTIVE | Noted: 2021-01-01

## 2021-04-08 PROBLEM — E11.9 TYPE 2 DIABETES MELLITUS WITHOUT COMPLICATION, WITHOUT LONG-TERM CURRENT USE OF INSULIN (H): Chronic | Status: RESOLVED | Noted: 2018-02-27 | Resolved: 2021-01-01

## 2021-04-08 PROBLEM — R06.09 DYSPNEA ON EXERTION: Status: ACTIVE | Noted: 2021-01-01

## 2021-04-08 PROBLEM — I50.9 ACUTE CONGESTIVE HEART FAILURE, UNSPECIFIED HEART FAILURE TYPE (H): Status: ACTIVE | Noted: 2021-01-01

## 2021-04-08 PROBLEM — I10 ESSENTIAL HYPERTENSION, MALIGNANT: Status: ACTIVE | Noted: 2021-01-01

## 2021-04-08 PROBLEM — E87.5 HYPERKALEMIA: Status: ACTIVE | Noted: 2021-01-01

## 2021-04-08 NOTE — ASSESSMENT & PLAN NOTE
Chronically low levels, normally on replacement  Due to mixup, had stopped replacement and was critically low several days ago, replaced in infusion and back on oral replacement  Continue to replace, monitor

## 2021-04-08 NOTE — PROGRESS NOTES
"Patient alert and oriented, denies pain at this time. Pt received IV magnesium replacement. Tolerating diet and has adequate fluid intake. VSS. Will continue to monitor. /78   Pulse 84   Temp 97.8  F (36.6  C) (Tympanic)   Resp 18   Ht 1.702 m (5' 7\")   Wt 91.6 kg (202 lb)   SpO2 95%   BMI 31.64 kg/m      "

## 2021-04-08 NOTE — ASSESSMENT & PLAN NOTE
History of liver transplant 14 years ago due to hep C  Currently asymptomatic, taking twice daily Prograf  Continue Prograf twice daily, outpatient management by transplant team

## 2021-04-08 NOTE — ASSESSMENT & PLAN NOTE
Presenting with 1 week history of increasing shortness of breath and fatigue  Known history of pulmonary fibrosis secondary to previous treatment of chemotherapy  Just restarted Spiriva, currently on day 4 treatment with azithromycin and prednisone for possible COPD exacerbation  He correlates symptoms with stopping his magnesium and low levels  We will continue home meds, finish out treatment with prednisone and azithromycin.  Replace magnesium  Echocardiogram was performed showing normal EF and function with moderate to severe aortic stenosis noted with a gradient of 40  With replacement of his magnesium, his dyspnea on exertion improved and he was feeling pretty much at baseline at discharge  Will send home today, plan is to follow-up with primary care.  It appears and is notes that they are planning to do outpatient pulmonary testing.  Discharge will continue home dosing of magnesium as well as restarting and continuing his Spiriva

## 2021-04-08 NOTE — PROGRESS NOTES
" NS ADMISSION NOTE    Patient admitted to room 314 at approximately 0430 via cart from emergency room. Patient was accompanied by spouse.     Verbal SBAR report received from RUFUS Reyes prior to patient arrival.     Patient ambulated to bed with stand-by assist. Patient alert and oriented X 3. The patient is not having any pain.  . Admission vital signs: Blood pressure (!) 152/87, pulse 83, temperature 97.4  F (36.3  C), temperature source Tympanic, resp. rate 18, height 1.702 m (5' 7\"), weight 91.6 kg (202 lb), SpO2 97 %. Patient was oriented to plan of care, call light, bed controls, tv, telephone, bathroom and visiting hours.           Dian Carballo RN    "

## 2021-04-08 NOTE — ED TRIAGE NOTES
"ED Nursing Triage Note (General)   ________________________________    Ricki Sharp is a 71 year old Male that presents to triage private car  With history of increased SOB, weakness, low heart rate and general malace over past weak, reported by patient. Has also been having a problem with low magnesium and just received a replacement 4 days ago for same.    BP (!) 178/82   Pulse (!) 43   Temp 99  F (37.2  C) (Tympanic)   Resp 22   Ht 1.702 m (5' 7\")   Wt 91.6 kg (202 lb)   SpO2 97%   BMI 31.64 kg/m  t  Patient appears alert  and oriented, in moderate distress., and cooperative and calm behavior.    GCS Total = 15  Airway: intact  Breathing noted as Abnormal.  Circulation Normal  Skin normal, slightly yellow  Action taken:  Triage to critical care immediately      PRE HOSPITAL PRIOR LIVING SITUATION Spouse  "

## 2021-04-08 NOTE — PLAN OF CARE
Pt sates wife can bring rejection medication in, pt was instructed to alert nurse when medication arrives.  Jocy Estevez RN.............................4/8/2021 7:34 AM

## 2021-04-08 NOTE — TELEPHONE ENCOUNTER
"Fide (spouse) calling this evening in regards to a message that was left with the clinic on her 's behalf. Caller is wondering if \"Bill\" needs to get his magnesium rechecked after he was found to have a low level on Monday. Fide was wondering if this is something the ER can do. FNA advised the ED can check labs and also give electrolytes if needed. Fide (spouse) states that just a little bit ago Bill was not feeling well. FNA offered to conduct a full triage assessment, but wife declined as she will likely bring him in to be evaluated and re-check labs. RN advised to call back with any changes, worsening of symptoms, and questions or concerns. Fide (spouse) verbalized understanding of and agreement with plan and had no further questions.     Additional Information    Health Information question, no triage required and triager able to answer question    Protocols used: INFORMATION ONLY CALL-A-ISABELL Thibodeaux RN-BSN  [facility]   "

## 2021-04-08 NOTE — ASSESSMENT & PLAN NOTE
Chronic lung issues secondary to previous chemotherapy versus COPD  Normally on Spiriva which will be restarted at discharge, treat with duo nebs while here  Finishing out course of prednisone and azithromycin  If persistent symptoms, will probably need outpatient pulmonary consult

## 2021-04-08 NOTE — UTILIZATION REVIEW
"  Admission Status; Secondary Review Determination         Under the authority of the Utilization Management Committee, the utilization review process indicated a secondary review on the above patient.  The review outcome is based on review of the medical records, discussions with staff, and applying clinical experience noted on the date of the review.          (x) Observation Status Appropriate - This patient does not meet hospital inpatient criteria and is placed in observation status. If this patient's primary payer is Medicare and was admitted as an inpatient, Condition Code 44 should be used and patient status changed to \"observation\".     RATIONALE FOR DETERMINATION   71-year-old man with history of liver transplant, pulmonary fibrosis due to chemotherapy, presenting with dyspnea on exertion, concern for COPD exacerbation, he had also electrolyte mild abnormalities.  He has no evidence of hypoxia, patient has chronic shortness of breath with no clear indication for any new diagnoses requiring prolonged inpatient care at the time of this review.  His saturation is mid 90% on room air, there is no evidence of severe heart failure or pneumonia documented.  Patient admission orders after midnight.  Anticipated short hospital stay.  The severity of illness, intensity of service provided, expected LOS and risk for adverse outcome make the care appropriate for further observation; however, doesn't meet criteria for hospital inpatient admission. Dr Boogie notified of this determination.    This document was produced using voice recognition software.      The information on this document is developed by the utilization review team in order for the business office to ensure compliance.  This only denotes the appropriateness of proper admission status and does not reflect the quality of care rendered.         The definitions of Inpatient Status and Observation Status used in making the determination above are those " provided in the CMS Coverage Manual, Chapter 1 and Chapter 6, section 70.4.      Sincerely,     NIURKA HUBBARD MD    System Medical Director  Utilization Management  [facility].

## 2021-04-08 NOTE — PLAN OF CARE
"Pt is alert and oriented. Oriented to room. Has SOB with exertion. Denies pain. Afebrile. Fine crackles in lower lobes. +1 edema to LLE. Independent in room. Pt HR risa at times 44-50. Will continue to monitor.   BP (!) 152/87   Pulse 83   Temp 97.4  F (36.3  C) (Tympanic)   Resp 18   Ht 1.702 m (5' 7\")   Wt 91.6 kg (202 lb)   SpO2 97%   BMI 31.64 kg/m      Dian Carballo RN on 4/8/2021 at 5:31 AM    "

## 2021-04-08 NOTE — ED PROVIDER NOTES
History     Chief Complaint   Patient presents with     Shortness of Breath       Ricki Sharp is a 71 year old male presents for dyspnea. Onset was 6 days ago with progressive worsening.  He also reports a mild cough, lethargy, and some pleuritic component.  6 nights ago he also had some chills overnight.  Over the past 6 days he also had an episode of right back pain which self resolved.  He also reports some generalized chest tightness.  Patient was off his Spiriva and magnesium supplementation for approximately 1 month restarting these approximately 1 week ago. Denies fevers, nausea, vomiting, diaphoresis, lightheadedness, peripheral edema, weight gain.    Allergies   Allergen Reactions     Lisinopril Cough     Spironolactone Other (See Comments)     hyperkalemia     Levaquin [Levofloxacin] Rash     Rash on abdomen, possibly from Levaquin.       Patient Active Problem List    Diagnosis Date Noted     Hyperkalemia 04/08/2021     Priority: Medium     Hyponatremia 04/08/2021     Priority: Medium     Acute congestive heart failure, unspecified heart failure type (H) 04/08/2021     Priority: Medium     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 04/05/2021     Priority: Medium     Spinal stenosis in cervical region 01/23/2020     Priority: Medium     Degeneration of lumbar intervertebral disc 03/05/2018     Priority: Medium     Lumbar facet arthropathy 03/05/2018     Priority: Medium     Lumbar foraminal stenosis 03/05/2018     Priority: Medium     Type 2 diabetes mellitus without complication, without long-term current use of insulin (H) 02/27/2018     Priority: Medium     Essential hypertension 02/27/2018     Priority: Medium     Mixed hyperlipidemia 02/27/2018     Priority: Medium     Status post total replacement of left hip 03/18/2016     Priority: Medium     Primary localized osteoarthrosis, pelvic region and thigh 08/24/2015     Priority: Medium     Antineoplastic chemotherapy induced pancytopenia (H)  "2014     Priority: Medium     Hypomagnesemia 06/10/2014     Priority: Medium     Drug-induced neutropenia (H) 06/10/2014     Priority: Medium     updating diagnosis code for icd10 cutover       NHL (non-Hodgkin's lymphoma) (H) 2014     Priority: Medium     Hepatitis C 2014     Priority: Medium     PTLD (post-transplant lymphoproliferative disorder) (H) 2014     Priority: Medium     Liver replaced by transplant (H) 2012     Priority: Medium     Liver transplant recipient (H) 2008     Priority: Medium       Past Medical History:   Diagnosis Date     COPD (chronic obstructive pulmonary disease) (H)      Hypertension      Thyroid disease        Past Surgical History:   Procedure Laterality Date     ABDOMEN SURGERY      liver transplant     INJECT EPIDURAL TRANSFORAMINAL LUMBAR / SACRAL SINGLE Left 2018    Procedure: INJECT EPIDURAL TRANSFORAMINAL LUMBAR / SACRAL SINGLE;  Left Transforaminal Lumbar Epidural Steroid Injection  Lumbar 3, Lumbar 4;  Surgeon: Jeni Agarwal MD;  Location: [facility] OR       History reviewed. No pertinent family history.    Social History     Tobacco Use     Smoking status: Former Smoker     Years: 5.00     Quit date: 2018     Years since quittin.8     Smokeless tobacco: Never Used   Substance Use Topics     Alcohol use: No     Drug use: No       Medications:    No current outpatient medications on file.      Review of Systems: See HPI for pertinent negative and positive findings.    Physical Exam   BP (!) 152/87   Pulse 76   Temp 97.4  F (36.3  C) (Tympanic)   Resp 18   Ht 1.702 m (5' 7\")   Wt 91.6 kg (202 lb)   SpO2 97%   BMI 31.64 kg/m       General: awake, fatigued appearing  HEENT: atraumatic, neck supple, no supplemental oxygen  Respiratory: normal effort, bibasilar crackles  Cardiovascular: bradycardic, regular rhythm, no murmurs or gallops, extremities appear well perfused  Abdomen: soft, nontender, protuberant, remote surgical " scar  Extremities: no deformities, edema, or tenderness  Skin: warm, dry, no rashes  Neuro: alert, no focal deficits    ED Course      ED Course as of Apr 08 0620 Wed Apr 07, 2021   2346 EKG: sinus risa 43 with 1st degree block , LBBB, no ST or TWI abnormalities          Results for orders placed or performed during the hospital encounter of 04/07/21 (from the past 24 hour(s))   CT Chest Pulmonary Embolism w Contrast    Narrative    PROCEDURE INFORMATION:   Exam: CTA Chest With Contrast   Exam date and time: 4/8/2021 1:53 AM   Age: 71 years old   Clinical indication: Abnormal findings; Abnormal diagnostic tests; Elevated   d-dimer; Shortness of breath; Prior surgery; Surgery date: 6+ months; Surgery   type: Liver transplant; Patient HX: Increased SOB, weakness, low heart rate and   general malace over past weak     TECHNIQUE:   Imaging protocol: Computed tomographic angiography of the chest with contrast.   3D rendering (Not supervised by radiologist): MIP and/or 3D reconstructed   images were created by the technologist.   Radiation optimization: All CT scans at this facility use at least one of these   dose optimization techniques: automated exposure control; mA and/or kV   adjustment per patient size (includes targeted exams where dose is matched to   clinical indication); or iterative reconstruction.   Contrast material: ISOVUE 370; Contrast volume: 100 ml; Contrast route:   INTRAVENOUS (IV);      COMPARISON:   CT CHEST/ABDOMEN/PELVIS W CONTRAST 1/12/2016 8:26 AM     FINDINGS:   Pulmonary arteries: There may be an old embolus within a left lower lobe   artery. No acute pulmonary embolus.   Aorta: Unremarkable. No aortic aneurysm. No aortic dissection.   Veins: Vascular stent noted in the portal vein.     Lungs: Mild bibasilar atelectasis. Mild interstitial pulmonary edema.   Pleural spaces: Unremarkable. No pneumothorax. No pleural effusion.   Heart: Coronary artery calcification.   Mediastinal space:  Small hiatal hernia.   Lymph nodes: Unremarkable. No enlarged lymph nodes.     Gallbladder and bile ducts: Prior cholecystectomy.   Spleen: Splenic granulomas noted.   Bones/joints: Severe multilevel degenerative disc disease with vacuum disc   phenomenon.   Soft tissues: Gynecomastia noted.       Impression    IMPRESSION:   No acute pulmonary embolus.    Mild pulmonary edema and bibasal atelectasis noted.    No evidence of bronchopneumonia.    THIS DOCUMENT HAS BEEN ELECTRONICALLY SIGNED BY AWA SHAFER MD   Potassium   Result Value Ref Range    Potassium 4.5 3.5 - 5.1 mmol/L   Asymptomatic SARS-CoV-2 COVID-19 Virus (Coronavirus) by PCR    Specimen: Nasopharyngeal   Result Value Ref Range    SARS-CoV-2 Virus Specimen Source Nasopharyngeal     SARS-CoV-2 PCR Result NEGATIVE     SARS-CoV-2 PCR Comment       Testing was performed using the Rem[employer] Xpress SARS-CoV-2 Assay on the Cepheid Gene-Xpert   Instrument Systems. Additional information about this Emergency Use Authorization (EUA)   assay can be found via the Lab Guide.     Potassium   Result Value Ref Range    Potassium 4.5 3.5 - 5.1 mmol/L   EKG 12-LEAD, TRACING ONLY   Result Value Ref Range    Interpretation ECG Click View Image link to view waveform and result    Basic metabolic panel   Result Value Ref Range    Sodium 131 (L) 134 - 144 mmol/L    Potassium 4.2 3.5 - 5.1 mmol/L    Chloride 95 (L) 98 - 107 mmol/L    Carbon Dioxide 23 21 - 31 mmol/L    Anion Gap 13 3 - 14 mmol/L    Glucose 247 (H) 70 - 105 mg/dL    Urea Nitrogen 40 (H) 7 - 25 mg/dL    Creatinine 1.47 (H) 0.70 - 1.30 mg/dL    GFR Estimate 47 (L) >60 mL/min/[1.73_m2]    GFR Estimate If Black 57 (L) >60 mL/min/[1.73_m2]    Calcium 8.8 8.6 - 10.3 mg/dL     *Note: Due to a large number of results and/or encounters for the requested time period, some results have not been displayed. A complete set of results can be found in Results Review.       Medications   glucose gel 15-30 g (has no  administration in time range)     Or   dextrose 50 % injection 25-50 mL (has no administration in time range)     Or   glucagon injection 1 mg (has no administration in time range)   lidocaine 1 % 0.1-1 mL (has no administration in time range)   lidocaine (LMX4) cream (has no administration in time range)   sodium chloride (PF) 0.9% PF flush 3 mL (3 mLs Intracatheter Given 4/8/21 0438)   sodium chloride (PF) 0.9% PF flush 3 mL (has no administration in time range)   sodium chloride (PF) 0.9% PF flush 3 mL (has no administration in time range)   furosemide (LASIX) injection 80 mg (80 mg Intravenous Given 4/8/21 0153)   magnesium sulfate 2 g in water intermittent infusion (2 g Intravenous New Bag 4/8/21 0223)   insulin (regular) (HumuLIN R/NovoLIN R) injection 5 Units (5 Units Intravenous Given 4/8/21 0152)   iopamidol (ISOVUE-370) solution 100 mL (100 mLs Intravenous Given 4/8/21 0210)       Assessments & Plan (with Medical Decision Making)     I have reviewed the nursing notes.    Evaluated for dyspnea.  Normal oxygen saturations on room air.  EKG with sinus bradycardia mid 40s and nonischemic.  Exam with bibasilar crackles.  Labs with hyponatremia hyperkalemia hypomagnesemia, elevated D-dimer, and elevated BNP.  CT scan negative for PE. Due to acute CHF causing dyspnea along with elevated potassium recommend admission for diuresis and close monitoring.  Case discussed with Dr. Boogie who accepts admission.  Patient stable at time of transfer of care.  I reviewed the findings, diagnosis, and plan with the patient, who is agreeable with plan.        Current Discharge Medication List          Final diagnoses:   Acute congestive heart failure, unspecified heart failure type (H)   Hyperkalemia   Hyponatremia   Hypomagnesemia       4/7/2021   [facility] AND [facility]       Franki Vasquez MD  04/08/21 [id number]

## 2021-04-08 NOTE — TELEPHONE ENCOUNTER
Take oral magnesium, recheck a magnesium level in 2 weeks.    Signed, Jarad Bocanegra MD, FAAP, FACP  Internal Medicine & Pediatrics

## 2021-04-08 NOTE — ASSESSMENT & PLAN NOTE
Potassium in ED elevated 5.4 which is normalized after Lasix and administration of insulin  Continue to monitor

## 2021-04-08 NOTE — PHARMACY-ADMISSION MEDICATION HISTORY
Pharmacy -- Admission Medication Reconciliation    Prior to admission (PTA) medications were reviewed and the patient's PTA medication list was updated.    Sources Consulted: patient, [facility], Chart Review  ** [facility] and [facility] refill records pending at time of note    The reliability of this Medication Reconciliation is: Reliability: Reliable    The following significant changes were made:  1. Clarified acetaminophen dosing  2. Updated fluocinonide to 3 times weekly per patient  3. Updated cyanocobalamin to twice daily    *Tramadol is taken once daily at noon (prescribed as twice daily - did not update controlled prescription in Epic). *Patient takes his tacrolimus twice daily at 1200 and 0000 exactly 12 hours apart specifically with food.   *Patient states he takes his as needed cyclobenzaprine 5 mg scheduled at bedtime.  *He uses his desonide ointment around twice weekly after he takes his showers.     Patient had a misunderstanding with his PCP, he stopped taking his magnesium supplement because he wanted to clarify at his appointment, then his appointment was postponed.     He also did not  his Spiriva prescription one time in 2020 because he was in his doughnut hole for his insurance.  He states this was a mistake in hindsight. He has good prescription drug coverage and does pay a lot out of pocket per month, however he said he can afford his current regimen and has no concerns.     In addition, the patient's allergies were reviewed with the patient and updated as follows:   Allergies: Lisinopril, Spironolactone, and Levaquin [levofloxacin]    The pharmacist has reviewed with the patient that all personal medications should be removed from the building or locked in the belongings safe.  Patient shall only take medications ordered by the physician and administered by the nursing staff.     Medication barriers identified: Patient very easily discussed medication  regimen. He seems eager to follow healthcare professional advise.    Medication adherence concerns: none noted - misunderstanding discussed above (unintentional) and cost issue also discussed above, patient states this is resolved and does not see this as a future issue.    Understanding of emergency medications: Patient states he has an albuterol inhaler at home. Prescription was written in . Advised patient to check inhaler expiration date as it is likely . Offered to alert hospitalist that patient may need new prescription - however patient did not want to at this time. Patient states he can recognize signs and symptoms of hypoglycemia. He checks his blood sugars once daily. He has not had issues with low blood sugar but was able to discuss appropriate treatment if needed.     Saba Sanchez RPH, 2021,  9:25 AM     Spoke with [facility]. Patient has three medications filled through them - which matched patient's interview. No changes made.     Saba Sanchez RPH on 2021 at 12:05 PM

## 2021-04-08 NOTE — PROGRESS NOTES
1.  Has the patient had a previous reaction to IV contrast? n    2.  Does the patient have kidney disease? n    3.  Is the patient on dialysis? n    If YES to any of these questions, exam will be reviewed with a Radiologist before administering contrast.    1.  Is patient currently taking metformin? y     If NO: Technologist will give the patient normal post CT instructions.     If YES: Technologist will obtain GFR from Lab.    2.  Is GFR is greater than 60? n     If YES: Technologist will give the patient normal post CT instructions.     If NO: Technologist will give the patient METFORMIN post CT instructions.

## 2021-04-08 NOTE — TELEPHONE ENCOUNTER
Scratch that. He got admitted.  Follow-up at hospital discharge visit.    Signed, Jarad Bocanegra MD, FAAP, FACP  Internal Medicine & Pediatrics

## 2021-04-08 NOTE — H&P
[facility]    History and Physical - Hospitalist Service       Date of Admission:  4/7/2021    Assessment & Plan   Ricki Sharp is a 71 year old male admitted on 4/7/2021. He presents emergency room feeling short of air with weakness worsening over the past week.  In clinic was noted to be quite low on magnesium which was replaced and he associates the onset of his symptoms around the time that he stopped taking his magnesium replacement.  Past history significant for liver transplant 14 years ago, on antirejection drug, Prograf.  In the ED he was dyspneic but not hypoxemic.  Labs notable for an elevated potassium of 5.4 with magnesium low at 1.6.  CBC unremarkable with BNP elevated at 1320, chest x-ray showing no obvious infiltrate and chest CT was done due to elevated D-dimer showing possible old pulmonary embolism, nothing acute with no signs of any infectious etiology.  This point is not clearly allergy for his dyspnea whether it has exacerbation of his underlying lung disease versus in terms of low magnesium.  We will plan to proceed with echocardiogram of his heart today.  Continue prednisone and azithromycin, replace magnesium.  This point I do not get the sense that he is fluid overloaded, and will not continue Lasix at this point.  Expect to be here for couple of days as we sort these out.    Dyspnea on exertion  Presenting with 1 week history of increasing shortness of breath and fatigue  Known history of pulmonary fibrosis secondary to previous treatment of chemotherapy  Just restarted Spiriva, currently on day 4 treatment with azithromycin and prednisone for possible COPD exacerbation  He correlates symptoms with stopping his magnesium and low levels  We will continue home meds, finish out treatment with prednisone and azithromycin.  Replace magnesium  Echocardiogram ordered for today to rule out cardiac cause, CT imaging of chest yesterday showing no signs of pulmonary  embolism although possible old embolism noted  Inflammatory markers several days ago were elevated and if symptoms persist, may need pulmonary consult.    Essential hypertension  Controlled at home taking Norvasc, Cozaar and metoprolol  Continue home dosing, follow    Hyperkalemia  Potassium in ED elevated 5.4 which is normalized after Lasix and administration of insulin  Continue to monitor    Hypomagnesemia  Chronically low levels, normally on replacement  Due to mixup, had stopped replacement and was critically low several days ago, replaced in infusion and back on oral replacement  Continue to replace, monitor    Hyponatremia  Mildly low at 131  Monitor    Liver replaced by transplant (H)  History of liver transplant 14 years ago due to hep C  Currently asymptomatic, taking twice daily Prograf  Continue Prograf twice daily, outpatient management by transplant team    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  Chronic lung issues secondary to previous chemotherapy versus COPD  Normally on Spiriva which will be restarted at discharge, treat with duo nebs while here  Finishing out course of prednisone and azithromycin  If persistent symptoms, will probably need outpatient pulmonary consult    Mixed hyperlipidemia  Controlled at home taking Lipitor  No change, restart at discharge         Diet: Moderate Consistent CHO Diet    DVT Prophylaxis: Enoxaparin (Lovenox) SQ  Mishra Catheter: not present  Code Status: Full Code           Disposition Plan   Expected discharge: 2 - 3 days, recommended to prior living arrangement once mental status at baseline and improved dyspnea.  Entered: David Boogie MD 04/08/2021, 8:52 AM     The patient's care was discussed with the Patient.    David Boogie MD  [facility]  Contact information available via [facility] Paging/Directory      ______________________________________________________________________    Chief Complaint   71-year-old male presenting  with increased shortness of breath    History is obtained from the patient, electronic health record and emergency department physician    History of Present Illness   Ricki Sharp is a 71 year old male who presents to the emergency department with concerns of increased shortness of breath.  This is worsened over the past week associate with generalized muscle weakness fatigue, chills and increased cough, dry in nature.  He denies fever, nausea or vomiting.  Past history significant for pulmonary disease with fibrosis probably related to previous chemotherapy that was given for treatment of non-Hodgkin's lymphoma as a result of previous liver transplant, occurring 14 years ago.  Patient is taking daily Prograf.  He has history of low magnesium and due to a mixup, did not have his magnesium replaced for the past month and when seen in clinic several days ago his magnesium is quite low at 0.7, replaced in infusion and restarted on his home dosing.  Also in clinic he had been started on prednisone and a azithromycin for 5 days.  Patient does not have any previous history of heart problems.  He denies any recent travel, has received his Covid vaccine.  Denies any increased weight or fluid retention.  Patient has insulin-dependent diabetes using 5 units of insulin Lantus at bedtime and is also taking Metformin during the day.    Review of Systems    The 10 point Review of Systems is negative other than noted in the HPI or here.     Past Medical History    I have reviewed this patient's medical history and updated it with pertinent information if needed.   Past Medical History:   Diagnosis Date     COPD (chronic obstructive pulmonary disease) (H)      Hypertension      Thyroid disease        Past Surgical History   I have reviewed this patient's surgical history and updated it with pertinent information if needed.  Past Surgical History:   Procedure Laterality Date     ABDOMEN SURGERY      liver transplant      INJECT EPIDURAL TRANSFORAMINAL LUMBAR / SACRAL SINGLE Left 2018    Procedure: INJECT EPIDURAL TRANSFORAMINAL LUMBAR / SACRAL SINGLE;  Left Transforaminal Lumbar Epidural Steroid Injection  Lumbar 3, Lumbar 4;  Surgeon: Jeni Agarwal MD;  Location:  OR       Social History   I have reviewed this patient's social history and updated it with pertinent information if needed.  Social History     Tobacco Use     Smoking status: Former Smoker     Years: 5.00     Quit date: 2018     Years since quittin.8     Smokeless tobacco: Never Used   Substance Use Topics     Alcohol use: No     Drug use: No       Family History   I have reviewed this patient's family history and updated it with pertinent information if needed.  Family History   Problem Relation Age of Onset     Alcoholism Mother      Cirrhosis Mother      Heart Disease Father      Cirrhosis Brother          Prior to Admission Medications   Prior to Admission Medications   Prescriptions Last Dose Informant Patient Reported? Taking?   ACETAMINOPHEN PO Past Week at Unknown time  Yes Yes   Sig: Take 500-1,000 mg by mouth every 6 hours as needed    B-D U/F 31G X 8 MM insulin pen needle 2021 at pm  No Yes   Sig: USE WITH LANTUS DAILY   Blood Pressure Monitoring (BLOOD PRESSURE CUFF) MISC   No No   Sig: BP cuff. HTN. 99   CONTOUR NEXT TEST test strip   No No   Sig: TESTING 2 TIMES DAILY   LANTUS SOLOSTAR 100 UNIT/ML soln 2021 at am  No Yes   Sig: INJECT 5 UNITS SUB-Q AT BEDTIME   OYSTER SHELL CALCIUM + D3 500-400 MG-UNIT TABS 2021 at am  No Yes   Sig: TAKE ONE TABLET BY MOUTH TWICE A DAY   SPIRIVA HANDIHALER 18 MCG inhaled capsule 2021 at am  No Yes   Sig: INHALE THE CONTENTS OF 1 CAPSULE(18 MCG) INTO THE LUNGS DAILY   albuterol (PROAIR HFA, PROVENTIL HFA, VENTOLIN HFA) 108 (90 BASE) MCG/ACT inhaler 2021 at pm  Yes Yes   Sig: Inhale 2 puffs into the lungs every 4 hours as needed for shortness of breath / dyspnea or wheezing   amLODIPine  (NORVASC) 5 MG tablet 4/7/2021 at am  No Yes   Sig: Take 1 tablet (5 mg) by mouth daily   aspirin 81 MG EC tablet 4/7/2021 at am  Yes Yes   Sig: Take 81 mg by mouth daily   atorvastatin (LIPITOR) 10 MG tablet 4/7/2021 at am  No Yes   Sig: Take 1 tablet (10 mg) by mouth daily   azithromycin (ZITHROMAX) 250 MG tablet 4/7/2021 at 1400  No Yes   Sig: Take 2 tablets (500 mg) by mouth daily for 1 day, THEN 1 tablet (250 mg) daily for 4 days.   blood glucose monitoring (NO BRAND SPECIFIED) meter device kit   No No   Sig: Use to test blood sugar 2 times daily. Please dispense item that is covered by insurance.   cyanocobalamin (VITAMIN B-12) 1000 MCG tablet 4/7/2021 at am  No Yes   Sig: Take 1 tablet (1,000 mcg) by mouth daily   Patient taking differently: Take 1,000 mcg by mouth 2 times daily    cyclobenzaprine (FLEXERIL) 5 MG tablet Past Week at pm  No Yes   Sig: TAKE 1 TO 2 TABLETS BY MOUTH AT NIGHT AS NEEDED   desonide (DESOWEN) 0.05 % external ointment Past Week at Unknown time  No Yes   Sig: Use on the face daily PRN   fluocinonide (LIDEX) 0.05 % external solution 4/7/2021 at Unknown time  No Yes   Sig: APPLY TOPICALLY TO THE AFFECTED SCALP TWICE DAILY   levothyroxine (SYNTHROID/LEVOTHROID) 175 MCG tablet 4/7/2021 at am  No Yes   Sig: Take 1 tablet (175 mcg) by mouth daily   losartan (COZAAR) 25 MG tablet 4/7/2021 at am  No Yes   Sig: Take 1 tablet (25 mg) by mouth daily   magnesium oxide (MAG-OX) 400 MG tablet 4/7/2021 at pm  No Yes   Sig: TAKE TWO TABLETS BY MOUTH TWICE A DAY   magnesium sulfate 4 GM/100ML SOLN infusion Past Week at Unknown time  No Yes   Sig: Inject 100 mLs (4 g) into the vein every 2 hours as needed for magnesium supplementation   metFORMIN (GLUCOPHAGE-XR) 500 MG 24 hr tablet 4/7/2021 at pm  No Yes   Sig: TAKE 4 TABLETS(2000 MG) BY MOUTH DAILY WITH DINNER   Patient taking differently: Take 1,500 mg by mouth daily    metoprolol tartrate (LOPRESSOR) 100 MG tablet 4/7/2021 at am  No Yes   Sig: Take 1  tablet (100 mg) by mouth 2 times daily   omeprazole (PRILOSEC) 20 MG DR capsule Past Week at Unknown time  No Yes   Sig: TAKE 1 CAPSULE BY MOUTH EVERY DAY BEFORE A MEAL   Patient taking differently: Take 20 mg by mouth every other day    predniSONE (DELTASONE) 20 MG tablet 4/7/2021 at 1400  No Yes   Sig: Take 2 tablets (40 mg) by mouth daily for 5 days   tacrolimus (GENERIC EQUIVALENT) 1 MG capsule 4/7/2021 at am  No Yes   Sig: TAKE ONE CAPSULE BY MOUTH EVERY 12 HOURS   tamsulosin (FLOMAX) 0.4 MG capsule 4/7/2021 at pm  No Yes   Sig: Take 1 capsule (0.4 mg) by mouth daily   thin (NO BRAND SPECIFIED) lancets   No No   Sig: Use to test blood sugar 2 times daily.  Please dispense items that are covered by insurance.   traMADol (ULTRAM) 50 MG tablet Past Week at am  No Yes   Sig: Take 1 tablet (50 mg) by mouth 2 times daily   Patient taking differently: Take 50 mg by mouth daily       Facility-Administered Medications: None     Allergies   Allergies   Allergen Reactions     Lisinopril Cough     Spironolactone Other (See Comments)     hyperkalemia     Levaquin [Levofloxacin] Rash     Rash on abdomen, possibly from Levaquin.       Physical Exam   Vital Signs: Temp: 97.7  F (36.5  C) Temp src: Tympanic BP: (!) 151/90 Pulse: 91   Resp: 18 SpO2: 94 % O2 Device: None (Room air)    Weight: 202 lbs 0 oz    General Appearance: Lying on the bed, no obvious distress, not requiring oxygen  Eyes: Pupils equal, reactive, EOM intact  HEENT: Nose mouth and throat unremarkable  Respiratory: Lungs show crackles bilaterally without wheezing or rhonchi  Cardiovascular: Regular rate and rhythm, no murmur heard, no peripheral edema  GI: Scarred, no obvious tenderness nondistended  Lymph/Hematologic: Cervical nodes negative  Genitourinary: Not examined  Skin: No lesions or rashes  Musculoskeletal: Moving arms and legs freely with no issues, joints negative  Neurologic: No focal deficits, cranial nerves normal, did not attempt to  walk  Psychiatric: Mood and affect are normal    Data   Data reviewed today: I reviewed all medications, new labs and imaging results over the last 24 hours. I personally reviewed the chest x-ray image(s) showing Some pulmonary scarring, heart size normal, blunting of the costophrenic junctions bilateral.    Results for orders placed or performed during the hospital encounter of 04/07/21   XR Chest Port 1 View     Status: None    Narrative    PROCEDURE: XR CHEST PORT 1 VW 4/8/2021 12:16 AM    HISTORY: Shortness of breath    COMPARISONS: 4/5/2021.    TECHNIQUE: Single view.    FINDINGS: Heart is mildly enlarged but is stable compared to the prior  exam. No confluent infiltrate is seen although there is some slight  patchy density at the right lung base. There is blunting of the left  costophrenic angle, also seen previously.    Extensive surgical changes are seen in the right upper quadrant.         Impression    IMPRESSION: Interval improvement in the appearance of the chest with  fewer interstitial changes.    GEORGINA HERNÁNDEZ MD   CT Chest Pulmonary Embolism w Contrast     Status: None    Narrative    PROCEDURE INFORMATION:   Exam: CTA Chest With Contrast   Exam date and time: 4/8/2021 1:53 AM   Age: 71 years old   Clinical indication: Abnormal findings; Abnormal diagnostic tests; Elevated   d-dimer; Shortness of breath; Prior surgery; Surgery date: 6+ months; Surgery   type: Liver transplant; Patient HX: Increased SOB, weakness, low heart rate and   general malace over past weak     TECHNIQUE:   Imaging protocol: Computed tomographic angiography of the chest with contrast.   3D rendering (Not supervised by radiologist): MIP and/or 3D reconstructed   images were created by the technologist.   Radiation optimization: All CT scans at this facility use at least one of these   dose optimization techniques: automated exposure control; mA and/or kV   adjustment per patient size (includes targeted exams where dose is  matched to   clinical indication); or iterative reconstruction.   Contrast material: ISOVUE 370; Contrast volume: 100 ml; Contrast route:   INTRAVENOUS (IV);      COMPARISON:   CT CHEST/ABDOMEN/PELVIS W CONTRAST 1/12/2016 8:26 AM     FINDINGS:   Pulmonary arteries: There may be an old embolus within a left lower lobe   artery. No acute pulmonary embolus.   Aorta: Unremarkable. No aortic aneurysm. No aortic dissection.   Veins: Vascular stent noted in the portal vein.     Lungs: Mild bibasilar atelectasis. Mild interstitial pulmonary edema.   Pleural spaces: Unremarkable. No pneumothorax. No pleural effusion.   Heart: Coronary artery calcification.   Mediastinal space: Small hiatal hernia.   Lymph nodes: Unremarkable. No enlarged lymph nodes.     Gallbladder and bile ducts: Prior cholecystectomy.   Spleen: Splenic granulomas noted.   Bones/joints: Severe multilevel degenerative disc disease with vacuum disc   phenomenon.   Soft tissues: Gynecomastia noted.       Impression    IMPRESSION:   No acute pulmonary embolus.    Mild pulmonary edema and bibasal atelectasis noted.    No evidence of bronchopneumonia.    THIS DOCUMENT HAS BEEN ELECTRONICALLY SIGNED BY AWA SHAFER MD   CBC with platelets differential     Status: Abnormal   Result Value Ref Range    WBC 9.7 4.0 - 11.0 10e9/L    RBC Count 3.70 (L) 4.4 - 5.9 10e12/L    Hemoglobin 11.3 (L) 13.3 - 17.7 g/dL    Hematocrit 34.9 (L) 40.0 - 53.0 %    MCV 94 78 - 100 fl    MCH 30.5 26.5 - 33.0 pg    MCHC 32.4 31.5 - 36.5 g/dL    RDW 15.3 (H) 10.0 - 15.0 %    Platelet Count 111 (L) 150 - 450 10e9/L    Diff Method Automated Method     % Neutrophils 88.4 %    % Lymphocytes 8.8 %    % Monocytes 1.8 %    % Eosinophils 0.1 %    % Basophils 0.1 %    % Immature Granulocytes 0.8 %    Absolute Neutrophil 8.6 (H) 1.6 - 8.3 10e9/L    Absolute Lymphocytes 0.9 0.8 - 5.3 10e9/L    Absolute Monocytes 0.2 0.0 - 1.3 10e9/L    Absolute Eosinophils 0.0 0.0 - 0.7 10e9/L    Absolute  Basophils 0.0 0.0 - 0.2 10e9/L    Abs Immature Granulocytes 0.1 0 - 0.4 10e9/L   Comprehensive metabolic panel     Status: Abnormal   Result Value Ref Range    Sodium 130 (L) 134 - 144 mmol/L    Potassium 5.4 (H) 3.5 - 5.1 mmol/L    Chloride 99 98 - 107 mmol/L    Carbon Dioxide 21 21 - 31 mmol/L    Anion Gap 10 3 - 14 mmol/L    Glucose 369 (H) 70 - 105 mg/dL    Urea Nitrogen 40 (H) 7 - 25 mg/dL    Creatinine 1.47 (H) 0.70 - 1.30 mg/dL    GFR Estimate 47 (L) >60 mL/min/[1.73_m2]    GFR Estimate If Black 57 (L) >60 mL/min/[1.73_m2]    Calcium 8.4 (L) 8.6 - 10.3 mg/dL    Bilirubin Total 0.9 0.3 - 1.0 mg/dL    Albumin 3.7 3.5 - 5.7 g/dL    Protein Total 6.7 6.4 - 8.9 g/dL    Alkaline Phosphatase 104 34 - 104 U/L    ALT 36 7 - 52 U/L    AST 36 13 - 39 U/L   Magnesium     Status: Abnormal   Result Value Ref Range    Magnesium 1.6 (L) 1.9 - 2.7 mg/dL   D dimer quantitative     Status: Abnormal   Result Value Ref Range    D Dimer 1.5 (H) 0.0 - 0.50 ug/ml FEU   Troponin GH     Status: None   Result Value Ref Range    Troponin 19.7 <34.0 pg/mL   TSH Reflex GH     Status: None   Result Value Ref Range    TSH Reflex 2.65 0.34 - 5.60 IU/mL   Nt probnp inpatient     Status: Abnormal   Result Value Ref Range    N-Terminal Pro BNP Inpatient 1,320 (H) 0 - 100 pg/mL   Potassium     Status: None   Result Value Ref Range    Potassium 4.5 3.5 - 5.1 mmol/L   Potassium     Status: None   Result Value Ref Range    Potassium 4.5 3.5 - 5.1 mmol/L   Asymptomatic SARS-CoV-2 COVID-19 Virus (Coronavirus) by PCR     Status: None    Specimen: Nasopharyngeal   Result Value Ref Range    SARS-CoV-2 Virus Specimen Source Nasopharyngeal     SARS-CoV-2 PCR Result NEGATIVE     SARS-CoV-2 PCR Comment       Testing was performed using the Xpert Xpress SARS-CoV-2 Assay on the Cepheid Gene-Xpert   Instrument Systems. Additional information about this Emergency Use Authorization (EUA)   assay can be found via the Lab Guide.     Basic metabolic panel      Status: Abnormal   Result Value Ref Range    Sodium 131 (L) 134 - 144 mmol/L    Potassium 4.2 3.5 - 5.1 mmol/L    Chloride 95 (L) 98 - 107 mmol/L    Carbon Dioxide 23 21 - 31 mmol/L    Anion Gap 13 3 - 14 mmol/L    Glucose 247 (H) 70 - 105 mg/dL    Urea Nitrogen 40 (H) 7 - 25 mg/dL    Creatinine 1.47 (H) 0.70 - 1.30 mg/dL    GFR Estimate 47 (L) >60 mL/min/[1.73_m2]    GFR Estimate If Black 57 (L) >60 mL/min/[1.73_m2]    Calcium 8.8 8.6 - 10.3 mg/dL   EKG 12-LEAD, TRACING ONLY     Status: None (Preliminary result)   Result Value Ref Range    Interpretation ECG Click View Image link to view waveform and result

## 2021-04-08 NOTE — PHARMACY-CONSULT NOTE
"Pharmacy: Patient Own Medication Identification    The requirements of Policy 13-03 from the Pharmacy Policy Manual have been met and the patient will be allowed to use their own supply of: tacrolimus 1 mg capsule.    Saba Sanchez RPH has verified the name, dose, route, and directions for each medication. The integrity has been assessed and deemed safe.     The product(s) have been labeled as being inspected by a pharmacist and the medication has been placed in the patient-specific bin in the medication room.    Nursing will remove medication at the appropriately scheduled times and document the administration on the MAR with the designation of \"patient own\".    Nursing to return medication back to patient at time of discharge.     Saba Sanchez RPH ....................  4/8/2021   3:32 PM    "

## 2021-04-09 NOTE — PROGRESS NOTES
SAFETY CHECKLIST  ID Bands and Risk clasps correct and in place (DNR, Fall risk, Allergy, Latex, Limb):  Yes  All Lines Reconciled and labeled correctly: Yes  Whiteboard updated:Yes  Environmental interventions (bed/chair alarm on, call light, side rails, restraints, sitter....): Yes  Verify Tele #: [id number]

## 2021-04-09 NOTE — DISCHARGE SUMMARY
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  No follow up labs or test are needed.             Unresulted Labs Ordered in the Past 30 Days of this Admission     No orders found from 3/8/2021 to 4/8/2021.      These results will be followed up by Dr. Bocanegra    Discharge Disposition   Discharged to home  Condition at discharge: Satisfactory      Hospital Course   Dyspnea on exertion  Presenting with 1 week history of increasing shortness of breath and fatigue  Known history of pulmonary fibrosis secondary to previous treatment of chemotherapy  Just restarted Spiriva, currently on day 4 treatment with azithromycin and prednisone for possible COPD exacerbation  He correlates symptoms with stopping his magnesium and low levels  We will continue home meds, finish out treatment with prednisone and azithromycin.  Replace magnesium  Echocardiogram was performed showing normal EF and function with moderate to severe aortic stenosis noted with a gradient of 40  With replacement of his magnesium, his dyspnea on exertion improved and he was feeling pretty much at baseline at discharge  Will send home today, plan is to follow-up with primary care.  It appears and is notes that they are planning to do outpatient pulmonary testing.  Discharge will continue home dosing of magnesium as well as  restarting and continuing his Spiriva      Essential hypertension  Controlled at home taking Norvasc, Cozaar and metoprolol  Continue home dosing, follow    Hyperkalemia  Potassium in ED elevated 5.4 which is normalized after Lasix and administration of insulin  Continue to monitor    Hypomagnesemia  Chronically low levels, normally on replacement  Due to mixup, had stopped replacement and was critically low several days ago, replaced in infusion and back on oral replacement  Continue to replace, monitor    Hyponatremia  Mildly low at 131  Monitor    Liver replaced by transplant (H)  History of liver transplant 14 years ago due to hep C  Currently asymptomatic, taking twice daily Prograf  Continue Prograf twice daily, outpatient management by transplant team    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  Chronic lung issues secondary to previous chemotherapy versus COPD  Normally on Spiriva which will be restarted at discharge, treat with duo nebs while here  Finishing out course of prednisone and azithromycin  If persistent symptoms, will probably need outpatient pulmonary consult    Mixed hyperlipidemia  Controlled at home taking Lipitor  No change, restart at discharge    Type 2 diabetes mellitus without complication (H)  Blood sugars were slightly elevated on admission and during his hospital stay, likely due to steroid use  Metformin was held due to imaging with contrast that have been ordered.  Patient will be discharged home, can restart his Metformin and will need ongoing monitoring        Consultations This Hospital Stay   None    Code Status   Full Code    Time Spent on this Encounter   I, David Boogie MD, personally saw the patient today and spent less than or equal to 30 minutes discharging this patient.       David Boogie MD  [facility]  [address]  [facility] [address]  Phone: [phone]  Fax:  [phone]  ______________________________________________________________________    Physical Exam   Vital Signs: Temp: 97.6  F (36.4  C) Temp src: Tympanic BP: 134/77 Pulse: 98   Resp: 17 SpO2: 95 % O2 Device: None (Room air)    Weight: 198 lbs 3.2 oz  Constitutional: awake, alert, cooperative, no apparent distress, and appears stated age  Respiratory: No increased work of breathing, good air exchange, clear to auscultation bilaterally, no crackles or wheezing  Cardiovascular: regular rate and rhythm, murmurs include systolic murmur II/VI located at left lower sternal border without radiation and no edema  GI: normal bowel sounds, soft and non-distended       Primary Care Physician   Jarad Bocanegra    Discharge Orders      Reason for your hospital stay    Short of breath,  Low magnesium     Follow-up and recommended labs and tests     Follow up with primary care provider, Jarad Bocanegra, within 7 days for hospital follow- up.  No follow up labs or test are needed.     Activity    Your activity upon discharge: activity as tolerated     Full Code     Diet    Follow this diet upon discharge: Orders Placed This Encounter      Moderate Consistent CHO Diet       Significant Results and Procedures   Most Recent 3 CBC's:  Recent Labs   Lab Test 04/09/21  0610 04/07/21  0025 04/05/21  1158   WBC 8.2 9.7 8.0   HGB 12.4* 11.3* 12.3*   MCV 93 94 93   * 111* 92*     Most Recent 3 BMP's:  Recent Labs   Lab Test 04/09/21  0610 04/08/21  0545 04/08/21  0413 04/07/21  0025 04/07/21  0025    131*  --   --  130*   POTASSIUM 4.3 4.2 4.5   < > 5.4*   CHLORIDE 96* 95*  --   --  99   CO2 30 23  --   --  21   BUN 36* 40*  --   --  40*   CR 1.46* 1.47*  --   --  1.47*   ANIONGAP 8 13  --   --  10   LIT 8.6 8.8  --   --  8.4*   * 247*  --   --  369*    < > = values in this interval not displayed.   ,   Results for orders placed or performed during the hospital encounter of 04/07/21   XR Chest Port 1 View     Narrative    PROCEDURE: XR CHEST PORT 1 VW 4/8/2021 12:16 AM    HISTORY: Shortness of breath    COMPARISONS: 4/5/2021.    TECHNIQUE: Single view.    FINDINGS: Heart is mildly enlarged but is stable compared to the prior  exam. No confluent infiltrate is seen although there is some slight  patchy density at the right lung base. There is blunting of the left  costophrenic angle, also seen previously.    Extensive surgical changes are seen in the right upper quadrant.         Impression    IMPRESSION: Interval improvement in the appearance of the chest with  fewer interstitial changes.    GEORGINA HERNÁNDEZ MD   CT Chest Pulmonary Embolism w Contrast    Narrative    PROCEDURE INFORMATION:   Exam: CTA Chest With Contrast   Exam date and time: 4/8/2021 1:53 AM   Age: 71 years old   Clinical indication: Abnormal findings; Abnormal diagnostic tests; Elevated   d-dimer; Shortness of breath; Prior surgery; Surgery date: 6+ months; Surgery   type: Liver transplant; Patient HX: Increased SOB, weakness, low heart rate and   general malace over past weak     TECHNIQUE:   Imaging protocol: Computed tomographic angiography of the chest with contrast.   3D rendering (Not supervised by radiologist): MIP and/or 3D reconstructed   images were created by the technologist.   Radiation optimization: All CT scans at this facility use at least one of these   dose optimization techniques: automated exposure control; mA and/or kV   adjustment per patient size (includes targeted exams where dose is matched to   clinical indication); or iterative reconstruction.   Contrast material: ISOVUE 370; Contrast volume: 100 ml; Contrast route:   INTRAVENOUS (IV);      COMPARISON:   CT CHEST/ABDOMEN/PELVIS W CONTRAST 1/12/2016 8:26 AM     FINDINGS:   Pulmonary arteries: There may be an old embolus within a left lower lobe   artery. No acute pulmonary embolus.   Aorta: Unremarkable. No aortic aneurysm. No aortic dissection.   Veins: Vascular stent  noted in the portal vein.     Lungs: Mild bibasilar atelectasis. Mild interstitial pulmonary edema.   Pleural spaces: Unremarkable. No pneumothorax. No pleural effusion.   Heart: Coronary artery calcification.   Mediastinal space: Small hiatal hernia.   Lymph nodes: Unremarkable. No enlarged lymph nodes.     Gallbladder and bile ducts: Prior cholecystectomy.   Spleen: Splenic granulomas noted.   Bones/joints: Severe multilevel degenerative disc disease with vacuum disc   phenomenon.   Soft tissues: Gynecomastia noted.       Impression    IMPRESSION:   No acute pulmonary embolus.    Mild pulmonary edema and bibasal atelectasis noted.    No evidence of bronchopneumonia.    THIS DOCUMENT HAS BEEN ELECTRONICALLY SIGNED BY AWA SHAFER MD   Echocardiogram Complete    Narrative    [id number]  [id number]  [id number]  [id number]^PAUL^MAYO^NAN     [facility]  [address] [place] [address]     Name: MARY ELELN MONREAL  MRN: [id number]  : 1950  Study Date: 2021 09:26 AM  Age: 71 yrs  Gender: Male  Patient Location: [facility]  Reason For Study: Dyspnea  Ordering Physician: MAYO MILLER  Performed By: Yajaira Haro RDCS, RVT     BSA: 2.0 m2  Height: 67 in  Weight: 202 lb  HR: 75  BP: 151/90 mmHg  ______________________________________________________________________________  Procedure  Complete Portable Echo Adult. Contrast Definity. Definity (NDC #13982-848-52)  given intravenously. Patient was given 1ml mixture of 1.5ml Definity and 8.5ml  saline. 9 ml wasted. Definity Lot # [id number] .  ______________________________________________________________________________  Interpretation Summary  Severe aortic stenosis is present.  Global and regional left ventricular function is normal with an EF of 55-60%.  Mild pulmonary hypertension is present.  IVC diameter <2.1 cm collapsing >50% with sniff suggests a normal RA pressure  of 3 mmHg.  No pericardial effusion is  present.  Compared to study from 2014, AS has progressed.  ______________________________________________________________________________  Left Ventricle  Global and regional left ventricular function is normal with an EF of 55-60%.  Left ventricular size is normal. Mild concentric wall thickening consistent  with left ventricular hypertrophy is present. Diastolic function not assessed  due to significant mitral annular calcification.     Right Ventricle  The right ventricle is normal size. Global right ventricular function is  normal.     Atria  The right atria appears normal. Severe left atrial enlargement is present.     Mitral Valve  Severe mitral annular calcification is present. Trace mitral insufficiency is  present. The mean mitral valve gradient is 6.0 mmHg. Mild mitral stenosis is  present.     Aortic Valve  Moderate aortic valve calcification is present. Cannot exclude a bicuspid  aortic valve. Trace to mild aortic insufficiency is present. Severe aortic  stenosis is present. The calculated aortic valve are is 0.90 cm^2. The mean  gradient across the aortic valve is40 mmHg. The peak aortic velocity is 3.9  m/sec.     Tricuspid Valve  Mild tricuspid insufficiency is present. The right ventricular systolic  pressure is approximated at 34.2 mmHg plus the right atrial pressure. Mild  pulmonary hypertension is present.     Pulmonic Valve  The pulmonic valve is normal.     Vessels  The thoracic aorta is normal. IVC diameter <2.1 cm collapsing >50% with sniff  suggests a normal RA pressure of 3 mmHg.     Pericardium  No pericardial effusion is present.     ______________________________________________________________________________  MMode/2D Measurements & Calculations  IVSd: 1.4 cm  LVIDd: 4.3 cm  LVIDs: 3.2 cm  LVPWd: 1.2 cm  FS: 26.6 %  LV mass(C)d: 212.8 grams  LV mass(C)dI: 104.8 grams/m2  Ao root diam: 3.6 cm  asc Aorta Diam: 3.5 cm     LVOT diam: 2.2 cm  LVOT area: 3.8 cm2  LA Volume (BP): 90.3 ml  LA  Volume Index (BP): 44.5 ml/m2  RWT: 0.56     Doppler Measurements & Calculations  MV E max edis: 181.0 cm/sec  MV A max edis: 65.1 cm/sec  MV E/A: 2.8  MV max PG: 15.7 mmHg  MV mean P.0 mmHg  MV V2 VTI: 34.4 cm  MVA(VTI): 1.9 cm2  MV dec slope: 1508 cm/sec2  MV dec time: 0.12 sec  Ao V2 max: 362.2 cm/sec  Ao max P.0 mmHg  Ao V2 mean: 273.8 cm/sec  Ao mean P.2 mmHg  Ao V2 VTI: 71.4 cm  JD(I,D): 0.90 cm2  JD(V,D): 0.95 cm2     LV V1 max PG: 3.3 mmHg  LV V1 max: 90.4 cm/sec  LV V1 VTI: 16.9 cm  SV(LVOT): 64.2 ml  SI(LVOT): 31.6 ml/m2  TR max edis: 290.8 cm/sec  TR max P.2 mmHg  AV Edis Ratio (DI): 0.25  JD Index (cm2/m2): 0.44  Medial E/e': 36.2     ______________________________________________________________________________  Report approved by: Rose Price 2021 12:47 PM           *Note: Due to a large number of results and/or encounters for the requested time period, some results have not been displayed. A complete set of results can be found in Results Review.       Discharge Medications   Current Discharge Medication List      CONTINUE these medications which have NOT CHANGED    Details   albuterol (PROAIR HFA, PROVENTIL HFA, VENTOLIN HFA) 108 (90 BASE) MCG/ACT inhaler Inhale 2 puffs into the lungs every 4 hours as needed for shortness of breath / dyspnea or wheezing  Qty: 3 Inhaler, Refills: 1    Associated Diagnoses: Chronic obstructive pulmonary disease, unspecified COPD type (H)      amLODIPine (NORVASC) 5 MG tablet Take 1 tablet (5 mg) by mouth daily  Qty: 90 tablet, Refills: 3    Associated Diagnoses: Essential hypertension      aspirin 81 MG EC tablet Take 81 mg by mouth At Bedtime       atorvastatin (LIPITOR) 10 MG tablet Take 1 tablet (10 mg) by mouth daily  Qty: 90 tablet, Refills: 3    Associated Diagnoses: Type 2 diabetes mellitus without complication, without long-term current use of insulin (H)      azithromycin (ZITHROMAX) 250 MG tablet Take 2 tablets (500 mg) by  mouth daily for 1 day, THEN 1 tablet (250 mg) daily for 4 days.  Qty: 6 tablet, Refills: 0    Associated Diagnoses: COPD exacerbation (H)      B-D U/F 31G X 8 MM insulin pen needle USE WITH LANTUS DAILY  Qty: 100 each, Refills: 3    Associated Diagnoses: Type 2 diabetes mellitus without complication, without long-term current use of insulin (H)      cyanocobalamin (VITAMIN B-12) 1000 MCG tablet Take 1,000 mcg by mouth 2 times daily      cyclobenzaprine (FLEXERIL) 5 MG tablet TAKE 1 TO 2 TABLETS BY MOUTH AT NIGHT AS NEEDED  Qty: 45 tablet, Refills: 3    Associated Diagnoses: Lumbar degenerative disc disease; Lumbar facet arthropathy; Chronic left-sided low back pain with left-sided sciatica      desonide (DESOWEN) 0.05 % external ointment Use on the face daily PRN  Qty: 60 g, Refills: 3    Associated Diagnoses: Psoriasis      !! fluocinonide (LIDEX) 0.05 % external solution Apply topically three times a week to affected scalp      !! fluocinonide (LIDEX) 0.05 % external solution APPLY TOPICALLY TO THE AFFECTED SCALP TWICE DAILY  Qty: 60 mL, Refills: 3    Associated Diagnoses: Psoriasis      LANTUS SOLOSTAR 100 UNIT/ML soln INJECT 5 UNITS SUB-Q AT BEDTIME  Qty: 15 mL, Refills: 3    Associated Diagnoses: Type 2 diabetes mellitus without complication, without long-term current use of insulin (H)      levothyroxine (SYNTHROID/LEVOTHROID) 175 MCG tablet Take 1 tablet (175 mcg) by mouth daily  Qty: 90 tablet, Refills: 3    Associated Diagnoses: Acquired hypothyroidism      losartan (COZAAR) 25 MG tablet Take 1 tablet (25 mg) by mouth daily  Qty: 90 tablet, Refills: 3    Associated Diagnoses: Essential hypertension      magnesium oxide (MAG-OX) 400 MG tablet TAKE TWO TABLETS BY MOUTH TWICE A DAY  Qty: 120 tablet, Refills: 11    Associated Diagnoses: PTLD (post-transplant lymphoproliferative disorder) (H); Liver replaced by transplant (H)      metFORMIN (GLUCOPHAGE-XR) 500 MG 24 hr tablet TAKE 4 TABLETS(2000 MG) BY MOUTH  DAILY WITH DINNER  Qty: 360 tablet, Refills: 3    Associated Diagnoses: Type 2 diabetes mellitus without complication, without long-term current use of insulin (H)      metoprolol tartrate (LOPRESSOR) 100 MG tablet Take 1 tablet (100 mg) by mouth 2 times daily  Qty: 180 tablet, Refills: 3    Associated Diagnoses: Liver replaced by transplant (H)      omeprazole (PRILOSEC) 20 MG DR capsule Take 20 mg by mouth every other day      OYSTER SHELL CALCIUM + D3 500-400 MG-UNIT TABS TAKE ONE TABLET BY MOUTH TWICE A DAY  Qty: 180 tablet, Refills: 1    Associated Diagnoses: Liver replaced by transplant (H)      predniSONE (DELTASONE) 20 MG tablet Take 2 tablets (40 mg) by mouth daily for 5 days  Qty: 10 tablet, Refills: 0    Associated Diagnoses: COPD exacerbation (H)      SPIRIVA HANDIHALER 18 MCG inhaled capsule INHALE THE CONTENTS OF 1 CAPSULE(18 MCG) INTO THE LUNGS DAILY  Qty: 90 capsule, Refills: 0    Associated Diagnoses: Chronic obstructive pulmonary disease, unspecified COPD type (H)      tacrolimus (GENERIC EQUIVALENT) 1 MG capsule TAKE ONE CAPSULE BY MOUTH EVERY 12 HOURS  Qty: 60 capsule, Refills: 11    Associated Diagnoses: Liver replaced by transplant (H)      tamsulosin (FLOMAX) 0.4 MG capsule Take 1 capsule (0.4 mg) by mouth daily  Qty: 90 capsule, Refills: 3    Associated Diagnoses: Benign prostatic hyperplasia with lower urinary tract symptoms, symptom details unspecified      traMADol (ULTRAM) 50 MG tablet Take 1 tablet (50 mg) by mouth 2 times daily  Qty: 60 tablet, Refills: 5    Associated Diagnoses: Spinal stenosis of lumbar region, unspecified whether neurogenic claudication present; Lumbar foraminal stenosis; Lumbar facet arthropathy      acetaminophen (TYLENOL) 500 MG tablet Take 500 mg by mouth daily as needed (headache or back pain)      blood glucose monitoring (NO BRAND SPECIFIED) meter device kit Use to test blood sugar 2 times daily. Please dispense item that is covered by insurance.  Qty: 1 kit,  Refills: 11    Associated Diagnoses: Type 2 diabetes mellitus without complication, without long-term current use of insulin (H)      Blood Pressure Monitoring (BLOOD PRESSURE CUFF) MISC BP cuff. HTN. 99  Qty: 1 each, Refills: 11    Associated Diagnoses: Essential hypertension      CONTOUR NEXT TEST test strip TESTING 2 TIMES DAILY  Qty: 200 strip, Refills: 11    Associated Diagnoses: Type 2 diabetes mellitus without complication, without long-term current use of insulin (H)      thin (NO BRAND SPECIFIED) lancets Use to test blood sugar 2 times daily.  Please dispense items that are covered by insurance.  Qty: 200 each, Refills: 11    Associated Diagnoses: Type 2 diabetes mellitus without complication, without long-term current use of insulin (H)       !! - Potential duplicate medications found. Please discuss with provider.      STOP taking these medications       magnesium sulfate 4 GM/100ML SOLN infusion Comments:   Reason for Stopping:             Allergies   Allergies   Allergen Reactions     Lisinopril Cough     Spironolactone Other (See Comments)     hyperkalemia     Levaquin [Levofloxacin] Rash     Rash on abdomen, possibly from Levaquin.

## 2021-04-09 NOTE — PLAN OF CARE
Patient was pleasant. Vitals are stable. Patient had a blood sugar of 348 at bedtime, 3 units of insulin was given for coverage. Patient stated he feels better and did not have any complaints. Will continue to monitor.  Randy Mills RN on 4/9/2021 at 1:04 AM

## 2021-04-09 NOTE — PROGRESS NOTES
SAFETY CHECKLIST  ID Bands and Risk clasps correct and in place (DNR, Fall risk, Allergy, Latex, Limb):  Yes  All Lines Reconciled and labeled correctly: Yes  Whiteboard updated:Yes  Environmental interventions (bed/chair alarm on, call light, side rails, restraints, sitter....): Yes  Verify Tele #:3

## 2021-04-09 NOTE — PHARMACY - DISCHARGE MEDICATION RECONCILIATION AND EDUCATION
Pharmacy:  Discharge Counseling and Medication Reconciliation    Ricki Sharp  [address] [place] [address]  [phone] (home)   71 year old male  PCP: Jarad Bocanegra    Allergies: Lisinopril, Spironolactone, and Levaquin [levofloxacin]    Discharge Counseling:    Pharmacist met with patient (and/or family) today to review the medication portion of the After Visit Summary (with an emphasis on NEW medications) and to address patient's questions/concerns.    Summary of Education: patient did not have any new medications, no education provided.  Patient was told to STOP home azithromycin (completed course here) and nursing to return his home tacrolimus.  Per wife, his rx for magnesium came in the mail yesterday, has an adequate supply at home.    Materials Provided:  MedCounselor sheets printed from Clinical Pharmacology on: NA    Discharge Medication Reconciliation:    It has been determined that the patient has an adequate supply of medications available or which can be obtained from the patient's preferred pharmacy.  Thank you for the consult.    Juliet Canchola RPH........April 9, 2021 11:11 AM

## 2021-04-09 NOTE — ASSESSMENT & PLAN NOTE
Blood sugars were slightly elevated on admission and during his hospital stay, likely due to steroid use  Metformin was held due to imaging with contrast that have been ordered.  Patient will be discharged home, can restart his Metformin and will need ongoing monitoring

## 2021-04-09 NOTE — PROGRESS NOTES
[facility] DISCHARGE NOTE    Patient discharged to home at 11:56 AM via wheel chair. Accompanied by spouse and staff. Discharge instructions reviewed with patient, opportunity offered to ask questions. Patient refused to have blood glucose checked prior to discharging. He states he will eat lunch at home and check his blood glucose them. Prescriptions sent to patients preferred pharmacy. All belongings sent with patient. Tacrolimus returned to patient.    CAROL FLORES RN

## 2021-04-12 NOTE — TELEPHONE ENCOUNTER
Clinic Care Coordination Contact  [facility]/Voicemail       Clinical Data: Care Coordinator Outreach  Outreach attempted x 1.  Left message on patient's voicemail with call back information and requested return call.  Plan: Care Coordinator will attempt again.  Pauline Jimenez RN on 4/12/2021 at 9:50 AM

## 2021-04-13 NOTE — TELEPHONE ENCOUNTER
Transitional Care Management Phone Call    Summary of hospitalization:  [facility] discharge summary reviewed    DISCHARGE DIAGNOSIS: Short of breath, low magnesium    DATE OF DISCHARGE: 04-    Diagnostic Tests/Treatments reviewed.  Follow up needed: Lab check, magnesium    Post Discharge Medication Reconciliation: discharge medications reconciled, continue medications without change.    Medications reviewed by: by myself    Problems taking medications regularly:  None    Problems adhering to non-medication therapy:  None    Other Healthcare Providers Involved in Patient s Care:         None     Update since discharge: improved. Patient participating in physical therapy. They do question if his magnesium should be checked prior to 4-19-21, due to critical low of 0.7.     Plan of care communicated with patient and family, wife, Fide    Just a friendly reminder that you appointment is     Next 5 appointments (look out 90 days)    Apr 19, 2021 11:20 AM  SHORT with Jarad Bocanegra MD  [facility] ([facility] ) [address]  [place] [id number] [phone]      .  We encourage you to keep this appointment.    Please remember to bring all of your pills in their bottles (including any vitamins or over the counter pills) with you to your appointment.   The patient indicates understanding of these issues and agrees with the plan of care.   Yes, plans to follow plan of care and keep the scheduled appointment.    Was the patient contacted within the 2 business days or other approved timeframe?  Yes     Was the Medication reconciliation and management done since the patient was discharged? Yes    Pauline Jimenez RN  4/13/2021 2:44 PM

## 2021-04-13 NOTE — TELEPHONE ENCOUNTER
Magnesium recheck ordered. Patient will call and set up lab appointment before weekend to be on safe side. Pauline Jimenez RN on 4/13/2021 at 4:11 PM

## 2021-04-15 NOTE — TELEPHONE ENCOUNTER
"Patient name and date of birth verified. After verification, patient's wife stated he went into the ED with a magnesium of 1.6. Pt states he feels \"low.\"  They are asking \"how bad does he have to feel to come in and get magnesium?\" They are also worried he would have to come in on the weekend. Pt states he feels like he needs an infusion today or tomorrow.   They cannot find a sustained release magnesium, she said there are not any in town... at least not any on the shelves.         Jennifer Mccall, NOEMÍ 4/15/2021 3:48 PM     "

## 2021-04-15 NOTE — TELEPHONE ENCOUNTER
Would like to discuss pt's low magnesium.  He was in the hospital because of it.  Please call        Joe Casey on 4/15/2021 at 3:31 PM

## 2021-04-15 NOTE — TELEPHONE ENCOUNTER
It would be best and more stable to supplement orally.      ICD-10-CM    1. Hypomagnesemia  E83.42 Magnesium Cl-Calcium Carbonate (SLOW-MAG) 71.5-119 MG TBEC     Magnesium      Orders Placed This Encounter   Medications     Magnesium Cl-Calcium Carbonate (SLOW-MAG) 71.5-119 MG TBEC     Sig: Take 2 tablets by mouth 2 times daily     Dispense:  180 tablet     Refill:  3      -- Continue magnesium oxide   -- Start Slow-Mag supplement   -- Recheck mag level in 1 week    Signed, Jarad Bocanegra MD, FAAP, FACP  Internal Medicine & Pediatrics

## 2021-04-19 PROBLEM — J44.9 COPD, SEVERE (H): Status: ACTIVE | Noted: 2021-01-01

## 2021-04-19 PROBLEM — R06.09 DYSPNEA ON EXERTION: Status: RESOLVED | Noted: 2021-01-01 | Resolved: 2021-01-01

## 2021-04-19 PROBLEM — I35.0 AORTIC VALVE STENOSIS, ETIOLOGY OF CARDIAC VALVE DISEASE UNSPECIFIED: Status: ACTIVE | Noted: 2021-01-01

## 2021-04-19 PROBLEM — E87.1 HYPOSMOLALITY SYNDROME: Status: RESOLVED | Noted: 2021-01-01 | Resolved: 2021-01-01

## 2021-04-19 PROBLEM — I50.9 ACUTE CONGESTIVE HEART FAILURE, UNSPECIFIED HEART FAILURE TYPE (H): Status: RESOLVED | Noted: 2021-01-01 | Resolved: 2021-01-01

## 2021-04-19 PROBLEM — I35.0 AORTIC VALVE STENOSIS, ETIOLOGY OF CARDIAC VALVE DISEASE UNSPECIFIED: Chronic | Status: ACTIVE | Noted: 2021-01-01

## 2021-04-19 PROBLEM — J44.9 COPD, SEVERE (H): Chronic | Status: ACTIVE | Noted: 2021-01-01

## 2021-04-19 PROBLEM — E87.1 HYPONATREMIA: Status: RESOLVED | Noted: 2021-01-01 | Resolved: 2021-01-01

## 2021-04-19 PROBLEM — I10 ESSENTIAL HYPERTENSION, MALIGNANT: Status: RESOLVED | Noted: 2021-01-01 | Resolved: 2021-01-01

## 2021-04-19 PROBLEM — Z11.52 ENCOUNTER FOR SCREENING LABORATORY TESTING FOR SEVERE ACUTE RESPIRATORY SYNDROME CORONAVIRUS 2 (SARS-COV-2): Status: RESOLVED | Noted: 2021-01-01 | Resolved: 2021-01-01

## 2021-04-19 PROBLEM — N18.31 CHRONIC KIDNEY DISEASE, STAGE 3A (H): Chronic | Status: ACTIVE | Noted: 2021-01-01

## 2021-04-19 PROBLEM — E87.5 HYPERKALEMIA: Status: RESOLVED | Noted: 2021-01-01 | Resolved: 2021-01-01

## 2021-04-19 PROBLEM — N18.31 CHRONIC KIDNEY DISEASE, STAGE 3A (H): Status: ACTIVE | Noted: 2021-01-01

## 2021-04-19 PROBLEM — E87.5 HYPERPOTASSEMIA: Status: RESOLVED | Noted: 2021-01-01 | Resolved: 2021-01-01

## 2021-04-19 NOTE — NURSING NOTE
"Patient presents to the clinic today for a hospital follow up.  Shabana Mansfield LPN 4/19/2021   11:23 AM    Chief Complaint   Patient presents with     Hospital F/U       Initial /76 (BP Location: Right arm, Patient Position: Sitting, Cuff Size: Adult Regular)   Pulse 79   Temp 97.7  F (36.5  C) (Tympanic)   Resp 16   Ht 1.702 m (5' 7.01\")   Wt 92.6 kg (204 lb 3.2 oz)   SpO2 96%   BMI 31.97 kg/m   Estimated body mass index is 31.97 kg/m  as calculated from the following:    Height as of this encounter: 1.702 m (5' 7.01\").    Weight as of this encounter: 92.6 kg (204 lb 3.2 oz).  Medication Reconciliation: complete  Shabana Mansfield LPN    "

## 2021-04-19 NOTE — PROGRESS NOTES
"Subjective   Ricki Sharp is a 71 year old male who presents for hospital discharge follow-up.  He is starting to feel better after his hospitalization with magnesium infusions.  Over the last several days he has been able to start the Slow-Mag.  It was difficult to find.  His wife wants to know more about his echocardiogram report.  She wants to know if it is time for him to see the lung specialist again.    Hospital: [facility]  Date of discharge: 4/9/21  Date of post discharge contact: 4/21/21    Objective   Vitals: /76 (BP Location: Right arm, Patient Position: Sitting, Cuff Size: Adult Regular)   Pulse 79   Temp 97.7  F (36.5  C) (Tympanic)   Resp 16   Ht 1.702 m (5' 7.01\")   Wt 92.6 kg (204 lb 3.2 oz)   SpO2 96%   BMI 31.97 kg/m      Review and Analysis of Data   I personally reviewed the following:  External notes: Yes  Results: Yes  Use of an independent historian: Yes  Independent review of a test performed by another physician: No  Discussion of management with another physician: No  High risk of morbidity from additional diagnostic testing and/or treatment.    Assessment & Plan   1. Hospital discharge follow-up  2. Hypomagnesemia  His magnesium level has improved.  Oral replenishment is our goal in order to maintain his level as IV magnesium does not last in the system very long, 12 hours or so.  I wonder if his hypomagnesemia is related to his immunosuppressive therapy or history of malignancy or transplant.  We will look into other etiologies such as parathyroid abnormalities as outlined below.    - Magnesium Cl-Calcium Carbonate (SLOW-MAG) 71.5-119 MG TBEC; Take 2 tablets by mouth 2 times daily  Dispense: 180 tablet; Refill: 3  - Parathyroid Hormone Intact; Future  - PTH Related Peptide Test; Future  - Magnesium; Future  - Calcium ionized; Future  - Vitamin D Total GH; Future  - Parathyroid Hormone Intact  - PTH Related Peptide Test  - Magnesium  - Calcium ionized  - Vitamin D Total " GH    3. Severe aortic stenosis  I recommend a cardiology consult with Dr. Leslie next to discuss the potential for TAVR versus open heart surgery.  - CARDIOLOGY EVAL ADULT REFERRAL; Future    4. COPD, severe (H)  We discussed the possibility of return to pulmonary medicine but decided to start with the cardiology consult.  Most likely his COPD has worsened in the last several years.  - CARDIOLOGY EVAL ADULT REFERRAL; Future    5. Body mass index (BMI) 31.0-31.9, adult   - Vitamin D Total GH; Future  - Vitamin D Total GH    6. Chronic kidney disease, stage 3a  At goal, no change.    7. 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      Mr. Sharp was recently hospitalized for hypomagnesemia, appears to be doing well since discharge.  Discharge summary and recommendations for outpatient provider are reviewed. Based on what occurred in the visit today:  Previous medication(s) were discontinued or altered? No  Previous medication(s) were suspended pending consultation? No  New medication(s) started? No     -- Medication reconciliation and management was performed today    Signed, Jarad Bocanegra MD, FAAP, FACP  Internal Medicine & Pediatrics    Billing:   Type of Medical Decision Making Face-to-Face Visit within 7 Days Face-to-Face Visit within 14 days   Moderate Complexity [phone] [id number]   High Complexity [phone] [id number]

## 2021-04-20 NOTE — TELEPHONE ENCOUNTER
Call to Brendan and Charlotte.  Charlotte is concerned about Brendan's magnesium levels.  He was hospitalized in [place] and was given IV mag which helped his shortness of breath.  When he was hospitalized he had a heart echo:     terpretation Summary  Severe aortic stenosis is present.  Global and regional left ventricular function is normal with an EF of 55-60%.  Mild pulmonary hypertension is present.  IVC diameter <2.1 cm collapsing >50% with sniff suggests a normal RA pressure  of 3 mmHg.  No pericardial effusion is present.  Compared to study from 2014, AS has progressed.  ____________________________________\    Brendan's magnesium is back down to 1.3.  Dr. Bocanegra increased his oral mag.  Charlotte asking if Brendan should see an endocrinologist, told her if Dr. Bocanegra thinks it might help to go for it.  Has upcoming appt w/ a cardiologist.  Told Fide to work on increasing dietary mag.

## 2021-04-23 NOTE — TELEPHONE ENCOUNTER
Actually he can take both.  If the level is staying up, we'd try weaning off of the short acting magnesium. He can stop the other calcium supplement for now.  That 4.7 is ionized calcium, which is normal. Safest treatment for constipation is MiraLax.    Signed, Jarad Bocanegra MD, FAAP, FACP  Internal Medicine & Pediatrics

## 2021-04-26 NOTE — TELEPHONE ENCOUNTER
[facility] sent Rx request for the following:      Requested Prescriptions   Pending Prescriptions Disp Refills   traMADol (ULTRAM) 50 MG tablet [Pharmacy Med Name: TRAMADOL 50MG TABLETS] 60 tablet     Sig: TAKE 1 TABLET(50 MG) BY MOUTH TWICE DAILY   Last Prescription Date:   8/25/20  Last Fill Qty/Refills:         60, R-5    Last Office Visit:              4/19/21   Future Office visit:           None  Routing refill request to provider for review/approval because:  Drug not on the [facility], [facility] or [facility] refill protocol or controlled substance    Unable to complete prescription refill per RN Medication Refill Policy. Pauline Goldstein RN .............. 4/26/2021  9:39 AM

## 2021-05-04 NOTE — TELEPHONE ENCOUNTER
Please call Mr. Sharp.  Magnesium level still very low.      ICD-10-CM    1. Hypomagnesemia  E83.42 Magnesium Cl-Calcium Carbonate (SLOW-MAG) 71.5-119 MG TBEC     NEPHROLOGY ADULT REFERRAL      Orders Placed This Encounter   Medications     Magnesium Cl-Calcium Carbonate (SLOW-MAG) 71.5-119 MG TBEC     Sig: Take 3 tablets by mouth 4 times daily     Dispense:  180 tablet     Refill:  3      -- Increase Slow-Mag to 3 tablets 4x/day   -- Schedule Nephrology consult   -- If a return of very low mag symptoms (breathing problems, muscle weakness, etc) will need to be seen for infusion    SignedJarad MD, FAAP, FACP  Internal Medicine & Pediatrics

## 2021-05-04 NOTE — TELEPHONE ENCOUNTER
Writer spoke with patient who verbalized understanding of orders, with readback.  Allison Constantino LPN on 5/4/2021 at 3:11 PM

## 2021-05-05 NOTE — TELEPHONE ENCOUNTER
Fide is trying to schedule w/ nephrology here, can't get in until August.    Message to Dr. Neely to see if he thinks other providers (endocrine, cardiology) Bill is seeing locally will be able to address low mag situation of if we need to overbook to get in to see neprology sooner.

## 2021-05-05 NOTE — TELEPHONE ENCOUNTER
Call from Fide.  Brendan is still having hypomagnesemia.  He is now taking Slow mag 3 tabs qid and mag ox 400 mg 2 tabs bid.  Brendan's PCP is getting frustrated about knowing how to manage this. He has made referrals to other providers  Is set up to see a endocrinologist 5/26, a cardiologist next week. He is also going to see a nephrologist - is making an appt.  I suggested she follow thru w/ these appt's and see what they advise.

## 2021-05-05 NOTE — TELEPHONE ENCOUNTER
Please see my chart message patient was wondering if this could be addressed today due to the level of his magnesium.   Estrellita Mejia LPN........................5/5/2021  9:24 AM

## 2021-05-05 NOTE — TELEPHONE ENCOUNTER
Patient Call: wife fide or Brendan;  Really wants to see a nephrologist asap  Fide #[phone]   Bill # [phone]  They also have a referral from Dr. Bocanegra to see a Nephrologist       Call back needed? Yes    Return Call Needed  Same as documented in contacts section  When to return call?: Same day: [facility]

## 2021-05-06 NOTE — TELEPHONE ENCOUNTER
Infusions only last a short time in the body.  Oral is the best replacement.  If he has symptoms like before, yes he would need the quick fix.      Signed, Jaard Bocanegra MD, FAAP, FACP  Internal Medicine & Pediatrics

## 2021-05-06 NOTE — TELEPHONE ENCOUNTER
RECORDS RECEIVED FROM: internal    DATE RECEIVED: 5.26.21   NOTES (FOR ALL VISITS) STATUS DETAILS   OFFICE NOTES from referring provider internal  Jarad Bocanegra   OFFICE NOTES from other specialist na    ED NOTES internal  4.7.21 maria e SAM     OPERATIVE REPORT  (thyroid, pituitary, adrenal, parathyroid) na    MEDICATION LIST internal     IMAGING      DEXASCAN na    MRI (BRAIN) na    XR (Chest) internal  4.8.21, 4.5.21    CT (HEAD/NECK/CHEST/ABDOMEN) internal  4.8.21    NUCLEAR  na    ULTRASOUND (HEAD/NECK) na    LABS     DIABETES: HBGA1C, CREATININE, FASTING LIPIDS, MICROALBUMIN URINE, POTASSIUM, TSH, T4    THYROID: TSH, T4, CBC, THYRODLONULIN, TOTAL T3, FREE T4, CALCITONIN, CEA internal  Cbc- 4.9.21   HBGA1C 4.5.21   LIPIDS 8/4/20   Parathyroid Hormone Intact 4.19.21   Potassium 4/8/21   TSH 4/7/21   Vitamin D Total GH 4/19/21

## 2021-05-06 NOTE — TELEPHONE ENCOUNTER
Dr. Leslie could check the magnesium again, that'd be fine.  He might be able to get in with Dr. Platt sooner with Janki.    Signed, Jarad Bocanegra MD, FAAP, FACP  Internal Medicine & Pediatrics

## 2021-05-11 NOTE — TELEPHONE ENCOUNTER
ACC Review   Please call  Ricki FRANCE Sharp.     -- Perform PHQ-9 and BARAK-7 over the telephone   -- Schedule an office visit with Dr. Bocanegra for controlled substance agreement     Signed, Jarad Bocanegra MD, FAAP, FACP  Internal Medicine & Pediatrics

## 2021-05-11 NOTE — LETTER
May 17, 2021      Ricki Sharp  [address] [place] [address]      Your healthcare team cares about your health. To provide you with the best care,   we have reviewed your chart and based on our findings, we see that you are due to:     - OTHER FOLLOW UP:  Office Visit- Controlled Substance Agreement    If you have already completed these items, please contact the clinic via phone or   Mychart so your care team can review and update your records. Thank you for   choosing [facility] for your healthcare needs. For any questions,   concerns, or to schedule an appointment please contact the clinic.       Healthy Regards,      Your [facility] Care Team          Enclosure: [id number] and PHQ9

## 2021-05-13 PROBLEM — Z85.71 HISTORY OF HODGKIN'S LYMPHOMA: Status: ACTIVE | Noted: 2021-01-01

## 2021-05-13 PROBLEM — Z86.19 HISTORY OF HEPATITIS C: Status: ACTIVE | Noted: 2021-01-01

## 2021-05-13 PROBLEM — R06.09 DOE (DYSPNEA ON EXERTION): Status: ACTIVE | Noted: 2021-01-01

## 2021-05-13 PROBLEM — Z87.891 HISTORY OF TOBACCO ABUSE: Status: ACTIVE | Noted: 2021-01-01

## 2021-05-13 PROBLEM — Q23.81 BICUSPID AORTIC VALVE: Status: ACTIVE | Noted: 2021-01-01

## 2021-05-13 PROBLEM — Z94.4 HX OF LIVER TRANSPLANT (H): Status: ACTIVE | Noted: 2021-01-01

## 2021-05-13 NOTE — PATIENT INSTRUCTIONS
You were seen by  Elias Leslie,        1. A referral to TAVR clinic has been placed. You will be contacted by that facility to schedule an appointment.        2. Laboratory blood work has been ordered.  You will be notified by phone call or [employer] message when the results are available.      3. A referral to Pulmonary medicine has been placed. You will be contacted by that facility to schedule an appointment.       You will follow up with [facility] after TAVR, sooner if needed.       Please call the cardiology office with problems, questions, or concerns at [phone].    If you experience chest pain, chest pressure, chest tightness, shortness of breath, fainting, lightheadedness, nausea, vomiting, or other concerning symptoms, please report to the Emergency Department or call 911. These symptoms may be emergent, and best treated in the Emergency Department.     Cardiology Nurses  RUFUS Villalpando, NOEMÍ PÉREZ, NOEMÍ  [facility] ([address])  [phone]

## 2021-05-13 NOTE — PROGRESS NOTES
[facility] HEART CARE   CARDIOLOGY CONSULT     Ricki Sharp   1950  [id number]    Jarad Bocanegra     Chief Complaint   Patient presents with     Consult For     aortic valve stenosis, etiolgy of cardiac valve disease unspecified, COPD-severe          HPI:   Mr. Sharp is a 71-year-old gentleman who is being seen by cardiology for severe aortic stenosis.  He states he has had a murmur all his life and recent echocardiogram with these findings.  He has been dyspneic on exertion.    He has been dyspneic on exertion for multiple years.  He states this accelerated recently when he was found to have a magnesium of 0.7 on 4/5/2021.  He has been on magnesium for 12 years but was recently discontinued.  Now that he is back on magnesium, his shortness of breath has improved but continues.  There is concern for bicuspid aortic valve.  He has a known history of a murmur which has been followed up periodically.  The last echo we have on file was 2014.  More recently, he had an echocardiogram on 4/7/2021.  It showed an EF of 55-60% with concerns for severe aortic stenosis, progressing since 2014.  Most recently, on 4/7/2021, the calculated aortic valve area was 0.90 cm^2. The mean gradient across the aortic valve was 40 mmHg. The peak aortic velocity was 3.9 m/sec.    As mentioned, has been dyspneic on exertion but has denied chest pain, or significant peripheral edema.  He does have a history of severe COPD, quitting smoking in approximately 2018.  There is also reported history of pulmonary fibrosis.  He has been following up with pulmonary in the [facility] but has not been more recently.  [place]/2021, at his request.    He has a history of a liver transplant with hepatitis C.  He states he was treated for hepatitis C.  He had a liver transplant in 2008 and has been following with [facility] since.  He prefers to get his care at the [facility]  [place].    He also has a history of diabetes an A1c of 7.2% in 4/5/2021.  He has a history of CKD stage III.  He has history of hyperlipidemia currently on Lipitor.    IMAGING RESULTS:   ECHO on 4/7/21:  Severe aortic stenosis is present.  The calculated aortic valve are is 0.90 cm^2. The mean gradient across the aortic valve is 40 mmHg. The peak aortic velocity is 3.9 m/sec.  Global and regional left ventricular function is normal with an EF of 55-60%.  Mild pulmonary hypertension is present.  IVC diameter <2.1 cm collapsing >50% with sniff suggests a normal RA pressure of 3 mmHg.  No pericardial effusion is present.  Compared to study from 2014, AS has progressed.  Mild mitral stenosis is present.    Stress test on 12/10/2019:     The nuclear stress test is negative for inducible myocardial ischemia or infarction.     The left ventricular ejection fraction at rest is 70%.  The left ventricular ejection fraction at stress is 66%.     A prior study was conducted on 3/14/2016.  This study has no change when compared with the prior study.      CURRENT MEDICATIONS:   Prior to Admission medications    Medication Sig Start Date End Date Taking? Authorizing Provider   acetaminophen (TYLENOL) 500 MG tablet Take 500 mg by mouth daily as needed (headache or back pain)    Unknown, Entered By History   albuterol (PROAIR HFA, PROVENTIL HFA, VENTOLIN HFA) 108 (90 BASE) MCG/ACT inhaler Inhale 2 puffs into the lungs every 4 hours as needed for shortness of breath / dyspnea or wheezing 3/23/16   Ricki Wright MD   amLODIPine (NORVASC) 5 MG tablet Take 1 tablet (5 mg) by mouth daily 8/25/20   Jarad Bocanegra MD   aspirin 81 MG EC tablet Take 81 mg by mouth At Bedtime     Reported, Patient   atorvastatin (LIPITOR) 10 MG tablet Take 1 tablet (10 mg) by mouth daily 8/25/20   Jarad Bocanegra MD   B-D U/F 31G X 8 MM insulin pen needle USE WITH LANTUS DAILY 10/25/19   Jarad Bocanegra MD   blood glucose  monitoring (NO BRAND SPECIFIED) meter device kit Use to test blood sugar 2 times daily. Please dispense item that is covered by insurance. 6/10/19   Jarad Bocanegra MD   Blood Pressure Monitoring (BLOOD PRESSURE CUFF) MISC BP cuff. HTN. 99 10/19/18   Jarad Bocanegra MD   CONTOUR NEXT TEST test strip TESTING 2 TIMES DAILY 9/23/20   Jarad Bocanegra MD   cyanocobalamin (VITAMIN B-12) 1000 MCG tablet Take 1,000 mcg by mouth 2 times daily    Unknown, Entered By History   cyclobenzaprine (FLEXERIL) 5 MG tablet TAKE 1 TO 2 TABLETS BY MOUTH AT NIGHT AS NEEDED 10/26/20   Jarad Bocanegra MD   desonide (DESOWEN) 0.05 % external ointment Use on the face daily PRN 1/17/20   Jarad Bocanegra MD   fluocinonide (LIDEX) 0.05 % external solution Apply topically three times a week to affected scalp    Unknown, Entered By History   LANTUS SOLOSTAR 100 UNIT/ML soln INJECT 5 UNITS SUB-Q AT BEDTIME 8/25/20   Jarad Bocanegra MD   levothyroxine (SYNTHROID/LEVOTHROID) 175 MCG tablet Take 1 tablet (175 mcg) by mouth daily 8/25/20   Jarad Bocanegra MD   losartan (COZAAR) 25 MG tablet Take 1 tablet (25 mg) by mouth daily 8/25/20   Jarad Bocanegra MD   Magnesium Cl-Calcium Carbonate (SLOW-MAG) 71.5-119 MG TBEC Take 3 tablets by mouth 4 times daily 5/4/21   Jarad Bocanegra MD   metFORMIN (GLUCOPHAGE-XR) 500 MG 24 hr tablet TAKE 4 TABLETS(2000 MG) BY MOUTH DAILY WITH DINNER 12/23/20   Jarad Bocanegra MD   metoprolol tartrate (LOPRESSOR) 100 MG tablet Take 1 tablet (100 mg) by mouth 2 times daily 8/25/20   Jarad Bocanegra MD   omeprazole (PRILOSEC) 20 MG DR capsule Take 20 mg by mouth every other day    Unknown, Entered By History   OYSTER SHELL CALCIUM + D3 500-400 MG-UNIT TABS TAKE ONE TABLET BY MOUTH TWICE A DAY 3/18/21   Jarad Bocanegra MD   SPIRIVA HANDIHALER 18 MCG inhaled capsule INHALE THE CONTENTS OF 1 CAPSULE(18 MCG) INTO THE LUNGS DAILY 12/16/20   Daljit  Jarad Greenberg MD   tacrolimus (GENERIC EQUIVALENT) 1 MG capsule TAKE ONE CAPSULE BY MOUTH EVERY 12 HOURS 4/15/21   Mike Gray MD   tamsulosin (FLOMAX) 0.4 MG capsule Take 1 capsule (0.4 mg) by mouth daily 8/25/20   Jarad Bocanegra MD   thin (NO BRAND SPECIFIED) lancets Use to test blood sugar 2 times daily.  Please dispense items that are covered by insurance. 6/10/19   Jarad Bocanegra MD   traMADol (ULTRAM) 50 MG tablet TAKE 1 TABLET(50 MG) BY MOUTH TWICE DAILY 4/26/21   Jarad Bocanegra MD       ALLERGIES:   Allergies   Allergen Reactions     Lisinopril Cough     Spironolactone Other (See Comments)     hyperkalemia     Levaquin [Levofloxacin] Rash     Rash on abdomen, possibly from Levaquin.        PAST MEDICAL HISTORY:   Past Medical History:   Diagnosis Date     COPD (chronic obstructive pulmonary disease) (H)      Hypertension      Thyroid disease         PAST SURGICAL HISTORY:   Past Surgical History:   Procedure Laterality Date     ABDOMEN SURGERY      liver transplant     INJECT EPIDURAL TRANSFORAMINAL LUMBAR / SACRAL SINGLE Left 8/1/2018    Procedure: INJECT EPIDURAL TRANSFORAMINAL LUMBAR / SACRAL SINGLE;  Left Transforaminal Lumbar Epidural Steroid Injection  Lumbar 3, Lumbar 4;  Surgeon: Jeni Agarwal MD;  Location: [facility] OR        FAMILY HISTORY:   Family History   Problem Relation Age of Onset     Alcoholism Mother      Cirrhosis Mother      Heart Disease Father      Cirrhosis Brother         SOCIAL HISTORY:   Social History     Socioeconomic History     Marital status:      Spouse name: Not on file     Number of children: Not on file     Years of education: Not on file     Highest education level: Not on file   Occupational History     Not on file   Social Needs     Financial resource strain: Not on file     Food insecurity     Worry: Not on file     Inability: Not on file     Transportation needs     Medical: Not on file     Non-medical: Not on file   Tobacco Use      Smoking status: Former Smoker     Years: 5.00     Quit date: 2018     Years since quittin.9     Smokeless tobacco: Never Used   Substance and Sexual Activity     Alcohol use: No     Drug use: No     Sexual activity: Yes     Partners: Female   Lifestyle     Physical activity     Days per week: Not on file     Minutes per session: Not on file     Stress: Not on file   Relationships     Social connections     Talks on phone: Not on file     Gets together: Not on file     Attends Buddhist service: Not on file     Active member of club or organization: Not on file     Attends meetings of clubs or organizations: Not on file     Relationship status: Not on file     Intimate partner violence     Fear of current or ex partner: Not on file     Emotionally abused: Not on file     Physically abused: Not on file     Forced sexual activity: Not on file   Other Topics Concern     Parent/sibling w/ CABG, MI or angioplasty before 65F 55M? Not Asked   Social History Narrative              ROS:   CONSTITUTIONAL: No weight loss, fever, chills, admits to weakness and fatigue.   HEENT: Eyes: No visual changes. Ears, Nose, Throat: No hearing loss, congestion or difficulty swallowing.   CARDIOVASCULAR: No chest pain, chest pressure or chest discomfort. No palpitations but has lower extremity edema.   RESPIRATORY: (+) shortness of breath with dyspnea upon exertion, no cough or sputum production.   GASTROINTESTINAL: No abdominal pain. No anorexia, nausea, vomiting or diarrhea.   NEUROLOGICAL: No headache, lightheadedness, dizziness, syncope, ataxia but admits to generalized weakness.   HEMATOLOGIC: No anemia, bleeding or bruising.   PSYCHIATRIC: No history of depression or anxiety.   ENDOCRINOLOGIC: No reports of sweating, cold or heat intolerance. No polyuria or polydipsia.   SKIN: No abnormal rashes or itching.       PHYSICAL EXAM:   GENERAL: The patient is a well-developed, well-nourished, in no apparent distress. Alert  and oriented x3.   HEENT: Head is normocephalic and atraumatic. Eyes are symmetrical with normal visual tracking.  HEART: Regular rate and rhythm, S1S2 present without murmur, rub or gallop.   LUNGS: Respirations regular and unlabored. Clear to auscultation.   EXTREMITIES: No peripheral edema present.   NEUROLOGIC: Alert and oriented X3.    SKIN: No jaundice. No rashes or visible skin lesions present.        LAB RESULTS:   Orders Only on 05/04/2021   Component Date Value Ref Range Status     Magnesium 05/04/2021 1.1* 1.9 - 2.7 mg/dL Final   Office Visit on 04/19/2021   Component Date Value Ref Range Status     Parathyroid Hormone Intact 04/19/2021 31  12 - 88 pg/mL Final     Magnesium 04/19/2021 1.3* 1.9 - 2.7 mg/dL Final     Calcium Ionized 04/19/2021 4.7  4.4 - 5.2 mg/dL Final     Vitamin D Total 04/19/2021 24.2  ng/mL Final   Orders Only on 04/15/2021   Component Date Value Ref Range Status     Magnesium 04/15/2021 1.5* 1.9 - 2.7 mg/dL Final   Office Visit on 04/05/2021   Component Date Value Ref Range Status     Uric Acid 04/05/2021 6.1  4.4 - 7.6 mg/dL Final     Lactate Dehydrogenase 04/05/2021 147  140 - 271 U/L Final     Prostate Specific Antigen 04/05/2021 0.186  <3.100 ng/mL Final     Sed Rate 04/05/2021 66* 1 - 10 mm/h Final     CRP Inflammation 04/05/2021 39.8* <10.0 mg/L Final     Sodium 04/05/2021 135  134 - 144 mmol/L Final     Potassium 04/05/2021 4.0  3.5 - 5.1 mmol/L Final     Chloride 04/05/2021 103  98 - 107 mmol/L Final     Carbon Dioxide 04/05/2021 25  21 - 31 mmol/L Final     Anion Gap 04/05/2021 7  3 - 14 mmol/L Final     Glucose 04/05/2021 259* 70 - 105 mg/dL Final     Urea Nitrogen 04/05/2021 20  7 - 25 mg/dL Final     Creatinine 04/05/2021 1.47* 0.70 - 1.30 mg/dL Final     GFR Estimate 04/05/2021 47* >60 mL/min/[1.73_m2] Final     GFR Estimate If Black 04/05/2021 57* >60 mL/min/[1.73_m2] Final     Calcium 04/05/2021 8.7  8.6 - 10.3 mg/dL Final     WBC 04/05/2021 8.0  4.0 - 11.0 10e9/L Final      RBC Count 04/05/2021 3.98* 4.4 - 5.9 10e12/L Final     Hemoglobin 04/05/2021 12.3* 13.3 - 17.7 g/dL Final     Hematocrit 04/05/2021 36.8* 40.0 - 53.0 % Final     MCV 04/05/2021 93  78 - 100 fl Final     MCH 04/05/2021 30.9  26.5 - 33.0 pg Final     MCHC 04/05/2021 33.4  31.5 - 36.5 g/dL Final     RDW 04/05/2021 15.0  10.0 - 15.0 % Final     Platelet Count 04/05/2021 92* 150 - 450 10e9/L Final     Magnesium 04/05/2021 0.7* 1.9 - 2.7 mg/dL Final     N-Terminal Pro Bnp 04/05/2021 844* 0 - 100 pg/mL Final     Hemoglobin A1C 04/05/2021 7.2* 4.0 - 6.0 % Final     COVID-19 Virus PCR to [facility] - So* 04/05/2021 Nasopharyngeal   Final     COVID-19 Virus PCR to [facility] - Re* 04/05/2021 Not Detected   Final          ASSESSMENT:       ICD-10-CM    1. Severe aortic stenosis  I35.0 CARDIOLOGY EVAL ADULT REFERRAL     EKG 12-lead, tracing only     Thyrotropin GH     CANCELED: Thyrotropin GH   2. Bicuspid aortic valve  Q23.1    3. WOOD (dyspnea on exertion)  R06.00 CBC with platelets     Comprehensive metabolic panel     N terminal pro BNP outpatient     Magnesium     TSH     PULMONARY MEDICINE REFERRAL     CBC with platelets     Comprehensive metabolic panel     N terminal pro BNP outpatient     Magnesium     CANCELED: TSH   4. COPD, severe (H)  J44.9 CARDIOLOGY EVAL ADULT REFERRAL     EKG 12-lead, tracing only     PULMONARY MEDICINE REFERRAL   5. Hypomagnesemia  E83.42 Magnesium     TSH     Magnesium     CANCELED: TSH   6. History of hepatitis C  Z86.19     Status post treatment   7. Hx of liver transplant (H)  Z94.4 Comprehensive metabolic panel     Comprehensive metabolic panel    In 2008 at the [facility]   8. Mixed hyperlipidemia  E78.2    9. Type 2 diabetes mellitus with hyperglycemia, without long-term current use of insulin (H)  E11.65    10. Essential hypertension  I10    11. Chronic kidney disease, stage 3a  N18.31 Comprehensive metabolic panel     Comprehensive metabolic panel   12. Liver transplant recipient (H)  Z94.4  Comprehensive metabolic panel     Comprehensive metabolic panel   13. History of tobacco abuse  Z87.891    14. History of Hodgkin's lymphoma  Z85.71          PLAN:   1.  We discussed in great detail his need for valve replacement.  He has severe aortic stenosis with history of bicuspid aortic valve.  We discussed SAVR versus TAVR.  He is somewhat nervous about the idea of having his valve replaced.  He has chosen to have TAVR.  As a result, referral was placed to the [facility] for consideration of TAVR.  2.  He is requesting labs today.  He states he recently had a severely reduced magnesium level.  He was short of breath.  He has been on high doses of magnesium since and was hospitalized.  Labs will be obtained.  Labs will include a TSH, magnesium, BNP, CMP, and CBC.  3.  Follow-up after completion of valve replacement.      Thank you for allowing me to participate in the care of your patient. Please do not hesitate to contact me if you have any questions.     Elias Leslie, DO

## 2021-05-13 NOTE — NURSING NOTE
"Patient comes in for consult for aortic valve stenosis, etiolgy of cardiac valve disease unspecified, COPD-severe.  Andree Soto LPN ....................5/13/2021   2:53 PM  Chief Complaint   Patient presents with     Consult For     aortic valve stenosis, etiolgy of cardiac valve disease unspecified, COPD-severe       Initial /78 (BP Location: Right arm, Patient Position: Sitting, Cuff Size: Adult Large)   Pulse 75   Temp 97.8  F (36.6  C) (Tympanic)   Resp 18   Ht 1.7 m (5' 6.93\")   Wt 89.8 kg (198 lb)   SpO2 97%   BMI 31.08 kg/m   Estimated body mass index is 31.08 kg/m  as calculated from the following:    Height as of this encounter: 1.7 m (5' 6.93\").    Weight as of this encounter: 89.8 kg (198 lb).  Meds Reconciled: complete  Pt is on Aspirin  Pt is on a Statin  PHQ and/or BARAK reviewed. Pt referred to PCP/MH Provider as appropriate.    Andree Soto LPN      "

## 2021-05-14 NOTE — PROGRESS NOTES
Date: 5/14/2021    Time of Call: 2:04 PM     Diagnosis:  Severe aortic stenosis     [ TORB ] Ordering provider: Lino Lucio MD  Order: Lab- BMP, CT TAVR and carotid US     Order received by: Patti French RN     Follow-up/additional notes:     We are going to schedule you for a CT angiogram of the chest, abdomen, and pelvis (CT TAVR):  -No caffeine, alcohol or smoking 12 hours prior to test  -Nothing by mouth 3 hours prior, however it is OK to take your medications with a sip of water.  -If you are on oral diabetes medications:  Do not take on the day of the procedure.  If you take Glucophage or Metformin:  Do not take 48 hours post procedure.    -Report to the Gold Waiting room in the hospital      [facility], [address] [address]

## 2021-05-17 NOTE — PROGRESS NOTES
Ordering labs per standing order protocol. Patient is stage 3A CKD with hypomagnesimia. Ordering labs for upcoming appointment with Dr. Abdi.

## 2021-05-17 NOTE — TELEPHONE ENCOUNTER
Patient Quality Outreach      Summary:    Patient has the following on his problem list/HM: None    Patient is due/failing the following:   Controlled Substance Agreement    Type of outreach:    Sent letter.    Questions for provider review:    None                                                                                                                                     Allison Constantino LPN on 5/17/2021 at 10:24 AM       Chart routed to none.

## 2021-05-20 PROBLEM — I35.0 SEVERE AORTIC STENOSIS: Status: ACTIVE | Noted: 2021-01-01

## 2021-05-20 PROBLEM — I51.89 OTHER ILL-DEFINED HEART DISEASES: Status: ACTIVE | Noted: 2021-01-01

## 2021-05-20 NOTE — LETTER
5/20/2021      RE: Ricki Sharp  [address] [place] [address]       Dear Colleague,    Thank you for the opportunity to participate in the care of your patient, Ricki Sharp, at the [facility] [place] at [facility]. Please see a copy of my visit note below.    [facility] CARDIOTHORACIC SURGERY CONSULT  Patient Name: Ricki Sharp  Medical Record Number: [id number]  YOB: 1950  Room Number: Room/bed info not found  Referring Physician: Dr. Lucio    CC: Severe aortic stenosis    History of Present Illness: Ricki Sharp is a 71 year old male with severe aortic valvular stenosis referred to our clinic for evaluation and consideration for potential transcatheter aortic valve replacement (TAVR).  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 A recent TTE 4/7/21 shows severe aortic stenosis with an JD 0.9 cm2, mean PG 40 mmHg and pV 3.9 m/sec and EF 55-60% which has progressed from mild in 2014. Additional history notable for Hep C s/p liver transplant in 2008 at [facility] on tacrolimus, PTLD status-post R-CHOP in 2014, pulmonary fibrosis secondary to chemotherapy, history of pleural effusion s/p bilateral decortication, COPD, DM, HLD, HTN, CKD III.       Patient was recently admitted to the hospital with hypomagnesemia (0.5), he has chronically low magnesium levels and his supplement was stopped. During his admission he developed bradycardia, arrhythmias and shortness of breath. An echo was obtained demonstrating severe aortic stenosis. He reports chronic shortness of breath dating back to 2008 (liver transplant c/b pleural effusion s/p decortication). He says he is only able to walk about 30 feet before having to stop and catch his breath, he is winded climbing 10 steps. He denies any chest pain or pressure. He reports lightheadedness with  standing, no palpitations or syncope. He denies any leg swelling, weight gain, orthopnea, PND. Has notes progressive fatigue over the years and has slowed down quite a bit.     Assessment and Plan:  Ricki Sharp is a 71 year old male with severe aortic stenosis  1) Recommend TAVR - poor surgical candidate given age and comorbidities  2) Surgical bailout - yes   3) Please call with questions or concerns.     Thank you for the opportunity to participate in the care of this patient.    Drew Caballero MD  Cardiothoracic Surgery  [phone]    Past Medical History:  Past Medical History:   Diagnosis Date     COPD (chronic obstructive pulmonary disease) (H)      Hypertension      Thyroid disease        Past Surgical History:  Past Surgical History:   Procedure Laterality Date     ABDOMEN SURGERY      liver transplant     INJECT EPIDURAL TRANSFORAMINAL LUMBAR / SACRAL SINGLE Left 8/1/2018    Procedure: INJECT EPIDURAL TRANSFORAMINAL LUMBAR / SACRAL SINGLE;  Left Transforaminal Lumbar Epidural Steroid Injection  Lumbar 3, Lumbar 4;  Surgeon: Jeni Agarwal MD;  Location: [facility] OR        Family History:   Family History   Problem Relation Age of Onset     Alcoholism Mother      Cirrhosis Mother      Heart Disease Father      Cirrhosis Brother        Social History:  Social History     Socioeconomic History     Marital status:      Spouse name: Not on file     Number of children: Not on file     Years of education: Not on file     Highest education level: Not on file   Occupational History     Not on file   Social Needs     Financial resource strain: Not on file     Food insecurity     Worry: Not on file     Inability: Not on file     Transportation needs     Medical: Not on file     Non-medical: Not on file   Tobacco Use     Smoking status: Former Smoker     Years: 5.00     Quit date: 5/30/2018     Years since quitting: 3.0     Smokeless tobacco: Never Used   Substance and Sexual Activity     Alcohol use: No      Drug use: No     Sexual activity: Yes     Partners: Female   Lifestyle     Physical activity     Days per week: Not on file     Minutes per session: Not on file     Stress: Not on file   Relationships     Social connections     Talks on phone: Not on file     Gets together: Not on file     Attends Mandaeism service: Not on file     Active member of club or organization: Not on file     Attends meetings of clubs or organizations: Not on file     Relationship status: Not on file     Intimate partner violence     Fear of current or ex partner: Not on file     Emotionally abused: Not on file     Physically abused: Not on file     Forced sexual activity: Not on file   Other Topics Concern     Parent/sibling w/ CABG, MI or angioplasty before 65F 55M? Not Asked   Social History Narrative           Allergies:   Allergies   Allergen Reactions     Lisinopril Cough     Spironolactone Other (See Comments)     hyperkalemia     Levaquin [Levofloxacin] Rash     Rash on abdomen, possibly from Levaquin.       Medications:  Current Outpatient Medications   Medication     acetaminophen (TYLENOL) 500 MG tablet     albuterol (PROAIR HFA, PROVENTIL HFA, VENTOLIN HFA) 108 (90 BASE) MCG/ACT inhaler     amLODIPine (NORVASC) 5 MG tablet     aspirin 81 MG EC tablet     atorvastatin (LIPITOR) 10 MG tablet     B-D U/F 31G X 8 MM insulin pen needle     blood glucose monitoring (NO BRAND SPECIFIED) meter device kit     Blood Pressure Monitoring (BLOOD PRESSURE CUFF) MISC     CONTOUR NEXT TEST test strip     cyanocobalamin (VITAMIN B-12) 1000 MCG tablet     cyclobenzaprine (FLEXERIL) 5 MG tablet     desonide (DESOWEN) 0.05 % external ointment     fluocinonide (LIDEX) 0.05 % external solution     LANTUS SOLOSTAR 100 UNIT/ML soln     levothyroxine (SYNTHROID/LEVOTHROID) 175 MCG tablet     losartan (COZAAR) 25 MG tablet     Magnesium Cl-Calcium Carbonate (SLOW-MAG) 71.5-119 MG TBEC     metFORMIN (GLUCOPHAGE-XR) 500 MG 24 hr tablet      metoprolol tartrate (LOPRESSOR) 100 MG tablet     omeprazole (PRILOSEC) 20 MG DR capsule     OYSTER SHELL CALCIUM + D3 500-400 MG-UNIT TABS     SPIRIVA HANDIHALER 18 MCG inhaled capsule     tacrolimus (GENERIC EQUIVALENT) 1 MG capsule     tamsulosin (FLOMAX) 0.4 MG capsule     thin (NO BRAND SPECIFIED) lancets     traMADol (ULTRAM) 50 MG tablet     No current facility-administered medications for this visit.        Review of Systems:   A 10 point ROS was performed and is negative other than HPI.    Physical Exam:       Gen: NAD, resting comfortably in chair   Lungs: CTAB, non-labored breathing   CV: regular rhythm, normal rate   Abd: Soft, not tender, not distended   Ext: Motor, sensation, pulses intact   Neuro: AOx3    Labs:  Lab Results   Component Value Date    WBC 8.1 05/13/2021     Lab Results   Component Value Date    RBC 4.07 05/13/2021     Lab Results   Component Value Date    HGB 12.9 05/20/2021     Lab Results   Component Value Date    HCT 38.7 05/13/2021     No components found for: MCT  Lab Results   Component Value Date    MCV 95 05/13/2021     Lab Results   Component Value Date    MCH 31.9 05/13/2021     Lab Results   Component Value Date    MCHC 33.6 05/13/2021     Lab Results   Component Value Date    RDW 15.5 05/13/2021     Lab Results   Component Value Date     05/13/2021     Last Comprehensive Metabolic Panel:  Sodium   Date Value Ref Range Status   05/20/2021 139 133 - 144 mmol/L Final     Potassium   Date Value Ref Range Status   05/20/2021 4.2 3.4 - 5.3 mmol/L Final     Chloride   Date Value Ref Range Status   05/20/2021 110 (H) 94 - 109 mmol/L Final     Carbon Dioxide   Date Value Ref Range Status   05/20/2021 24 20 - 32 mmol/L Final     Anion Gap   Date Value Ref Range Status   05/20/2021 5 3 - 14 mmol/L Final     Glucose   Date Value Ref Range Status   05/20/2021 147 (H) 70 - 99 mg/dL Final     Urea Nitrogen   Date Value Ref Range Status   05/20/2021 26 7 - 30 mg/dL Final      Creatinine   Date Value Ref Range Status   05/20/2021 1.18 0.66 - 1.25 mg/dL Final     GFR Estimate   Date Value Ref Range Status   05/20/2021 63 >60 mL/min/[1.73_m2] Final     Comment:     Non  GFR Calc  Starting 12/18/2018, serum creatinine based estimated GFR (eGFR) will be   calculated using the Chronic Kidney Disease Epidemiology Collaboration   (CKD-EPI) equation.       Calcium   Date Value Ref Range Status   05/20/2021 8.8 8.5 - 10.1 mg/dL Final       Imaging:  Transthoracic echocardiogram (4/8/21):  Interpretation Summary  Severe aortic stenosis is present.  Global and regional left ventricular function is normal with an EF of 55-60%.  Mild pulmonary hypertension is present.  IVC diameter <2.1 cm collapsing >50% with sniff suggests a normal RA pressure  of 3 mmHg.  No pericardial effusion is present.  Compared to study from 2014, AS has progressed.    Coronary angiogram - pending      Please do not hesitate to contact me if you have any questions/concerns.     Sincerely,     Drew Caballero MD

## 2021-05-20 NOTE — PROGRESS NOTES
[facility] HEART CARE  CARDIOVASCULAR DIVISION    VALVE CLINIC CONSULTATION    PRIMARY CARDIOLOGIST: Dr. Leslie      PERTINENT CLINICAL HISTORY & REASON FOR CONSULTATION:     Ricki Sharp is a very pleasant 71 year old male with severe aortic valvular stenosis referred to our clinic for evaluation and consideration for potential transcatheter aortic valve replacement (TAVR).  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 A recent TTE 4/7/21 shows severe aortic stenosis with an JD 0.9 cm2, mean PG 40 mmHg and pV 3.9 m/sec and EF 55-60% which has progressed from mild in 2014. 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      Patient was recently admitted to the hospital with hypomagnesemia (0.5), he has chronically low magnesium levels and his supplement was stopped. During his admission he developed bradycardia, arrhythmias and shortness of breath. An echo was obtained demonstrating severe aortic stenosis. He reports chronic shortness of breath dating back to 2008 (liver transplant c/b pleural effusion s/p decortication). He says he is only able to walk about 30 feet before having to stop and catch his breath, he is winded climbing 10 steps. He denies any chest pain or pressure. He reports lightheadedness with standing, no palpitations or syncope. He denies any leg swelling, weight gain, orthopnea, PND. Has notes progressive fatigue over the years and has slowed down quite a bit.      PAST MEDICAL HISTORY:   Hep C s/p liver transplant in 2008  PTLD status-post R-CHOP in 2014  Pulmonary fibrosis secondary to chemotherapy  COPD   History pleural effusions s/p bilateral decortication  DM  HTN  HLD  CKD III      PAST SURGICAL HISTORY:      Past Surgical History:   Procedure Laterality Date     ABDOMEN SURGERY      liver transplant     INJECT EPIDURAL TRANSFORAMINAL LUMBAR / SACRAL SINGLE Left 8/1/2018    Procedure: INJECT EPIDURAL TRANSFORAMINAL LUMBAR / SACRAL SINGLE;  Left Transforaminal Lumbar Epidural Steroid Injection  Lumbar 3, Lumbar 4;  Surgeon: Jeni Agarwal MD;  Location:  OR        CURRENT MEDICATIONS:     Current Outpatient Medications   Medication Sig Dispense Refill     acetaminophen (TYLENOL) 500 MG tablet Take 500 mg by mouth daily as needed (headache or back pain)       albuterol (PROAIR HFA, PROVENTIL HFA, VENTOLIN HFA) 108 (90 BASE) MCG/ACT inhaler Inhale 2 puffs into the lungs every 4 hours as needed for shortness of breath / dyspnea or wheezing 3 Inhaler 1     amLODIPine (NORVASC) 5 MG tablet Take 1 tablet (5 mg) by mouth daily 90 tablet 3     aspirin 81 MG EC tablet Take 81 mg by mouth At Bedtime        atorvastatin (LIPITOR) 10 MG tablet Take 1 tablet (10 mg) by mouth daily 90 tablet 3     B-D U/F 31G X 8 MM insulin pen needle USE WITH LANTUS DAILY 100 each 3     blood glucose monitoring (NO BRAND SPECIFIED) meter device kit Use to test blood sugar 2 times daily. Please dispense item that is covered by insurance. 1 kit 11     Blood Pressure Monitoring (BLOOD PRESSURE CUFF) MISC BP cuff. HTN. 99 1 each 11     CONTOUR NEXT TEST test strip TESTING 2 TIMES DAILY 200 strip 11     cyanocobalamin (VITAMIN B-12) 1000 MCG tablet Take 1,000 mcg by mouth 2 times daily       cyclobenzaprine (FLEXERIL) 5 MG tablet TAKE 1 TO 2 TABLETS BY MOUTH AT NIGHT AS NEEDED 45 tablet 3     desonide (DESOWEN) 0.05 % external ointment Use on the face daily PRN 60 g 3     fluocinonide (LIDEX) 0.05 % external solution Apply topically three times a week to affected scalp       LANTUS SOLOSTAR 100 UNIT/ML soln INJECT 5 UNITS SUB-Q AT BEDTIME 15 mL 3     levothyroxine (SYNTHROID/LEVOTHROID) 175 MCG tablet Take 1 tablet (175 mcg) by mouth daily 90 tablet  3     losartan (COZAAR) 25 MG tablet Take 1 tablet (25 mg) by mouth daily 90 tablet 3     Magnesium Cl-Calcium Carbonate (SLOW-MAG) 71.5-119 MG TBEC Take 3 tablets by mouth 4 times daily 180 tablet 3     metFORMIN (GLUCOPHAGE-XR) 500 MG 24 hr tablet TAKE 4 TABLETS(2000 MG) BY MOUTH DAILY WITH DINNER 360 tablet 3     metoprolol tartrate (LOPRESSOR) 100 MG tablet Take 1 tablet (100 mg) by mouth 2 times daily 180 tablet 3     omeprazole (PRILOSEC) 20 MG DR capsule Take 20 mg by mouth every other day       OYSTER SHELL CALCIUM + D3 500-400 MG-UNIT TABS TAKE ONE TABLET BY MOUTH TWICE A  tablet 1     SPIRIVA HANDIHALER 18 MCG inhaled capsule INHALE THE CONTENTS OF 1 CAPSULE(18 MCG) INTO THE LUNGS DAILY 90 capsule 0     tacrolimus (GENERIC EQUIVALENT) 1 MG capsule TAKE ONE CAPSULE BY MOUTH EVERY 12 HOURS 60 capsule 11     tamsulosin (FLOMAX) 0.4 MG capsule Take 1 capsule (0.4 mg) by mouth daily 90 capsule 3     thin (NO BRAND SPECIFIED) lancets Use to test blood sugar 2 times daily.  Please dispense items that are covered by insurance. 200 each 11     traMADol (ULTRAM) 50 MG tablet TAKE 1 TABLET(50 MG) BY MOUTH TWICE DAILY 60 tablet 5        ALLERGIES:     Allergies   Allergen Reactions     Lisinopril Cough     Spironolactone Other (See Comments)     hyperkalemia     Levaquin [Levofloxacin] Rash     Rash on abdomen, possibly from Levaquin.      FAMILY HISTORY:     Family History   Problem Relation Age of Onset     Alcoholism Mother      Cirrhosis Mother      Heart Disease Father      Cirrhosis Brother       SOCIAL HISTORY:     Social History     Socioeconomic History     Marital status:      Spouse name: Not on file     Number of children: Not on file     Years of education: Not on file     Highest education level: Not on file   Occupational History     Not on file   Social Needs     Financial resource strain: Not on file     Food insecurity     Worry: Not on file     Inability: Not on file     Transportation  needs     Medical: Not on file     Non-medical: Not on file   Tobacco Use     Smoking status: Former Smoker     Years: 5.00     Quit date: 2018     Years since quittin.9     Smokeless tobacco: Never Used   Substance and Sexual Activity     Alcohol use: No     Drug use: No     Sexual activity: Yes     Partners: Female   Lifestyle     Physical activity     Days per week: Not on file     Minutes per session: Not on file     Stress: Not on file   Relationships     Social connections     Talks on phone: Not on file     Gets together: Not on file     Attends Druze service: Not on file     Active member of club or organization: Not on file     Attends meetings of clubs or organizations: Not on file     Relationship status: Not on file     Intimate partner violence     Fear of current or ex partner: Not on file     Emotionally abused: Not on file     Physically abused: Not on file     Forced sexual activity: Not on file   Other Topics Concern     Parent/sibling w/ CABG, MI or angioplasty before 65F 55M? Not Asked   Social History Narrative            REVIEW OF SYSTEMS:     Constitutional: No fevers or chills  Skin: No new rash or itching  Eyes: No acute change in vision  Ears/Nose/Throat: No purulent rhinorrhea, new hearing loss, or new vertigo  Respiratory: No cough or hemoptysis  Cardiovascular: See HPI  Gastrointestinal: No change in appetite, vomiting, hematemesis or diarrhea  Genitourinary: No dysuria or hematuria  Musculoskeletal: No new back pain, neck pain or muscle pain  Neurologic: No new headaches, focal weakness or behavior changes  Psychiatric: No hallucinations, excessive alcohol consumption or illegal drug usage  Hematologic/Lymphatic/Immunologic: No bleeding, chills, fever, night sweats or weight loss  Endocrine: No new cold intolerance, heat intolerance, polyphagia, polydipsia or polyuria      PHYSICAL EXAMINATION:     /80 (BP Location: Right arm, Patient Position: Chair, Cuff  "Size: Adult Large)   Pulse 88   Ht 1.651 m (5' 5\")   Wt 91.2 kg (201 lb)   SpO2 98%   BMI 33.45 kg/m      GENERAL: No acute distress.  HEENT: EOMI. Sclerae white, not injected. Nares clear. Pharynx without erythema or exudate.   Neck: No adenopathy. No thyromegaly. No jugular venous distension.   Heart: Regular rate and rhythm. 3/6 GLEN RUSB  Lungs: Clear to auscultation. No ronchi, wheezes, rales.   Abdomen: Soft, nontender, nondistended. Bowel sounds present.  Extremities: No clubbing, cyanosis, or edema.   Neurologic: Alert and oriented to person/place/time, normal speech and affect. No focal deficits.  Skin: No petechiae, purpura or rash.     LABORATORY DATA:     LIPID RESULTS:  Lab Results   Component Value Date    CHOL 103 05/20/2021    HDL 41 05/20/2021    LDL 37 05/20/2021    TRIG 128 05/20/2021    CHOLHDLRATIO 4.5 07/21/2009       LIVER ENZYME RESULTS:  Lab Results   Component Value Date    AST 14 05/13/2021    ALT 14 05/13/2021       CBC RESULTS:  Lab Results   Component Value Date    WBC 8.1 05/13/2021    RBC 4.07 (L) 05/13/2021    HGB 12.9 (L) 05/20/2021    HCT 38.7 (L) 05/13/2021    MCV 95 05/13/2021    MCH 31.9 05/13/2021    MCHC 33.6 05/13/2021    RDW 15.5 (H) 05/13/2021     (L) 05/13/2021       BMP RESULTS:  Lab Results   Component Value Date     05/20/2021    POTASSIUM 4.2 05/20/2021    CHLORIDE 110 (H) 05/20/2021    CO2 24 05/20/2021    ANIONGAP 5 05/20/2021     (H) 05/20/2021    BUN 26 05/20/2021    CR 1.18 05/20/2021    GFRESTIMATED 63 05/20/2021    GFRESTBLACK 72 05/20/2021    LIT 8.8 05/20/2021        A1C RESULTS:  Lab Results   Component Value Date    A1C 7.2 (H) 04/05/2021       INR RESULTS:  Lab Results   Component Value Date    INR 1.4 04/11/2016    INR 1.7 04/07/2016          PROCEDURES & FURTHER ASSESSMENTS:     ECG dated today:  NSR with 1st degree AVB, LBBB    Echocardiogram dated 4/8/21:  Interpretation Summary  Severe aortic stenosis is present.  Global and " regional left ventricular function is normal with an EF of 55-60%.  Mild pulmonary hypertension is present.  IVC diameter <2.1 cm collapsing >50% with sniff suggests a normal RA pressure  of 3 mmHg.  No pericardial effusion is present.  Compared to study from 2014, AS has progressed.  ______________________________________________________________________________  Left Ventricle  Global and regional left ventricular function is normal with an EF of 55-60%.  Left ventricular size is normal. Mild concentric wall thickening consistent  with left ventricular hypertrophy is present. Diastolic function not assessed  due to significant mitral annular calcification.     Right Ventricle  The right ventricle is normal size. Global right ventricular function is  normal.     Atria  The right atria appears normal. Severe left atrial enlargement is present.     Mitral Valve  Severe mitral annular calcification is present. Trace mitral insufficiency is  present. The mean mitral valve gradient is 6.0 mmHg. Mild mitral stenosis is  present.     Aortic Valve  Moderate aortic valve calcification is present. Cannot exclude a bicuspid  aortic valve. Trace to mild aortic insufficiency is present. Severe aortic  stenosis is present. The calculated aortic valve are is 0.90 cm^2. The mean  gradient across the aortic valve is40 mmHg. The peak aortic velocity is 3.9  m/sec.     Tricuspid Valve  Mild tricuspid insufficiency is present. The right ventricular systolic  pressure is approximated at 34.2 mmHg plus the right atrial pressure. Mild  pulmonary hypertension is present.     Pulmonic Valve  The pulmonic valve is normal.     Vessels  The thoracic aorta is normal. IVC diameter <2.1 cm collapsing >50% with sniff  suggests a normal RA pressure of 3 mmHg.     Pericardium  No pericardial effusion is present.     STS RISK SCORE 4%  FRAILTY SCORE: 1/4  NYHA CLASS: II  DENTAL: Has 2 cavities that need to be filled - will be done prior to TAVR       CLINICAL IMPRESSION:     Ricki Sharp is a very pleasant 71 year old male with severe aortic valvular stenosis referred to our clinic for evaluation and consideration for potential transcatheter aortic valve replacement (TAVR).  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 A recent TTE 4/7/21 shows severe aortic stenosis with an JD 0.9 cm2, mean PG 40 mmHg and pV 3.9 m/sec and EF 55-60% which has progressed from mild in 2014. 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 He reports symptoms of chronic dyspnea on exertion and lightheadedness with standing, otherwise no chest pain, palpitations or syncope. We discussed the natural history of severe aortic stenosis, associated symptoms and available treatment options including TAVR vs SAVR. Patient is a appropriate candidate for transcatheter aortic valve replacement based on age, frailty, co-morbidities and surgical mortality risk estimation of 4% based on the STS score.        Plan Summary:  1) Severe Aortic Stenosis:  -Proceed with TAVR evalution   -CT TAVR and carotid US complete  -Schedule coronary angiogram, right heart catheterization and left heart catheterization  -Needs cavities filled 6/21 and surgical eval prior to TAVR   -Following completion of TAVR work-up patient case will be discussed during our multi-disciplinary conference for consensus decision and procedural planning.    Patient was evaluated in clinic with Dr. Agee.     CORTES Santiago, CNP  [facility] Cardiology Team      CC  Patient Care Team:  Jarad Bocanegra MD as PCP - General  Kiana Palumbo RN as Registered Nurse (Transplant)  Mike Gray MD as MD (Gastroenterology)  Cruzito Frederick MD as MD  (Vascular and Interventional Radiology)  Raghu Fisher MD as MD (Orthopedics)  Melissa Glover MD as MD (Internal Medicine)  Jarad Bocanegra MD as Assigned PCP  Viji Roach MD as Assigned Neuroscience Provider  Mike Gray MD as Assigned Surgical Provider  Kylie Camp MD as MD (Endocrinology, Diabetes, and Metabolism)  SELF, REFERRED      Answers for HPI/ROS submitted by the patient on 5/18/2021   General Symptoms: Yes  Skin Symptoms: No  HENT Symptoms: No  EYE SYMPTOMS: No  HEART SYMPTOMS: Yes  LUNG SYMPTOMS: Yes  INTESTINAL SYMPTOMS: Yes  URINARY SYMPTOMS: Yes  REPRODUCTIVE SYMPTOMS: Yes  SKELETAL SYMPTOMS: Yes  BLOOD SYMPTOMS: No  NERVOUS SYSTEM SYMPTOMS: Yes  MENTAL HEALTH SYMPTOMS: No  Fever: No  Loss of appetite: No  Weight loss: No  Weight gain: No  Fatigue: Yes  Night sweats: No  Chills: No  Increased stress: No  Excessive hunger: No  Excessive thirst: No  Feeling hot or cold when others believe the temperature is normal: No  Loss of height: No  Post-operative complications: No  Surgical site pain: No  Hallucinations: No  Change in or Loss of Energy: Yes  Hyperactivity: No  Confusion: No  Cough: No  Sputum or phlegm: No  Coughing up blood: No  Difficulty breating or shortness of breath: Yes  Snoring: No  Wheezing: No  Difficulty breathing on exertion: Yes  Nighttime Cough: No  Difficulty breathing when lying flat: No  Chest pain or pressure: No  Fast or irregular heartbeat: Yes  Pain in legs with walking: Yes  Trouble breathing while lying down: No  Fingers or toes appear blue: No  High blood pressure: No  Low blood pressure: No  Fainting: No  Murmurs: No  Pacemaker: No  Varicose veins: No  Edema or swelling: No  Wake up at night with shortness of breath: No  Light-headedness: Yes  Exercise intolerance: Yes  Heart burn or indigestion: No  Nausea: No  Vomiting: No  Abdominal pain: No  Bloating: No  Constipation: No  Diarrhea: Yes  Blood in stool: No  Black stools:  No  Rectal or Anal pain: No  Fecal incontinence: No  Yellowing of skin or eyes: No  Vomit with blood: No  Change in stools: Yes  Trouble holding urine or incontinence: No  Pain or burning: No  Trouble starting or stopping: Yes  Increased frequency of urination: Yes  Blood in urine: No  Decreased frequency of urination: No  Frequent nighttime urination: Yes  Flank pain: No  Difficulty emptying bladder: No  Back pain: Yes  Muscle aches: Yes  Neck pain: No  Swollen joints: No  Joint pain: Yes  Bone pain: No  Muscle cramps: No  Muscle weakness: Yes  Joint stiffness: No  Bone fracture: No  Trouble with coordination: No  Dizziness or trouble with balance: Yes  Fainting or black-out spells: No  Memory loss: No  Headache: No  Seizures: No  Speech problems: No  Tingling: Yes  Tremor: No  Weakness: Yes  Difficulty walking: Yes  Paralysis: No  Numbness: Yes  Scrotal pain or swelling: No  Erectile dysfunction: Yes  Penile discharge: No  Genital ulcers: No  Reduced libido: Yes      I have seen and examined the patient with the TAVR team. I agree with the assessment and plan of the note above.I have reviewed pertinent labs.     Andrez Agee MD  Interventional Cardiology  Pager: [phone]

## 2021-05-20 NOTE — NURSING NOTE
Vitals were taken and medications where reconciled. EKG was performed   RAYSHAWN Valencia  3:27 PM

## 2021-05-20 NOTE — NURSING NOTE
Chief Complaint   Patient presents with     Follow Up     Discuss TAVR procedure     TAVR testing that was performed:    KCCQ-12 questionnaire collected: Yes.    Test: No  5M walk: Yes  Picture taken: Yes  Sergei Rodriguez    3:25 PM

## 2021-05-20 NOTE — PATIENT INSTRUCTIONS
You were seen today in the Cardiovascular Clinic at the [facility].      Cardiology Providers you saw during your visit:  Dr. Agee     Recommendations:  You will need to have your dental appoinment   and angiogram.    You will be scheduled for a Coronary Angiogram at the [facility], [address] [place] [address], [place] [address]. You are to check in at the [facility].     Pre procedure instructions:  1. Please make arrangements to have a  as you will not be allowed to drive following the procedure.  2. Do not eat or drink after midnight the day of your procedure.  3. You may take your usual morning medications with a sip of water the morning of your procedure.  4. Take a 325 mg aspirin the day before and the day of your procedure.  5. Make arrangements to have someone stay with you the night after your procedure.  6.  You will need COVID testing prior to your procedure.  You will be contacted by the COVID testing team.      Please call me if you have questions regarding these instructions or your scheduled procedure.      Thank you for your visit today!     Patti French RN  Structural Heart Care Coordinator   TAVR, MitraClip and Watchman Programs  [facility]    Yumiko Barb, Lead CMA Office: [phone]      Clinics and Surgery Center  [address] [place] [address]  Cardiology Clinic [id number]  [place] [phone]

## 2021-05-20 NOTE — LETTER
5/20/2021      RE: Ricki Sharp  [address] [facility] [id number]       Dear Colleague,    Thank you for the opportunity to participate in the care of your patient, Ricki Sharp, at the [facility] HEART CLINIC [place] [facility]. Please see a copy of my visit note below.        [facility] HEART CARE  CARDIOVASCULAR DIVISION    VALVE CLINIC CONSULTATION    PRIMARY CARDIOLOGIST: Dr. Leslie      PERTINENT CLINICAL HISTORY & REASON FOR CONSULTATION:     Ricki Sharp is a very pleasant 71 year old male with severe aortic valvular stenosis referred to our clinic for evaluation and consideration for potential transcatheter aortic valve replacement (TAVR).  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 A recent TTE 4/7/21 shows severe aortic stenosis with an JD 0.9 cm2, mean PG 40 mmHg and pV 3.9 m/sec and EF 55-60% which has progressed from mild in 2014. 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      Patient was recently admitted to the hospital with hypomagnesemia (0.5), he has chronically low magnesium levels and his supplement was stopped. During his admission he developed bradycardia, arrhythmias and shortness of breath. An echo was obtained demonstrating severe aortic stenosis. He reports chronic shortness of breath dating back to 2008 (liver transplant c/b pleural effusion s/p decortication). He says he is only able to walk about 30 feet before having to stop and catch his breath, he is winded climbing 10 steps. He denies any chest pain or pressure. He reports lightheadedness with standing, no palpitations or syncope. He denies any leg swelling, weight gain,  orthopnea, PND. Has notes progressive fatigue over the years and has slowed down quite a bit.      PAST MEDICAL HISTORY:   Hep C s/p liver transplant in 2008  PTLD status-post R-CHOP in 2014  Pulmonary fibrosis secondary to chemotherapy  COPD   History pleural effusions s/p bilateral decortication  DM  HTN  HLD  CKD III      PAST SURGICAL HISTORY:     Past Surgical History:   Procedure Laterality Date     ABDOMEN SURGERY      liver transplant     INJECT EPIDURAL TRANSFORAMINAL LUMBAR / SACRAL SINGLE Left 8/1/2018    Procedure: INJECT EPIDURAL TRANSFORAMINAL LUMBAR / SACRAL SINGLE;  Left Transforaminal Lumbar Epidural Steroid Injection  Lumbar 3, Lumbar 4;  Surgeon: Jeni Agarwal MD;  Location:  OR        CURRENT MEDICATIONS:     Current Outpatient Medications   Medication Sig Dispense Refill     acetaminophen (TYLENOL) 500 MG tablet Take 500 mg by mouth daily as needed (headache or back pain)       albuterol (PROAIR HFA, PROVENTIL HFA, VENTOLIN HFA) 108 (90 BASE) MCG/ACT inhaler Inhale 2 puffs into the lungs every 4 hours as needed for shortness of breath / dyspnea or wheezing 3 Inhaler 1     amLODIPine (NORVASC) 5 MG tablet Take 1 tablet (5 mg) by mouth daily 90 tablet 3     aspirin 81 MG EC tablet Take 81 mg by mouth At Bedtime        atorvastatin (LIPITOR) 10 MG tablet Take 1 tablet (10 mg) by mouth daily 90 tablet 3     B-D U/F 31G X 8 MM insulin pen needle USE WITH LANTUS DAILY 100 each 3     blood glucose monitoring (NO BRAND SPECIFIED) meter device kit Use to test blood sugar 2 times daily. Please dispense item that is covered by insurance. 1 kit 11     Blood Pressure Monitoring (BLOOD PRESSURE CUFF) MISC BP cuff. HTN. 99 1 each 11     CONTOUR NEXT TEST test strip TESTING 2 TIMES DAILY 200 strip 11     cyanocobalamin (VITAMIN B-12) 1000 MCG tablet Take 1,000 mcg by mouth 2 times daily       cyclobenzaprine (FLEXERIL) 5 MG tablet TAKE 1 TO 2 TABLETS BY MOUTH AT NIGHT AS NEEDED 45 tablet 3     desonide  (DESOWEN) 0.05 % external ointment Use on the face daily PRN 60 g 3     fluocinonide (LIDEX) 0.05 % external solution Apply topically three times a week to affected scalp       LANTUS SOLOSTAR 100 UNIT/ML soln INJECT 5 UNITS SUB-Q AT BEDTIME 15 mL 3     levothyroxine (SYNTHROID/LEVOTHROID) 175 MCG tablet Take 1 tablet (175 mcg) by mouth daily 90 tablet 3     losartan (COZAAR) 25 MG tablet Take 1 tablet (25 mg) by mouth daily 90 tablet 3     Magnesium Cl-Calcium Carbonate (SLOW-MAG) 71.5-119 MG TBEC Take 3 tablets by mouth 4 times daily 180 tablet 3     metFORMIN (GLUCOPHAGE-XR) 500 MG 24 hr tablet TAKE 4 TABLETS(2000 MG) BY MOUTH DAILY WITH DINNER 360 tablet 3     metoprolol tartrate (LOPRESSOR) 100 MG tablet Take 1 tablet (100 mg) by mouth 2 times daily 180 tablet 3     omeprazole (PRILOSEC) 20 MG DR capsule Take 20 mg by mouth every other day       OYSTER SHELL CALCIUM + D3 500-400 MG-UNIT TABS TAKE ONE TABLET BY MOUTH TWICE A  tablet 1     SPIRIVA HANDIHALER 18 MCG inhaled capsule INHALE THE CONTENTS OF 1 CAPSULE(18 MCG) INTO THE LUNGS DAILY 90 capsule 0     tacrolimus (GENERIC EQUIVALENT) 1 MG capsule TAKE ONE CAPSULE BY MOUTH EVERY 12 HOURS 60 capsule 11     tamsulosin (FLOMAX) 0.4 MG capsule Take 1 capsule (0.4 mg) by mouth daily 90 capsule 3     thin (NO BRAND SPECIFIED) lancets Use to test blood sugar 2 times daily.  Please dispense items that are covered by insurance. 200 each 11     traMADol (ULTRAM) 50 MG tablet TAKE 1 TABLET(50 MG) BY MOUTH TWICE DAILY 60 tablet 5        ALLERGIES:     Allergies   Allergen Reactions     Lisinopril Cough     Spironolactone Other (See Comments)     hyperkalemia     Levaquin [Levofloxacin] Rash     Rash on abdomen, possibly from Levaquin.      FAMILY HISTORY:     Family History   Problem Relation Age of Onset     Alcoholism Mother      Cirrhosis Mother      Heart Disease Father      Cirrhosis Brother       SOCIAL HISTORY:     Social History     Socioeconomic History      Marital status:      Spouse name: Not on file     Number of children: Not on file     Years of education: Not on file     Highest education level: Not on file   Occupational History     Not on file   Social Needs     Financial resource strain: Not on file     Food insecurity     Worry: Not on file     Inability: Not on file     Transportation needs     Medical: Not on file     Non-medical: Not on file   Tobacco Use     Smoking status: Former Smoker     Years: 5.00     Quit date: 2018     Years since quittin.9     Smokeless tobacco: Never Used   Substance and Sexual Activity     Alcohol use: No     Drug use: No     Sexual activity: Yes     Partners: Female   Lifestyle     Physical activity     Days per week: Not on file     Minutes per session: Not on file     Stress: Not on file   Relationships     Social connections     Talks on phone: Not on file     Gets together: Not on file     Attends Caodaism service: Not on file     Active member of club or organization: Not on file     Attends meetings of clubs or organizations: Not on file     Relationship status: Not on file     Intimate partner violence     Fear of current or ex partner: Not on file     Emotionally abused: Not on file     Physically abused: Not on file     Forced sexual activity: Not on file   Other Topics Concern     Parent/sibling w/ CABG, MI or angioplasty before 65F 55M? Not Asked   Social History Narrative            REVIEW OF SYSTEMS:     Constitutional: No fevers or chills  Skin: No new rash or itching  Eyes: No acute change in vision  Ears/Nose/Throat: No purulent rhinorrhea, new hearing loss, or new vertigo  Respiratory: No cough or hemoptysis  Cardiovascular: See HPI  Gastrointestinal: No change in appetite, vomiting, hematemesis or diarrhea  Genitourinary: No dysuria or hematuria  Musculoskeletal: No new back pain, neck pain or muscle pain  Neurologic: No new headaches, focal weakness or behavior  "changes  Psychiatric: No hallucinations, excessive alcohol consumption or illegal drug usage  Hematologic/Lymphatic/Immunologic: No bleeding, chills, fever, night sweats or weight loss  Endocrine: No new cold intolerance, heat intolerance, polyphagia, polydipsia or polyuria      PHYSICAL EXAMINATION:     /80 (BP Location: Right arm, Patient Position: Chair, Cuff Size: Adult Large)   Pulse 88   Ht 1.651 m (5' 5\")   Wt 91.2 kg (201 lb)   SpO2 98%   BMI 33.45 kg/m      GENERAL: No acute distress.  HEENT: EOMI. Sclerae white, not injected. Nares clear. Pharynx without erythema or exudate.   Neck: No adenopathy. No thyromegaly. No jugular venous distension.   Heart: Regular rate and rhythm. 3/6 GLEN RUSB  Lungs: Clear to auscultation. No ronchi, wheezes, rales.   Abdomen: Soft, nontender, nondistended. Bowel sounds present.  Extremities: No clubbing, cyanosis, or edema.   Neurologic: Alert and oriented to person/place/time, normal speech and affect. No focal deficits.  Skin: No petechiae, purpura or rash.     LABORATORY DATA:     LIPID RESULTS:  Lab Results   Component Value Date    CHOL 103 05/20/2021    HDL 41 05/20/2021    LDL 37 05/20/2021    TRIG 128 05/20/2021    CHOLHDLRATIO 4.5 07/21/2009       LIVER ENZYME RESULTS:  Lab Results   Component Value Date    AST 14 05/13/2021    ALT 14 05/13/2021       CBC RESULTS:  Lab Results   Component Value Date    WBC 8.1 05/13/2021    RBC 4.07 (L) 05/13/2021    HGB 12.9 (L) 05/20/2021    HCT 38.7 (L) 05/13/2021    MCV 95 05/13/2021    MCH 31.9 05/13/2021    MCHC 33.6 05/13/2021    RDW 15.5 (H) 05/13/2021     (L) 05/13/2021       BMP RESULTS:  Lab Results   Component Value Date     05/20/2021    POTASSIUM 4.2 05/20/2021    CHLORIDE 110 (H) 05/20/2021    CO2 24 05/20/2021    ANIONGAP 5 05/20/2021     (H) 05/20/2021    BUN 26 05/20/2021    CR 1.18 05/20/2021    GFRESTIMATED 63 05/20/2021    GFRESTBLACK 72 05/20/2021    LIT 8.8 05/20/2021        A1C " RESULTS:  Lab Results   Component Value Date    A1C 7.2 (H) 04/05/2021       INR RESULTS:  Lab Results   Component Value Date    INR 1.4 04/11/2016    INR 1.7 04/07/2016          PROCEDURES & FURTHER ASSESSMENTS:     ECG dated today:  NSR with 1st degree AVB, LBBB    Echocardiogram dated 4/8/21:  Interpretation Summary  Severe aortic stenosis is present.  Global and regional left ventricular function is normal with an EF of 55-60%.  Mild pulmonary hypertension is present.  IVC diameter <2.1 cm collapsing >50% with sniff suggests a normal RA pressure  of 3 mmHg.  No pericardial effusion is present.  Compared to study from 2014, AS has progressed.  ______________________________________________________________________________  Left Ventricle  Global and regional left ventricular function is normal with an EF of 55-60%.  Left ventricular size is normal. Mild concentric wall thickening consistent  with left ventricular hypertrophy is present. Diastolic function not assessed  due to significant mitral annular calcification.     Right Ventricle  The right ventricle is normal size. Global right ventricular function is  normal.     Atria  The right atria appears normal. Severe left atrial enlargement is present.     Mitral Valve  Severe mitral annular calcification is present. Trace mitral insufficiency is  present. The mean mitral valve gradient is 6.0 mmHg. Mild mitral stenosis is  present.     Aortic Valve  Moderate aortic valve calcification is present. Cannot exclude a bicuspid  aortic valve. Trace to mild aortic insufficiency is present. Severe aortic  stenosis is present. The calculated aortic valve are is 0.90 cm^2. The mean  gradient across the aortic valve is40 mmHg. The peak aortic velocity is 3.9  m/sec.     Tricuspid Valve  Mild tricuspid insufficiency is present. The right ventricular systolic  pressure is approximated at 34.2 mmHg plus the right atrial pressure. Mild  pulmonary hypertension is present.      Pulmonic Valve  The pulmonic valve is normal.     Vessels  The thoracic aorta is normal. IVC diameter <2.1 cm collapsing >50% with sniff  suggests a normal RA pressure of 3 mmHg.     Pericardium  No pericardial effusion is present.     STS RISK SCORE 4%  FRAILTY SCORE: 1/4  NYHA CLASS: II  DENTAL: Has 2 cavities that need to be filled - will be done prior to TAVR      CLINICAL IMPRESSION:     Ricki Sharp is a very pleasant 71 year old male with severe aortic valvular stenosis referred to our clinic for evaluation and consideration for potential transcatheter aortic valve replacement (TAVR).  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 A recent TTE 4/7/21 shows severe aortic stenosis with an JD 0.9 cm2, mean PG 40 mmHg and pV 3.9 m/sec and EF 55-60% which has progressed from mild in 2014. 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 He reports symptoms of chronic dyspnea on exertion and lightheadedness with standing, otherwise no chest pain, palpitations or syncope. We discussed the natural history of severe aortic stenosis, associated symptoms and available treatment options including TAVR vs SAVR. Patient is a appropriate candidate for transcatheter aortic valve replacement based on age, frailty, co-morbidities and surgical mortality risk estimation of 4% based on the STS score.        Plan Summary:  1) Severe Aortic Stenosis:  -Proceed with TAVR evalution   -CT TAVR and carotid US complete  -Schedule coronary angiogram, right heart catheterization and left heart catheterization  -Needs cavities filled 6/21 and surgical eval prior to TAVR   -Following completion of TAVR work-up patient case will be discussed during our  multi-disciplinary conference for consensus decision and procedural planning.    Patient was evaluated in clinic with Dr. Agee.     CORTES Santiago, CNP  [facility] Cardiology Team      CC  Patient Care Team:  Jarad Bocanegra MD as PCP - General  Kiana Palumbo, RN as Registered Nurse (Transplant)  Mike Gray MD as MD (Gastroenterology)  Cruzito Frederick MD as MD (Vascular and Interventional Radiology)  Raghu Fisher MD as MD (Orthopedics)  Melissa Glover MD as MD (Internal Medicine)  Jarad Bocanegra MD as Assigned PCP  Viji Roach MD as Assigned Neuroscience Provider  Mike Gray MD as Assigned Surgical Provider  Kylie Camp MD as MD (Endocrinology, Diabetes, and Metabolism)  SELF, REFERRED      Answers for HPI/ROS submitted by the patient on 5/18/2021   General Symptoms: Yes  Skin Symptoms: No  HENT Symptoms: No  EYE SYMPTOMS: No  HEART SYMPTOMS: Yes  LUNG SYMPTOMS: Yes  INTESTINAL SYMPTOMS: Yes  URINARY SYMPTOMS: Yes  REPRODUCTIVE SYMPTOMS: Yes  SKELETAL SYMPTOMS: Yes  BLOOD SYMPTOMS: No  NERVOUS SYSTEM SYMPTOMS: Yes  MENTAL HEALTH SYMPTOMS: No  Fever: No  Loss of appetite: No  Weight loss: No  Weight gain: No  Fatigue: Yes  Night sweats: No  Chills: No  Increased stress: No  Excessive hunger: No  Excessive thirst: No  Feeling hot or cold when others believe the temperature is normal: No  Loss of height: No  Post-operative complications: No  Surgical site pain: No  Hallucinations: No  Change in or Loss of Energy: Yes  Hyperactivity: No  Confusion: No  Cough: No  Sputum or phlegm: No  Coughing up blood: No  Difficulty breating or shortness of breath: Yes  Snoring: No  Wheezing: No  Difficulty breathing on exertion: Yes  Nighttime Cough: No  Difficulty breathing when lying flat: No  Chest pain or pressure: No  Fast or irregular heartbeat: Yes  Pain in legs with walking: Yes  Trouble breathing while lying down: No  Fingers or toes appear blue: No  High blood  pressure: No  Low blood pressure: No  Fainting: No  Murmurs: No  Pacemaker: No  Varicose veins: No  Edema or swelling: No  Wake up at night with shortness of breath: No  Light-headedness: Yes  Exercise intolerance: Yes  Heart burn or indigestion: No  Nausea: No  Vomiting: No  Abdominal pain: No  Bloating: No  Constipation: No  Diarrhea: Yes  Blood in stool: No  Black stools: No  Rectal or Anal pain: No  Fecal incontinence: No  Yellowing of skin or eyes: No  Vomit with blood: No  Change in stools: Yes  Trouble holding urine or incontinence: No  Pain or burning: No  Trouble starting or stopping: Yes  Increased frequency of urination: Yes  Blood in urine: No  Decreased frequency of urination: No  Frequent nighttime urination: Yes  Flank pain: No  Difficulty emptying bladder: No  Back pain: Yes  Muscle aches: Yes  Neck pain: No  Swollen joints: No  Joint pain: Yes  Bone pain: No  Muscle cramps: No  Muscle weakness: Yes  Joint stiffness: No  Bone fracture: No  Trouble with coordination: No  Dizziness or trouble with balance: Yes  Fainting or black-out spells: No  Memory loss: No  Headache: No  Seizures: No  Speech problems: No  Tingling: Yes  Tremor: No  Weakness: Yes  Difficulty walking: Yes  Paralysis: No  Numbness: Yes  Scrotal pain or swelling: No  Erectile dysfunction: Yes  Penile discharge: No  Genital ulcers: No  Reduced libido: Yes      I have seen and examined the patient with the TAVR team. I agree with the assessment and plan of the note above.I have reviewed pertinent labs.     Andrez Agee MD  Interventional Cardiology  Pager: [phone]        Please do not hesitate to contact me if you have any questions/concerns.     Sincerely,     [facility]

## 2021-05-24 NOTE — TELEPHONE ENCOUNTER
Health Call Center    Phone Message    May a detailed message be left on voicemail: no     Reason for Call: Other: Fide called to schedule an appointment for Pt Bill. She scheduled a video visit for 6/23/2021 at 8am, however, she has requested orders for a PFT and Labs to be completed in [place], [place] at [facility]. Please reach out to Fide at [phone] when complete.      Action Taken: Message routed to:  [facility] ([facility] Pulmonology    Travel Screening: Not Applicable

## 2021-05-25 NOTE — TELEPHONE ENCOUNTER
RECORDS RECEIVED FROM: internal    DATE RECEIVED: 6.23.21    NOTES STATUS DETAILS   OFFICE NOTE from referring provider internal  Elias Leslie, DO   OFFICE NOTE from other specialist internal  4.19.21 Bocanegra    DISCHARGE SUMMARY from hospital     DISCHARGE REPORT from the ER internal  4/7/21 Chuyita SAM     MEDICATION LIST internal     IMAGING  (NEED IMAGES AND REPORTS)     CT SCAN internal  4/8/21    CHEST XRAY (CXR) internal  4.8.21, 4.5.21    TESTS     PULMONARY FUNCTION TESTING (PFT) In process        Action 5.25.21 sv    Action Taken Message sent to CC pool to call pt and help scheduled PFT order

## 2021-05-25 NOTE — PROGRESS NOTES
Ricki Sharp  is being evaluated via a billable video visit.      How would you like to obtain your AVS? AGLOGIC  For the video visit, send the invitation by: log onto [employer]  10 minutes before appointment   Will anyone else be joining your video visit? No  Will you be in the [facility] at time of visit? Yes       Video Start Time: 11:50 AM    Assessment & Plan     Type 2 diabetes mellitus without complication, with long-term current use of insulin (H)    His CKD is not clear if it's related to DM or not. He will be seeing a nephrologist soon.     His current control is at goal. A1c 7.2. bedtime readings all at 180 mg/dL or below, which is at goal.     He is on very low dose insulin, and metformin max dose.   We discussed the possibility of switching from insulin to an SGLT-2 inhibitor, which will provide more cardiac and renal benefits.   At this point, since he is having aortic stenosis soon, will hold off on making any change sin his diabetes regimen, but we will address this possibility of switching to SGLT-2 inhibitor at the next visit in 4 months.       Review of the result(s) of each unique test - as per HPI     50 minutes spent on the date of the encounter doing chart review, history and exam, documentation and further activities per the note       Return in about 4 months (around 9/26/2021).    Kylie Camp MD  [facility] ENDOCRINOLOGY CLINIC [place]    ------------------------------------------------------------------------------------      Niurka Cardona is a 71 year old who presents for the following health issues: type 2 diabetes.     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 He is scheduled for aortic  stenosis surgery within the next month.     He was diagnosed with diabetes since about the time he had the liver transplant. It got worse around the time of the lymphoma. Then got better again as his lymphoma was treated. He has been on a stable regimen of lantus and metformin for the past few years.   Was taking glipizide which was stopped by his liver doctor. He used to have hypoglycemia on glipizide.     HbA1c was 7.2 on 4/5/2021.   No history of hypoglycemia.     He also had a history of hypothyroidism dating back to 2009.   Last TSH 2.64 on 4/7/2021. He is on levothyroxine 175 mcg po daily.     Diabetes Care:   Eyes: no known complications  Kidneys: recent dip in GFR in recent admission with hypomagnesemia, then improved. Will be seeing nephrology soon. Current Cr 1.13, no urine albumin checked  Nerves: some numbness in hands from cervical spine stenosis  Smoking: no  Blood Pressure: on BP meds  Lipids: on statins  Macrovascular: Aortic stenosis.    Current treatment strategy: Lantus 5 units at bedtime, Metformin XR 2000 mg daily (after supper).    Blood Glucose Monitoring: checks at bedtime.  5/12 103  5/13 180   5/14 163   5/15 145  5/16 131  5/17 148  5/18 111  5/19 156  5/20 159  5/21 119  5/22 111  5/23 130  5/24 112    Diet:   Breakfast: at noon  Dinner: 6 pm  Snacks: evening: nuts/kind bar    Exercise: no routine, limited by shortness of breath.      Review of Systems   Constitutional, HEENT, cardiovascular, pulmonary, gi and gu systems are negative, except as otherwise noted.      Objective           Vitals:  No vitals were obtained today due to virtual visit.    Physical Exam   GENERAL: Healthy, alert and no distress  EYES: Eyes grossly normal to inspection.  No discharge or erythema, or obvious scleral/conjunctival abnormalities.  RESP: No audible wheeze, cough, or visible cyanosis.  No visible retractions or increased work of breathing.    SKIN: Visible skin clear. No significant rash, abnormal  pigmentation or lesions.  NEURO: Cranial nerves grossly intact.  Mentation and speech appropriate for age.  PSYCH: Mentation appears normal, affect normal/bright, judgement and insight intact, normal speech and appearance well-groomed.            Video-Visit Details    Type of service:  Video Visit    Video End Time:12:37 PM    Originating Location (pt. Location): Home    Distant Location (provider location):  [facility] ENDOCRINOLOGY CLINIC [place]     Platform used for Video Visit: [employer]

## 2021-05-25 NOTE — CONFIDENTIAL NOTE
Spoke with Fide and she determined that patient will have PFT here on either 6/16 or 6/18 as have another apt on 6/17(angiogram) so PFT off the 17 th would be best. Will return call to her after obtain PFT time. Will ask Ksenia to schedule PFT per this note and get that time to this writer.    Gave the PFT time of 10:15 am on 6/16/21 to Fide. She confirmed understanding.

## 2021-05-25 NOTE — PATIENT INSTRUCTIONS
We appreciate your assistance in coordinating your healthcare.     Please upload your insulin pump, blood sugar meter and/or continuous glucose monitor at home 1-2 days before your next diabetes-related appointment.   This will allow your provider to review your  data before your scheduled virtual visit.    To ask a question to your Endocrine care team, please send them a [employer] message, or reach them by phone at [phone]     To expedite your medication refill(s), please contact your pharmacy and have them   fax a refill request to: [phone].  *Please allow 3 business days for routine medication refills.  *Please allow 5 business days for controlled substance medication refills.    For after-hours urgent Endocrine issues, that do not require 411, please dial [phone], and ask to speak with the Endocrinologist On-Call

## 2021-05-26 NOTE — LETTER
5/26/2021       RE: Ricki Sharp  [address] [facility] [address]     Dear Colleague,    Thank you for referring your patient, Ricki Sharp, to the [facility] ENDOCRINOLOGY CLINIC [place] at [facility]. Please see a copy of my visit note below.    Ricki Sharp  is being evaluated via a billable video visit.      How would you like to obtain your AVS? [employer]  For the video visit, send the invitation by: log onto [employer]  10 minutes before appointment   Will anyone else be joining your video visit? No  Will you be in the [facility] at time of visit? Yes       Video Start Time: 11:50 AM    Assessment & Plan     Type 2 diabetes mellitus without complication, with long-term current use of insulin (H)    His CKD is not clear if it's related to DM or not. He will be seeing a nephrologist soon.     His current control is at goal. A1c 7.2. bedtime readings all at 180 mg/dL or below, which is at goal.     He is on very low dose insulin, and metformin max dose.   We discussed the possibility of switching from insulin to an SGLT-2 inhibitor, which will provide more cardiac and renal benefits.   At this point, since he is having aortic stenosis soon, will hold off on making any change sin his diabetes regimen, but we will address this possibility of switching to SGLT-2 inhibitor at the next visit in 4 months.       Review of the result(s) of each unique test - as per HPI     50 minutes spent on the date of the encounter doing chart review, history and exam, documentation and further activities per the note       Return in about 4 months (around 9/26/2021).    Kylie Camp MD  [facility] ENDOCRINOLOGY [facility] [place]    ------------------------------------------------------------------------------------      Niurka Cardona is a 71 year old who presents for the following health issues: type 2 diabetes.     HPI     The  patient has a complex PMHx of Hep C s/p liver transplant in 2008 at [facility] on tacrolimus, PTLD status-post R-CHOP in 2014, pulmonary fibrosis secondary to chemotherapy, history of pleural effusion s/p bilateral decortication, COPD, DM, HLD, HTN, CKD III, hypothyroidism, and severe aortic stenosis. He is scheduled for aortic stenosis surgery within the next month.     He was diagnosed with diabetes since about the time he had the liver transplant. It got worse around the time of the lymphoma. Then got better again as his lymphoma was treated. He has been on a stable regimen of lantus and metformin for the past few years.   Was taking glipizide which was stopped by his liver doctor. He used to have hypoglycemia on glipizide.     HbA1c was 7.2 on 4/5/2021.   No history of hypoglycemia.     He also had a history of hypothyroidism dating back to 2009.   Last TSH 2.64 on 4/7/2021. He is on levothyroxine 175 mcg po daily.     Diabetes Care:   Eyes: no known complications  Kidneys: recent dip in GFR in recent admission with hypomagnesemia, then improved. Will be seeing nephrology soon. Current Cr 1.13, no urine albumin checked  Nerves: some numbness in hands from cervical spine stenosis  Smoking: no  Blood Pressure: on BP meds  Lipids: on statins  Macrovascular: Aortic stenosis.    Current treatment strategy: Lantus 5 units at bedtime, Metformin XR 2000 mg daily (after supper).    Blood Glucose Monitoring: checks at bedtime.  5/12 103  5/13 180   5/14 163   5/15 145  5/16 131  5/17 148  5/18 111  5/19 156  5/20 159  5/21 119  5/22 111  5/23 130  5/24 112    Diet:   Breakfast: at noon  Dinner: 6 pm  Snacks: evening: nuts/kind bar    Exercise: no routine, limited by shortness of breath.      Review of Systems   Constitutional, HEENT, cardiovascular, pulmonary, gi and gu systems are negative, except as otherwise noted.      Objective           Vitals:  No vitals were obtained today due to virtual visit.    Physical Exam    GENERAL: Healthy, alert and no distress  EYES: Eyes grossly normal to inspection.  No discharge or erythema, or obvious scleral/conjunctival abnormalities.  RESP: No audible wheeze, cough, or visible cyanosis.  No visible retractions or increased work of breathing.    SKIN: Visible skin clear. No significant rash, abnormal pigmentation or lesions.  NEURO: Cranial nerves grossly intact.  Mentation and speech appropriate for age.  PSYCH: Mentation appears normal, affect normal/bright, judgement and insight intact, normal speech and appearance well-groomed.            Video-Visit Details    Type of service:  Video Visit    Video End Time:12:37 PM    Originating Location (pt. Location): Home    Distant Location (provider location):  [facility] ENDOCRINOLOGY CLINIC [place]     Platform used for Video Visit: [employer]

## 2021-06-01 NOTE — PROGRESS NOTES
[facility] CARDIOTHORACIC SURGERY CONSULT  Patient Name: Ricki Sharp  Medical Record Number: [id number]  YOB: 1950  Room Number: Room/bed info not found  Referring Physician: Dr. Lucio    CC: Severe aortic stenosis    History of Present Illness: Ricki Sharp is a 71 year old male with severe aortic valvular stenosis referred to our clinic for evaluation and consideration for potential transcatheter aortic valve replacement (TAVR).  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 A recent TTE 4/7/21 shows severe aortic stenosis with an JD 0.9 cm2, mean PG 40 mmHg and pV 3.9 m/sec and EF 55-60% which has progressed from mild in 2014. 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       Patient was recently admitted to the hospital with hypomagnesemia (0.5), he has chronically low magnesium levels and his supplement was stopped. During his admission he developed bradycardia, arrhythmias and shortness of breath. An echo was obtained demonstrating severe aortic stenosis. He reports chronic shortness of breath dating back to 2008 (liver transplant c/b pleural effusion s/p decortication). He says he is only able to walk about 30 feet before having to stop and catch his breath, he is winded climbing 10 steps. He denies any chest pain or pressure. He reports lightheadedness with standing, no palpitations or syncope. He denies any leg swelling, weight gain, orthopnea, PND. Has notes progressive fatigue over the years and has slowed down quite a bit.     Assessment and Plan:  Ricki Sharp is a 71 year old male with severe aortic stenosis  1) Recommend TAVR - poor surgical candidate given age and comorbidities  2) Surgical  bailout - yes   3) Please call with questions or concerns.     Thank you for the opportunity to participate in the care of this patient.    Drew Caballero MD  Cardiothoracic Surgery  [phone]    Past Medical History:  Past Medical History:   Diagnosis Date     COPD (chronic obstructive pulmonary disease) (H)      Hypertension      Thyroid disease        Past Surgical History:  Past Surgical History:   Procedure Laterality Date     ABDOMEN SURGERY      liver transplant     INJECT EPIDURAL TRANSFORAMINAL LUMBAR / SACRAL SINGLE Left 8/1/2018    Procedure: INJECT EPIDURAL TRANSFORAMINAL LUMBAR / SACRAL SINGLE;  Left Transforaminal Lumbar Epidural Steroid Injection  Lumbar 3, Lumbar 4;  Surgeon: Jeni Agarwal MD;  Location:  OR        Family History:   Family History   Problem Relation Age of Onset     Alcoholism Mother      Cirrhosis Mother      Heart Disease Father      Cirrhosis Brother        Social History:  Social History     Socioeconomic History     Marital status:      Spouse name: Not on file     Number of children: Not on file     Years of education: Not on file     Highest education level: Not on file   Occupational History     Not on file   Social Needs     Financial resource strain: Not on file     Food insecurity     Worry: Not on file     Inability: Not on file     Transportation needs     Medical: Not on file     Non-medical: Not on file   Tobacco Use     Smoking status: Former Smoker     Years: 5.00     Quit date: 5/30/2018     Years since quitting: 3.0     Smokeless tobacco: Never Used   Substance and Sexual Activity     Alcohol use: No     Drug use: No     Sexual activity: Yes     Partners: Female   Lifestyle     Physical activity     Days per week: Not on file     Minutes per session: Not on file     Stress: Not on file   Relationships     Social connections     Talks on phone: Not on file     Gets together: Not on file     Attends Hindu service: Not on file     Active member of  club or organization: Not on file     Attends meetings of clubs or organizations: Not on file     Relationship status: Not on file     Intimate partner violence     Fear of current or ex partner: Not on file     Emotionally abused: Not on file     Physically abused: Not on file     Forced sexual activity: Not on file   Other Topics Concern     Parent/sibling w/ CABG, MI or angioplasty before 65F 55M? Not Asked   Social History Narrative           Allergies:   Allergies   Allergen Reactions     Lisinopril Cough     Spironolactone Other (See Comments)     hyperkalemia     Levaquin [Levofloxacin] Rash     Rash on abdomen, possibly from Levaquin.       Medications:  Current Outpatient Medications   Medication     acetaminophen (TYLENOL) 500 MG tablet     albuterol (PROAIR HFA, PROVENTIL HFA, VENTOLIN HFA) 108 (90 BASE) MCG/ACT inhaler     amLODIPine (NORVASC) 5 MG tablet     aspirin 81 MG EC tablet     atorvastatin (LIPITOR) 10 MG tablet     B-D U/F 31G X 8 MM insulin pen needle     blood glucose monitoring (NO BRAND SPECIFIED) meter device kit     Blood Pressure Monitoring (BLOOD PRESSURE CUFF) MISC     CONTOUR NEXT TEST test strip     cyanocobalamin (VITAMIN B-12) 1000 MCG tablet     cyclobenzaprine (FLEXERIL) 5 MG tablet     desonide (DESOWEN) 0.05 % external ointment     fluocinonide (LIDEX) 0.05 % external solution     LANTUS SOLOSTAR 100 UNIT/ML soln     levothyroxine (SYNTHROID/LEVOTHROID) 175 MCG tablet     losartan (COZAAR) 25 MG tablet     Magnesium Cl-Calcium Carbonate (SLOW-MAG) 71.5-119 MG TBEC     metFORMIN (GLUCOPHAGE-XR) 500 MG 24 hr tablet     metoprolol tartrate (LOPRESSOR) 100 MG tablet     omeprazole (PRILOSEC) 20 MG DR capsule     OYSTER SHELL CALCIUM + D3 500-400 MG-UNIT TABS     SPIRIVA HANDIHALER 18 MCG inhaled capsule     tacrolimus (GENERIC EQUIVALENT) 1 MG capsule     tamsulosin (FLOMAX) 0.4 MG capsule     thin (NO BRAND SPECIFIED) lancets     traMADol (ULTRAM) 50 MG tablet     No  current facility-administered medications for this visit.        Review of Systems:   A 10 point ROS was performed and is negative other than HPI.    Physical Exam:       Gen: NAD, resting comfortably in chair   Lungs: CTAB, non-labored breathing   CV: regular rhythm, normal rate   Abd: Soft, not tender, not distended   Ext: Motor, sensation, pulses intact   Neuro: AOx3    Labs:  Lab Results   Component Value Date    WBC 8.1 05/13/2021     Lab Results   Component Value Date    RBC 4.07 05/13/2021     Lab Results   Component Value Date    HGB 12.9 05/20/2021     Lab Results   Component Value Date    HCT 38.7 05/13/2021     No components found for: MCT  Lab Results   Component Value Date    MCV 95 05/13/2021     Lab Results   Component Value Date    MCH 31.9 05/13/2021     Lab Results   Component Value Date    MCHC 33.6 05/13/2021     Lab Results   Component Value Date    RDW 15.5 05/13/2021     Lab Results   Component Value Date     05/13/2021     Last Comprehensive Metabolic Panel:  Sodium   Date Value Ref Range Status   05/20/2021 139 133 - 144 mmol/L Final     Potassium   Date Value Ref Range Status   05/20/2021 4.2 3.4 - 5.3 mmol/L Final     Chloride   Date Value Ref Range Status   05/20/2021 110 (H) 94 - 109 mmol/L Final     Carbon Dioxide   Date Value Ref Range Status   05/20/2021 24 20 - 32 mmol/L Final     Anion Gap   Date Value Ref Range Status   05/20/2021 5 3 - 14 mmol/L Final     Glucose   Date Value Ref Range Status   05/20/2021 147 (H) 70 - 99 mg/dL Final     Urea Nitrogen   Date Value Ref Range Status   05/20/2021 26 7 - 30 mg/dL Final     Creatinine   Date Value Ref Range Status   05/20/2021 1.18 0.66 - 1.25 mg/dL Final     GFR Estimate   Date Value Ref Range Status   05/20/2021 63 >60 mL/min/[1.73_m2] Final     Comment:     Non  GFR Calc  Starting 12/18/2018, serum creatinine based estimated GFR (eGFR) will be   calculated using the Chronic Kidney Disease Epidemiology  Collaboration   (CKD-EPI) equation.       Calcium   Date Value Ref Range Status   05/20/2021 8.8 8.5 - 10.1 mg/dL Final       Imaging:  Transthoracic echocardiogram (4/8/21):  Interpretation Summary  Severe aortic stenosis is present.  Global and regional left ventricular function is normal with an EF of 55-60%.  Mild pulmonary hypertension is present.  IVC diameter <2.1 cm collapsing >50% with sniff suggests a normal RA pressure  of 3 mmHg.  No pericardial effusion is present.  Compared to study from 2014, AS has progressed.    Coronary angiogram - pending

## 2021-06-02 NOTE — TELEPHONE ENCOUNTER
Routing refill request to provider for review/approval because:  Drug not on the [facility] refill protocol     LOV: 4/19/2021    Ne Farah RN on 6/2/2021 at 7:54 AM

## 2021-06-03 NOTE — TELEPHONE ENCOUNTER
It is very difficult to keep the mag level up, when your body wants to erroneously lose it.  Typically you must target the underlying health problem.  Continue mag, and see the Nephrologist.      ICD-10-CM    1. Hypomagnesemia  E83.42 Magnesium      No orders of the defined types were placed in this encounter.    Signed, Jarad Bocanegra MD, FAAP, FACP  Internal Medicine & Pediatrics

## 2021-06-03 NOTE — TELEPHONE ENCOUNTER
"[facility] sent Rx request for the following:   SPIRIVA HANDIHALER 18 MCG inhaled capsule   Sig INHALE THE CONTENTS OF 1 CAPSULE(18 MCG) INTO THE LUNGS DAILY    Last Prescription Date:   12/16/2020  Last Fill Qty/Refills:         90, R-0    Last Office Visit:              04/19/2021 (Daljit)   Future Office visit:           None noted   Asthma Maintenance Inhalers - Anticholinergics Passed - 6/3/2021 11:01 AM        Passed - Patient is age 12 years or older        Passed - Recent (12 mo) or future (30 days) visit within the authorizing provider's specialty     Patient has had an office visit with the authorizing provider or a provider within the authorizing providers department within the previous 12 mos or has a future within next 30 days. See \"Patient Info\" tab in inbasket, or \"Choose Columns\" in Meds & Orders section of the refill encounter.              Passed - Medication is active on med list       Inhaled Steroids Protocol Passed - 6/3/2021 11:01 AM        Passed - Patient is age 12 or older        Passed - Recent (12 mo) or future (30 days) visit within the authorizing provider's specialty     Patient has had an office visit with the authorizing provider or a provider within the authorizing providers department within the previous 12 mos or has a future within next 30 days. See \"Patient Info\" tab in inbasket, or \"Choose Columns\" in Meds & Orders section of the refill encounter.              Passed - Medication is active on med list         Prescription approved per [facility] Refill Protocol.  Alfreda Hollingsworth RN ....................  6/3/2021   3:12 PM        "

## 2021-06-11 NOTE — TELEPHONE ENCOUNTER
Patient Quality Outreach 2nd Attempt      Summary:    Type of outreach:    Sent letter.    Next Steps:  Reach out within 90 days via Letter. Results for PHQ-9 and BARAK-7 entered    Max number of attempts reached: No. Will try again in 90 days if patient still on fail list.    Questions for provider review:    None                                                                                                                    Yeni Sahni LPN....................  6/11/2021   3:09 PM         Chart routed to .

## 2021-06-13 NOTE — PROGRESS NOTES
Patient swabbed for COVID-19 testing.  Norma J. Gosselin, LPN on 6/13/2021 at 11:10 AM    Surgery at [facility]

## 2021-06-16 NOTE — TELEPHONE ENCOUNTER
Left voicemail for patient to complete Travel Screen for Cardiac Cath Lab appointment on  6/17/2021 and inform patient of updated Visitor Policy.

## 2021-06-17 PROBLEM — Z98.890 STATUS POST CORONARY ANGIOGRAM: Status: ACTIVE | Noted: 2021-01-01

## 2021-06-17 NOTE — PROGRESS NOTES
Prep and teaching for CORS and RHC completed. Patient is alert and oriented x 4, denies pain. Wife, Fide is at bedside and will transport patient home post-procedure. Consent and labs current.

## 2021-06-17 NOTE — Clinical Note
The first balloon was inserted into the circumflex and proximal circumflex.Max pressure = 14 елена.

## 2021-06-17 NOTE — DISCHARGE INSTRUCTIONS
Going home after a Coronary Angiogram with Stent Placement    PROCEDURE SITE:  It is normal to have soreness and small bruising at the puncture site as well as mild tingling in your hand for up to 3 days. You may shower but please do not use a hot tub, bath tub or pool for 2 days. Do not apply any lotion or powder near the site for 3 days. For 3 days do not use your affected hand/arm to support your weight (like rising up out of a chair) or lifting >5 pounds.    MEDICATIONS:  1. You have begun Brilinta (ticagrelor). This medication is essential for your stent(s). Do not stop it without speaking first to your heart doctor or their nurse- this includes if you have concerns about side effects or cost. Also, if another health provider tells you to stop it, let them know they need to discuss it with your heart doctor.   2. If you are on Metformin (Glucophage) or any medications that contain this, you should not take it for at least 48 hours (2 days) from the time of your procedure. If you have baseline kidney problems, you may be instructed to also have a lab test drawn in 2-3 days before getting the okay to restart it.  3. If you have not been continued on other medications you were previously taking at home it is probably for reason though please discuss your concerns with any of your doctors.     DIET:  We recommend a diet low in saturated fat, trans fat and cholesterol. In addition it will be helpful to be cautious of sodium intake and carbohydrates. Try to increase the amount of lean meats you eat like fish and chicken, but avoid frying; and reduce the amount of red meat you eat. Eat more fresh fruits and vegetables and try to avoid canned and processed food. Please reference the handouts you received for more specific information.    CARDIAC REHAB:  After the initial healing process of the procedure site, we recommend cardiac rehabilitation for all heart attack and stent patients. Cardiac rehabilitation will help  you:  - Rebuild stamina, strength and balance.  - Learn how to participate in activities safely, as well as help you regain confidence to do so.  - Return to activities of daily living and leisure.  You should receive a call from them within the next week, but if you do not or you would like to call you can contact their central scheduling at [phone]    OTHER INFORMATION:  1. If you are a smoker, quitting smoking will be one of the most important things you can do for yourself. There are nicotine replacement options or medications they might be able to be prescribed. Please discuss this with your doctors. Consider calling the [employer]uitPlan at [phone] ([phone]) as they can offer ongoing support after discharge.    CALL YOUR DOCTOR IF:  -You have a large or growing lump/bump around the procedure site  -The site is red, swollen, hot, tender or has drainage  -You have hives, a rash or unusual itching  -You have increasing or worsening shortness of breath or chest pain    FOLLOW UP:  We prefer you to follow up with your primary care provider within one week. You will be arranged to see the cardiologist (heart doctor) in clinic in about one month.     Should you need to contact us:  Cardiology clinic for scheduling or triage nurse questions/concerns:  [phone]

## 2021-06-17 NOTE — Clinical Note
The first balloon was inserted into the left anterior descending and proximal left anterior descending.Max pressure = 12 елена.     Max pressure = 12 елена.    Balloon reinflated a second time: Max pressure = 12 елена.

## 2021-06-17 NOTE — Clinical Note
The first balloon was inserted into the left anterior descending and proximal left anterior descending.Max pressure = 14 елена.     Max pressure = 16 елена.    Balloon reinflated a second time: Max pressure = 16 елена.

## 2021-06-17 NOTE — Clinical Note
The first balloon was inserted into the left anterior descending and proximal left anterior descending.Max pressure = 14 елена.     Max pressure = 14 елена.    Balloon reinflated a second time: Max pressure = 14 елена.

## 2021-06-17 NOTE — Clinical Note
The first balloon was inserted into the circumflex and proximal circumflex.Max pressure = 12 елена.

## 2021-06-17 NOTE — PRE-PROCEDURE
GENERAL PRE-PROCEDURE:   Procedure:  Right heart catheterization, coronary angiogram with possible PCI  Date/Time:  6/17/2021 11:47 AM    Verbal consent obtained?: Yes    Written consent obtained?: Yes    Risks and benefits: Risks, benefits and alternatives were discussed    DC Plan: Appropriate discharge home plan in place for patients who are going home after procedure   Consent given by:  Patient  Patient states understanding of procedure being performed: Yes    Patient's understanding of procedure matches consent: Yes    Procedure consent matches procedure scheduled: Yes    Expected level of sedation:  Moderate  Appropriately NPO:  Yes  ASA Class:  Class 3- Severe systemic disease, definite functional limitations  Mallampati  :  Grade 3- soft palate visible, posterior pharyngeal wall not visible  Lungs:  Lungs clear with good breath sounds bilaterally  Heart:  Normal heart sounds and rate  History & Physical reviewed:  History and physical reviewed and no updates needed  Statement of review:  I have reviewed the lab findings, diagnostic data, medications, and the plan for sedation    CORTES Zamorano, CNP  [facility] Cardiology

## 2021-06-17 NOTE — PLAN OF CARE
D/I/A: Pt roomed on 3C in bay [address].  Arrived via litter and accompanied by RN On/Off: On monitor.  VSSA.  Rhythm upon arrival sinus rhythm on monitor.  Denies pain or sob.  Reviewed activity restrictions and when to notify RN, ie-changes to breathing or increased chest pressure or chest pain.  CCL access:  Right internal jugular and right radial.  P: Continue to monitor status.  Discharge to home once meeting criteria.

## 2021-06-17 NOTE — PLAN OF CARE
1500- on TR band and internal jugular remain in place. He declines to eat food at this time, as he has TR band on right, and BP cuff and antecubital PIV in left, and declines to eat with left hand. Family member arrived and now he will eat and drink. He voided and sat on side of the bed  Walked Form reviewedHas all belongings  Site flat old bruise around site, old flat bruise around internal jugular site and old flat bruise around PIV site, (he has low platelets and wife said he bleeds always)  RN brought to front to ride home

## 2021-06-18 NOTE — PROGRESS NOTES
Return telephone call made to Fide:  Brendan will have his post-angiogram creatinine drawn up in [place], prior to his re-starting the metformin.  Fied had questions regarding if Brendan was OK to have dental work done following stent placement.  She was informed that Brendan was OK to proceed with this.

## 2021-06-18 NOTE — TELEPHONE ENCOUNTER
Call back from Fide.  Brendan had a heart cath yesterday, had stents placed.  He will get a TAVR in the future.  He is seeing nephrology and pulmonary in [place].   She says yesterday's appt's were very chaotic.    They are planning to change local internists in [place].

## 2021-06-18 NOTE — TELEPHONE ENCOUNTER
They need verification on Mag  oxid and slow mag. Please call      Ivy Carey on 6/18/2021 at 11:10 AM

## 2021-06-18 NOTE — TELEPHONE ENCOUNTER
Spoke to Joann at [facility].  She states patient is taking 2 different Magnesium prescriptions.  She states when she looks at the patient's med list she sees only Slow Mag is active, but patient has been filling both the Slow Mag and the Mag Ox in the past 2 months.  She is wondering which one is correct.  Shabana Mansfield LPN 6/18/2021   11:32 AM

## 2021-06-21 NOTE — TELEPHONE ENCOUNTER
Called patient and he would like to have this drawn . They will call in the morning to make an appointment . Karis Wood LPN ....................6/21/2021  4:49 PM

## 2021-06-21 NOTE — TELEPHONE ENCOUNTER
Both. Awaiting Nephrology input.    Signed, Jarad Bocanegra MD, FAAP, FACP  Internal Medicine & Pediatrics

## 2021-06-21 NOTE — TELEPHONE ENCOUNTER
Lab called. No adequate sample. If he wants COVID antibody tests, he'll have to come get redrawn.      ICD-10-CM    1. Liver replaced by transplant (H)  Z94.4 COVID-19 Son RBD Senait & Titer Reflex     SARS-CoV-2 Nucleocapsid Total Ab     COVID-19 Son RBD Senait & Titer Reflex     SARS-CoV-2 Nucleocapsid Total Ab      No orders of the defined types were placed in this encounter.    Signed, Jarad Bocanegra MD, FAAP, FACP  Internal Medicine & Pediatrics

## 2021-06-21 NOTE — TELEPHONE ENCOUNTER
Spoke with wife. States patient had blood work done this morning and if wondering about adding a covid antibody test. He did receive both covid vaccines but due to his compromised immune medication that he takes the specialist is saying there is a chance he may not have made immunities with the vaccine. They would like a covid antibody test to check to see if he made antibodies.  Maria L Doshi LPN.........................6/21/2021  11:56 AM

## 2021-06-21 NOTE — TELEPHONE ENCOUNTER
K[facility]-patient is looking to have the COVID anti body test done    Please call and advise    Thank You    Heaven Domingo on 6/21/2021 at 11:36 AM

## 2021-06-21 NOTE — TELEPHONE ENCOUNTER
So is the patient taking both magnesium tablets? And he is suppose to ? Karis Wood LPN ....................6/21/2021  10:30 AM

## 2021-06-22 NOTE — PROGRESS NOTES
"Chief Complaint   Patient presents with     Consult     CKD 2, hypomagnesemia       Vitals:    06/22/21 1341   BP: 120/78   Pulse: 70   Weight: 91.2 kg (201 lb)   Height: 1.651 m (5' 5\")       Body mass index is 33.45 kg/m .    Ricki Sharp is a 71 year old male who is being evaluated via a billable video visit.      The patient has been notified of following:     \"This video visit will be conducted via a call between you and your physician/provider. We have found that certain health care needs can be provided without the need for an in-person physical exam.  This service lets us provide the care you need with a video conversation.  If a prescription is necessary we can send it directly to your pharmacy.  If lab work is needed we can place an order for that and you can then stop by our lab to have the test done at a later time.    If during the course of the call the physician/provider feels a video visit is not appropriate, you will not be charged for this service.\"     Patient confirmed that they are in [place] for today's visit-confirmed by Laura Galdamez RNCC    Video-Visit Details  Type of service:  Video Visit    Video Start Time: 243pm  Video End Time:  3:04 PM  >60 minutes spent reviewing chart, coordinating care and discussing with patient on day of service    Originating Location (pt. Location): Home    Distant Location (provider location):  [facility]     Platform used: Lilliam Galdamez RN      Assessment and Plan:  71 year old male with history of hepatitis C cirrhosis and liver transplant , who is referred for evaluation of hypomagnesium. He has has aortic stenosis, and CAD s/p 3 stents une 2021. He also has history of lymphoma/ PTLD, chronic thrombocytopenia  # CKD:  Scr 1.2-1.4 baseline for many years, recently up a little higher but now back to 1.2 on recent check  - had KEIRY post transplant with Scr up to ~5, settled to near 1 for baseline    # Hypomagnesemia- " was taken off magnesium (error in not refiling?), which he has been on for years post transplant,  and magnesium was down to 0.7 and he had heart rhythm issues.  He was given IV magnesium and started on very high dose magnesium (16 pills a day now)  - his serum magnesium is 1.7  - we will decrease by 1 pill every 2 weeks as I hope he has now built up his magnesium stores and will tolerate weaning down to 2-4 pills total a day  - Magnesium every 2 weeks ordered and wife and he will decrease unless level goes down to 1.4 or lower  - needs monitoring closely given cardiac arrhythmias especially in setting of valvular disease.   # Hypertension: has mild hypertension, on metoprolol, amlodipine and losartan    # Anemia in chronic renal disease:   - Hgb: 12.4, mild anemia  - Iron studies: Not checked recently    # Electrolytes:   - Potassium; level: Normal  - Bicarbonate; level: Normal    # Mineral Bone Disorder:   - Calcium; level is:  Normal     Assessment and plan was discussed with patient and he voiced his understanding and agreement.    Consult:  Ricki Sharp was seen in consultation at the request of Dr. Damico for hypomagnesemia.    Reason for Visit:  Ricki Sharp is a 71 year old male with hypomagnesemia, who presents for evaluation.    HPI:  He is a pleasant male with history of hepatitis C and liver cirrhosis s/p liver transplant 2008 who presents for evaluation of hypomagnesemia. A few months back, his magnesium was not refilled and thus he was off of it for several weeks and he became symptomatic and noted with a magnesium of 0.7  He was treated with IV magnesium urgently and has been on high dose oral magnesium since.  His last serum magnesium is 1.7 but he is having a lot of GI issues from the magnesium. He is not sure why magnesium was not renewed, but the low magnesium led to cardiac rhythm issues which actually led to further cardiac workup with angiogram June 2021 revealing CAD and recently  underwent stent placement.  He also is noted with aortic stenosis and will undergo evaluation for replacement.  He is now starting to feel a little better but it has been frustrating to have the low magnesium issue and he is anxious to decrease the pills given the GI side effects he is experiencing (diarrhea etc)  His weight is relatively stable and he denies shortness of breath.  He is taking 4 pills three times a day of one magnesium formulation (slow mag), and several other pills of magnesium oxide.  He  Also has history of lymphoma/ PTLD  Renal History:   Liver transplant  CKD     ROS:   A comprehensive review of systems was obtained and negative, except as noted in the HPI or PMH.    Active Medical Problems:  Patient Active Problem List   Diagnosis     Liver replaced by transplant (H)     PTLD (post-transplant lymphoproliferative disorder) (H)     NHL (non-Hodgkin's lymphoma) (H)     Hypomagnesemia     Drug-induced neutropenia (H)     Antineoplastic chemotherapy induced pancytopenia (H)     Primary localized osteoarthrosis, pelvic region and thigh     Status post total replacement of left hip     Type 2 diabetes mellitus with hyperglycemia (H)     Liver transplant recipient (H)     Essential hypertension     Mixed hyperlipidemia     Degeneration of lumbar intervertebral disc     Lumbar facet arthropathy     Lumbar foraminal stenosis     Spinal stenosis in cervical region     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  Past Medical History:   Diagnosis Date     Cancer (H)     Lymphoma     COPD (chronic obstructive pulmonary disease) (H)      Coronary artery disease     3 stents placed in June of 2021 and aortic valve replacement TBD     Diabetes (H)      History of blood transfusion      Hypertension      Thyroid disease      PSH:   Past Surgical History:   Procedure Laterality Date     ABDOMEN SURGERY      liver transplant     CARDIAC SURGERY      3 stents     CHOLECYSTECTOMY       CV CORONARY ANGIOGRAM N/A 6/17/2021    Procedure: CV CORONARY ANGIOGRAM;  Surgeon: Noah Akins MD;  Location: [facility]     CV INSTANTANEOUS WAVE-FREE RATIO N/A 6/17/2021    Procedure: Instantaneous Wave-Free Ratio;  Surgeon: Noah Akins MD;  Location: [facility]     CV PCI STENT DRUG ELUTING N/A 6/17/2021    Procedure: Percutaneous Coronary Intervention Stent Drug Eluting;  Surgeon: Noah Akins MD;  Location: [facility]     CV RIGHT HEART CATH MEASUREMENTS RECORDED N/A 6/17/2021    Procedure: CV RIGHT HEART CATH;  Surgeon: Noah Akins MD;  Location: [facility]     INJECT EPIDURAL TRANSFORAMINAL LUMBAR / SACRAL SINGLE Left 8/1/2018    Procedure: INJECT EPIDURAL TRANSFORAMINAL LUMBAR / SACRAL SINGLE;  Left Transforaminal Lumbar Epidural Steroid Injection  Lumbar 3, Lumbar 4;  Surgeon: Jeni Agarwal MD;  Location:  OR     THORACIC SURGERY      lung     TRANSPLANT  2008    Liver       Family Hx:   Family History   Problem Relation Age of Onset     Alcoholism Mother      Cirrhosis Mother      Heart Disease Father      Cirrhosis Brother      Personal Hx:   Social History     Tobacco Use     Smoking status: Former Smoker     Years: 5.00     Types: Cigarettes     Quit date: 5/30/2018     Years since quitting: 3.0     Smokeless tobacco: Never Used   Substance Use Topics     Alcohol use: No        Allergies:  Allergies   Allergen Reactions     Lisinopril Cough     Spironolactone Other (See Comments)     hyperkalemia     Levaquin [Levofloxacin] Rash     Rash on abdomen, possibly from Levaquin.       Medications:  Current Outpatient Medications   Medication Sig     acetaminophen (TYLENOL) 500 MG tablet Take 500 mg by mouth daily as needed (headache or back pain)     albuterol (PROAIR HFA, PROVENTIL HFA, VENTOLIN HFA) 108 (90 BASE) MCG/ACT inhaler Inhale 2 puffs into the lungs every 4 hours as needed for shortness of breath / dyspnea or wheezing     amLODIPine (NORVASC) 5 MG tablet Take 1 tablet (5 mg) by mouth daily     aspirin 81 MG EC tablet Take 81 mg by mouth At Bedtime      atorvastatin (LIPITOR) 40 MG tablet Take 1 tablet (40 mg) by mouth daily     B-D U/F 31G X 8 MM insulin pen needle USE WITH LANTUS DAILY     blood glucose monitoring (NO BRAND SPECIFIED) meter device kit Use to test blood sugar 2 times daily. Please dispense item that is covered by insurance.     Blood Pressure Monitoring (BLOOD PRESSURE CUFF) MISC BP cuff. HTN. 99     CONTOUR NEXT TEST test strip TESTING 2 TIMES DAILY     cyanocobalamin (VITAMIN B-12) 1000 MCG tablet Take 1,000 mcg by mouth 2 times daily     cyclobenzaprine (FLEXERIL) 5 MG tablet TAKE 1 TO 2 TABLETS BY MOUTH AT NIGHT AS NEEDED     desonide (DESOWEN) 0.05 % external ointment Use on the face daily PRN     fluocinonide (LIDEX) 0.05 % external solution APPLY TOPICALLY TO THE AFFECTED SCALP TWICE DAILY     LANTUS SOLOSTAR 100 UNIT/ML soln INJECT 5 UNITS SUB-Q AT BEDTIME     levothyroxine (SYNTHROID/LEVOTHROID) 175 MCG tablet Take 1 tablet (175 mcg) by mouth daily     losartan (COZAAR) 25 MG tablet Take 1 tablet (25 mg) by mouth daily     Magnesium Cl-Calcium Carbonate (SLOW-MAG) 71.5-119 MG TBEC Take 3 tablets by mouth 4 times daily     metFORMIN (GLUCOPHAGE-XR) 500 MG 24 hr tablet TAKE 4 TABLETS(2000 MG) BY MOUTH DAILY WITH DINNER     metoprolol succinate ER  "(TOPROL-XL) 25 MG 24 hr tablet Take 1 tablet (25 mg) by mouth daily     metoprolol tartrate (LOPRESSOR) 100 MG tablet Take 1 tablet (100 mg) by mouth 2 times daily     omeprazole (PRILOSEC) 20 MG DR capsule Take 20 mg by mouth every other day     SPIRIVA HANDIHALER 18 MCG inhaled capsule INHALE THE CONTENTS OF 1 CAPSULE(18 MCG) INTO THE LUNGS DAILY     tacrolimus (GENERIC EQUIVALENT) 1 MG capsule TAKE ONE CAPSULE BY MOUTH EVERY 12 HOURS     tamsulosin (FLOMAX) 0.4 MG capsule Take 1 capsule (0.4 mg) by mouth daily     thin (NO BRAND SPECIFIED) lancets Use to test blood sugar 2 times daily.  Please dispense items that are covered by insurance.     ticagrelor (BRILINTA) 90 MG tablet Take 1 tablet (90 mg) by mouth 2 times daily Start tomorrow morning.     Vitamin D3 (CHOLECALCIFEROL) 25 mcg (1000 units) tablet Take 1 tablet by mouth daily     OYSTER SHELL CALCIUM + D3 500-400 MG-UNIT TABS TAKE ONE TABLET BY MOUTH TWICE A DAY (Patient not taking: Reported on 6/22/2021)     traMADol (ULTRAM) 50 MG tablet TAKE 1 TABLET(50 MG) BY MOUTH TWICE DAILY (Patient taking differently: Take 50 mg by mouth daily )     No current facility-administered medications for this visit.       Vitals:  /78   Pulse 70   Ht 1.651 m (5' 5\")   Wt 91.2 kg (201 lb)   BMI 33.45 kg/m      GENERAL: Healthy, alert and no distress  EYES: Eyes grossly normal to inspection.  No discharge or erythema, or obvious scleral/conjunctival abnormalities.  RESP: No audible wheeze, cough, or visible cyanosis.  No visible retractions or increased work of breathing.    SKIN: Visible skin clear. No significant rash, abnormal pigmentation or lesions.  NEURO: Cranial nerves grossly intact.  Mentation and speech appropriate for age.  PSYCH: Mentation appears normal, affect normal/bright, judgement and insight intact, normal speech and appearance well-groomed.      LABS:   CMP  Recent Labs   Lab Test 06/21/21  1112 06/17/21  1048 05/20/21  0840 05/13/21  1619   NA " 136 138 139 137   POTASSIUM 4.4 4.6 4.2 5.1   CHLORIDE 104 110* 110* 106   CO2 22 24 24 24   ANIONGAP 10 5 5 7   * 172* 147* 193*   BUN 18 24 26 26*   CR 1.20 1.06 1.18 1.27   GFRESTIMATED 60* 71 63 56*   GFRESTBLACK 73 82 72 68   LIT 8.9 8.8 8.8 9.5     Recent Labs   Lab Test 05/13/21  1619 04/09/21  0610 04/07/21  0025 02/02/21  1225   BILITOTAL 1.3* 1.0 0.9 1.1*   ALKPHOS 83 89 104 107*   ALT 14 25 36 11   AST 14 11* 36 11*     CBC  Recent Labs   Lab Test 06/17/21  1240 06/17/21  1048 05/20/21  0840 05/13/21  1619 04/09/21  0610 04/07/21  0025   HGB 12.4* 12.7* 12.9* 13.0* 12.4* 11.3*   WBC  --  6.4  --  8.1 8.2 9.7   RBC  --  4.12*  --  4.07* 3.90* 3.70*   HCT  --  39.4*  --  38.7* 36.2* 34.9*   MCV  --  96  --  95 93 94   MCH  --  30.8  --  31.9 31.8 30.5   MCHC  --  32.2  --  33.6 34.3 32.4   RDW  --  15.2*  --  15.5* 15.2* 15.3*   PLT  --  78*  --  101* 109* 111*     URINE STUDIES  Recent Labs   Lab Test 03/11/19  1101 02/17/17  1055 03/08/16  1409 06/18/14  0007 06/11/14  1625 04/10/14  2311 04/10/14  2311   COLOR Yellow Yellow Yellow Light Yellow Straw   < > Yellow   APPEARANCE Clear  --   --  Clear Clear  --  Clear   URINEGLC 250* Negative Negative 70* 300*   < > 30*   URINEBILI Negative  --   --  Negative Negative  --  Negative   URINEKETONE Negative Negative Negative Negative Negative   < > Negative   SG 1.025 1.020 1.015 1.010 1.006   < > 1.011   UBLD Trace*  --   --  Moderate* Small*  --  Negative   URINEPH 6.0  --   --  8.0* 7.0  --  5.0   PROTEIN 100*  --   --  30* Negative  --  30*   UROBILINOGEN 0.2  --   --   --   --   --   --    NITRITE Negative Negative Negative Negative Negative   < > Negative   LEUKEST Negative Negative Negative Negative Negative   < > Negative   RBCU O - 2  --   --  53* 16*  --  1   WBCU 0 - 5  --   --  2 <1  --  <1    < > = values in this interval not displayed.     Recent Labs   Lab Test 11/04/14  1240   [facility] 0.99*     PTH  Recent Labs   Lab Test 05/20/21  0840  04/19/21  1154   PTHI 38 31     IRON STUDIES  No lab results found.      Kristy Abdi MD

## 2021-06-22 NOTE — PATIENT INSTRUCTIONS
It was a pleasure taking care of you today.  I've included a brief summary of our discussion and care plan from today's visit below.  Please review this information with your primary care provider.  _______________________________________________________________________    My recommendations are summarized as follows:  Wean down magnesium every 10-14 days with magnesium level check every couple of weeks    To schedule imaging please call [phone]     To schedule your lab appointment at [facility] 1st floor lab call [phone]      Return to Nephrology Clinic in 3-4 months to review your progress.    _______________________________________________________________________    Who do I call with any questions after my visit?    Please be in touch if there are any further questions that arise following today's visit.  There are multiple ways to contact your nephrology care team.      During business hours, you may reach your Nephrology RN Care Coordinator, Laura, at [phone].      To schedule or reschedule an appointment, please call [phone].    You can always send a secure message through [employer].  [employer] messages are answered by your nurse or doctor typically within 24 hours.  Please allow extra time on weekends and holidays.      For urgent/emergent questions after business hours, you may reach the on-call Nephrology Fellow by contacting the [facility] at [phone].     How will I get the results of any tests ordered?    You will receive all of your results.  If you have signed up for [employer], any tests ordered at your visit will be available to you after your physician reviews them.  Typically this takes 1-2 weeks.  If there are urgent results that require a change in your care plan, your physician or nurse will call you to discuss the next steps.      What is [employer]?  [employer] is a secure way for you to access all of your healthcare records from the [place] [facility]  [place].  It is a web based computer program, so you can sign on to it from any location.  It also allows you to send secure messages to your care team.  I recommend signing up for Nveloped access if you have not already done so and are comfortable with using a computer.      How do I schedule a follow-up visit?  If you did not schedule a follow-up visit today, please call [phone] to schedule a follow-up office visit.        Sincerely,    Kristy Abdi MD  [facility]  Division of Nephrology and Hypertension      Plan for magnesium lab every 10-14 days and if level 1.5 or higher, lower pill by one

## 2021-06-24 NOTE — TELEPHONE ENCOUNTER
[facility] Call Center    Phone Message    May a detailed message be left on voicemail: yes     Reason for Call: Other: Wife calling in on behalf of pt and would like to speak to nurse or clinical coordinator about ticagrelor (BRILINTA) 90 MG tablet as medication is too expensive and they were told the medication was going to change. Also, they want a call back for a follow-up as well. Please return call at [phone]. Thank you.      Action Taken: Message routed to:  [facility] cardio    Travel Screening: Not Applicable

## 2021-06-24 NOTE — TELEPHONE ENCOUNTER
[facility] sent Rx request for the following:      Requested Prescriptions   Pending Prescriptions Disp Refills   desonide (DESOWEN) 0.05 % external ointment [Pharmacy Med Name: DESONIDE 0.05% OINTMENT 60GM] 60 g 3    Sig: APPLY TOPICALLY TO THE FACE DAILY AS NEEDED   Last Prescription Date:   1/17/20  Last Fill Qty/Refills:         60g, R-3    Last Office Visit:              4/19/21   Future Office visit:           None  Routing refill request to provider for review/approval because:  Drug not on the [facility], [facility] or [facility] refill protocol or controlled substance    Unable to complete prescription refill per RN Medication Refill Policy. Pauline Goldstein RN .............. 6/24/2021  3:56 PM

## 2021-06-25 NOTE — CONFIDENTIAL NOTE
Dianna calling and requesting a callback to [facility]. They would like to discuss options for Brilinta or Plavix. Dianna states that they need a PA for the Brilinta which is the option they would like to go with.

## 2021-06-29 NOTE — PROGRESS NOTES
Date: 6/29/2021    Time of Call: 2:59 PM     Diagnosis:  Severe aortic stenosis     [ TORB ] Ordering provider: Airam Hannah CNP  Order: Labs- CMP, CBC, NT-Bnp, covid, type & cross. TAVR scheduling orders and ECHO      Order received by: Patti French RN     Follow-up/additional notes:   Ricki's case was discussed.    The plan will be for him to proceed with TAVR   Medtronic Valve  Size 29 mm  Approach: TF    Additional testings/orders/information discussed:   NO additional testing needed at this time.

## 2021-07-07 PROBLEM — Z95.5 HISTORY OF CORONARY ARTERY STENT PLACEMENT: Status: ACTIVE | Noted: 2021-01-01

## 2021-07-07 NOTE — H&P (VIEW-ONLY)
[facility]  [address]  [place] [address]  Phone: [phone]  Fax: [phone]  Primary Provider: Jarad Bocanegra  Pre-op Performing Provider: LITO POWELL      PREOPERATIVE EVALUATION:  Today's date: 7/7/2021    Ricki Sharp is a 71 year old male who presents for a preoperative evaluation.    Surgical Information:  Surgery/Procedure: Aortic Valve surgery   Surgery Location: [facility]   Surgeon:    Surgery Date: 07/14/2021  Time of Surgery:   Where patient plans to recover: At home with family  Fax number for surgical facility: [phone]    Type of Anesthesia Anticipated: to be determined    Assessment & Plan     The proposed surgical procedure is considered LOW to INTERMEDIATE risk.    Pre-op exam  Patient reports all labs will be done at the [facility].  No labs performed here.    Severe aortic stenosis  Symptomatic.    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  Diff possible difficulty getting off the respirator.       Medication Instructions:   - clopidrogel (Plavix), prasugrel (Effient), ticagrelor (Brilinta): Patient has a cardiac stent. Medication will NOT be stopped until cleared by cardiology.     RECOMMENDATION:  APPROVAL GIVEN to proceed with proposed procedure pending review of diagnostic evaluation by pulm    Review of external notes as documented above                   Subjective     HPI related to upcoming procedure: Patient arrives  here for preop. He is scheduled to undergo TAVR procedure on July 14 at [facility]. Patient has a history of severe aortic valve stenosis. Increasing dyspnea on exertion. He recently underwent cardiac stenting in preparation. He states that he can walk about 600 feet before he has to rest. This typically is walking down to his mailbox. He reports that it is slowly worsening over time. Patient also has a history of pulmonary fibrosis secondary to chemotherapy. History of recent liver transplant. And type 2 diabetes. His last hemoglobin A1c was April 5 at 7.2. States his diabetes has been under fairly good control.  Patient indicates that he was told to continue the Brilinta.  Preop Questions 7/7/2021   1. Have you ever had a heart attack or stroke? No   2. Have you ever had surgery on your heart or blood vessels, such as a stent placement, a coronary artery bypass, or surgery on an artery in your head, neck, heart, or legs? YES - stents times [id number]   3. Do you have chest pain with activity? No   4. Do you have a history of  heart failure? No   5. Do you currently have a cold, bronchitis or symptoms of other infection? No   6. Do you have a cough, shortness of breath, or wheezing? YES - chronic   7. Do you or anyone in your family have previous history of blood clots? No   8. Do you or does anyone in your family have a serious bleeding problem such as prolonged bleeding following surgeries or cuts? No   9. Have you ever had problems with anemia or been told to take iron pills? No   10. Have you had any abnormal blood loss such as black, tarry or bloody stools? YES - prior to liver transplant   11. Have you ever had a blood transfusion? YES -    11a. Have you ever had a transfusion reaction? No   12. Are you willing to have a blood transfusion if it is medically needed before, during, or after your surgery? Yes   13. Have you or any of your relatives ever had problems with anesthesia? No   14. Do you have  sleep apnea, excessive snoring or daytime drowsiness? No   15. Do you have any artifical heart valves or other implanted medical devices like a pacemaker, defibrillator, or continuous glucose monitor? YES - CAD stent   15a. What type of device do you have? 3 stents   15b. Name of the clinic that manages your device:  [facility]   16. Do you have artificial joints? YES - hips   17. Are you allergic to latex? No       Health Care Directive:  Patient does not have a Health Care Directive or Living Will: Discussed advance care planning with patient; however, patient declined at this time.    Preoperative Review of :   reviewed - controlled substances reflected in medication list.          Review of Systems  CONSTITUTIONAL: NEGATIVE for fever, chills, change in weight  ENT/MOUTH: NEGATIVE for ear, mouth and throat problems  RESP: NEGATIVE for significant cough or SOB  CV: NEGATIVE for chest pain, palpitations or peripheral edema    Patient Active Problem List    Diagnosis Date Noted     Status post coronary angiogram 06/17/2021     Priority: Medium     Severe aortic stenosis 05/20/2021     Priority: Medium     Added automatically from request for surgery [id number]       Other ill-defined heart diseases 05/20/2021     Priority: Medium     Added automatically from request for surgery [id number]       Bicuspid aortic valve 05/13/2021     Priority: Medium     WOOD (dyspnea on exertion) 05/13/2021     Priority: Medium     History of tobacco abuse 05/13/2021     Priority: Medium     Hx of liver transplant (H) 05/13/2021     Priority: Medium     [facility]        History of hepatitis C 05/13/2021     Priority: Medium     Status post treatment       History of Hodgkin's lymphoma 05/13/2021     Priority: Medium     Chronic kidney disease, stage 3a 04/19/2021     Priority: Medium     COPD, severe on 12/29/2015 (H) 04/19/2021     Priority: Medium     Severe aortic stenosis 04/19/2021     Priority: Medium     Personal history  of tobacco use, presenting hazards to health 04/08/2021     Priority: Medium     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 04/05/2021     Priority: Medium     Spinal stenosis in cervical region 01/23/2020     Priority: Medium     Degeneration of lumbar intervertebral disc 03/05/2018     Priority: Medium     Lumbar facet arthropathy 03/05/2018     Priority: Medium     Lumbar foraminal stenosis 03/05/2018     Priority: Medium     Essential hypertension 02/27/2018     Priority: Medium     Mixed hyperlipidemia 02/27/2018     Priority: Medium     Type 2 diabetes mellitus with hyperglycemia (H) 04/18/2016     Priority: Medium     Status post total replacement of left hip 03/18/2016     Priority: Medium     Primary localized osteoarthrosis, pelvic region and thigh 08/24/2015     Priority: Medium     Antineoplastic chemotherapy induced pancytopenia (H) 07/11/2014     Priority: Medium     Hypomagnesemia 06/10/2014     Priority: Medium     Drug-induced neutropenia (H) 06/10/2014     Priority: Medium     updating diagnosis code for icd10 cutover       NHL (non-Hodgkin's lymphoma) (H) 05/16/2014     Priority: Medium     PTLD (post-transplant lymphoproliferative disorder) (H) 04/02/2014     Priority: Medium     Liver replaced by transplant (H) 08/29/2012     Priority: Medium     Liver transplant recipient (H) 03/18/2008     Priority: Medium      Past Medical History:   Diagnosis Date     Cancer (H)     Lymphoma     COPD (chronic obstructive pulmonary disease) (H)      Coronary artery disease     3 stents placed in June of 2021 and aortic valve replacement TBD     Diabetes (H)      History of blood transfusion      Hypertension      Thyroid disease      Past Surgical History:   Procedure Laterality Date     ABDOMEN SURGERY      liver transplant     CARDIAC SURGERY      3 stents     CHOLECYSTECTOMY       CV CORONARY ANGIOGRAM N/A 6/17/2021    Procedure: CV CORONARY ANGIOGRAM;  Surgeon: Noah Akins MD;  Location:   [facility]     CV INSTANTANEOUS WAVE-FREE RATIO N/A 6/17/2021    Procedure: Instantaneous Wave-Free Ratio;  Surgeon: Noah Akins MD;  Location:  [facility]     CV PCI STENT DRUG ELUTING N/A 6/17/2021    Procedure: Percutaneous Coronary Intervention Stent Drug Eluting;  Surgeon: Noah Akins MD;  Location:  [facility] CATH [facility]     CV RIGHT HEART CATH MEASUREMENTS RECORDED N/A 6/17/2021    Procedure: CV RIGHT HEART CATH;  Surgeon: Noah Akins MD;  Location:  HE[facility] CARDI[facility] CATH LAB     INJECT EPIDURAL TRANSFORAMINAL LUMBAR / SACRAL SINGLE Left 8/1/2018    Procedure: INJECT EPIDURAL TRANSFORAMINAL LUMBAR / SACRAL SINGLE;  Left Transforaminal Lumbar Epidural Steroid Injection  Lumbar 3, Lumbar 4;  Surgeon: Jeni Agarwal MD;  [facility]     THORACIC SURGERY      lung     TRANSPLANT  2008    Liver     Current Outpatient Medications   Medication Sig Dispense Refill     acetaminophen (TYLENOL) 500 MG tablet Take 500 mg by mouth daily as needed (headache or back pain)       albuterol (PROAIR HFA, PROVENTIL HFA, VENTOLIN HFA) 108 (90 BASE) MCG/ACT inhaler Inhale 2 puffs into the lungs every 4 hours as needed for shortness of breath / dyspnea or wheezing 3 Inhaler 1     amLODIPine (NORVASC) 5 MG tablet Take 1 tablet (5 mg) by mouth daily 90 tablet 3     aspirin 81 MG EC tablet Take 81 mg by mouth At Bedtime        atorvastatin (LIPITOR) 40 MG tablet Take 1 tablet (40 mg) by mouth daily 90 tablet 3     B-D U/F 31G X 8 MM insulin pen needle USE WITH LANTUS DAILY 100 each 3     blood glucose monitoring (NO BRAND SPECIFIED) meter device kit Use to test blood sugar 2 times daily. Please dispense item that is covered by insurance. 1 kit 11     Blood Pressure Monitoring (BLOOD PRESSURE CUFF) MISC BP cuff. HTN. 99 1 each 11     CONTOUR NEXT TEST test strip TESTING 2 TIMES DAILY 200 strip 11     cyanocobalamin (VITAMIN B-12) 1000 MCG tablet  Take 1,000 mcg by mouth 2 times daily       cyclobenzaprine (FLEXERIL) 5 MG tablet TAKE 1 TO 2 TABLETS BY MOUTH AT NIGHT AS NEEDED 45 tablet 3     desonide (DESOWEN) 0.05 % external ointment APPLY TOPICALLY TO THE FACE DAILY AS NEEDED 60 g 2     fluocinonide (LIDEX) 0.05 % external solution APPLY TOPICALLY TO THE AFFECTED SCALP TWICE DAILY 60 mL 3     LANTUS SOLOSTAR 100 UNIT/ML soln INJECT 5 UNITS SUB-Q AT BEDTIME 15 mL 3     levothyroxine (SYNTHROID/LEVOTHROID) 175 MCG tablet Take 1 tablet (175 mcg) by mouth daily 90 tablet 3     losartan (COZAAR) 25 MG tablet Take 1 tablet (25 mg) by mouth daily 90 tablet 3     Magnesium Cl-Calcium Carbonate (SLOW-MAG) 71.5-119 MG TBEC Take 3 tablets by mouth 4 times daily 180 tablet 3     metFORMIN (GLUCOPHAGE-XR) 500 MG 24 hr tablet TAKE 4 TABLETS(2000 MG) BY MOUTH DAILY WITH DINNER 360 tablet 3     metoprolol succinate ER (TOPROL-XL) 25 MG 24 hr tablet Take 1 tablet (25 mg) by mouth daily 90 tablet 3     metoprolol tartrate (LOPRESSOR) 100 MG tablet Take 1 tablet (100 mg) by mouth 2 times daily 180 tablet 3     omeprazole (PRILOSEC) 20 MG DR capsule Take 20 mg by mouth every other day       OYSTER SHELL CALCIUM + D3 500-400 MG-UNIT TABS TAKE ONE TABLET BY MOUTH TWICE A  tablet 1     SPIRIVA HANDIHALER 18 MCG inhaled capsule INHALE THE CONTENTS OF 1 CAPSULE(18 MCG) INTO THE LUNGS DAILY 90 capsule 0     tacrolimus (GENERIC EQUIVALENT) 1 MG capsule TAKE ONE CAPSULE BY MOUTH EVERY 12 HOURS 60 capsule 11     tamsulosin (FLOMAX) 0.4 MG capsule Take 1 capsule (0.4 mg) by mouth daily 90 capsule 3     thin (NO BRAND SPECIFIED) lancets Use to test blood sugar 2 times daily.  Please dispense items that are covered by insurance. 200 each 11     ticagrelor (BRILINTA) 90 MG tablet Take 1 tablet (90 mg) by mouth 2 times daily Start tomorrow morning. 180 tablet 3     traMADol (ULTRAM) 50 MG tablet TAKE 1 TABLET(50 MG) BY MOUTH TWICE DAILY (Patient taking differently: Take 50 mg by  mouth daily ) 60 tablet 5     Vitamin D3 (CHOLECALCIFEROL) 25 mcg (1000 units) tablet Take 1 tablet by mouth daily         Allergies   Allergen Reactions     Lisinopril Cough     Spironolactone Other (See Comments)     hyperkalemia     Levaquin [Levofloxacin] Rash     Rash on abdomen, possibly from Levaquin.        Social History     Tobacco Use     Smoking status: Former Smoker     Years: 5.00     Types: Cigarettes     Quit date: 5/30/2018     Years since quitting: 3.1     Smokeless tobacco: Never Used   Substance Use Topics     Alcohol use: No     Family History   Problem Relation Age of Onset     Alcoholism Mother      Cirrhosis Mother      Heart Disease Father      Cirrhosis Brother      History   Drug Use No         Objective     There were no vitals taken for this visit.    Physical Exam  GENERAL APPEARANCE: healthy, alert and no distress  HENT: ear canals and TM's normal and nose and mouth without ulcers or lesions  RESP: lungs clear to auscultation - no rales, rhonchi or wheezes  CV: regular rate and rhythm,  sys murmur   ABDOMEN: soft, nontender, no HSM or masses and bowel sounds normal  NEURO: Normal strength and tone,     Recent Labs   Lab Test 06/21/21  1112 06/17/21  1240 06/17/21  1048 05/13/21  1619 05/13/21  1619 04/05/21  1312 04/05/21  1312 12/02/19  1456 12/02/19  1456   HGB  --  12.4* 12.7*   < > 13.0*   < >  --    < >  --    PLT  --   --  78*  --  101*   < >  --    < >  --      --  138   < > 137   < >  --    < >  --    POTASSIUM 4.4  --  4.6   < > 5.1   < >  --    < >  --    CR 1.20  --  1.06   < > 1.27   < >  --    < >  --    A1C  --   --   --   --   --   --  7.2*  --  6.6*    < > = values in this interval not displayed.        Diagnostics:  No labs were ordered during this visit.   EKG: EKG dated 17th June shows first-degree AV block left bundle branch block.                Signed Electronically by: Armani Oneill MD  Copy of this evaluation report is provided to requesting  physician.

## 2021-07-07 NOTE — PROGRESS NOTES
[facility]  [address]  [place] [address]  Phone: [phone]  Fax: [phone]  Primary Provider: Jarad Bocanegra  Pre-op Performing Provider: LITO POEWLL      PREOPERATIVE EVALUATION:  Today's date: 7/7/2021    Ricki Sharp is a 71 year old male who presents for a preoperative evaluation.    Surgical Information:  Surgery/Procedure: Aortic Valve surgery   Surgery Location: [facility]   Surgeon:    Surgery Date: 07/14/2021  Time of Surgery:   Where patient plans to recover: At home with family  Fax number for surgical facility: [phone]    Type of Anesthesia Anticipated: to be determined    Assessment & Plan     The proposed surgical procedure is considered LOW to INTERMEDIATE risk.    Pre-op exam  Patient reports all labs will be done at the [facility].  No labs performed here.    Severe aortic stenosis  Symptomatic.    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  Diff possible difficulty getting off the respirator.       Medication Instructions:   - clopidrogel (Plavix), prasugrel (Effient), ticagrelor (Brilinta): Patient has a cardiac stent. Medication will NOT be stopped until cleared by cardiology.     RECOMMENDATION:  APPROVAL GIVEN to proceed with proposed procedure pending review of diagnostic evaluation by pulm    Review of external notes as documented above                   Subjective     HPI related to upcoming procedure: Patient arrives  here for preop. He is scheduled to undergo TAVR procedure on July 14 at [facility]. Patient has a history of severe aortic valve stenosis. Increasing dyspnea on exertion. He recently underwent cardiac stenting in preparation. He states that he can walk about 600 feet before he has to rest. This typically is walking down to his mailbox. He reports that it is slowly worsening over time. Patient also has a history of pulmonary fibrosis secondary to chemotherapy. History of recent liver transplant. And type 2 diabetes. His last hemoglobin A1c was April 5 at 7.2. States his diabetes has been under fairly good control.  Patient indicates that he was told to continue the Brilinta.  Preop Questions 7/7/2021   1. Have you ever had a heart attack or stroke? No   2. Have you ever had surgery on your heart or blood vessels, such as a stent placement, a coronary artery bypass, or surgery on an artery in your head, neck, heart, or legs? YES - stents times [id number]   3. Do you have chest pain with activity? No   4. Do you have a history of  heart failure? No   5. Do you currently have a cold, bronchitis or symptoms of other infection? No   6. Do you have a cough, shortness of breath, or wheezing? YES - chronic   7. Do you or anyone in your family have previous history of blood clots? No   8. Do you or does anyone in your family have a serious bleeding problem such as prolonged bleeding following surgeries or cuts? No   9. Have you ever had problems with anemia or been told to take iron pills? No   10. Have you had any abnormal blood loss such as black, tarry or bloody stools? YES - prior to liver transplant   11. Have you ever had a blood transfusion? YES -    11a. Have you ever had a transfusion reaction? No   12. Are you willing to have a blood transfusion if it is medically needed before, during, or after your surgery? Yes   13. Have you or any of your relatives ever had problems with anesthesia? No   14. Do you have  sleep apnea, excessive snoring or daytime drowsiness? No   15. Do you have any artifical heart valves or other implanted medical devices like a pacemaker, defibrillator, or continuous glucose monitor? YES - CAD stent   15a. What type of device do you have? 3 stents   15b. Name of the clinic that manages your device:  [facility]   16. Do you have artificial joints? YES - hips   17. Are you allergic to latex? No       Health Care Directive:  Patient does not have a Health Care Directive or Living Will: Discussed advance care planning with patient; however, patient declined at this time.    Preoperative Review of :   reviewed - controlled substances reflected in medication list.          Review of Systems  CONSTITUTIONAL: NEGATIVE for fever, chills, change in weight  ENT/MOUTH: NEGATIVE for ear, mouth and throat problems  RESP: NEGATIVE for significant cough or SOB  CV: NEGATIVE for chest pain, palpitations or peripheral edema    Patient Active Problem List    Diagnosis Date Noted     Status post coronary angiogram 06/17/2021     Priority: Medium     Severe aortic stenosis 05/20/2021     Priority: Medium     Added automatically from request for surgery [id number]       Other ill-defined heart diseases 05/20/2021     Priority: Medium     Added automatically from request for surgery [id number]       Bicuspid aortic valve 05/13/2021     Priority: Medium     WOOD (dyspnea on exertion) 05/13/2021     Priority: Medium     History of tobacco abuse 05/13/2021     Priority: Medium     Hx of liver transplant (H) 05/13/2021     Priority: Medium     [facility]        History of hepatitis C 05/13/2021     Priority: Medium     Status post treatment       History of Hodgkin's lymphoma 05/13/2021     Priority: Medium     Chronic kidney disease, stage 3a 04/19/2021     Priority: Medium     COPD, severe on 12/29/2015 (H) 04/19/2021     Priority: Medium     Severe aortic stenosis 04/19/2021     Priority: Medium     Personal history  of tobacco use, presenting hazards to health 04/08/2021     Priority: Medium     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 04/05/2021     Priority: Medium     Spinal stenosis in cervical region 01/23/2020     Priority: Medium     Degeneration of lumbar intervertebral disc 03/05/2018     Priority: Medium     Lumbar facet arthropathy 03/05/2018     Priority: Medium     Lumbar foraminal stenosis 03/05/2018     Priority: Medium     Essential hypertension 02/27/2018     Priority: Medium     Mixed hyperlipidemia 02/27/2018     Priority: Medium     Type 2 diabetes mellitus with hyperglycemia (H) 04/18/2016     Priority: Medium     Status post total replacement of left hip 03/18/2016     Priority: Medium     Primary localized osteoarthrosis, pelvic region and thigh 08/24/2015     Priority: Medium     Antineoplastic chemotherapy induced pancytopenia (H) 07/11/2014     Priority: Medium     Hypomagnesemia 06/10/2014     Priority: Medium     Drug-induced neutropenia (H) 06/10/2014     Priority: Medium     updating diagnosis code for icd10 cutover       NHL (non-Hodgkin's lymphoma) (H) 05/16/2014     Priority: Medium     PTLD (post-transplant lymphoproliferative disorder) (H) 04/02/2014     Priority: Medium     Liver replaced by transplant (H) 08/29/2012     Priority: Medium     Liver transplant recipient (H) 03/18/2008     Priority: Medium      Past Medical History:   Diagnosis Date     Cancer (H)     Lymphoma     COPD (chronic obstructive pulmonary disease) (H)      Coronary artery disease     3 stents placed in June of 2021 and aortic valve replacement TBD     Diabetes (H)      History of blood transfusion      Hypertension      Thyroid disease      Past Surgical History:   Procedure Laterality Date     ABDOMEN SURGERY      liver transplant     CARDIAC SURGERY      3 stents     CHOLECYSTECTOMY       CV CORONARY ANGIOGRAM N/A 6/17/2021    Procedure: CV CORONARY ANGIOGRAM;  Surgeon: Noah Akins MD;  Location:   HEART CARDIAC CATH LAB     CV INSTANTANEOUS WAVE-FREE RATIO N/A 6/17/2021    Procedure: Instantaneous Wave-Free Ratio;  Surgeon: Noah Akins MD;  Location:  HE[facility] CARD[facility] CATH LAB     CV PCI STENT DRUG ELUTING N/A 6/17/2021    Procedure: Percutaneous Coronary Intervention Stent Drug Eluting;  Surgeon: Noah Akins MD;  Location:  HEART CARDI[facility] CATH LAB     CV RIGHT HEART CATH MEASUREMENTS RECORDED N/A 6/17/2021    Procedure: CV RIGHT HEART CATH;  Surgeon: Noah Akins MD;  Location:  HEART CARDIAC CATH LAB     INJECT EPIDURAL TRANSFORAMINAL LUMBAR / SACRAL SINGLE Left 8/1/2018    Procedure: INJECT EPIDURAL TRANSFORAMINAL LUMBAR / SACRAL SINGLE;  Left Transforaminal Lumbar Epidural Steroid Injection  Lumbar 3, Lumbar 4;  Surgeon: Jeni Agarwal MD;  [facility]     THORACIC SURGERY      lung     TRANSPLANT  2008    Liver     Current Outpatient Medications   Medication Sig Dispense Refill     acetaminophen (TYLENOL) 500 MG tablet Take 500 mg by mouth daily as needed (headache or back pain)       albuterol (PROAIR HFA, PROVENTIL HFA, VENTOLIN HFA) 108 (90 BASE) MCG/ACT inhaler Inhale 2 puffs into the lungs every 4 hours as needed for shortness of breath / dyspnea or wheezing 3 Inhaler 1     amLODIPine (NORVASC) 5 MG tablet Take 1 tablet (5 mg) by mouth daily 90 tablet 3     aspirin 81 MG EC tablet Take 81 mg by mouth At Bedtime        atorvastatin (LIPITOR) 40 MG tablet Take 1 tablet (40 mg) by mouth daily 90 tablet 3     B-D U/F 31G X 8 MM insulin pen needle USE WITH LANTUS DAILY 100 each 3     blood glucose monitoring (NO BRAND SPECIFIED) meter device kit Use to test blood sugar 2 times daily. Please dispense item that is covered by insurance. 1 kit 11     Blood Pressure Monitoring (BLOOD PRESSURE CUFF) MISC BP cuff. HTN. 99 1 each 11     CONTOUR NEXT TEST test strip TESTING 2 TIMES DAILY 200 strip 11     cyanocobalamin (VITAMIN B-12) 1000 MCG tablet  Take 1,000 mcg by mouth 2 times daily       cyclobenzaprine (FLEXERIL) 5 MG tablet TAKE 1 TO 2 TABLETS BY MOUTH AT NIGHT AS NEEDED 45 tablet 3     desonide (DESOWEN) 0.05 % external ointment APPLY TOPICALLY TO THE FACE DAILY AS NEEDED 60 g 2     fluocinonide (LIDEX) 0.05 % external solution APPLY TOPICALLY TO THE AFFECTED SCALP TWICE DAILY 60 mL 3     LANTUS SOLOSTAR 100 UNIT/ML soln INJECT 5 UNITS SUB-Q AT BEDTIME 15 mL 3     levothyroxine (SYNTHROID/LEVOTHROID) 175 MCG tablet Take 1 tablet (175 mcg) by mouth daily 90 tablet 3     losartan (COZAAR) 25 MG tablet Take 1 tablet (25 mg) by mouth daily 90 tablet 3     Magnesium Cl-Calcium Carbonate (SLOW-MAG) 71.5-119 MG TBEC Take 3 tablets by mouth 4 times daily 180 tablet 3     metFORMIN (GLUCOPHAGE-XR) 500 MG 24 hr tablet TAKE 4 TABLETS(2000 MG) BY MOUTH DAILY WITH DINNER 360 tablet 3     metoprolol succinate ER (TOPROL-XL) 25 MG 24 hr tablet Take 1 tablet (25 mg) by mouth daily 90 tablet 3     metoprolol tartrate (LOPRESSOR) 100 MG tablet Take 1 tablet (100 mg) by mouth 2 times daily 180 tablet 3     omeprazole (PRILOSEC) 20 MG DR capsule Take 20 mg by mouth every other day       OYSTER SHELL CALCIUM + D3 500-400 MG-UNIT TABS TAKE ONE TABLET BY MOUTH TWICE A  tablet 1     SPIRIVA HANDIHALER 18 MCG inhaled capsule INHALE THE CONTENTS OF 1 CAPSULE(18 MCG) INTO THE LUNGS DAILY 90 capsule 0     tacrolimus (GENERIC EQUIVALENT) 1 MG capsule TAKE ONE CAPSULE BY MOUTH EVERY 12 HOURS 60 capsule 11     tamsulosin (FLOMAX) 0.4 MG capsule Take 1 capsule (0.4 mg) by mouth daily 90 capsule 3     thin (NO BRAND SPECIFIED) lancets Use to test blood sugar 2 times daily.  Please dispense items that are covered by insurance. 200 each 11     ticagrelor (BRILINTA) 90 MG tablet Take 1 tablet (90 mg) by mouth 2 times daily Start tomorrow morning. 180 tablet 3     traMADol (ULTRAM) 50 MG tablet TAKE 1 TABLET(50 MG) BY MOUTH TWICE DAILY (Patient taking differently: Take 50 mg by  mouth daily ) 60 tablet 5     Vitamin D3 (CHOLECALCIFEROL) 25 mcg (1000 units) tablet Take 1 tablet by mouth daily         Allergies   Allergen Reactions     Lisinopril Cough     Spironolactone Other (See Comments)     hyperkalemia     Levaquin [Levofloxacin] Rash     Rash on abdomen, possibly from Levaquin.        Social History     Tobacco Use     Smoking status: Former Smoker     Years: 5.00     Types: Cigarettes     Quit date: 5/30/2018     Years since quitting: 3.1     Smokeless tobacco: Never Used   Substance Use Topics     Alcohol use: No     Family History   Problem Relation Age of Onset     Alcoholism Mother      Cirrhosis Mother      Heart Disease Father      Cirrhosis Brother      History   Drug Use No         Objective     There were no vitals taken for this visit.    Physical Exam  GENERAL APPEARANCE: healthy, alert and no distress  HENT: ear canals and TM's normal and nose and mouth without ulcers or lesions  RESP: lungs clear to auscultation - no rales, rhonchi or wheezes  CV: regular rate and rhythm,  sys murmur   ABDOMEN: soft, nontender, no HSM or masses and bowel sounds normal  NEURO: Normal strength and tone,     Recent Labs   Lab Test 06/21/21  1112 06/17/21  1240 06/17/21  1048 05/13/21  1619 05/13/21  1619 04/05/21  1312 04/05/21  1312 12/02/19  1456 12/02/19  1456   HGB  --  12.4* 12.7*   < > 13.0*   < >  --    < >  --    PLT  --   --  78*  --  101*   < >  --    < >  --      --  138   < > 137   < >  --    < >  --    POTASSIUM 4.4  --  4.6   < > 5.1   < >  --    < >  --    CR 1.20  --  1.06   < > 1.27   < >  --    < >  --    A1C  --   --   --   --   --   --  7.2*  --  6.6*    < > = values in this interval not displayed.        Diagnostics:  No labs were ordered during this visit.   EKG: EKG dated 17th June shows first-degree AV block left bundle branch block.                Signed Electronically by: Armani Oneill MD  Copy of this evaluation report is provided to requesting  physician.

## 2021-07-07 NOTE — NURSING NOTE
Patient here for preop for Aortic Vlave surgery on 07/14/21 at the [facility] with Dr. Lucio. Medication Reconciliation: complete.    Melissa Saavedra LPN  7/7/2021 1:05 PM

## 2021-07-07 NOTE — TELEPHONE ENCOUNTER
Call back to Fide  She is concerned about a bilirubin of 1.8.  Alk phos, alt and ast normal.  Suggested she try not to worry about these.

## 2021-07-08 NOTE — TELEPHONE ENCOUNTER
[facility] Call Center    Phone Message    May a detailed message be left on voicemail: no     Reason for Call: Other: Nurse Laura [facility] or [facility].  Call Laura at: [phone]     Action Taken: Message routed to:  [facility] ([facility]): Cardiology    Travel Screening: Not Applicable

## 2021-07-08 NOTE — CONFIDENTIAL NOTE
Spoke with Laura in lab. Labs were accidentally drawn yesterday, she reordered them to be drawn next week for TAVR procedure. Lab orders look correct.

## 2021-07-12 NOTE — PROGRESS NOTES
Date: 7/12/2021    Time of Call: 3:54 PM     Diagnosis:  Severe aortic stenosis     [ TORB ] Ordering provider: Lino Lucio MD  Order: TAVR pre procedure order set, blood components     Order received by: Patti French RN     Follow-up/additional notes:

## 2021-07-14 PROBLEM — I35.0 AORTIC STENOSIS, SEVERE: Status: ACTIVE | Noted: 2021-01-01

## 2021-07-14 NOTE — PRE-PROCEDURE
GENERAL PRE-PROCEDURE:   Procedure:  Transfemoral transcatheter aortic valve replacement  Date/Time:  7/14/2021 12:23 PM    Written consent obtained?: Yes    Risks and benefits: Risks, benefits and alternatives were discussed    Consent given by:  Patient  Patient states understanding of procedure being performed: Yes    Patient's understanding of procedure matches consent: Yes    Procedure consent matches procedure scheduled: Yes    Expected level of sedation:  Moderate  Appropriately NPO:  Yes  ASA Class:  Class 2- mild systemic disease, no acute problems, no functional limitations  Mallampati  :  Grade 2- soft palate, base of uvula, tonsillar pillars, and portion of posterior pharyngeal wall visible  Lungs:  Lungs clear with good breath sounds bilaterally  Heart:  Normal heart sounds and rate and systolic murmur  History & Physical reviewed:  History and physical reviewed and no updates needed  Statement of review:  I have reviewed the lab findings, diagnostic data, medications, and the plan for sedation

## 2021-07-14 NOTE — H&P
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 Patient has been followed for aortic stenosis for some time, but recently was seen in structural clinic after progression to severe AS on echo. TAVR CT confirmed a severely calcified tricuspid aortic valve. Given multiple co-morbidities, he was felt to be a better TAVR candidate rather than surgical.     # Severe symptomatic aortic stenosis s/p TAVR  ##Acute on chronic diastolic Heart failure  Now s/p Transcatheter Aortic Valve Replacement on 7/14/2021. Prior to procedure, pt noted to have a mean gradient 40 mmHG, AVA0.9 cm^2, PV 3.9 m/s- Intraoperative LVEDP 18 mmHg, preop BNP 1017 (prevously 115). Following transfemoral transcatheter aortic valve replacement with a 29mm Medtronic Evolut PRO+ valve, was noted to have complete AV block immediately after valve deployment. EP were called for placement of permanent pace maker for complete heart block. Femoral sheath sites soft without hematoma.   - Bedrest & Neuro checks, per protocol.  - Echocardiogram ordered fo tomorrow.   - Monitor groin sites.   - EKG in morning.    - aspirin + ticagrelor as below for anti-platelet therapy.   - Cardiac rehab/PT/OT.  - Daily weights.   - Strict intake/output.     #CAD s/p PCI LAD, LCx 6/17/2021  #HTN  #HLD  - continue atorvastatin 40mg   - continue aspirin 81 + ticagrelor 90mg bid  - continue PTA metoprolol succinate 25mg   - resume PTA losartan 25mg and amlodipine 5mg daily     #Hep C cirrhosis s/p liver transplant  - continue 1 mg tacro  BID    #Hypothyroidism  - PTA levothyroxine     #COPD  #Hx RCHOP c/b pulmonary fibrosis   - PTA inhalers    #BPH  - PTA flomax 0.4mg      Prophylaxis:  DVT: ASA and ticagrelor PPI: omeparazole  Lines: PIV  Disposition: Admission to [facility].   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 Patient has been followed for aortic stenosis for some time, but recently was seen in structural clinic after progression to severe AS on echo. At that visit he reported marked dyspnea with minimal activity stating he could walk about 30 feet before becoming winded, and climb about 10 steps. TAVR CT confirmed a severely calcified tricuspid aortic valve. Given multiple co-morbidities, he was felt to be a better TAVR candidate rather than surgical.    s/p TAVR on 7/14/2021. Following transfemoral transcatheter aortic valve replacement with a 29mm Medtronic Evolut PRO+ valve, was noted to have complete AV block immediately after valve deployment. EP were called for placement of permanent pace maker for complete heart block. In the PACU, patient had HR lower than programmed lower rate limit of 70bpm. ECG with p wave w/o corresponding QRS. Patient's device was interrogated. There was noted to be likely farfield oversensing of complexes following each QRS. V sensing increased from 0.9mV to 2.0 mV. This corrected the oversensing. No changes made to A sensitivity. Initial BP was also low in the 80s which improved to systolic >100 after above changes were made. V pacing threshold 0.25mV. RV impedance 551 ohms. Pt remains at 3.50 V/0.40 ms  Patient remains in DDDR .           Review of Systems:    10 point review of systems negative except for stated above in HPI.          Past Medical History:   Medical History reviewed.   Past Medical History:   Diagnosis Date     Cancer (H)     Lymphoma     COPD (chronic obstructive pulmonary disease) (H)      Coronary artery disease     3 stents placed in June of 2021 and aortic valve replacement TBD     Diabetes (H)      History of blood transfusion      Hypertension      Thyroid disease              Past Surgical History:   Surgical History reviewed.   Past Surgical History:   Procedure Laterality Date     ABDOMEN SURGERY      liver transplant     CARDIAC SURGERY      3 stents     CHOLECYSTECTOMY       CV CORONARY ANGIOGRAM N/A 6/17/2021    Procedure: CV CORONARY ANGIOGRAM;  Surgeon: Noah Akins MD;  Location:  HEART CARDIAC CATH LAB     CV INSTANTANEOUS WAVE-FREE RATIO N/A 6/17/2021    Procedure: Instantaneous Wave-Free Ratio;  Surgeon: Noah Akins MD;  Location: U HEART CARDIAC CATH LAB     CV PCI STENT DRUG ELUTING N/A 6/17/2021    Procedure: Percutaneous Coronary Intervention Stent Drug Eluting;  Surgeon: Noah Akins MD;  Location: U HEART CARDIAC CATH LAB     CV RIGHT HEART CATH MEASUREMENTS RECORDED N/A 6/17/2021    Procedure: CV RIGHT HEART CATH;  Surgeon: Noah Akins MD;  Location: U HEART CARDIAC CATH LAB     INJECT EPIDURAL TRANSFORAMINAL LUMBAR / SACRAL SINGLE Left 8/1/2018    Procedure: INJECT EPIDURAL TRANSFORAMINAL LUMBAR / SACRAL SINGLE;  Left Transforaminal Lumbar Epidural Steroid Injection  Lumbar 3, Lumbar 4;  Surgeon: Jeni Agarwal MD;  Location:  OR     THORACIC SURGERY      lung     TRANSPLANT  2008    Liver             Social History:   Social History reviewed.  Social History     Tobacco Use     Smoking status: Former Smoker     Years: 5.00     Types: Cigarettes     Quit date: 5/30/2018     Years  "since quitting: 3.1     Smokeless tobacco: Never Used   Substance Use Topics     Alcohol use: No             Family History:   Family History reviewed.   Family History   Problem Relation Age of Onset     Alcoholism Mother      Cirrhosis Mother      Heart Disease Father      Cirrhosis Brother              Allergies:     Allergies   Allergen Reactions     Lisinopril Cough     Spironolactone Other (See Comments)     hyperkalemia     Levaquin [Levofloxacin] Rash     Rash on abdomen, possibly from Levaquin.             Medications:   Medications Reviewed.   Current Facility-Administered Medications   Medication     aspirin EC tablet 81 mg     ceFAZolin (ANCEF) intermittent infusion 2 g in 100 mL dextrose PRE-MIX     lidocaine (LMX4) cream     lidocaine (LMX4) cream     lidocaine 1 % 0.1-1 mL     lidocaine 1 % 0.1-1 mL     magnesium sulfate 2 g in water intermittent infusion     Patient is NOT allergic to contrast dye OR was given pre-procedure oral steroids for treatment     sodium chloride (PF) 0.9% PF flush 3 mL     sodium chloride (PF) 0.9% PF flush 3 mL     sodium chloride (PF) 0.9% PF flush 3 mL     sodium chloride (PF) 0.9% PF flush 3 mL     sodium chloride (PF) 0.9% PF flush 3 mL     sodium chloride 0.9% infusion             Physical Exam:   Vitals were reviewed.  Blood pressure 139/89, pulse 90, temperature 98.3  F (36.8  C), temperature source Oral, resp. rate 18, height 1.676 m (5' 6\"), weight 88.5 kg (195 lb 1.7 oz), SpO2 97 %.    General: AAOx3, NAD  Skin: Not jaundiced, no rash, no ecchymoses  HEENT: MMM, PERRLA, EOM intact  CV: RRR, normal S1S2, no murmur, clicks, rubs  Resp: Clear to auscultation bilaterally, no wheezes, rhonchi  Abd: Soft, non-tender, BS+, no masses appreciated  Extremities: Femoral sheath signs without expanding hematoma. Otherwise, ext warm and well perfused, palpable pulses, no edema  Neuro: No lateralizing symptoms or focal neurologic deficits        Labs:   Routine Labs:  Lab " Results   Component Value Date    TROPI 0.023 06/19/2014    TROPI <0.012 05/09/2008     CMP  Recent Labs   Lab 07/14/21  0830 07/13/21  1013 07/07/21  1549 07/07/21  1351   NA  --  139  --  Test canceled - Lab  error   POTASSIUM  --  4.6  --  Test canceled - Lab  error   CHLORIDE  --  109  --  Test canceled - Lab  error   CO2  --  24  --  Test canceled - Lab  error   ANIONGAP  --  6  --  Test canceled - Lab  error   * 153*  --  Test canceled - Lab  error   BUN  --  19  --  Test canceled - Lab  error   CR  --  1.06  --  Test canceled - Lab  error   GFRESTIMATED  --  71  --  Test canceled - Lab  error   GFRESTBLACK  --   --   --  Test canceled - Lab  error   LIT  --  8.8  --  Test canceled - Lab  error   MAG  --  1.4* 1.3* Test canceled - Lab  error   PROTTOTAL  --  7.0  --  Test canceled - Lab  error   ALBUMIN  --  3.3*  --  Test canceled - Lab  error   BILITOTAL  --  1.3  --  Test canceled - Lab  error   ALKPHOS  --  122  --  Test canceled - Lab  error   AST  --  12  --  Test canceled - Lab  error   ALT  --  18  --  Test canceled - Lab  error     CBC  Recent Labs   Lab 07/13/21  1014   WBC 7.9   RBC 4.07*   HGB 12.5*   HCT 39.8*   MCV 98   MCH 30.7   MCHC 31.4*   RDW 14.6   PLT 92*     INRNo lab results found in last 7 days.        Diagnostics:      Echo result w/o MOPS: Interpretation SummarySevere aortic stenosis is present.Global and regional left ventricular function is normal with an EF of 55-60%.Mild pulmonary hypertension is present.IVC diameter <2.1 cm collapsing >50% with sniff suggests a normal RA pressureof 3 mmHg.No pericardial effusion is present.Compared to study from 2014, AS has progressed.

## 2021-07-14 NOTE — OP NOTE
DATE OF SERVICE: 7/14/2021.     PREOPERATIVE DIAGNOSES:  Severe aortic stenosis.     POSTOPERATIVE DIAGNOSES:  Severe aortic stenosis.     PROCEDURE PERFORMED:  Transfemoral transcatheter aortic valve replacement (29 mm Medtronic Evolut Pro valve).     SURGEON:  Tato Vieira MD, PhD.     ATTENDING CARDIOLOGISTS:  Dr. Lucio.     ANESTHESIA:  General endotracheal anesthesia.     ANESTHESIOLOGIST:  Jerad Da Silva MD.     ESTIMATED BLOOD LOSS:  minimal.     SPECIMEN:  None.     INDICATIONS FOR PROCEDURE: Ricki Sharp is a 71 year old male with severe aortic valvular stenosis.  He has had a longstanding murmur and possible bicupsid valve that has been followed up periodically over the years. A recent TTE 4/7/21 shows severe aortic stenosis with an JD 0.9 cm2, mean PG 40 mmHg and pV 3.9 m/sec and EF 55-60% which has progressed from mild in 2014. 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 The patient understands the risks and benefits of the procedure and wishes to undergo the operation.     OPERATIVE FINDINGS:  There was trave perivalvular leak after the procedure. The prosthetic valve leaflet motion was normal.     OPERATIVE DESCRIPTION IN DETAIL:  After obtaining informed consent, the patient was brought to the operating room and placed in the supine position on the operating room table.  Appropriate lines and devices for monitoring were placed by the anesthesia team.  The patient was prepped and draped, and a timeout was performed to confirm the correct patient identity, as well as the procedure to be performed.      Please see the cardiology procedure note for details. The 29 mm Medtronic Evolut Pro valve was deployed successfully through a transfemoral approach. The deployment was  successful and there were no complications. The delivery system was removed and the femoral artery was closed with percutaneously.    Tato Vieira MD PhD

## 2021-07-14 NOTE — ANESTHESIA PREPROCEDURE EVALUATION
Anesthesia Pre-Procedure Evaluation    Patient: Ricki Sharp   MRN: [id number] : 1950        Preoperative Diagnosis: Severe aortic stenosis [I35.0]   Procedure : Procedure(s):  Transfemoral Transcatheter Aortic Valve Replacement (MEDTRONIC)  with possible emergency open heart bypass and or balloon pump placement and any indicated procedure     Past Medical History:   Diagnosis Date     Cancer (H)     Lymphoma     COPD (chronic obstructive pulmonary disease) (H)      Coronary artery disease     3 stents placed in 2021 and aortic valve replacement TBD     Diabetes (H)      History of blood transfusion      Hypertension      Thyroid disease       Past Surgical History:   Procedure Laterality Date     ABDOMEN SURGERY      liver transplant     CARDIAC SURGERY      3 stents     CHOLECYSTECTOMY       CV CORONARY ANGIOGRAM N/A 2021    Procedure: CV CORONARY ANGIOGRAM;  Surgeon: oNah Akins MD;  Location:  HEART CARDIAC CATH LAB     CV INSTANTANEOUS WAVE-FREE RATIO N/A 2021    Procedure: Instantaneous Wave-Free Ratio;  Surgeon: Noah Akins MD;  Location: [facility] CARD[facility]AC CATH LAB     CV PCI STENT DRUG ELUTING N/A 2021    Procedure: Percutaneous Coronary Intervention Stent Drug Eluting;  Surgeon: Noah Akins MD;  [facility]     CV RIGHT HEART CATH MEASUREMENTS RECORDED N/A 2021    Procedure: CV RIGHT HEART CATH;  Surgeon: Noah Akins MD;  Location: [facility] CARD[facility] CATH LAB     INJECT EPIDURAL TRANSFORAMINAL LUMBAR / SACRAL SINGLE Left 2018    Procedure: INJECT EPIDURAL TRANSFORAMINAL LUMBAR / SACRAL SINGLE;  Left Transforaminal Lumbar Epidural Steroid Injection  Lumbar 3, Lumbar 4;  Surgeon: Jeni Agarwal MD;  Location:  OR     THORACIC SURGERY      lung     TRANSPLANT      Liver      Allergies   Allergen Reactions     Lisinopril Cough     Spironolactone Other (See  Comments)     hyperkalemia     Levaquin [Levofloxacin] Rash     Rash on abdomen, possibly from Levaquin.      Social History     Tobacco Use     Smoking status: Former Smoker     Years: 5.00     Types: Cigarettes     Quit date: 5/30/2018     Years since quitting: 3.1     Smokeless tobacco: Never Used   Substance Use Topics     Alcohol use: No      Wt Readings from Last 1 Encounters:   07/14/21 88.5 kg (195 lb 1.7 oz)        Anesthesia Evaluation            ROS/MED HX  ENT/Pulmonary: Comment: Pulmonary fibrosis   Pleural effusion s/p bilateral decortication    (+) COPD,     Neurologic:       Cardiovascular: Comment: CAD with stents to LAD X2, Lcx  (6/17/21)    (+) hypertension--CAD --stent-3 Drug Eluting Stent. valvular problems/murmurs type: AS Previous cardiac testing   Echo: Date: 4/8/21 Results:  Severe aortic stenosis is present.  Global and regional left ventricular function is normal with an EF of 55-60%.  Mild pulmonary hypertension is present.  IVC diameter <2.1 cm collapsing >50% with sniff suggests a normal RA pressure  of 3 mmHg.  No pericardial effusion is present.  Compared to study from 2014, AS has progressed.  Stress Test: Date: Results:    ECG Reviewed: Date: Results:    Cath: Date: Results:      METS/Exercise Tolerance:     Hematologic: Comments: On brilinta       Musculoskeletal:       GI/Hepatic: Comment: HCV s/p liver transplant 2008      Renal/Genitourinary:     (+) renal disease,     Endo:     (+) type II DM,     Psychiatric/Substance Use:       Infectious Disease:       Malignancy:       Other:            Physical Exam    Airway        Mallampati: II   TM distance: > 3 FB   Neck ROM: full     Respiratory Devices and Support         Dental       (+) upper dentures and lower dentures      Cardiovascular          Rhythm and rate: regular and normal   (+) murmur       Pulmonary   pulmonary exam normal                OUTSIDE LABS:  CBC:   Lab Results   Component Value Date    WBC 7.9 07/13/2021     WBC 6.4 06/17/2021    HGB 12.5 (L) 07/13/2021    HGB 12.4 (L) 06/17/2021    HCT 39.8 (L) 07/13/2021    HCT 39.4 (L) 06/17/2021    PLT 92 (L) 07/13/2021    PLT 78 (L) 06/17/2021     BMP:   Lab Results   Component Value Date     07/13/2021    NA Test canceled - Lab  error 07/07/2021    POTASSIUM 4.6 07/13/2021    POTASSIUM Test canceled - Lab  error 07/07/2021    CHLORIDE 109 07/13/2021    CHLORIDE Test canceled - Lab  error 07/07/2021    CO2 24 07/13/2021    CO2 Test canceled - Lab  error 07/07/2021    BUN 19 07/13/2021    BUN Test canceled - Lab  error 07/07/2021    CR 1.06 07/13/2021    CR Test canceled - Lab  error 07/07/2021     (H) 07/14/2021     (H) 07/13/2021     COAGS:   Lab Results   Component Value Date    PTT 27 03/18/2016    INR 1.4 04/11/2016    FIBR 650 (H) 06/19/2014     POC:   Lab Results   Component Value Date     (H) 06/17/2021     HEPATIC:   Lab Results   Component Value Date    ALBUMIN 3.3 (L) 07/13/2021    PROTTOTAL 7.0 07/13/2021    ALT 18 07/13/2021    AST 12 07/13/2021    ALKPHOS 122 07/13/2021    BILITOTAL 1.3 07/13/2021    BILIDIRECT 0.23 (H) 12/21/2017    INGE 20 06/18/2014     OTHER:   Lab Results   Component Value Date    PH 7.37 03/18/2016    LACT 2.9 (H) 06/19/2014    A1C 7.2 (H) 04/05/2021    LIT 8.8 07/13/2021    PHOS 2.9 06/21/2021    MAG 1.4 (L) 07/13/2021    LIPASE 51 06/11/2014    AMYLASE 69 03/25/2010    TSH 5.65 (H) 10/29/2015    T4 1.10 06/18/2014    CRP 39.8 (H) 04/05/2021    SED 66 (H) 04/05/2021       Anesthesia Plan    ASA Status:  4      Anesthesia Type: MAC.     - Reason for MAC: straight local not clinically adequate         Techniques and Equipment:     - Lines/Monitors: 2nd IV     - Blood: Blood in Room     Consents    Anesthesia Plan(s) and associated risks, benefits, and realistic alternatives discussed. Questions answered and patient/representative(s) expressed  understanding.     - Discussed with:  Patient      - Extended Intubation/Ventilatory Support Discussed: No.      - Patient is DNR/DNI Status: No    Use of blood products discussed: Yes.     - Discussed with: Patient.     - Consented: consented to blood products            Reason for refusal: other.     Postoperative Care            Comments:                Jerad Da Silva MD

## 2021-07-14 NOTE — PROGRESS NOTES
SPIRITUAL HEALTH SERVICES  [facility] 3C   PRE-SURGERY VISIT    Had pre-surgery visit with pt and spouse Fide.  Provided spiritual support, prayer. Oriented to [facility] availability during hospital stay.      Angela Castaneda  Chaplain Resident  Pager: [phone]

## 2021-07-14 NOTE — ANESTHESIA PROCEDURE NOTES
Airway       Patient location during procedure: OR       Procedure Start/Stop Times: 7/14/2021 6:20 PM  Staff -        CRNA: Joann Mitchell APRN CRNA       Performed By: CRNA  Consent for Airway        Urgency: elective  Indications and Patient Condition       Indications for airway management: alex-procedural       Induction type:intravenous       Mask difficulty assessment: 1 - vent by mask    Final Airway Details       Final airway type: endotracheal airway       Successful airway: ETT - single  Endotracheal Airway Details        ETT size (mm): 7.0       Cuffed: yes       Successful intubation technique: direct laryngoscopy and video laryngoscopy       VL Blade Size: MAC D Blade       Grade View of Cords: 1       Adjucts: stylet       Secured at (cm): 23    Post intubation assessment        Placement verified by: capnometry, equal breath sounds and chest rise        Number of attempts at approach: 1       Ease of procedure: easy       Dentition: Intact

## 2021-07-14 NOTE — Clinical Note
The left subclavian vein was selected. Access was obtained under with Fluoroscopic guidance using a micropuncture 21 guage needle with direct visualization of needle entry.

## 2021-07-15 NOTE — PROGRESS NOTES
DISCHARGE                         7/15/2021  2:41 PM  ----------------------------------------------------------------------------  Discharged to: Home  Via: private transportation  Accompanied by: Wife  Discharge Instructions: diet, activity, medications, follow up appointments, when to call the MD, aftercare instructions. Discussed what to do with wounds. Discussed how to manage pain. MD to bedside to answer further questions.   Prescriptions: To be filled by discharge pharmacy; medication list reviewed & sent with pt  Follow Up Appointments: arranged; information given  Belongings: All sent with pt  IV: d/c'd  Telemetry: d/c'd  Pt exhibits understanding of above discharge instructions; all questions answered.    Discharge Paperwork: Signed, copied, and sent home with patient.

## 2021-07-15 NOTE — PLAN OF CARE
Neuro: A&Ox4. Uses call light appropriately. Q2h neuro checks- neuro WDL.  Cardiac: paced by permanent pacemaker. DDDR setting. VSS.    Respiratory: able to wean pt down to RA from 3L NC.  EtCO2 20-30s. IPI 6-7 on average.  GI/: Adequate urine output through fisher.   Diet/appetite: Tolerating clear liquids. Drinking adequate amounts.  Activity:  Sat up to 45 degrees. tolerated  Pain: gave PRN Percocet and Tylenol for moderate sore throat and incisional groin pain. Given w/ good relief.  Skin: No new deficits noted. Bruise noted under R eye.  LDA's: fisher, pacemaker    Plan: Continue with POC. Notify primary team with changes.

## 2021-07-15 NOTE — PLAN OF CARE
Occupational Therapy Discharge Summary    Reason for therapy discharge:    Discharged to home with outpatient therapy.    Progress towards therapy goal(s). See goals on Care Plan in [facility] electronic [facility] record for goal details.  Goals not met.  Barriers to achieving goals:   discharge on same date as initial evaluation.    Therapy recommendation(s):    Continued therapy is recommended.  Rationale/Recommendations:  OP CR to progress aerobic endurance for I/ADL IND.

## 2021-07-15 NOTE — ANESTHESIA POSTPROCEDURE EVALUATION
Patient: Ricki Sharp    Procedure(s):  Transfemoral Transcatheter Aortic Valve Replacement (MEDTRONIC 29 mm), Trans thorasic echocardiogram read by Dr. Dean Smith, Perfusion and cardiac surgery standby, temporary pacemaker placent  with possible emergency open heart bypass and or balloon pump placement and any indicated procedure  Permanent Pacemakers  Insert of New or Replacement with Vent Lead    Diagnosis:Severe aortic stenosis [I35.0]  Diagnosis Additional Information: No value filed.    Anesthesia Type:  MAC    Note:  Disposition: Admission   Postop Pain Control: Uneventful            Sign Out: Well controlled pain   PONV: No   Neuro/Psych: Uneventful            Sign Out: Acceptable/Baseline neuro status   Airway/Respiratory: Uneventful            Sign Out: Acceptable/Baseline resp. status   CV/Hemodynamics:             Sign Out: Acceptable CV status   Other NRE: NONE   DID A NON-ROUTINE EVENT OCCUR? YES    Event details/Postop Comments:  Patient received pacemaker in OR. Patient found to have the pacemaker not capturing in PACU. Cardiology fellow paged and reprogrammed pacemaker thresholds. Patient now appropriately paced           Last vitals:  Vitals:    07/14/21 2230 07/14/21 2240 07/14/21 2300   BP: 94/58 99/60 111/61   Pulse: 69 69 70   Resp:  22 19   Temp:   36.5  C (97.7  F)   SpO2: 96% 97% 99%       Last vitals prior to Anesthesia Care Transfer:  CRNA VITALS  7/14/2021 2010 - 7/14/2021 2110      7/14/2021             Pulse:  71    SpO2:  100 %          Electronically Signed By: Elmer Bustamante MD  July 14, 2021  11:47 PM

## 2021-07-15 NOTE — PLAN OF CARE
Transfer  Transferred from: PACU  Via:bed  Reason for transfer: Pt appropriate for 6B- monitoring  Family: Aware of transfer  Belongings: Received with pt  Chart: Received with pt  Medications:   Code Status verified on armband: yes  2 RN Skin Assessment Completed By: Ksenia CLEMENTE RN and Yeni Moran RN.  Med rec completed: yes  Bed surface reassessed with algorithm and charted: yes  New bed surface ordered: no  Suction/Ambu bag/Flowmeter at bedside: yes    Pt status: VSS,  Afebrile, 3L nc

## 2021-07-15 NOTE — PLAN OF CARE
Time of notification: 1:28 AM  Provider notified: [facility] night intern  Patient status:    Pt states that he usually takes his Tacrolimus at 0000 and 1200. Pt did not have a scheduled dose after TAVR surgery.     Temp:  [97.7  F (36.5  C)-98.3  F (36.8  C)] 97.7  F (36.5  C)  Pulse:  [47-90] 70  Resp:  [12-22] 19  BP: ()/() 111/61  SpO2:  [94 %-100 %] 98 %  Orders received: Tacrolimus retimed

## 2021-07-15 NOTE — PROGRESS NOTES
07/15/21 1130   Quick Adds   Type of Visit Initial Occupational Therapy Evaluation   Living Environment   People in home spouse   Current Living Arrangements house   Home Accessibility stairs to enter home;stairs within home   Number of Stairs, Main Entrance 1   Number of Stairs, Within Home, Primary greater than 10 stairs  (multiple flights with various number of steps)   Transportation Anticipated family or friend will provide   Living Environment Comments Pt lives with spouse in multi-level home that requires stairs to reach all needs. Bedroom on upper level requires 13+7 stairs which is greatest amount in the home.   Self-Care   Usual Activity Tolerance moderate   Current Activity Tolerance fair   Regular Exercise No   Equipment Currently Used at Home none   Activity/Exercise/Self-Care Comment Pt with fairly sedentary lifestyle. Pt gets SOB quite easily leading up to admission and would have to take breaks on the stair case to bedroom due to SOB   Instrumental Activities of Daily Living (IADL)   Previous Responsibilities meal prep;shopping;finances;medication management   IADL Comments Pt recently lost 's license due to not filling out correct paperwork. Manages his own meds. Spouse able to assist as needed.    Disability/Function   Hearing Difficulty or Deaf no   Wear Glasses or Blind yes   Vision Management glasses   Concentrating, Remembering or Making Decisions Difficulty no   Difficulty Communicating no   Difficulty Eating/Swallowing no   Walking or Climbing Stairs Difficulty no   Dressing/Bathing Difficulty no   Toileting issues no   Doing Errands Independently Difficulty (such as shopping) no   Fall history within last six months no   Change in Functional Status Since Onset of Current Illness/Injury yes   General Information   Onset of Illness/Injury or Date of Surgery 07/14/21   Referring Physician Barrington Merchant MD   Patient/Family Therapy Goal Statement (OT) Improve activity tolerance  "  Additional Occupational Profile Info/Pertinent History of Current Problem \"71 year old male with a past medical history significant for HTN, HLD, CAD s/p PCi to LAD and LCx 6/17/2021, COPD, Hep C cirrhosis s/p liver tx 2008 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 Patient has been followed for aortic stenosis for some time, but recently was seen in structural clinic after progression to severe AS on echo. TAVR CT confirmed a severely calcified tricuspid aortic valve. Given multiple co-morbidities, he was felt to be a better TAVR candidate rather than surgical.\" Pt now s/p TAVR and PPM placement   Existing Precautions/Restrictions fall;pacemaker;weight bearing  (s/p TAVR)   Left Upper Extremity (Weight-bearing Status) partial weight-bearing (PWB)  (<10#)   Right Upper Extremity (Weight-bearing Status) partial weight-bearing (PWB)  (<10#)   Left Lower Extremity (Weight-bearing Status) full weight-bearing (FWB)   Right Lower Extremity (Weight-bearing Status) full weight-bearing (FWB)   General Observations and Info pt on RA; spouse present and supportive   Cognitive Status Examination   Orientation Status orientation to person, place and time   Cognitive Status Comments no concerns noted   Visual Perception   Visual Impairment/Limitations corrective lenses full-time   Sensory   Sensory Comments Pt with baseline numbness/tingling in R hand   Posture   Posture forward head position   Range of Motion Comprehensive   Comment, General Range of Motion RUE ROM WFL; pt able to flex L shoulder to 90 degrees and no significant pain    Strength Comprehensive (MMT)   Comment, General Manual Muscle Testing (MMT) Assessment MMT: not formally tested 2/2 post-surgical precautions   Bed Mobility   Comment (Bed Mobility) SBA   Transfers   Transfer Comments SBA   Balance   Balance Comments Pt steady on feet   Activities of Daily Living   BADL Assessment toileting "   Toileting   [place] Level (Toileting) independent   Assistive Devices (Toileting) urinal   Clinical Impression   Criteria for Skilled Therapeutic Interventions Met (OT) yes;skilled treatment is necessary   OT Diagnosis decreased ADL IND   OT Problem List-Impairments impacting ADL activity tolerance impaired;pain;post-surgical precautions   Assessment of Occupational Performance 3-5 Performance Deficits   Identified Performance Deficits dressing, bathing, home management, functional mobility   Planned Therapy Interventions (OT) ADL retraining;home program guidelines;progressive activity/exercise;risk factor education   Clinical Decision Making Complexity (OT) moderate complexity   Therapy Frequency (OT) Daily   Predicted Duration of Therapy 2-3 dsays   Risk & Benefits of therapy have been explained care plan/treatment goals reviewed;participants voiced agreement with care plan;participants included;patient;spouse/significant other   Comment-Clinical Impression Pt s/p TAVR and PPM placement. Will benefit from skilled OT serivces to promote greater safety and IND with daily activities within surgical precautions   OT Discharge Planning    OT Discharge Recommendation (DC Rec) Home with assist;home with outpatient cardiac rehab   OT Rationale for DC Rec pt mobilizing well; will benefit from skilled OT to progress aerobic endurance for greater I/ADL IND   OT Brief overview of current status  SBA   Total Evaluation Time (Minutes)   Total Evaluation Time (Minutes) 8

## 2021-07-15 NOTE — PROCEDURES
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 Patient has been followed for aortic stenosis for some time,s/p TAVR today, EP were called for placement of permanent pace maker for complete heart block.    A Dual chamber Pace Maker with LBB lead placement, device is [employer], RA:sensing was 5.5 mv, impedence is 494, pacing threshold 0.75 v at 0.4 ms, RV: impedence: 551, pacin.5, at 0.4 ms. DDDR, lower rate is 60, upper rate is 140, pt will be on antibiotics.     Staff: full note to follow/woa

## 2021-07-15 NOTE — DISCHARGE SUMMARY
[address] [address]  p: [phone]    Discharge Summary: Cardiology Service    Ricki Sharp MRN# [id number]   YOB: 1950 Age: 71 year old       Admission Date: 07/14/2021  Discharge Date: 07/15/2021      Discharge Diagnoses:  1. Severe aortic stenosis s/p 29mm medtronic evolut Pro+ c/b complete heart block  2. S/p placement of permanent pacemaker  3. Acute on chronic diastolic heart failure   4. Coronary artery disease s/p PCI to LAD, LCx 6/17/2021  5. HTN  6. HLD  7. Hx of liver transplant on immunosuppression  8. Hypothyroidism  9. COPD  10. Pulmonary fibrosis     Pertinent Procedures:  1. TAVR  2. Permanent pacemaker placement    Consults:  Electrophysiology    Imaging with results:  Echocardiogram 7/15/2021:  Interpretation Summary  Status post 29 mm Medtronic Evolut Pro Plus valve TAVR on 07/14/2021.  The TAVR is well-seated. A large area of calcification is seen in the 12-  o'clock position and mild-moderate paravalvular regurgitation is seen  originating adjacent to this in the 1-o'clock position. The peak velocity is  1.8 m/s and the mean gradient is 7 mmHg with an AT of 90 msec.  No pericardial effusion is present.  This study was compared with the study from 7/14/2021. No significant changes  in the valve hemodynamics, degree of paravalvular regurgitation, or  ventricular function.  ______________________________________________________________________________  Left Ventricle  Global and regional left ventricular function is normal with an EF of 60-65%.  Left ventricular size is normal. Mild concentric wall thickening consistent  with left ventricular hypertrophy is present. Diastolic function not assessed  due to tachycardia. No regional wall motion abnormalities are seen.     Right Ventricle  Global right ventricular function is normal. The right ventricle is normal  size.     Atria  The atria cannot be  assessed.     Mitral Valve  Moderate mitral annular calcification is present. There is mild-moderate  mitral stenosis. There is a mean mitral valve gradient of 6 mmHg at 100 bpm.     Aortic Valve  The 29 mm Medtronic Evolut Pro Plus is visualized in the aortic position. The  valve is well-seated. A large area of calcification is seen in the 12-o'clock  position in the short axis views. Mild-moderate paravalvular regurgitation is  seen adjacent to this in the 1-o'clock position. The peak velocity is 1.8 m/s  and the mean gradient is 7 mmHg with an AT of 90 msec.     Tricuspid Valve  The tricuspid valve is normal. Trace tricuspid insufficiency is present.  Pulmonary artery systolic pressure cannot be assessed.     Pulmonic Valve  The valve leaflets are not well visualized. Trace pulmonic insufficiency is  present.     Vessels  The aorta root cannot be assessed. The thoracic aorta cannot be assessed. IVC  diameter <2.1 cm collapsing >50% with sniff suggests a normal RA pressure of 3  mmHg. Small inferior vena cava size consistent with hypovolemia.     Pericardium  No pericardial effusion is present.     Compared to Previous Study  This study was compared with the study from 7/14/2021 . No significant changes  in the valve hemodynamics, degree of paravalvular regurgitation, or  ventricular function.    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 Patient has been followed for aortic stenosis for some time, but recently was seen in structural clinic after progression to severe AS on echo. TAVR CT confirmed a severely calcified tricuspid aortic valve. Given multiple co-morbidities, he was felt to be a better TAVR candidate rather than surgical.    Hospital Course by Diagnosis:  #  Severe symptomatic aortic stenosis s/p TAVR s/p 29mm Medtronic evolut pro+ c/b CHB  ##Acute on chronic diastolic Heart failure  ##S/p PPM placement  Now s/p Transcatheter Aortic Valve Replacement on 7/14/2021. Prior to procedure, pt noted to have a mean gradient 40 mmHG, AVA0.9 cm^2, PV 3.9 m/s- Intraoperative LVEDP 18 mmHg, preop BNP 1017 (prevously 115). Following transfemoral transcatheter aortic valve replacement with a 29mm Medtronic Evolut PRO+ valve, was noted to have complete AV block immediately after valve deployment. EP were called for placement of permanent pace maker for complete heart block. Femoral sheath sites soft without hematoma. Overnight was noted to be in sinus rhythm ~60 (PPM backup set to 70). Device interrogated, and it appeared to be experiencing farfield sensing. V sensing increased from 0.9-->2.0 mV with subsequent resolution of oversensing. POD TTE with large amount of calcium in the 12 o'clock position and mild-mod PVL. MG 7mmHg, PV 1.8 m/s.  - aspirin + ticagrelor as below for anti-platelet therapy.   - cardiac rehab  - keflex 500mg tid x7 days for device prophylaxis  - wound check in one week, EP clinic in 3 mos  - follow up with structural clinic as previously arranged     #CAD s/p PCI LAD, LCx 6/17/2021  #HTN  #HLD  - continue atorvastatin 40mg   - continue aspirin 81 + ticagrelor 90mg bid  - continue PTA metoprolol succinate 25mg   - resume PTA losartan 25mg and amlodipine 5mg daily      #Hep C cirrhosis s/p liver transplant  #on immunosuppression  - continue 1 mg tacro BID     #Hypothyroidism  - PTA levothyroxine      #COPD  #Hx RCHOP c/b pulmonary fibrosis   - PTA inhalers     #BPH  - PTA flomax 0.4mg    #Acute anemia 2/2 multiple procedures, lab draws  #Thrombocytopenia, chronic  Thrombocytopenia appears to be chronic, suspect 2/2 tacro.      Condition on discharge  Temp:  [97.7  F (36.5  C)-98.5  F (36.9  C)] 98.2  F (36.8  C)  Pulse:  [47-89] 84  Resp:  [12-22] 18  BP:  ()/() 126/66  SpO2:  [92 %-100 %] 94 %  General: Alert, interactive, NAD  Eyes: sclera anicteric, EOMI  Neck: JVP not appreciably elevated  Cardiovascular: paced, normal S1 and S2, no murmurs, gallops, or rubs  Resp: clear to auscultation bilaterally, no rales, wheezes, or rhonchi  GI: Soft, nontender, nondistended. +BS.  No HSM or masses, no rebound or guarding.  Extremities: no edema, no cyanosis or clubbing, dorsalis pedis and posterior tibialis pulses 2+ bilaterally. Groin sites c/d/i, no hematoma.   Skin: Warm and dry, no jaundice or rash  Neuro: CN 2-12 intact, moves all extremities equally  Psych: Alert & oriented x 3    Medication Changes:    Discharge medications:   Current Discharge Medication List      START taking these medications    Details   cephALEXin (KEFLEX) 500 MG capsule Take 1 capsule (500 mg) by mouth every 8 hours  Qty: 20 capsule, Refills: 0    Associated Diagnoses: S/P placement of cardiac pacemaker         CONTINUE these medications which have NOT CHANGED    Details   albuterol (PROAIR HFA, PROVENTIL HFA, VENTOLIN HFA) 108 (90 BASE) MCG/ACT inhaler Inhale 2 puffs into the lungs every 4 hours as needed for shortness of breath / dyspnea or wheezing  Qty: 3 Inhaler, Refills: 1    Associated Diagnoses: Chronic obstructive pulmonary disease, unspecified COPD type (H)      amLODIPine (NORVASC) 5 MG tablet Take 1 tablet (5 mg) by mouth daily  Qty: 90 tablet, Refills: 3    Associated Diagnoses: Essential hypertension      aspirin 81 MG EC tablet Take 81 mg by mouth At Bedtime       atorvastatin (LIPITOR) 40 MG tablet Take 1 tablet (40 mg) by mouth daily  Qty: 90 tablet, Refills: 3    Associated Diagnoses: Coronary artery disease involving native coronary artery of native heart without angina pectoris      B-D U/F 31G X 8 MM insulin pen needle USE WITH LANTUS DAILY  Qty: 100 each, Refills: 3    Associated Diagnoses: Type 2 diabetes mellitus without complication, without long-term current  use of insulin (H)      blood glucose monitoring (NO BRAND SPECIFIED) meter device kit Use to test blood sugar 2 times daily. Please dispense item that is covered by insurance.  Qty: 1 kit, Refills: 11    Associated Diagnoses: Type 2 diabetes mellitus without complication, without long-term current use of insulin (H)      Blood Pressure Monitoring (BLOOD PRESSURE CUFF) MISC BP cuff. HTN. 99  Qty: 1 each, Refills: 11    Associated Diagnoses: Essential hypertension      CONTOUR NEXT TEST test strip TESTING 2 TIMES DAILY  Qty: 200 strip, Refills: 11    Associated Diagnoses: Type 2 diabetes mellitus without complication, without long-term current use of insulin (H)      cyanocobalamin (VITAMIN B-12) 1000 MCG tablet Take 1,000 mcg by mouth 2 times daily      cyclobenzaprine (FLEXERIL) 5 MG tablet TAKE 1 TO 2 TABLETS BY MOUTH AT NIGHT AS NEEDED  Qty: 45 tablet, Refills: 3    Associated Diagnoses: Lumbar degenerative disc disease; Lumbar facet arthropathy; Chronic left-sided low back pain with left-sided sciatica      desonide (DESOWEN) 0.05 % external ointment APPLY TOPICALLY TO THE FACE DAILY AS NEEDED  Qty: 60 g, Refills: 2    Associated Diagnoses: Psoriasis      fluocinonide (LIDEX) 0.05 % external solution APPLY TOPICALLY TO THE AFFECTED SCALP TWICE DAILY  Qty: 60 mL, Refills: 3    Associated Diagnoses: Psoriasis      LANTUS SOLOSTAR 100 UNIT/ML soln INJECT 5 UNITS SUB-Q AT BEDTIME  Qty: 15 mL, Refills: 3    Associated Diagnoses: Type 2 diabetes mellitus without complication, without long-term current use of insulin (H)      levothyroxine (SYNTHROID/LEVOTHROID) 175 MCG tablet Take 1 tablet (175 mcg) by mouth daily  Qty: 90 tablet, Refills: 3    Associated Diagnoses: Acquired hypothyroidism      losartan (COZAAR) 25 MG tablet Take 1 tablet (25 mg) by mouth daily  Qty: 90 tablet, Refills: 3    Associated Diagnoses: Essential hypertension      Magnesium Cl-Calcium Carbonate (SLOW-MAG) 71.5-119 MG TBEC Take 3 tablets by  mouth 4 times daily  Qty: 180 tablet, Refills: 3    Associated Diagnoses: Hypomagnesemia      metFORMIN (GLUCOPHAGE-XR) 500 MG 24 hr tablet TAKE 4 TABLETS(2000 MG) BY MOUTH DAILY WITH DINNER  Qty: 360 tablet, Refills: 3    Associated Diagnoses: Type 2 diabetes mellitus without complication, without long-term current use of insulin (H)      metoprolol succinate ER (TOPROL-XL) 25 MG 24 hr tablet Take 1 tablet (25 mg) by mouth daily  Qty: 90 tablet, Refills: 3    Associated Diagnoses: Coronary artery disease involving native coronary artery of native heart without angina pectoris      omeprazole (PRILOSEC) 20 MG DR capsule Take 20 mg by mouth every other day      SPIRIVA HANDIHALER 18 MCG inhaled capsule INHALE THE CONTENTS OF 1 CAPSULE(18 MCG) INTO THE LUNGS DAILY  Qty: 90 capsule, Refills: 0    Associated Diagnoses: Chronic obstructive pulmonary disease, unspecified COPD type (H)      tacrolimus (GENERIC EQUIVALENT) 1 MG capsule TAKE ONE CAPSULE BY MOUTH EVERY 12 HOURS  Qty: 60 capsule, Refills: 11    Associated Diagnoses: Liver replaced by transplant (H)      tamsulosin (FLOMAX) 0.4 MG capsule Take 1 capsule (0.4 mg) by mouth daily  Qty: 90 capsule, Refills: 3    Associated Diagnoses: Benign prostatic hyperplasia with lower urinary tract symptoms, symptom details unspecified      thin (NO BRAND SPECIFIED) lancets Use to test blood sugar 2 times daily.  Please dispense items that are covered by insurance.  Qty: 200 each, Refills: 11    Associated Diagnoses: Type 2 diabetes mellitus without complication, without long-term current use of insulin (H)      ticagrelor (BRILINTA) 90 MG tablet Take 1 tablet (90 mg) by mouth 2 times daily Start tomorrow morning.  Qty: 180 tablet, Refills: 3    Associated Diagnoses: Coronary artery disease involving native coronary artery of native heart without angina pectoris      traMADol (ULTRAM) 50 MG tablet TAKE 1 TABLET(50 MG) BY MOUTH TWICE DAILY  Qty: 60 tablet, Refills: 5     Associated Diagnoses: Spinal stenosis of lumbar region, unspecified whether neurogenic claudication present; Lumbar foraminal stenosis; Lumbar facet arthropathy      Vitamin D3 (CHOLECALCIFEROL) 25 mcg (1000 units) tablet Take 1 tablet by mouth daily      acetaminophen (TYLENOL) 500 MG tablet Take 500 mg by mouth daily as needed (headache or back pain)             Labs or imaging requiring follow-up after discharge:  TTE in one month with structural clinic       Follow-up:  EP clinic in 1 week for wound check  Structural clinic 7/26/2021    Code status:  FULL    50 minutes spent in discharge, including >50% in counseling and coordination of care, medication review and plan of care recommended on follow up. Questions were answered.   It was our pleasure to care for Ricki Sharp during this hospitalization. Please do not hesitate to contact me should there be questions regarding the hospital course or discharge plan.          Elias Farah PA-C  [facility] Cardiology Team

## 2021-07-15 NOTE — ANESTHESIA CARE TRANSFER NOTE
Patient: Ricki Sharp    Procedure(s):  Transfemoral Transcatheter Aortic Valve Replacement (MEDTRONIC 29 mm), Trans thorasic echocardiogram read by Dr. Dean Smith, Perfusion and cardiac surgery standby, temporary pacemaker placent  with possible emergency open heart bypass and or balloon pump placement and any indicated procedure  Permanent Pacemakers  Insert of New or Replacement with Vent Lead    Diagnosis: Severe aortic stenosis [I35.0]  Diagnosis Additional Information: No value filed.    Anesthesia Type:   MAC     Note:    Oropharynx: spontaneously breathing  Level of Consciousness: drowsy  Oxygen Supplementation: face mask    Independent Airway: airway patency satisfactory and stable    Vital Signs Stable: post-procedure vital signs reviewed and stable  Report to RN Given: handoff report given  Patient transferred to: PACU    Handoff Report: Identifed the Patient, Identified the Reponsible Provider, Reviewed the pertinent medical history, Discussed the surgical course, Reviewed Intra-OP anesthesia mangement and issues during anesthesia, Set expectations for post-procedure period and Allowed opportunity for questions and acknowledgement of understanding      Vitals: (Last set prior to Anesthesia Care Transfer)  CRNA VITALS  7/14/2021 2010 - 7/14/2021 2052 7/14/2021             Pulse:  71    SpO2:  100 %        Electronically Signed By: CORTES Blancas CRNA  July 14, 2021  8:52 PM

## 2021-07-15 NOTE — PROGRESS NOTES
2125 Dr George notified that patients heart rate is dropping to 56 with an occasional dropped QRS complex. MD will review 12 lead and CXR. No new orders at this time.  2147 Pt heart rate sustained around 45-48 BP 87/55. Pt asympomatic  2230 Verbal signout obtained from Dr Bustamante

## 2021-07-15 NOTE — DISCHARGE INSTRUCTIONS
Home Care after a Pacemaker Implant    Wound care:  Keep your incision (surgery wound) dry for 3 days.  After 3 days, you may remove the outer bandage.  Keep the strips of tape on.  They will be removed at your clinic visit.  Check for signs of infection each day.  These include increased redness, swelling, drainage, bleeding or a fever over 101 F (38.3 C).  Call us immediately if you see any of these signs.  If there are no signs of infection, you may shower in 3 days.  Do not submerge the incision (in a bath tub, hot tub, or swimming pool) until fully healed.    Pain:   You may have mild to moderate pain for 3 to 5 days.  Take acetaminophen (Tylenol) or ibuprofen (Advil) for the pain.  Call us if the pain is severe or lasts more than 5 days.    Activity:  After 24 hours, slowly return to your normal activities.   Healing will take 4 to 6 weeks.  No driving for 3 days  Avoid climbing a ladder alone.  It is best to stay within 4 feet of the ground.  Avoid anything that may cause rough contact or a hard hit to your chest.  This includes football, hockey, and other contact sports.  For at least 4 weeks:  Do not raise your affected arm above your shoulder.  Do not use your affected arm to push, pull, or lift anything over 10 pounds.  Avoid repetitive upper body activities for 6 weeks (ie: golf, swimming, and weight lifting)    Follow Up Visits:  Return to the clinic in 7 to 10 days to have your device and wound checked.      Telling others about your device:  Before you have any medical tests or treatments, tell the doctors, dentists, and other care providers about your device.  There are a few tests and treatments that may interfere with your device.  (These include MRI, radiation therapy, electrocautery, and others.)  Your care team may need to take special steps to keep you safe.  Before you leave the hospital, you will receive a temporary ID card.  A permanent card will be mailed to you about 6 to 8 weeks later.   Always carry the ID card with you.  It has important details about your device.  You should also get a MedicAlert ID.  Please ask us for a MedicAlert brochure, or go to [email or web address].    Safety near electrical equipment:  All of these are safe to use when in good repair:    Microwaves    Radios    Cordless phone    Remote controls    Small electrical tools  Cell phones: Keep cell phones at least 6 inches from your device.  Do not carry the phone in a pocket near your device.  Security patton: It is okay to walk through security patton at the airports and department stores.  Tell airport security that you have a pacemaker.  They should keep the screening wand at least 6 inches from your device.  Full-body scanners are safe.  Avoid the following:    MRI tests in the hospital unless you have a MRI safe pacemaker.           Arc welding, chain saws and high-powered industrial or commercial tools.    Power lines, power plants and large power generators.    Electric body fat scales.    Magnetic mattress pads or pillow.    Questions?  Please call [facility].    Device Nurse:          Business Hours:  [phone]                         After Hours:  [phone]   Choose option 4, then ask for the on-call device nurse at job code [id number].    Please schedule an appointment for an incision check with your primary care physician in 7-10 days.   Please send a remote transmission on 7/21/21.  Your next device clinic appointment is scheduled on:  10/12/21 at 11 am at the [facility]  [facility] [address]  [address] [place] [address]      Going home after Transcatheter Aortic Valve Replacement (TAVR)  Femoral Access    You may have small amounts of bruising or discomfort, this is expected. If you develop worse swelling, redness and warmth that does not go away, fever and chills, Increasing numbness in your legs, worsening  pain at the site then you need to contact your nurse coordinator.    You may shower, but do not soak in a bath tub or pool or apply lotions, ointments, powder, etc for 3-5 days or until sites look healed.     Activity: No lifting, pushing, pulling more than 10 pounds (examples to avoid: groceries, vacuuming, gardening, golfing): For one week with a procedure through the groin.    After the initial healing process of the access site, we recommend cardiac rehabilitation for all TAVR patients. Cardiac rehabilitation will help you:  - Rebuild stamina, strength and balance.  - Learn how to participate in activities safely, as well as help you regain confidence to do so.  - Return to activities of daily living and leisure.  Please contact their central scheduling to arrange at [phone]    We prefer you to follow up with your primary care provider within 2 weeks. You will be arranged to see the TAVR clinic in month. At that time you will have a repeat ultrasound of the heart to look at the valve.     Should you have any questions or concerns please contact your assigned TAVR nurse coordinator:  Scarlet [phone] (at the first prompt enter #1, second prompt enter #3, then ask the triage nurse if you can speak with Scarlet).

## 2021-07-15 NOTE — PROGRESS NOTES
I was notified about the patient's HR lower than programmed lower rate limit of 70bpm. ECG with p wave w/o corresponding QRS. Patient's device was interrogated. There was noted to be likely farfield oversensing of complexes following each QRS. V sensing increased from 0.9mV to 2.0 mV. This corrected the oversensing. No changes made to A sensitivity. Initial BP was also low in the 80s which improved to systolic >100 after above changes were made. V pacing threshold 0.25mV. RV impedance 551 ohms. Pt remains at 3.50 V/0.40 ms Patient remains in DDDR .    Mohsan Chaudhry MD  Cardiology Fellow

## 2021-07-16 NOTE — TELEPHONE ENCOUNTER
"Patient wife will do the 1 week video visit but would like for sydney that day to let her know if they should go into  office and get seen there as well. She worries about no one checking the wounds. She will call EP and also send us a picture of the incision site. Patient sent a picture and I showed Naldo Gandhi,. He explained \" its to be expected for the bruising and it might lee ann be there from 3-6 months. It will turn colors until it completely heals. As long as there is no fever and discharge from the incision. They should be ok. He also said in his discharge papers it says to call PCP to schedule a 7-10 day follow up. So they can check his incision. I called patient and told her . She understood and will call [facility] to make appt to check incision.   "

## 2021-07-16 NOTE — RESULT ENCOUNTER NOTE
Unchanged renal function, electrolytes.  Borderline low calcium. Low protein.  Patient will continue current medication regimen. These were inpatient results, managed by primary inpatient team.

## 2021-07-16 NOTE — TELEPHONE ENCOUNTER
"[facility] Call Center    Phone Message    May a detailed message be left on voicemail: yes     Reason for Call: Other: Fide called in stating she was \"left with only a vague understanding about the leak in Bill's valve\" and would like to ask some more questions. Please call her back at [phone] to discuss. She would like a call back today if possible.      Action Taken: Message routed to:  [facility] ([facility] Cardio    Travel Screening: Not Applicable  "

## 2021-07-16 NOTE — PROGRESS NOTES
Date: 7/16/2021    Time of Call: 12:54 PM     Diagnosis:  Severe aortic stenosis     [ TORB ] Ordering provider: Airam Hannah CNP  Order: CT angiogram heart, ECHO, Labs-CBC and CMP     Order received by: Patti French RN     Follow-up/additional notes:

## 2021-07-16 NOTE — TELEPHONE ENCOUNTER
Patient verified their .  Called patient re: referral to cardiac rehab received from Aric  Instructed on program and goals of cardiac rehab.   Pt states they are still in [place] and not sure what day they are driving home.  He stated he will call us back once they get home to schedule an appointment.  Patient verbalized understanding.

## 2021-07-19 NOTE — TELEPHONE ENCOUNTER
Patients spouse Fide called with questions regarding new pacemaker implant. Went over patients [employer] home monitor instructions and confirmed follow up appointments with the [facility]. Instructed patients spouse to call the Device clinic with any further questions or concerns.

## 2021-07-19 NOTE — TELEPHONE ENCOUNTER
Patient's spouse Fide called to schedule follow up for aortic valve and pacemaker insertion.  Was suppose to follow up in 7-10 days.  They would prefer to see Dr. Leslie, but he has nothing available in their time frame.  Appointment was scheduled with Mary on 7/26/21 but they wanted a note sent to see if [facility] would do a 'work in'.  Please call to discuss and advise.  Vanesa Queen on 7/19/2021 at 9:15 AM

## 2021-07-23 NOTE — PROGRESS NOTES
Return telephone call made to Fide.  She had the following questions:  Is it normal for the groin site to continue to be sore, and is it OK for Bill to take tylenol and use ice if necessary?  I answered that that was correct to both.  She stated that the groin area does not have any swelling and there is no fever.  She wanted to have this reassurance before going into the weekend.

## 2021-07-26 PROBLEM — Z95.0 PACEMAKER: Status: ACTIVE | Noted: 2021-01-01

## 2021-07-26 PROBLEM — I34.2 NONRHEUMATIC MITRAL VALVE STENOSIS: Status: ACTIVE | Noted: 2021-01-01

## 2021-07-26 PROBLEM — I44.2: Status: ACTIVE | Noted: 2021-01-01

## 2021-07-26 PROBLEM — I97.89: Status: ACTIVE | Noted: 2021-01-01

## 2021-07-26 NOTE — PROGRESS NOTES
Ricki Sharp is a 71 year old who is being evaluated via a billable video visit.      How would you like to obtain your AVS? MyChart  If the video visit is dropped, the invitation should be resent by: Text to cell phone: [phone]  Will anyone else be joining your video visit? No      Vitals - Patient Reported  Weight (Patient Reported): 88 kg (194 lb)  Pain Score: No Pain (0) (No more SOB than usual)    Vitals were taken and medications reconciled.    Vick Porter, EMT  12:09 PM

## 2021-07-26 NOTE — LETTER
7/26/2021      RE: Ricki Sharp  [address] [place] [address]       Dear Colleague,    Thank you for the opportunity to participate in the care of your patient, Ricki Sharp, at the [facility] [place] at [facility]. Please see a copy of my visit note below.        [facility] HEART CARE  CARDIOVASCULAR DIVISION    VALVE CLINIC RETURN VISIT    PRIMARY CARDIOLOGIST: Dr. Leslie       PERTINENT CLINICAL HISTORY:     Ricki Sharp is a very pleasant 71 year old male who presents for 1 week TAVR follow-up. His history is notable for Hep C s/p liver transplant in 2008, PTLD status-post R-CHOP in 2014, pulmonary fibrosis secondary to chemo and pleural effusions s/p bilateral decortication, COPD, DM, HLD, HTN, CKD III, CAD s/p PCI of pLAD and pLcx 6/17/21, HFpEF and severe aortic valvular stenosis that was treated with transfemoral transcatheter aortic valve replacement (TAVR) with a 29mm Medtronic Evolut Pro + on 7/14/21 by Marck Lucio, Taz, Mayur and Federico. The TAVR and post-procedural course were notable for mild-moderate PVL requiring post dilation x 1 with mild residual PVL. He also developed complete heart block that required permanent pacemaker implantation. His POD#1 ECHO showed mean PG of 7 mmHg with mild-moderate PVL. He was discharged home on DAPT given recent PCI.     Brendan says he was feeling sore for a while but pain in his groin sites seems to be improving. He hasn't noticed a significant difference in his overall health since the procedure. He continues to experience shortness of breath with exertion which is unchanged. His dyspnea is longstanding dating back to 2014 when he received chemo. He also notes a sensation of paroxysmal air hunger which is new and he believes may be related to Brilinta. He endorses fatigue that improves throughout the day. He reports mild orthopnea, and is propping himself up on 3  pillows at night. He denies any significant leg swelling, weight gain, palpitations, lightheadedness or syncope. His pacemaker and groin sites are bruised but seem to be improving. He is tolerating DAPT and has no major bleeding concerns. He is scheduled to start cardiac rehab next week.      PAST MEDICAL HISTORY:     Past Medical History:   Diagnosis Date     Cancer (H)     Lymphoma     COPD (chronic obstructive pulmonary disease) (H)      Coronary artery disease     3 stents placed in June of 2021 and aortic valve replacement TBD     Diabetes (H)      History of blood transfusion      Hypertension      Thyroid disease       PAST SURGICAL HISTORY:     Past Surgical History:   Procedure Laterality Date     ABDOMEN SURGERY      liver transplant     CARDIAC SURGERY      3 stents     CHOLECYSTECTOMY       CV CORONARY ANGIOGRAM N/A 6/17/2021    Procedure: CV CORONARY ANGIOGRAM;  Surgeon: Noah Akins MD;  Location:  HEART CARDIAC CATH LAB     CV INSTANTANEOUS WAVE-FREE RATIO N/A 6/17/2021    Procedure: Instantaneous Wave-Free Ratio;  Surgeon: Noah Akins MD;  Location:  HEART CARDIAC CATH LAB     CV PCI STENT DRUG ELUTING N/A 6/17/2021    Procedure: Percutaneous Coronary Intervention Stent Drug Eluting;  Surgeon: Noah Akins MD;  Location:  HEART CARDIAC CATH LAB     CV RIGHT HEART CATH MEASUREMENTS RECORDED N/A 6/17/2021    Procedure: CV RIGHT HEART CATH;  Surgeon: Noah Akins MD;  Location:  HEART CARDIAC CATH LAB     CV TRANSCATHETER AORTIC VALVE REPLACEMENT N/A 7/14/2021    Procedure: Transfemoral Transcatheter Aortic Valve Replacement (MEDTRONIC 29 mm), Trans thorasic echocardiogram read by Dr. Dean Smith, Perfusion and cardiac surgery standby, temporary pacemaker placent;  Surgeon: Lino Lucio MD;  Location: [facility] OR     EP PPM INSERT OF NEW OR REPL W/VENT LEAD N/A 7/14/2021    Procedure: Permanent Pacemakers  Insert of New or  Replacement with Vent Lead;  Surgeon: Robby Moseley MD;  Location: [facility] OR     HEART CATH FEMORAL CANNULIZATION WITH OPEN STANDBY REPAIR AORTIC VALVE N/A 7/14/2021    Procedure: with possible emergency open heart bypass and or balloon pump placement and any indicated procedure;  Surgeon: Tato Vieira MD;  Location: [facility] OR     INJECT EPIDURAL TRANSFORAMINAL LUMBAR / SACRAL SINGLE Left 8/1/2018    Procedure: INJECT EPIDURAL TRANSFORAMINAL LUMBAR / SACRAL SINGLE;  Left Transforaminal Lumbar Epidural Steroid Injection  Lumbar 3, Lumbar 4;  Surgeon: Jeni Agarwal MD;  Location:  OR     THORACIC SURGERY      lung     TRANSPLANT  2008    Liver        CURRENT MEDICATIONS:     Current Outpatient Medications   Medication Sig Dispense Refill     acetaminophen (TYLENOL) 500 MG tablet Take 500 mg by mouth daily as needed (headache or back pain)       albuterol (PROAIR HFA, PROVENTIL HFA, VENTOLIN HFA) 108 (90 BASE) MCG/ACT inhaler Inhale 2 puffs into the lungs every 4 hours as needed for shortness of breath / dyspnea or wheezing 3 Inhaler 1     amLODIPine (NORVASC) 5 MG tablet Take 1 tablet (5 mg) by mouth daily 90 tablet 3     aspirin 81 MG EC tablet Take 81 mg by mouth At Bedtime        atorvastatin (LIPITOR) 40 MG tablet Take 1 tablet (40 mg) by mouth daily 90 tablet 3     B-D U/F 31G X 8 MM insulin pen needle USE WITH LANTUS DAILY 100 each 3     blood glucose monitoring (NO BRAND SPECIFIED) meter device kit Use to test blood sugar 2 times daily. Please dispense item that is covered by insurance. 1 kit 11     Blood Pressure Monitoring (BLOOD PRESSURE CUFF) MISC BP cuff. HTN. 99 1 each 11     cephALEXin (KEFLEX) 500 MG capsule Take 1 capsule (500 mg) by mouth every 8 hours 20 capsule 0     CONTOUR NEXT TEST test strip TESTING 2 TIMES DAILY 200 strip 11     cyanocobalamin (VITAMIN B-12) 1000 MCG tablet Take 1,000 mcg by mouth 2 times daily       cyclobenzaprine (FLEXERIL) 5 MG tablet TAKE 1 TO 2 TABLETS  BY MOUTH AT NIGHT AS NEEDED 45 tablet 3     desonide (DESOWEN) 0.05 % external ointment APPLY TOPICALLY TO THE FACE DAILY AS NEEDED 60 g 2     fluocinonide (LIDEX) 0.05 % external solution APPLY TOPICALLY TO THE AFFECTED SCALP TWICE DAILY 60 mL 3     LANTUS SOLOSTAR 100 UNIT/ML soln INJECT 5 UNITS SUB-Q AT BEDTIME 15 mL 3     levothyroxine (SYNTHROID/LEVOTHROID) 175 MCG tablet Take 1 tablet (175 mcg) by mouth daily 90 tablet 3     losartan (COZAAR) 25 MG tablet Take 1 tablet (25 mg) by mouth daily 90 tablet 3     Magnesium Cl-Calcium Carbonate (SLOW-MAG) 71.5-119 MG TBEC Take 3 tablets by mouth 4 times daily 180 tablet 3     metFORMIN (GLUCOPHAGE-XR) 500 MG 24 hr tablet TAKE 4 TABLETS(2000 MG) BY MOUTH DAILY WITH DINNER 360 tablet 3     metoprolol succinate ER (TOPROL-XL) 25 MG 24 hr tablet Take 1 tablet (25 mg) by mouth daily 90 tablet 3     omeprazole (PRILOSEC) 20 MG DR capsule Take 20 mg by mouth every other day       SPIRIVA HANDIHALER 18 MCG inhaled capsule INHALE THE CONTENTS OF 1 CAPSULE(18 MCG) INTO THE LUNGS DAILY 90 capsule 0     tacrolimus (GENERIC EQUIVALENT) 1 MG capsule TAKE ONE CAPSULE BY MOUTH EVERY 12 HOURS 60 capsule 11     tamsulosin (FLOMAX) 0.4 MG capsule Take 1 capsule (0.4 mg) by mouth daily 90 capsule 3     thin (NO BRAND SPECIFIED) lancets Use to test blood sugar 2 times daily.  Please dispense items that are covered by insurance. 200 each 11     ticagrelor (BRILINTA) 90 MG tablet Take 1 tablet (90 mg) by mouth 2 times daily Start tomorrow morning. 180 tablet 3     traMADol (ULTRAM) 50 MG tablet TAKE 1 TABLET(50 MG) BY MOUTH TWICE DAILY (Patient taking differently: Take 50 mg by mouth daily ) 60 tablet 5     Vitamin D3 (CHOLECALCIFEROL) 25 mcg (1000 units) tablet Take 1 tablet by mouth daily          ALLERGIES:     Allergies   Allergen Reactions     Lisinopril Cough     Spironolactone Other (See Comments)     hyperkalemia     Levaquin [Levofloxacin] Rash     Rash on abdomen, possibly from  Erin.        FAMILY HISTORY:     Family History   Problem Relation Age of Onset     Alcoholism Mother      Cirrhosis Mother      Heart Disease Father      Cirrhosis Brother         SOCIAL HISTORY:     Social History     Socioeconomic History     Marital status:      Spouse name: Not on file     Number of children: Not on file     Years of education: Not on file     Highest education level: Not on file   Occupational History     Not on file   Tobacco Use     Smoking status: Former Smoker     Years: 5.00     Types: Cigarettes     Quit date: 5/30/2018     Years since quitting: 3.1     Smokeless tobacco: Never Used   Vaping Use     Vaping Use: Never used   Substance and Sexual Activity     Alcohol use: No     Drug use: No     Sexual activity: Yes     Partners: Female   Other Topics Concern     Parent/sibling w/ CABG, MI or angioplasty before 65F 55M? Not Asked   Social History Narrative         Social Determinants of Health     Financial Resource Strain:      Difficulty of Paying Living Expenses:    Food Insecurity:      Worried About Running Out of Food in the Last Year:      Ran Out of Food in the Last Year:    Transportation Needs:      Lack of Transportation (Medical):      Lack of Transportation (Non-Medical):    Physical Activity:      Days of Exercise per Week:      Minutes of Exercise per Session:    Stress:      Feeling of Stress :    Social Connections:      Frequency of Communication with Friends and Family:      Frequency of Social Gatherings with Friends and Family:      Attends Mosque Services:      Active Member of Clubs or Organizations:      Attends Club or Organization Meetings:      Marital Status:    Intimate Partner Violence:      Fear of Current or Ex-Partner:      Emotionally Abused:      Physically Abused:      Sexually Abused:         REVIEW OF SYSTEMS:     Constitutional: No fevers or chills  Skin: No new rash or itching  Eyes: No acute change in vision  Ears/Nose/Throat:  No purulent rhinorrhea, new hearing loss, or new vertigo  Respiratory: No cough or hemoptysis  Cardiovascular: See HPI  Gastrointestinal: No change in appetite, vomiting, hematemesis or diarrhea  Genitourinary: No dysuria or hematuria  Musculoskeletal: No new back pain, neck pain or muscle pain  Neurologic: No new headaches, focal weakness or behavior changes  Psychiatric: No hallucinations, excessive alcohol consumption or illegal drug usage  Hematologic/Lymphatic/Immunologic: No bleeding, chills, fever, night sweats or weight loss  Endocrine: No new cold intolerance, heat intolerance, polyphagia, polydipsia or polyuria      PHYSICAL EXAMINATION:     There were no vitals taken for this visit.    GENERAL: No acute distress.  HEENT: EOMI. Sclerae white, not injected. Nares clear. Pharynx without erythema or exudate.   Heart: No obvious JVD.    Lungs: Non labored breathing, no wheezing   Extremities: No clubbing, cyanosis, or edema.   Neurologic: Alert and oriented to person/place/time, normal speech and affect. No focal deficits.  Skin: No petechiae, purpura or rash.     LABORATORY DATA:   Discharge labs reviewed and stable.     CBC RESULTS:  Lab Results   Component Value Date    WBC 7.7 07/15/2021    WBC 6.4 06/17/2021    RBC 3.14 (L) 07/15/2021    RBC 4.12 (L) 06/17/2021    HGB 9.9 (L) 07/15/2021    HGB 12.4 (L) 06/17/2021    HCT 30.4 (L) 07/15/2021    HCT 39.4 (L) 06/17/2021    MCV 97 07/15/2021    MCV 96 06/17/2021    MCH 31.5 07/15/2021    MCH 30.8 06/17/2021    MCHC 32.6 07/15/2021    MCHC 32.2 06/17/2021    RDW 14.9 07/15/2021    RDW 15.2 (H) 06/17/2021    PLT 68 (L) 07/15/2021    PLT 78 (L) 06/17/2021       BMP RESULTS:  Lab Results   Component Value Date     07/15/2021     07/15/2021    NA Test canceled - Lab  error 07/07/2021    POTASSIUM 5.0 07/15/2021    POTASSIUM 5.1 07/15/2021    POTASSIUM Test canceled - Lab  error 07/07/2021    CHLORIDE 108 07/15/2021    CHLORIDE 109  07/15/2021    CHLORIDE Test canceled - Lab  error 07/07/2021    CO2 22 07/15/2021    CO2 22 07/15/2021    CO2 Test canceled - Lab  error 07/07/2021    ANIONGAP 7 07/15/2021    ANIONGAP 6 07/15/2021    ANIONGAP Test canceled - Lab  error 07/07/2021     (H) 07/15/2021     (H) 07/15/2021    GLC Test canceled - Lab  error 07/07/2021    BUN 24 07/15/2021    BUN 24 07/15/2021    BUN Test canceled - Lab  error 07/07/2021    CR 1.18 07/15/2021    CR 1.20 07/15/2021    CR Test canceled - Lab  error 07/07/2021    GFRESTIMATED 63 07/15/2021    GFRESTIMATED 61 07/15/2021    GFRESTIMATED Test canceled - Lab  error 07/07/2021    GFRESTBLACK Test canceled - Lab  error 07/07/2021    LIT 8.0 (L) 07/15/2021    LIT 8.2 (L) 07/15/2021    LIT Test canceled - Lab  error 07/07/2021       PROCEDURES & FURTHER ASSESSMENTS:     ECG dated 7/15/21:      Echocardiogram dated 7/15/21 POD#1:  Interpretation Summary  Status post 29 mm Medtronic Evolut Pro Plus valve TAVR on 07/14/2021.  The TAVR is well-seated. A large area of calcification is seen in the 12-  o'clock position and mild-moderate paravalvular regurgitation is seen  originating adjacent to this in the 1-o'clock position. The peak velocity is  1.8 m/s and the mean gradient is 7 mmHg with an AT of 90 msec.  No pericardial effusion is present.  This study was compared with the study from 7/14/2021. No significant changes  in the valve hemodynamics, degree of paravalvular regurgitation, or  ventricular function.    NYHA Class: II     CLINICAL IMPRESSION:     Ricki Sharp is a very pleasant 71 year old male with severe aortic valvular stenosis that was treated with transfemoral transcatheter aortic /valve replacement (TAVR) with a 29mm Medtronic Evolut Pro + on 7/14/21 requiring post dilation x 1 with residual mild-moderate PVL on POD#1 TTE who presents for 1 week  follow-up.     1. Severe aortic stenosis s/p TAVR: He required post dilation x 1 due to mild to moderate PVL. His POD#1 ECHO showed mean PG of 7 mmHg with mild-moderate residual PVL due to a large area of calcification at the 12 o'clock position. Will reassess PVL on 1-2 month post TAVR ECHO and cardiac CT. Will also obtain hemolysis labs. Could consider plugging PVL if clinically indicated ie. evidence of heart failure or hemolysis. Continue ASA 81 mg daily and SBE prophylaxis lifelong.     2. CHB s/p PPM: Patient developed CHB post TAVR and required PPM. EP follow-up and device check October 2021.     3. HFpEF: Likely secondary to valvular disease. Per patient no weight gain or worsening heart failure symptoms at this time. No diuretic therapy required.     4. CAD s/p PCI of pLAD and pLcx 6/17/21: Currently without angina. Continue ASA, Brilinta and statin therapy. Patient would like to switch to Plavix after 3 months of Brilinta due to SE and cost, which is reasonable.     5. HTN: Continue current regimen.     6. HLD: Continue statin therapy.     RTC: 1 month with imaging and labs prior.     CORTES Santiago, CNP  [facility] Cardiology Team    CC  Patient Care Team:  Jarad Bocanegra MD as PCP - General  Kiana Palumbo RN as Registered Nurse (Transplant)  Mike Gray MD as MD (Gastroenterology)  Cruzito Frederick MD as MD (Vascular and Interventional Radiology)  Raghu Fisher MD as MD (Orthopedics)  Melissa Glover MD as MD (Internal Medicine)  Kylie Camp MD as MD (Endocrinology, Diabetes, and Metabolism)  Ricki Truong MD as MD (Pulmonary Disease)  Drew Caballero MD as Assigned Heart and Vascular Provider  Linn Rivers, RN as Specialty Care Coordinator (Cardiology)  Lino Lucio MD as MD (Interventional Cardiology)  Kristy Abdi MD as Assigned Nephrology Provider  Vanesa Llanes MD as MD (Endocrinology, Diabetes, and  Metabolism)

## 2021-07-26 NOTE — PROGRESS NOTES
[facility] HEART CARE   CARDIOLOGY PROGRESS NOTE    Ricki Sharp   [address] [facility] [id number]    Jarad Bocanegra     Chief Complaint   Patient presents with     Follow Up     s/p aortic valve/ pacemaker - 7/15/21        HPI:   Mr. Sharp is a 71 year old male who is s/p TAVR on 7/14/21 at [facility] secondary to severe aortic valve stenosis as a result of severely calcified aortic valve.  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 Given his multiple comorbidities patient was deemed a better candidate for TAVR then surgical.     Patient underwent TAVR on 7/14/21 with 29 mm Metronic Evolut PRO+. Prior to procedure patient was noted to have a mean gradient 40 mmHG, AVA0.9 cm^2, PV 3.9 m/s. Following TAVR he was noted to have complete AV block immediately after valve deployment. EP were called for placement of permanent pace maker for complete heart block. Overnight was noted to be in sinus rhythm ~60 (PPM backup set to 70). Device interrogated, and it appeared to be experiencing farfield sensing. V sensing increased from 0.9-->2.0 mV with subsequent resolution of oversensing. Post -op TTE with large amount of calcium in the 12 o'clock position and mild-mod PVL. MG 7mmHg, PV 1.8 m/s.  He was started on aspirin and ticagrelor for DAPT, referred to cardiac rehab.  He was prescribed Keflex 500 mg 3 times daily for 7 days for indication of device prophylaxis.    Today patient reports feeling fine. No fever or chills. Still tired and dyspnea level unchanged from his baseline. He was hopeful that breathing would improve some following TAVR. He does have underlying lung disease, pulmonary fibrosis and COPD. He did have mild increase in LE edema following TAVR which has  improved. Bruising at pacer site and right femoral access site improved.     IMAGING RESULTS:     Imaging with results:  Echocardiogram 7/15/2021:  Interpretation Summary  Status post 29 mm Medtronic Evolut Pro Plus valve TAVR on 07/14/2021.  The TAVR is well-seated. A large area of calcification is seen in the 12-  o'clock position and mild-moderate paravalvular regurgitation is seen  originating adjacent to this in the 1-o'clock position. The peak velocity is  1.8 m/s and the mean gradient is 7 mmHg with an AT of 90 msec.  No pericardial effusion is present.  This study was compared with the study from 7/14/2021. No significant changes  in the valve hemodynamics, degree of paravalvular regurgitation, or  ventricular function.  ______________________________________________________________________________  Left Ventricle  Global and regional left ventricular function is normal with an EF of 60-65%.  Left ventricular size is normal. Mild concentric wall thickening consistent  with left ventricular hypertrophy is present. Diastolic function not assessed  due to tachycardia. No regional wall motion abnormalities are seen.     Right Ventricle  Global right ventricular function is normal. The right ventricle is normal  size.     Atria  The atria cannot be assessed.     Mitral Valve  Moderate mitral annular calcification is present. There is mild-moderate  mitral stenosis. There is a mean mitral valve gradient of 6 mmHg at 100 bpm.     Aortic Valve  The 29 mm Medtronic Evolut Pro Plus is visualized in the aortic position. The  valve is well-seated. A large area of calcification is seen in the 12-o'clock  position in the short axis views. Mild-moderate paravalvular regurgitation is  seen adjacent to this in the 1-o'clock position. The peak velocity is 1.8 m/s  and the mean gradient is 7 mmHg with an AT of 90 msec.     Tricuspid Valve  The tricuspid valve is normal. Trace tricuspid insufficiency is present.  Pulmonary  artery systolic pressure cannot be assessed.     Pulmonic Valve  The valve leaflets are not well visualized. Trace pulmonic insufficiency is  present.     Vessels  The aorta root cannot be assessed. The thoracic aorta cannot be assessed. IVC  diameter <2.1 cm collapsing >50% with sniff suggests a normal RA pressure of 3  mmHg. Small inferior vena cava size consistent with hypovolemia.     Pericardium  No pericardial effusion is present.     Compared to Previous Study  This study was compared with the study from 7/14/2021 . No significant changes  in the valve hemodynamics, degree of paravalvular regurgitation, or  ventricular function.      PAST MEDICAL HISTORY:   Past Medical History:   Diagnosis Date     Cancer (H)     Lymphoma     COPD (chronic obstructive pulmonary disease) (H)      Coronary artery disease     3 stents placed in June of 2021 and aortic valve replacement TBD     Diabetes (H)      History of blood transfusion      Hypertension      Thyroid disease           FAMILY HISTORY:   Family History   Problem Relation Age of Onset     Alcoholism Mother      Cirrhosis Mother      Heart Disease Father      Cirrhosis Brother           PAST SURGICAL HISTORY:   Past Surgical History:   Procedure Laterality Date     ABDOMEN SURGERY      liver transplant     CARDIAC SURGERY      3 stents     CHOLECYSTECTOMY       CV CORONARY ANGIOGRAM N/A 6/17/2021    Procedure: CV CORONARY ANGIOGRAM;  Surgeon: Noah Akins MD;  Location:  HEART CARDIAC CATH LAB     CV INSTANTANEOUS WAVE-FREE RATIO N/A 6/17/2021    Procedure: Instantaneous Wave-Free Ratio;  Surgeon: Noah Akins MD;  Location:  HEART CARDIAC CATH LAB     CV PCI STENT DRUG ELUTING N/A 6/17/2021    Procedure: Percutaneous Coronary Intervention Stent Drug Eluting;  Surgeon: Noah Akins MD;  Location:  HEART CARDIAC CATH LAB     CV RIGHT HEART CATH MEASUREMENTS RECORDED N/A 6/17/2021    Procedure: CV RIGHT  HEART CATH;  Surgeon: Noah Akins MD;  Location: [facility] HEART CARDIAC CATH LAB     CV TRANSCATHETER AORTIC VALVE REPLACEMENT N/A 7/14/2021    Procedure: Transfemoral Transcatheter Aortic Valve Replacement (MEDTRONIC 29 mm), Trans thorasic echocardiogram read by Dr. Dean Smith, Perfusion and cardiac surgery standby, temporary pacemaker placent;  Surgeon: Lino Lucio MD;  Location: [facility] OR     EP PPM INSERT OF NEW OR REPL W/VENT LEAD N/A 7/14/2021    Procedure: Permanent Pacemakers  Insert of New or Replacement with Vent Lead;  Surgeon: Robby Moseley MD;  Location: [facility] OR     HEART CATH FEMORAL CANNULIZATION WITH OPEN STANDBY REPAIR AORTIC VALVE N/A 7/14/2021    Procedure: with possible emergency open heart bypass and or balloon pump placement and any indicated procedure;  Surgeon: Tato Vieira MD;  Location: [facility] OR     INJECT EPIDURAL TRANSFORAMINAL LUMBAR / SACRAL SINGLE Left 8/1/2018    Procedure: INJECT EPIDURAL TRANSFORAMINAL LUMBAR / SACRAL SINGLE;  Left Transforaminal Lumbar Epidural Steroid Injection  Lumbar 3, Lumbar 4;  Surgeon: Jeni Agarwal MD;  Location:  OR     THORACIC SURGERY      lung     TRANSPLANT  2008    Liver          SOCIAL HISTORY:   Social History     Socioeconomic History     Marital status:      Spouse name: None     Number of children: None     Years of education: None     Highest education level: None   Occupational History     None   Tobacco Use     Smoking status: Former Smoker     Years: 5.00     Types: Cigarettes     Quit date: 5/30/2018     Years since quitting: 3.1     Smokeless tobacco: Never Used   Vaping Use     Vaping Use: Never used   Substance and Sexual Activity     Alcohol use: No     Drug use: No     Sexual activity: Yes     Partners: Female   Other Topics Concern     Parent/sibling w/ CABG, MI or angioplasty before 65F 55M? Not Asked   Social History Narrative         Social Determinants of Health     Financial  Resource Strain:      Difficulty of Paying Living Expenses:    Food Insecurity:      Worried About Running Out of Food in the Last Year:      Ran Out of Food in the Last Year:    Transportation Needs:      Lack of Transportation (Medical):      Lack of Transportation (Non-Medical):    Physical Activity:      Days of Exercise per Week:      Minutes of Exercise per Session:    Stress:      Feeling of Stress :    Social Connections:      Frequency of Communication with Friends and Family:      Frequency of Social Gatherings with Friends and Family:      Attends Spiritism Services:      Active Member of Clubs or Organizations:      Attends Club or Organization Meetings:      Marital Status:    Intimate Partner Violence:      Fear of Current or Ex-Partner:      Emotionally Abused:      Physically Abused:      Sexually Abused:           CURRENT MEDICATIONS:   Prior to Admission medications    Medication Sig Start Date End Date Taking? Authorizing Provider   acetaminophen (TYLENOL) 500 MG tablet Take 500 mg by mouth daily as needed (headache or back pain)   Yes Unknown, Entered By History   albuterol (PROAIR HFA, PROVENTIL HFA, VENTOLIN HFA) 108 (90 BASE) MCG/ACT inhaler Inhale 2 puffs into the lungs every 4 hours as needed for shortness of breath / dyspnea or wheezing 3/23/16  Yes Ricki Wright MD   amLODIPine (NORVASC) 5 MG tablet Take 1 tablet (5 mg) by mouth daily 8/25/20  Yes Jarad Bocanegra MD   aspirin 81 MG EC tablet Take 81 mg by mouth At Bedtime    Yes Reported, Patient   atorvastatin (LIPITOR) 40 MG tablet Take 1 tablet (40 mg) by mouth daily 6/17/21  Yes Danielle Mcguire, CNP   B-D U/F 31G X 8 MM insulin pen needle USE WITH LANTUS DAILY 10/25/19  Yes Jarad Bocanegra MD   blood glucose monitoring (NO BRAND SPECIFIED) meter device kit Use to test blood sugar 2 times daily. Please dispense item that is covered by insurance. 6/10/19  Yes Jarad Bocanegra MD   Blood Pressure Monitoring  (BLOOD PRESSURE CUFF) MISC BP cuff. HTN. 99 10/19/18  Yes Jarad Bocanegra MD   cephALEXin (KEFLEX) 500 MG capsule Take 1 capsule (500 mg) by mouth every 8 hours 7/15/21  Yes Elias Farah PA-C   CONTOUR NEXT TEST test strip TESTING 2 TIMES DAILY 9/23/20  Yes Jarad Bocanegra MD   cyanocobalamin (VITAMIN B-12) 1000 MCG tablet Take 1,000 mcg by mouth 2 times daily   Yes Unknown, Entered By History   cyclobenzaprine (FLEXERIL) 5 MG tablet TAKE 1 TO 2 TABLETS BY MOUTH AT NIGHT AS NEEDED 10/26/20  Yes Jarad Bocanegra MD   desonide (DESOWEN) 0.05 % external ointment APPLY TOPICALLY TO THE FACE DAILY AS NEEDED 6/24/21  Yes Jarad Bocanegra MD   fluocinonide (LIDEX) 0.05 % external solution APPLY TOPICALLY TO THE AFFECTED SCALP TWICE DAILY 6/3/21  Yes Jarad Bocanegra MD   LANTUS SOLOSTAR 100 UNIT/ML soln INJECT 5 UNITS SUB-Q AT BEDTIME 8/25/20  Yes Jarad Bocanegra MD   levothyroxine (SYNTHROID/LEVOTHROID) 175 MCG tablet Take 1 tablet (175 mcg) by mouth daily 8/25/20  Yes Jarad Bocanegra MD   losartan (COZAAR) 25 MG tablet Take 1 tablet (25 mg) by mouth daily 8/25/20  Yes Jarad Bocanegra MD   Magnesium Cl-Calcium Carbonate (SLOW-MAG) 71.5-119 MG TBEC Take 3 tablets by mouth 4 times daily 5/4/21  Yes Jarda Bocanegra MD   metFORMIN (GLUCOPHAGE-XR) 500 MG 24 hr tablet TAKE 4 TABLETS(2000 MG) BY MOUTH DAILY WITH DINNER 12/23/20  Yes Jarad Bocanegra MD   metoprolol succinate ER (TOPROL-XL) 25 MG 24 hr tablet Take 1 tablet (25 mg) by mouth daily 6/17/21  Yes Danielle Mcguire CNP   metoprolol tartrate (LOPRESSOR) 100 MG tablet Take 1 tablet by mouth daily 7/23/21  Yes Reported, Patient   omeprazole (PRILOSEC) 20 MG DR capsule Take 20 mg by mouth every other day   Yes Unknown, Entered By History   SPIRIVA HANDIHALER 18 MCG inhaled capsule INHALE THE CONTENTS OF 1 CAPSULE(18 MCG) INTO THE LUNGS DAILY 6/3/21  Yes Jarad Bocanegra MD   tacrolimus  (GENERIC EQUIVALENT) 1 MG capsule TAKE ONE CAPSULE BY MOUTH EVERY 12 HOURS 4/15/21  Yes Mike Gray MD   tamsulosin (FLOMAX) 0.4 MG capsule Take 1 capsule (0.4 mg) by mouth daily 8/25/20  Yes Jarad Bocanegra MD   thin (NO BRAND SPECIFIED) lancets Use to test blood sugar 2 times daily.  Please dispense items that are covered by insurance. 6/10/19  Yes Jarad Bocanegra MD   ticagrelor (BRILINTA) 90 MG tablet Take 1 tablet (90 mg) by mouth 2 times daily Start tomorrow morning. 6/25/21  Yes Airam Hannah NP   traMADol (ULTRAM) 50 MG tablet TAKE 1 TABLET(50 MG) BY MOUTH TWICE DAILY  Patient taking differently: Take 50 mg by mouth daily  4/26/21  Yes Jarad Bocanegra MD   Vitamin D3 (CHOLECALCIFEROL) 25 mcg (1000 units) tablet Take 1 tablet by mouth daily   Yes Reported, Patient   calcium carbonate-vitamin D (OS-LIT) 500-400 MG-UNIT tablet Take 1 tablet by mouth 2 times daily 1/20/20 10/15/20  Jarad Bocanegra MD          ALLERGIES:   Allergies   Allergen Reactions     Lisinopril Cough     Spironolactone Other (See Comments)     hyperkalemia     Levaquin [Levofloxacin] Rash     Rash on abdomen, possibly from Levaquin.          ROS:   CONSTITUTIONAL: No reported fever or chills. No changes in weight.  ENT: No visual disturbance, ear ache, epistaxis or sore throat.   CARDIOVASCULAR: No chest pain, chest pressure or chest discomfort. No palpitations, mild LE edema.   RESPIRATORY: Chronic shortness of breath. No cough, wheezing or hemoptysis. No orthopnea or PND.   GI: No reported abdominal pain, no melena or hematochezia. No changes in bowel habits.   : No hematuria or dysuria. No hesitancy or incontinence.   NEUROLOGICAL: No lightheadedness, dizziness or syncope. Positive for generalized weakness.   HEMATOLOGIC: No history of anemia. No bleeding or excessive bruising. No history of blood clots.   MUSCULOSKELETAL: No new joint pain or swelling, no muscle pain.  ENDOCRINOLOGIC: No  temperature intolerance. No hair or skin changes.  SKIN: No abnormal rashes or sores, no unusual itching. Pacer site healing well without redness or drainage, bruising resolved.       PHYSICAL EXAM:   /70 (BP Location: Right arm, Patient Position: Sitting, Cuff Size: Adult Regular)   Pulse 76   Temp 98.2  F (36.8  C) (Tympanic)   Resp 18   Wt 88 kg (194 lb)   SpO2 97%   BMI 31.31 kg/m    GENERAL: The patient is a well-developed, well-nourished, in no apparent distress.  HEENT: Head is normocephalic and atraumatic. Eyes are symmetrical with normal visual tracking. No icterus, no xanthelasmas. Nares appeared normal without nasal drainage. Mucous membranes are moist, no cyanosis.  NECK: Supple, JVP not visible. No carotid bruits.   CHEST/ LUNGS: Lungs clear to auscultation, no rales, rhonchi or wheezes, no use of accessory muscles, no retractions, respirations unlabored and normal respiratory rate.   CARDIO: Regular rate and rhythm normal with S1 and S2, grade I systolic murmur, no click or rub.  ABD: Abdomen is nondistended.   EXTREMITIES: Trace LE edema present.   MUSCULOSKELETAL: No visible joint swelling.   NEUROLOGIC: Alert and oriented X3. Normal speech, gait and affect. No focal neurologic deficits.   SKIN: No jaundice. No rashes or visible skin lesions present. No ecchymosis.       LAB RESULTS:   Lab on 07/13/2021   Component Date Value Ref Range Status     SARS CoV2 PCR 07/13/2021 Negative  Negative Final     WBC Count 07/13/2021 7.9  4.0 - 11.0 10e3/uL Final     RBC Count 07/13/2021 4.07* 4.40 - 5.90 10e6/uL Final     Hemoglobin 07/13/2021 12.5* 13.3 - 17.7 g/dL Final     Hematocrit 07/13/2021 39.8* 40.0 - 53.0 % Final     MCV 07/13/2021 98  78 - 100 fL Final     MCH 07/13/2021 30.7  26.5 - 33.0 pg Final     MCHC 07/13/2021 31.4* 31.5 - 36.5 g/dL Final     RDW 07/13/2021 14.6  10.0 - 15.0 % Final     Platelet Count 07/13/2021 92* 150 - 450 10e3/uL Final     N Terminal Pro BNP Outpatient 07/13/2021  1,017* 0 - 125 pg/mL Final     Bilirubin Direct 07/13/2021 0.3* 0.0 - 0.2 mg/dL Final     Sodium 07/13/2021 139  133 - 144 mmol/L Final     Potassium 07/13/2021 4.6  3.4 - 5.3 mmol/L Final     Chloride 07/13/2021 109  94 - 109 mmol/L Final     Carbon Dioxide (CO2) 07/13/2021 24  20 - 32 mmol/L Final     Anion Gap 07/13/2021 6  3 - 14 mmol/L Final     Urea Nitrogen 07/13/2021 19  7 - 30 mg/dL Final     Creatinine 07/13/2021 1.06  0.66 - 1.25 mg/dL Final     Calcium 07/13/2021 8.8  8.5 - 10.1 mg/dL Final     Glucose 07/13/2021 153* 70 - 99 mg/dL Final     Alkaline Phosphatase 07/13/2021 122  40 - 150 U/L Final     AST 07/13/2021 12  0 - 45 U/L Final     ALT 07/13/2021 18  0 - 70 U/L Final     Protein Total 07/13/2021 7.0  6.8 - 8.8 g/dL Final     Albumin 07/13/2021 3.3* 3.4 - 5.0 g/dL Final     Bilirubin Total 07/13/2021 1.3  0.2 - 1.3 mg/dL Final     GFR Estimate 07/13/2021 71  >60 mL/min/1.73m2 Final     Magnesium 07/13/2021 1.4* 1.6 - 2.3 mg/dL Final   Orders Only on 07/07/2021   Component Date Value Ref Range Status     Sodium 07/07/2021 Test canceled - Lab  error  133 - 144 mmol/L Corrected     Potassium 07/07/2021 Test canceled - Lab  error  3.4 - 5.3 mmol/L Corrected     Chloride 07/07/2021 Test canceled - Lab  error  94 - 109 mmol/L Corrected     Carbon Dioxide 07/07/2021 Test canceled - Lab  error  20 - 32 mmol/L Corrected     Anion Gap 07/07/2021 Test canceled - Lab  error  6 - 17 mmol/L Corrected     Glucose 07/07/2021 Test canceled - Lab  error  70 - 99 mg/dL Corrected     Urea Nitrogen 07/07/2021 Test canceled - Lab  error  7 - 30 mg/dL Corrected     Creatinine 07/07/2021 Test canceled - Lab  error  0.66 - 1.25 mg/dL Corrected     GFR Estimate 07/07/2021 Test canceled - Lab  error  >60 mL/min/[1.73_m2] Corrected     GFR Estimate If Black 07/07/2021 Test canceled - Lab  error  >60 mL/min/[1.73_m2]  Corrected     Calcium 07/07/2021 Test canceled - Lab  error  8.5 - 10.1 mg/dL Corrected     Bilirubin Total 07/07/2021 Test canceled - Lab  error  0.2 - 1.3 mg/dL Corrected     Albumin 07/07/2021 Test canceled - Lab  error  3.4 - 5.0 g/dL Corrected     Protein Total 07/07/2021 Test canceled - Lab  error  6.8 - 8.8 g/dL Corrected     Alkaline Phosphatase 07/07/2021 Test canceled - Lab  error  40 - 150 U/L Corrected     ALT 07/07/2021 Test canceled - Lab  error  0 - 70 U/L Corrected     AST 07/07/2021 Test canceled - Lab  error  0 - 45 U/L Corrected     N-Terminal Pro Bnp 07/07/2021 Test canceled - Lab  error  0 - 125 pg/mL Corrected     Magnesium 07/07/2021 Test canceled - Lab  error  1.6 - 2.3 mg/dL Corrected     Bilirubin Direct 07/07/2021 Test canceled - Lab  error  0.0 - 0.2 mg/dL Corrected     Magnesium 07/07/2021 1.3* 1.9 - 2.7 mg/dL Final   Orders Only on 06/23/2021   Component Date Value Ref Range Status     SARS-CoV-2 Nucleocapsid Total Ab 06/23/2021 Negative  Negative Final     Patient's Race 06/23/2021 White   Final     Patient's Ethnicity 06/23/2021 White   Final     COVID-19 Son RBD Senait 06/23/2021 Positive   Final     COVID-19 Son RBD Senait Titer 06/23/2021 1:12,800   Final   Orders Only on 06/21/2021   Component Date Value Ref Range Status     Magnesium 06/21/2021 1.7* 1.9 - 2.7 mg/dL Final     Magnesium Urine mg/dL 06/21/2021 19.9  mg/dL Final     Magnesium Urine g/g Cr 06/21/2021 0.08  g/g Cr Final     Sodium 06/21/2021 136  134 - 144 mmol/L Final     Potassium 06/21/2021 4.4  3.5 - 5.1 mmol/L Final     Chloride 06/21/2021 104  98 - 107 mmol/L Final     Carbon Dioxide 06/21/2021 22  21 - 31 mmol/L Final     Anion Gap 06/21/2021 10  3 - 14 mmol/L Final     Glucose 06/21/2021 235* 70 - 105 mg/dL Final     Urea Nitrogen 06/21/2021 18  7 - 25 mg/dL Final     Creatinine 06/21/2021 1.20  0.70 -  1.30 mg/dL Final     GFR Estimate 06/21/2021 60* >60 mL/min/[1.73_m2] Final     GFR Estimate If Black 06/21/2021 73  >60 mL/min/[1.73_m2] Final     Calcium 06/21/2021 8.9  8.6 - 10.3 mg/dL Final     Phosphorus 06/21/2021 2.9  2.5 - 5.0 mg/dL Final     Albumin 06/21/2021 3.8  3.5 - 5.7 g/dL Final   Allied Health/Nurse Visit on 06/13/2021   Component Date Value Ref Range Status     COVID-19 Virus PCR to [facility] - So* 06/13/2021 Nasopharyngeal   Final     COVID-19 Virus PCR to [facility] - Re* 06/13/2021 Test received-See reflex to IDDL test SARS CoV2 (COVID-19) Virus RT-PCR   Final     SARS-CoV-2 Virus Specimen Source 06/13/2021 Nasopharyngeal   Final     SARS-CoV-2 PCR Result 06/13/2021 NEGATIVE   Final     SARS-CoV-2 PCR Comment 06/13/2021    Final                    Value:Testing was performed using the [employer] SARS-CoV-2 Assay on the [employer] System.   Additional information about this Emergency Use Authorization (EUA) assay can be found via   the Lab Guide.     Allied Health/Nurse Visit on 05/20/2021   Component Date Value Ref Range Status     Interpretation ECG 05/20/2021 Click View Image link to view waveform and result   Final   Orders Only on 05/20/2021   Component Date Value Ref Range Status     Parathyroid Hormone Intact 05/20/2021 38  18 - 80 pg/mL Final     Sodium 05/20/2021 139  133 - 144 mmol/L Final     Potassium 05/20/2021 4.2  3.4 - 5.3 mmol/L Final     Chloride 05/20/2021 110* 94 - 109 mmol/L Final     Carbon Dioxide 05/20/2021 24  20 - 32 mmol/L Final     Anion Gap 05/20/2021 5  3 - 14 mmol/L Final     Glucose 05/20/2021 147* 70 - 99 mg/dL Final     Urea Nitrogen 05/20/2021 26  7 - 30 mg/dL Final     Creatinine 05/20/2021 1.18  0.66 - 1.25 mg/dL Final     GFR Estimate 05/20/2021 63  >60 mL/min/[1.73_m2] Final     GFR Estimate If Black 05/20/2021 72  >60 mL/min/[1.73_m2] Final     Calcium 05/20/2021 8.8  8.5 - 10.1 mg/dL Final     Phosphorus 05/20/2021 3.0  2.5 - 4.5 mg/dL Final     Albumin  05/20/2021 3.5  3.4 - 5.0 g/dL Final     Hemoglobin 05/20/2021 12.9* 13.3 - 17.7 g/dL Final     Magnesium 05/20/2021 1.5* 1.6 - 2.3 mg/dL Final     Cholesterol 05/20/2021 103  <200 mg/dL Final     Triglycerides 05/20/2021 128  <150 mg/dL Final     HDL Cholesterol 05/20/2021 41  >39 mg/dL Final     LDL Cholesterol Calculated 05/20/2021 37  <100 mg/dL Final     Non HDL Cholesterol 05/20/2021 62  <130 mg/dL Final   Office Visit on 05/13/2021   Component Date Value Ref Range Status     Interpretation ECG 05/13/2021 Click View Image link to view waveform and result   Final     WBC 05/13/2021 8.1  4.0 - 11.0 10e9/L Final     RBC Count 05/13/2021 4.07* 4.4 - 5.9 10e12/L Final     Hemoglobin 05/13/2021 13.0* 13.3 - 17.7 g/dL Final     Hematocrit 05/13/2021 38.7* 40.0 - 53.0 % Final     MCV 05/13/2021 95  78 - 100 fl Final     MCH 05/13/2021 31.9  26.5 - 33.0 pg Final     MCHC 05/13/2021 33.6  31.5 - 36.5 g/dL Final     RDW 05/13/2021 15.5* 10.0 - 15.0 % Final     Platelet Count 05/13/2021 101* 150 - 450 10e9/L Final     Sodium 05/13/2021 137  134 - 144 mmol/L Final     Potassium 05/13/2021 5.1  3.5 - 5.1 mmol/L Final     Chloride 05/13/2021 106  98 - 107 mmol/L Final     Carbon Dioxide 05/13/2021 24  21 - 31 mmol/L Final     Anion Gap 05/13/2021 7  3 - 14 mmol/L Final     Glucose 05/13/2021 193* 70 - 105 mg/dL Final     Urea Nitrogen 05/13/2021 26* 7 - 25 mg/dL Final     Creatinine 05/13/2021 1.27  0.70 - 1.30 mg/dL Final     GFR Estimate 05/13/2021 56* >60 mL/min/[1.73_m2] Final     GFR Estimate If Black 05/13/2021 68  >60 mL/min/[1.73_m2] Final     Calcium 05/13/2021 9.5  8.6 - 10.3 mg/dL Final     Bilirubin Total 05/13/2021 1.3* 0.3 - 1.0 mg/dL Final     Albumin 05/13/2021 4.1  3.5 - 5.7 g/dL Final     Protein Total 05/13/2021 6.9  6.4 - 8.9 g/dL Final     Alkaline Phosphatase 05/13/2021 83  34 - 104 U/L Final     ALT 05/13/2021 14  7 - 52 U/L Final     AST 05/13/2021 14  13 - 39 U/L Final     N-Terminal Pro Bnp  05/13/2021 115* 0 - 100 pg/mL Final     Magnesium 05/13/2021 1.6* 1.9 - 2.7 mg/dL Final     Thyrotropin 05/13/2021 0.90  0.34 - 5.60 IU/mL Final   Orders Only on 05/04/2021   Component Date Value Ref Range Status     Magnesium 05/04/2021 1.1* 1.9 - 2.7 mg/dL Final          ASSESSMENT:   Ricki Sharp presents for follow-up s/p TAVR on 7/14/21 at [facility] secondary to severe aortic valve stenosis as a result of severely calcified aortic valve.  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 Given his multiple comorbidities patient was deemed a better candidate for TAVR then surgical.   Patient underwent TAVR on 7/14/21 with 29 mm Metronic Evolut PRO+. Prior to procedure patient was noted to have a mean gradient 40 mmHG, AVA0.9 cm^2, PV 3.9 m/s. Following TAVR he was noted to have complete AV block immediately after valve deployment. EP were called for placement of permanent pace maker for complete heart block. Overnight was noted to be in sinus rhythm ~60 (PPM backup set to 70). Device interrogated, and it appeared to be experiencing farfield sensing. V sensing increased from 0.9-->2.0 mV with subsequent resolution of oversensing. Post -op TTE with large amount of calcium in the 12 o'clock position and mild-mod PVL. MG 7mmHg, PV 1.8 m/s.  He was started on aspirin and ticagrelor for DAPT, referred to cardiac rehab.  He was prescribed Keflex 500 mg 3 times daily for 7 days for indication of device prophylaxis.  Today patient reports feeling fine. No fever or chills. Still tired and dyspnea level unchanged from his baseline. He was hopeful that breathing would improve some following TAVR. He does have underlying lung disease, pulmonary fibrosis and COPD. He did have mild increase in LE  edema following TAVR which has improved. Bruising at pacer site and right femoral access site improved.     1. S/P TAVR (transcatheter aortic valve replacement)- 7/14/21  2. Severe aortic stenosis  3. Coronary artery disease involving native coronary artery of native heart without angina pectoris  4. Mixed hyperlipidemia  5. History of coronary artery stent placement (6/17/21)  6. History of hepatitis C  7. Hx of liver transplant (H)  8. PVL   9. Type 2 diabetes mellitus with hyperglycemia, without long-term current use of insulin (H)  10. 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 Pacemaker- Dependent (7/14/21)  12. Surgical complete heart block (H)  13. Hypomagnesemia  14. Nonrheumatic mitral valve stenosis    PLAN:   1. Patient underwent TAVR on 7/14/21 with 29 mm Metronic Evolut PRO+.  TTE following TAVR revealing a mild to moderate perivalvular regurgitation. [facility]will return [facility] for repeat echocardiogram and CTA angiogram of the heart this September to follow up on PVL.   2. PPM implant site healing well without drainage or erythema, well approximated.  He will be set up for EP follow-up with Dr. Moseley in [place], 3 months.  Establish care with [facility] device clinic.  3. Follow-up with TAVR team today, appt with Airam Hannah.  4. He is scheduled to start phase 2 cardiac rehab next week.  5. Remain on DAPT of Brilinta and aspirin.  He will remain on DAPT for optimally 1 year status post PCI on 6/17/2021.  He has described Brilinta being unaffordable, was recently refilled.  We may switch the Brilinta to Plavix if needed when he is due for next refill.  6.  He will remain on high intensity statin for plaque stabilization.  7.  Continue to monitor mitral stenosis by TTE.  8.  No dental work or cleanings for 6 months postop.  9.  Right femoral access site healed well without pain, erythema, tenderness or palpable lump.  No evidence of pseudoaneurysm.  Ecchymosis resolving.  10.  He does have a history  of BPH, Flomax is not working the best and he is requesting referral to urology.  He will further discuss with his PCP.  Orders Placed This Encounter   Procedures     Comprehensive metabolic panel     CBC with platelets     Magnesium       Thank you for allowing me to participate in the care of your patient. Please do not hesitate to contact me if you have any questions.     Mary Moreno, APRN CNP [facility]

## 2021-07-26 NOTE — PROGRESS NOTES
Ricki Sharp is a 71 year old who is being evaluated via a billable video visit.      Video-Visit Details    Type of service:  Video Visit    Video Start Time: 12:35    Video End Time (time video stopped): 12:59    Originating Location (pt. Location): Home    Distant Location (provider location):  [facility] HEART CARE     Mode of Communication:  Video Conference via formerly [facility]  CARDIOVASCULAR DIVISION    VALVE CLINIC RETURN VISIT    PRIMARY CARDIOLOGIST: Dr. Leslie       PERTINENT CLINICAL HISTORY:     Ricki Sharp is a very pleasant 71 year old male who presents for 1 week TAVR follow-up. His history is notable for Hep C s/p liver transplant in 2008, PTLD status-post R-CHOP in 2014, pulmonary fibrosis secondary to chemo and pleural effusions s/p bilateral decortication, COPD, DM, HLD, HTN, CKD III, CAD s/p PCI of pLAD and pLcx 6/17/21, HFpEF and severe aortic valvular stenosis that was treated with transfemoral transcatheter aortic valve replacement (TAVR) with a 29mm Medtronic Evolut Pro + on 7/14/21 by Marck Lucio, Taz, Mayur and Federico. The TAVR and post-procedural course were notable for mild-moderate PVL requiring post dilation x 1 with mild residual PVL. He also developed complete heart block that required permanent pacemaker implantation. His POD#1 ECHO showed mean PG of 7 mmHg with mild-moderate PVL. He was discharged home on DAPT given recent PCI.     Brendan says he was feeling sore for a while but pain in his groin sites seems to be improving. He hasn't noticed a significant difference in his overall health since the procedure. He continues to experience shortness of breath with exertion which is unchanged. His dyspnea is longstanding dating back to 2014 when he received chemo. He also notes a sensation of paroxysmal air hunger which is new and he believes may be related to Brilinta. He endorses fatigue that improves throughout the day. He reports mild  orthopnea, and is propping himself up on 3 pillows at night. He denies any significant leg swelling, weight gain, palpitations, lightheadedness or syncope. His pacemaker and groin sites are bruised but seem to be improving. He is tolerating DAPT and has no major bleeding concerns. He is scheduled to start cardiac rehab next week.      PAST MEDICAL HISTORY:     Past Medical History:   Diagnosis Date     Cancer (H)     Lymphoma     COPD (chronic obstructive pulmonary disease) (H)      Coronary artery disease     3 stents placed in June of 2021 and aortic valve replacement TBD     Diabetes (H)      History of blood transfusion      Hypertension      Thyroid disease       PAST SURGICAL HISTORY:     Past Surgical History:   Procedure Laterality Date     ABDOMEN SURGERY      liver transplant     CARDIAC SURGERY      3 stents     CHOLECYSTECTOMY       CV CORONARY ANGIOGRAM N/A 6/17/2021    Procedure: CV CORONARY ANGIOGRAM;  Surgeon: Noah Akins MD;  Location:  HEART CARDIAC CATH LAB     CV INSTANTANEOUS WAVE-FREE RATIO N/A 6/17/2021    Procedure: Instantaneous Wave-Free Ratio;  Surgeon: Noah Akins MD;  Location:  HEART CARDIAC CATH LAB     CV PCI STENT DRUG ELUTING N/A 6/17/2021    Procedure: Percutaneous Coronary Intervention Stent Drug Eluting;  Surgeon: Noah Akins MD;  Location:  HEART CARDIAC CATH LAB     CV RIGHT HEART CATH MEASUREMENTS RECORDED N/A 6/17/2021    Procedure: CV RIGHT HEART CATH;  Surgeon: Noah Akins MD;  Location:  HEART CARDIAC CATH LAB     CV TRANSCATHETER AORTIC VALVE REPLACEMENT N/A 7/14/2021    Procedure: Transfemoral Transcatheter Aortic Valve Replacement (MEDTRONIC 29 mm), Trans thorasic echocardiogram read by Dr. Dean Smith, Perfusion and cardiac surgery standby, temporary pacemaker placent;  Surgeon: Lnio Lucio MD;  Location: [facility] OR     EP PPM INSERT OF NEW OR REPL W/VENT LEAD N/A 7/14/2021     Procedure: Permanent Pacemakers  Insert of New or Replacement with Vent Lead;  Surgeon: Robby Moseley MD;  Location: [facility] OR     HEART CATH FEMORAL CANNULIZATION WITH OPEN STANDBY REPAIR AORTIC VALVE N/A 7/14/2021    Procedure: with possible emergency open heart bypass and or balloon pump placement and any indicated procedure;  Surgeon: Tato Vieira MD;  Location: [facility] OR     INJECT EPIDURAL TRANSFORAMINAL LUMBAR / SACRAL SINGLE Left 8/1/2018    Procedure: INJECT EPIDURAL TRANSFORAMINAL LUMBAR / SACRAL SINGLE;  Left Transforaminal Lumbar Epidural Steroid Injection  Lumbar 3, Lumbar 4;  Surgeon: Jeni Agarwal MD;  Location:  OR     THORACIC SURGERY      lung     TRANSPLANT  2008    Liver        CURRENT MEDICATIONS:     Current Outpatient Medications   Medication Sig Dispense Refill     acetaminophen (TYLENOL) 500 MG tablet Take 500 mg by mouth daily as needed (headache or back pain)       albuterol (PROAIR HFA, PROVENTIL HFA, VENTOLIN HFA) 108 (90 BASE) MCG/ACT inhaler Inhale 2 puffs into the lungs every 4 hours as needed for shortness of breath / dyspnea or wheezing 3 Inhaler 1     amLODIPine (NORVASC) 5 MG tablet Take 1 tablet (5 mg) by mouth daily 90 tablet 3     aspirin 81 MG EC tablet Take 81 mg by mouth At Bedtime        atorvastatin (LIPITOR) 40 MG tablet Take 1 tablet (40 mg) by mouth daily 90 tablet 3     B-D U/F 31G X 8 MM insulin pen needle USE WITH LANTUS DAILY 100 each 3     blood glucose monitoring (NO BRAND SPECIFIED) meter device kit Use to test blood sugar 2 times daily. Please dispense item that is covered by insurance. 1 kit 11     Blood Pressure Monitoring (BLOOD PRESSURE CUFF) MISC BP cuff. HTN. 99 1 each 11     cephALEXin (KEFLEX) 500 MG capsule Take 1 capsule (500 mg) by mouth every 8 hours 20 capsule 0     CONTOUR NEXT TEST test strip TESTING 2 TIMES DAILY 200 strip 11     cyanocobalamin (VITAMIN B-12) 1000 MCG tablet Take 1,000 mcg by mouth 2 times daily        cyclobenzaprine (FLEXERIL) 5 MG tablet TAKE 1 TO 2 TABLETS BY MOUTH AT NIGHT AS NEEDED 45 tablet 3     desonide (DESOWEN) 0.05 % external ointment APPLY TOPICALLY TO THE FACE DAILY AS NEEDED 60 g 2     fluocinonide (LIDEX) 0.05 % external solution APPLY TOPICALLY TO THE AFFECTED SCALP TWICE DAILY 60 mL 3     LANTUS SOLOSTAR 100 UNIT/ML soln INJECT 5 UNITS SUB-Q AT BEDTIME 15 mL 3     levothyroxine (SYNTHROID/LEVOTHROID) 175 MCG tablet Take 1 tablet (175 mcg) by mouth daily 90 tablet 3     losartan (COZAAR) 25 MG tablet Take 1 tablet (25 mg) by mouth daily 90 tablet 3     Magnesium Cl-Calcium Carbonate (SLOW-MAG) 71.5-119 MG TBEC Take 3 tablets by mouth 4 times daily 180 tablet 3     metFORMIN (GLUCOPHAGE-XR) 500 MG 24 hr tablet TAKE 4 TABLETS(2000 MG) BY MOUTH DAILY WITH DINNER 360 tablet 3     metoprolol succinate ER (TOPROL-XL) 25 MG 24 hr tablet Take 1 tablet (25 mg) by mouth daily 90 tablet 3     omeprazole (PRILOSEC) 20 MG DR capsule Take 20 mg by mouth every other day       SPIRIVA HANDIHALER 18 MCG inhaled capsule INHALE THE CONTENTS OF 1 CAPSULE(18 MCG) INTO THE LUNGS DAILY 90 capsule 0     tacrolimus (GENERIC EQUIVALENT) 1 MG capsule TAKE ONE CAPSULE BY MOUTH EVERY 12 HOURS 60 capsule 11     tamsulosin (FLOMAX) 0.4 MG capsule Take 1 capsule (0.4 mg) by mouth daily 90 capsule 3     thin (NO BRAND SPECIFIED) lancets Use to test blood sugar 2 times daily.  Please dispense items that are covered by insurance. 200 each 11     ticagrelor (BRILINTA) 90 MG tablet Take 1 tablet (90 mg) by mouth 2 times daily Start tomorrow morning. 180 tablet 3     traMADol (ULTRAM) 50 MG tablet TAKE 1 TABLET(50 MG) BY MOUTH TWICE DAILY (Patient taking differently: Take 50 mg by mouth daily ) 60 tablet 5     Vitamin D3 (CHOLECALCIFEROL) 25 mcg (1000 units) tablet Take 1 tablet by mouth daily          ALLERGIES:     Allergies   Allergen Reactions     Lisinopril Cough     Spironolactone Other (See Comments)     hyperkalemia      Levaquin [Levofloxacin] Rash     Rash on abdomen, possibly from Levaquin.        FAMILY HISTORY:     Family History   Problem Relation Age of Onset     Alcoholism Mother      Cirrhosis Mother      Heart Disease Father      Cirrhosis Brother         SOCIAL HISTORY:     Social History     Socioeconomic History     Marital status:      Spouse name: Not on file     Number of children: Not on file     Years of education: Not on file     Highest education level: Not on file   Occupational History     Not on file   Tobacco Use     Smoking status: Former Smoker     Years: 5.00     Types: Cigarettes     Quit date: 5/30/2018     Years since quitting: 3.1     Smokeless tobacco: Never Used   Vaping Use     Vaping Use: Never used   Substance and Sexual Activity     Alcohol use: No     Drug use: No     Sexual activity: Yes     Partners: Female   Other Topics Concern     Parent/sibling w/ CABG, MI or angioplasty before 65F 55M? Not Asked   Social History Narrative         Social Determinants of Health     Financial Resource Strain:      Difficulty of Paying Living Expenses:    Food Insecurity:      Worried About Running Out of Food in the Last Year:      Ran Out of Food in the Last Year:    Transportation Needs:      Lack of Transportation (Medical):      Lack of Transportation (Non-Medical):    Physical Activity:      Days of Exercise per Week:      Minutes of Exercise per Session:    Stress:      Feeling of Stress :    Social Connections:      Frequency of Communication with Friends and Family:      Frequency of Social Gatherings with Friends and Family:      Attends Yazidi Services:      Active Member of Clubs or Organizations:      Attends Club or Organization Meetings:      Marital Status:    Intimate Partner Violence:      Fear of Current or Ex-Partner:      Emotionally Abused:      Physically Abused:      Sexually Abused:         REVIEW OF SYSTEMS:     Constitutional: No fevers or chills  Skin: No new rash  or itching  Eyes: No acute change in vision  Ears/Nose/Throat: No purulent rhinorrhea, new hearing loss, or new vertigo  Respiratory: No cough or hemoptysis  Cardiovascular: See HPI  Gastrointestinal: No change in appetite, vomiting, hematemesis or diarrhea  Genitourinary: No dysuria or hematuria  Musculoskeletal: No new back pain, neck pain or muscle pain  Neurologic: No new headaches, focal weakness or behavior changes  Psychiatric: No hallucinations, excessive alcohol consumption or illegal drug usage  Hematologic/Lymphatic/Immunologic: No bleeding, chills, fever, night sweats or weight loss  Endocrine: No new cold intolerance, heat intolerance, polyphagia, polydipsia or polyuria      PHYSICAL EXAMINATION:     There were no vitals taken for this visit.    GENERAL: No acute distress.  HEENT: EOMI. Sclerae white, not injected. Nares clear. Pharynx without erythema or exudate.   Heart: No obvious JVD.    Lungs: Non labored breathing, no wheezing   Extremities: No clubbing, cyanosis, or edema.   Neurologic: Alert and oriented to person/place/time, normal speech and affect. No focal deficits.  Skin: No petechiae, purpura or rash.     LABORATORY DATA:   Discharge labs reviewed and stable.     CBC RESULTS:  Lab Results   Component Value Date    WBC 7.7 07/15/2021    WBC 6.4 06/17/2021    RBC 3.14 (L) 07/15/2021    RBC 4.12 (L) 06/17/2021    HGB 9.9 (L) 07/15/2021    HGB 12.4 (L) 06/17/2021    HCT 30.4 (L) 07/15/2021    HCT 39.4 (L) 06/17/2021    MCV 97 07/15/2021    MCV 96 06/17/2021    MCH 31.5 07/15/2021    MCH 30.8 06/17/2021    MCHC 32.6 07/15/2021    MCHC 32.2 06/17/2021    RDW 14.9 07/15/2021    RDW 15.2 (H) 06/17/2021    PLT 68 (L) 07/15/2021    PLT 78 (L) 06/17/2021       BMP RESULTS:  Lab Results   Component Value Date     07/15/2021     07/15/2021    NA Test canceled - Lab  error 07/07/2021    POTASSIUM 5.0 07/15/2021    POTASSIUM 5.1 07/15/2021    POTASSIUM Test canceled - Lab order  entry error 07/07/2021    CHLORIDE 108 07/15/2021    CHLORIDE 109 07/15/2021    CHLORIDE Test canceled - Lab  error 07/07/2021    CO2 22 07/15/2021    CO2 22 07/15/2021    CO2 Test canceled - Lab  error 07/07/2021    ANIONGAP 7 07/15/2021    ANIONGAP 6 07/15/2021    ANIONGAP Test canceled - Lab  error 07/07/2021     (H) 07/15/2021     (H) 07/15/2021    GLC Test canceled - Lab  error 07/07/2021    BUN 24 07/15/2021    BUN 24 07/15/2021    BUN Test canceled - Lab  error 07/07/2021    CR 1.18 07/15/2021    CR 1.20 07/15/2021    CR Test canceled - Lab  error 07/07/2021    GFRESTIMATED 63 07/15/2021    GFRESTIMATED 61 07/15/2021    GFRESTIMATED Test canceled - Lab  error 07/07/2021    GFRESTBLACK Test canceled - Lab  error 07/07/2021    LIT 8.0 (L) 07/15/2021    LIT 8.2 (L) 07/15/2021    LIT Test canceled - Lab  error 07/07/2021       PROCEDURES & FURTHER ASSESSMENTS:     ECG dated 7/15/21:      Echocardiogram dated 7/15/21 POD#1:  Interpretation Summary  Status post 29 mm Medtronic Evolut Pro Plus valve TAVR on 07/14/2021.  The TAVR is well-seated. A large area of calcification is seen in the 12-  o'clock position and mild-moderate paravalvular regurgitation is seen  originating adjacent to this in the 1-o'clock position. The peak velocity is  1.8 m/s and the mean gradient is 7 mmHg with an AT of 90 msec.  No pericardial effusion is present.  This study was compared with the study from 7/14/2021. No significant changes  in the valve hemodynamics, degree of paravalvular regurgitation, or  ventricular function.    NYHA Class: II     CLINICAL IMPRESSION:     Ricki Sharp is a very pleasant 71 year old male with severe aortic valvular stenosis that was treated with transfemoral transcatheter aortic /valve replacement (TAVR) with a 29mm Medtronic Evolut Pro + on 7/14/21 requiring post dilation x 1 with  residual mild-moderate PVL on POD#1 TTE who presents for 1 week follow-up.     1. Severe aortic stenosis s/p TAVR: He required post dilation x 1 due to mild to moderate PVL. His POD#1 ECHO showed mean PG of 7 mmHg with mild-moderate residual PVL due to a large area of calcification at the 12 o'clock position. Will reassess PVL on 1-2 month post TAVR ECHO and cardiac CT. Will also obtain hemolysis labs. Could consider plugging PVL if clinically indicated ie. evidence of heart failure or hemolysis. Continue ASA 81 mg daily and SBE prophylaxis lifelong.     2. CHB s/p PPM: Patient developed CHB post TAVR and required PPM. EP follow-up and device check October 2021.     3. HFpEF: Likely secondary to valvular disease. Per patient no weight gain or worsening heart failure symptoms at this time. No diuretic therapy required.     4. CAD s/p PCI of pLAD and pLcx 6/17/21: Currently without angina. Continue ASA, Brilinta and statin therapy. Patient would like to switch to Plavix after 3 months of Brilinta due to SE and cost, which is reasonable.     5. HTN: Continue current regimen.     6. HLD: Continue statin therapy.     RTC: 1 month with imaging and labs prior.     CORTES Santiago, CNP  [facility] Cardiology Team    CC  Patient Care Team:  Jarad Bocanegra MD as PCP - General  Kiana Palumbo, RN as Registered Nurse (Transplant)  Mike Gray MD as MD (Gastroenterology)  Cruzito Frederick MD as MD (Vascular and Interventional Radiology)  Raghu Fisher MD as MD (Orthopedics)  Melissa Glover MD as MD (Internal Medicine)  Jarad Bocanegra MD as Assigned PCP  Mike Gray MD as Assigned Surgical Provider  Kylie Camp MD as MD (Endocrinology, Diabetes, and Metabolism)  Ricki Truong MD as MD (Pulmonary Disease)  Drew Caballero MD as Assigned Heart and Vascular Provider  Kylie Camp MD as Assigned Endocrinology Provider  Linn Rivers RN as Specialty Care Coordinator  (Cardiology)  Lino Lucio MD as MD (Interventional Cardiology)  Kristy Abdi MD as Assigned Nephrology Provider  Vanesa Llanes MD as MD (Endocrinology, Diabetes, and Metabolism)

## 2021-07-26 NOTE — Clinical Note
Jessika,   Can we get this patient in for follow-up with Dr. Moseley when he is in [place] next, patient is s/p pacer placement.   Thanks,   Mary

## 2021-07-26 NOTE — NURSING NOTE
"Chief Complaint   Patient presents with     Follow Up     s/p aortic valve/ pacemaker - 7/15/21       Initial /70 (BP Location: Right arm, Patient Position: Sitting, Cuff Size: Adult Regular)   Pulse 76   Temp 98.2  F (36.8  C) (Tympanic)   Resp 18   Wt 88 kg (194 lb)   SpO2 97%   BMI 31.31 kg/m   Estimated body mass index is 31.31 kg/m  as calculated from the following:    Height as of 7/14/21: 1.676 m (5' 6\").    Weight as of this encounter: 88 kg (194 lb).  Meds Reconciled: complete  Pt is on Aspirin  Pt is on a Statin  PHQ and/or BARAK reviewed. Pt referred to PCP/MH Provider as appropriate.    FOOD SECURITY SCREENING QUESTIONS  Hunger Vital Signs:  Within the past 12 months we worried whether our food would run out before we got money to buy more. Never  Within the past 12 months the food we bought just didn't last and we didn't have money to get more. Never  Annabella Hernandez LPN 7/26/2021 9:31 AM      "

## 2021-07-26 NOTE — PATIENT INSTRUCTIONS
You were seen by  CORTES Tapia CNP      1. Labs today, we will call you with the results or send them to you via the my chart.      2. You should start cardiac rehab next week.    3. You will be called to set up an appointment with Dr. Vasquez.  You need to see him in about 2 weeks.      You will follow up with [facility] Cardiology in August after the Echocardiogram, sooner if needed.       Please call the cardiology office with problems, questions, or concerns at [phone].    If you experience chest pain, chest pressure, chest tightness, shortness of breath, fainting, lightheadedness, nausea, vomiting, or other concerning symptoms, please report to the Emergency Department or call 911. These symptoms may be emergent, and best treated in the Emergency Department.     Cardiology Nurses  RUFUS Villalpando, NOEMÍ PÉREZ LPN  [facility] Cardiology (Unit [address])  [phone]

## 2021-07-28 NOTE — PATIENT INSTRUCTIONS
You were seen today in the Structural Heart Clinic at the [facility].    Cardiology provider you saw during your visit: Airam Hannah NP    Instructions:   1. Continue Aspirin 81 mg daily lifelong.  2. Continue Brilinta 90 mg twice daily - could consider switching to Plavix after 3 months due to breathing and cost concerns  3. Delay any nonurgent dental procedures for the first 6 month post TAVR.  4. For all future dental cleanings and procedures you will need to take antibiotics prior - see instructions below.   5. Start Cardiac rehab next week   6. Follow-up in Valve Clinic in September with echo, CT, labs and ECG prior     Prevention of Infective (Bacterial) Endocarditis:  You are at increased risk for developing adverse outcomes from infective endocarditis (IE), also known as bacterial endocarditis (BE) because of the new device in your heart. The guidelines for prevention of IE are to give patients antibiotics prior to any dental procedures that involve manipulation of gingival tissue or the periapical region of teeth, or perforation of the oral mucosa:      It is recommended to take Amoxicillin 2 gm by mouth as a single dose 30 to 60 minutes before procedure.     OR if allergic to Penicillin or Ampicillin:     Cephalexin 2 gm by mouth, or  Clindamycin 600 mg by mouth, or  Azithromycin or Clarithromycin 500 mg PO       Questions and scheduling:   First call: Structural Heart  Yumiko Day [phone]    General scheduling line: [phone].   First press #1 for the [employer] and then press #3 for Medical Questions to reach the Cardiology triage nurse.     On Call Cardiologist for after hours or on weekends: [phone], press option #4 and ask to speak to the on-call Cardiologist.

## 2021-07-29 NOTE — TELEPHONE ENCOUNTER
S-(situation): Fatigue not improving    B-(background): Hx of fatigue present in past office visit with cardiology    A-(assessment): Wife is wondering if they should be concerned about his hemoglobin and RBC due to his fatigue. Reported his fatigue is not improving. He is waking up and going right back to sleep. She is wondering if there anyone watching it decrease or if there is anything else that can be done. Wife sounds very anxious and reports she is making calls to his other providers so she makes sure no one drops the ball.      Magnesium 1.9 - 2.7 mg/dL 1.5Low        WBC Count 4.0 - 11.0 10e3/uL 8.0     RBC Count 4.40 - 5.90 10e6/uL 3.17Low      Hemoglobin 13.3 - 17.7 g/dL 9.8Low      Hematocrit 40.0 - 53.0 % 31.2Low      MCV 78 - 100 fL 98     MCH 26.5 - 33.0 pg 30.9     MCHC 31.5 - 36.5 g/dL 31.4Low      RDW 10.0 - 15.0 % 16.0High      Platelet Count 150 - 450 10e3/uL 114Low         R-(recommendations): Will route call to provider per wife's request to review and advise. Antoinette Weir RN  ....................  7/29/2021   2:20 PM

## 2021-07-29 NOTE — TELEPHONE ENCOUNTER
Called Brendan and spoke with Fide. She says Brendan is still taking 4 tabs of slow-mag TID and has weaned off of mag oxide altogether as of approximately 2 weeks ago. He had valve replacement surgery, and they gave him a mag infusion that temporarily brought his mag up to 2.0, but now is back to 1.5 (was 1.4 previous to the infusion).     Will review with Dr. Abdi: How do you prefer slow mag be weaned? Are every 2 week mag levels still ok with the weaning process? Please advise.

## 2021-07-29 NOTE — TELEPHONE ENCOUNTER
----- Message from Kristy Abdi MD sent at 7/29/2021  9:20 AM CDT -----  Regarding: RE: Mag 1.5  I gave them a weaning plan, they should decrease by a pill every week I think  Is that happening?  ----- Message -----  From: Laura Galdamez RN  Sent: 7/29/2021   7:51 AM CDT  To: Kristy Abdi MD  Subject: Mag 1.5                                          Hi,   Brendan believes we are managing his mag, when we are not being alerted to his lab results as they are ordered by other providers in the system. This was his response to an NOEMÍ Winchester who called him about the result. Would you like to make any changes?  Annabella Muhammad LPN   7/26/2021  1:58 PM CDT Back to Top    Called patient with the lab results as noted.  Patient stated that he is taking 12 tablets of magnesium daily, and that he has improved since his last lab that was at 1.4.  He stated that the Nephrologist in the [facility] is watching his this level.  He did have a 2.0 for magnesium on 7/15 because he had surgery and a bag of fluids that day.

## 2021-07-30 NOTE — TELEPHONE ENCOUNTER
Called and talked with patients wife did give her the recommendations per Dr. Bocanegra scheduling an appointment.  If was transferred to appointment desk to schedule an appointment Dr. Bocanegra.  Wife is aware if patient has increased symptoms or new symptoms needs to go to the emergency room.  Wife verbalized understanding.  Quynh Douglas RN on 7/30/2021 at 2:48 PM

## 2021-07-30 NOTE — TELEPHONE ENCOUNTER
Patient wife Fide called in requesting for orders for some labs. She knows that there needs to be orders from PCP- Daljit but can't see him until Aug 13th at 3:40 for follow up on heart surgery. She was wondering if she could get orders in for just labs by early next week then come see him on Aug 13th for the follow up appt.     Please call patient wife Fide at [phone].    Thank you,  CARIDAD COX on 7/30/2021 at 2:56 PM

## 2021-07-30 NOTE — TELEPHONE ENCOUNTER
Noted. Likely multifactorial.  Recommend scheduling an office visit.    Signed, Jarad Bocanegra MD, FAAP, FACP  Internal Medicine & Pediatrics

## 2021-07-30 NOTE — TELEPHONE ENCOUNTER
Called and talked with patient's wife, patient had TAVR procedure done on 7/14/2021 at the [facility].  Patient was in to see Mary Moreno on 7/26/2021 please see labs.  Patient is having increased fatigue since having labs drawn on 7/26/2021.  Patient is taking in fluids and eating well.  Denies chest pain/pressure, shortness of breath, lightheadedness, nausea, increased fatigue, syncope or pre-syncope.  Patient is just very fatigued.  Wife is concerned with this, hemoglobin was 9.8 magnesium was 1.5 on 7/26/2021 patient and wife are wondering hemoglobin drop.  We will send message to Dr. Bocanegra to review and any further recommendations.  Patient and wife understand if symptoms are getting worse to go emergency room.  Quynh Douglas RN on 7/30/2021 at 1:17 PM        Pt has low hemoglobin and low red blood cell count, he is extremely fatigued, please call, thank you!    Riya Stewart on 7/30/2021 at 12:45 PM

## 2021-08-03 NOTE — TELEPHONE ENCOUNTER
Kristy Abdi MD  You Just now (10:15 AM)   MB  Let's continue the 4 tabs for now   Labs every 2 weeks is adequate   Hemoglobin every 2 weeks x 2 with iron labs once, he will prob come up     thanks    Message text      Called Fide back to recommend labs and continued dosing of magnesium per Dr. Abdi's recommendations. She v/u and will schedule his labs for next Monday.

## 2021-08-03 NOTE — TELEPHONE ENCOUNTER
Fide called to mention Brendan is getting very tired and they discovered his Hgb and RBC is running lower than normal.  He has had a TAVR in the past 3 weeks, however Hgb was a little low even before this. Hgb currently 9.8. Denies occult bleeding in urine or stools.    Currently not on any iron supplementation or infusion plan. Will review with Dr. Abdi, in addition to the Mag message below.

## 2021-08-03 NOTE — TELEPHONE ENCOUNTER
[facility]: staff message:      Darvin Gray. Brendan had his Aortic valve replaced and a pace maker put in on July 24th. He has been healing from both surgeries as expected. However, since the surgery and right before the surgery his red blood count and his hemoglobin have been low and trending down. He feels very fatigued and breathing still difficult. Last readings  RBC 3.17 and hemoglobin 9.8. Brendan is also on a blood thinner called Brilinta. He also has not seen any blood in his stools yet.I talked to our cardiologist coordinator and she did not think this has anything to do with his surgery. She said to just eat some high iron foods. She did talk about some syndrome, but did not think it is that. She thought it possibly has something to do with Brendan s liver and thought we should check with you.I did read about a Heyde Syndrome that has something to do with gastrointestinal bleeding and the aortic value. My question to you would be is there anything Brendan could be taking to help with his low blood levels and how often should we be checking his labs to see if going down? We have no labs or apt concerning his heart until  9/20 and can t see Dr. Matthwes until 8/13. Should we be concerned. Should we have another set of labs? Thank you.      Brendan's Hgb has dropped from 12's to most recent 9.8. He had TAVR and pacemaker 2.5 weeks ago.      They have an appt w/ a local internist (Dr. Bocanegra) in 8/13.  She will discuss this w/ Dr. Bocanegra on that day.  Brendan feels lousy - is lethargic - more lethargic since heart surgery.    Brendan is having cardiology follow up w/ labs, heart echo and CT of chest on 9/20. Is starting cardiac rehab tomorrow.  Message to Dr. Gray to see if he has any other recommendations re: decreased Hgb.

## 2021-08-03 NOTE — TELEPHONE ENCOUNTER
After verifying patient's name and date of birth, spoke with patient's wife Fide. Fide stated that the [facility] nephrology department ordered labs for the patient. They will keep the follow up appointment with Dr. Bocanegra for 8/13/21.    Dixie Mercedes LPN....8/3/2021 10:52 AM

## 2021-08-13 PROBLEM — N32.81 OAB (OVERACTIVE BLADDER): Status: ACTIVE | Noted: 2021-01-01

## 2021-08-13 PROBLEM — Z95.2 S/P TAVR (TRANSCATHETER AORTIC VALVE REPLACEMENT): Status: ACTIVE | Noted: 2021-01-01

## 2021-08-13 PROBLEM — Z85.71 HISTORY OF HODGKIN'S LYMPHOMA: Status: RESOLVED | Noted: 2021-01-01 | Resolved: 2021-01-01

## 2021-08-13 PROBLEM — N40.1 BENIGN PROSTATIC HYPERPLASIA WITH INCOMPLETE BLADDER EMPTYING: Status: ACTIVE | Noted: 2021-01-01

## 2021-08-13 PROBLEM — R39.14 BENIGN PROSTATIC HYPERPLASIA WITH INCOMPLETE BLADDER EMPTYING: Status: ACTIVE | Noted: 2021-01-01

## 2021-08-13 NOTE — PROGRESS NOTES
Subjective   Ricki Sharp is a 71 year old male who presents for spittle discharge follow-up.  He recently underwent TAVR at the [facility] on July 14, 2021.  Postoperative course was uncomplicated.  He feels very weak.  He has started cardiac rehab.  He is hopeful his shortness of breath will improve.  He realizes he has COPD.  His magnesium level has improved.  He follows with nephrology.  They are working on tapering down on his supplement.  He thinks maybe his prostate pill needs to be adjusted.  He has a normal urinary stream but has difficulty fully emptying his bladder.  Seems to be worse in the evening.  He has 1 caffeinated soda a day in the afternoon with supper.  No alcohol.  He will be seeing a urologist in the near future.  His wife is concerned about his anemia and wants to know what else can be done there.    Objective   Vitals: /62   Pulse 89   Temp 98.9  F (37.2  C) (Tympanic)   Resp 24   Wt 86.2 kg (190 lb)   SpO2 96%   BMI 30.67 kg/m      Cardiovascular: Grade 2/6 systolic murmur.  Pulmonary: Clear    Review and Analysis of Data   I personally reviewed the following:  External notes: Yes Reviewed multiple notes from the [facility] including last 2 discharge summaries, nephrology consultation, endocrinology consultation, cardiology consultation, etc.  Results: Yes I reviewed all available lab work from the last month  Use of an independent historian: Yes Spoke with his wife  Independent review of a test performed by another physician: Yes Reviewed his PFT data from 2015  Discussion of management with another physician: No  High risk of morbidity from additional diagnostic testing and/or treatment.    Assessment & Plan   1. Hospital discharge follow-up  2. S/P TAVR (transcatheter aortic valve replacement)  At goal, no change.    3. Coronary artery disease involving native coronary artery of native heart without angina pectoris  There is some confusion here.   He appears to be taking both metoprolol tartrate and succinate.  He has been taking the metoprolol tartrate for a while and says he is taking it twice a day.  He does not know exactly what the doses.  Our medical record is not accurate with regards to this.  I asked him to continue taking what he has been taking and call his cardiology coordinator with the 2 different doses as it would be unlikely to be on both of these medications.  It does not make sense to immediately discontinue tartrate because it sounds as though he was on a significantly higher dose than 25 mg.  Will defer to cardiology.  - metoprolol succinate ER (TOPROL-XL) 25 MG 24 hr tablet; Take 1 tablet (25 mg) by mouth daily  Dispense: 90 tablet; Refill: 3    4. OAB (overactive bladder)  5. Benign prostatic hyperplasia with incomplete bladder emptying  His current symptoms sound more like overactive bladder to me.  We discussed options and decided on eliminating caffeine for now.  Follow-up with urology as planned.    6. Macrocytic anemia  He has a macrocytic anemia which is probably associated with a vitamin B12 deficiency.  He is on Metformin.  We plan to recheck a level and consider switching from oral to injectable vitamin B12.  - Vitamin B12; Future  - Folate; Future  - Iron binding panel; Future    7. Thrombocytopenia (H)  Given the anemia and thrombocytopenia with history of post transplant lymphoproliferative disorder he certainly could have a recurrence of a hematopoietic malignancy.  We will have a low threshold for oncology consultation.    8. Hypomagnesemia  Appreciate nephrology consultation    9. COPD, severe on 12/29/2015 (H)  At goal, no change.    Signed, Jarad Bocanegra MD, FAAP, FACP  Internal Medicine & Pediatrics

## 2021-08-13 NOTE — NURSING NOTE
Patient presenting for a hospital follow up after cardiac surgery.  Medication Reconciliation: complete  [facility] Care Directive Reviewed.    Melissa Farah LPN  8/13/2021 3:55 PM

## 2021-08-18 NOTE — PROGRESS NOTES
Date: 8/18/2021    Time of Call: 4:10 PM     Diagnosis:  HTN, S/p TAVR     [ TORB ] Ordering provider: Airam Hannah CNP  Order: Stop metoprolol tartrate. Continue metoprolol succinate 25 mg daily by mouth     Order received by: Patti French RN     Follow-up/additional notes: Monitor home b/p the the next week if consistently elevated >140/90.

## 2021-08-18 NOTE — TELEPHONE ENCOUNTER
SLOW-MAG 71.5-119MG TBEC   Last Written Prescription Date:  8/13/21  Last Fill Quantity: 90,   # refills: 3  Last Office Visit: 6/16/21  Future Office visit:    Next 5 appointments (look out 90 days)    Nov 10, 2021  1:15 PM  Return Visit with Kristy Abdi MD  [facility] ([facility] ) [address] [place] [id number] [phone]           Routing refill request to provider for review/approval because:  Drug not on the [facility], [facility] or [facility] refill protocol or controlled substance.  Rama Koch RN on 8/18/2021 at 10:57 AM

## 2021-08-23 NOTE — TELEPHONE ENCOUNTER
Wife is aware may not get a return call until tomorrow-she has one in with  also--Patient/Wife concerned about a few things and may want to get echo here instead of in [facility].

## 2021-08-23 NOTE — ED PROVIDER NOTES
History     Chief Complaint   Patient presents with     Shortness of Breath     Tachycardia     HPI  Ricki Sharp is a 71 year old male who presented today for some blood work and cardiac rehab, however right when he showed up to cardiac rehab they sent him to the emergency department.  I believe the reason for that was tachycardia, however the patient says he is  really not feeling any worse than he has recently for cardiac rehab.  He did notice today when he took his vitals that his heart rate was going fast in the 1 teen range.  She is very short of breath with any activity, he had to stop and rest 3 times between the lab and cardiac rehab, however he states that that is not unusual since his extensive cardiac surgery approximately 5 weeks ago.  It sounds like he had aortic valve replacement on July 14.  Immediately after that he was found to be in complete heart block so he had a pacemaker placed.  Angiogram done in June 17 with PCI to the LAD and circumflex.  He says that he is just not felt well since then, still significant amounts of shortness of breath and weakness and just has no energy.  He has had no appetite since then but is eating some.  Drinking plenty of liquids.  No nausea vomiting.  No change in bowel or bladder.  No diarrhea black bloody or tarry stools.  Last night he did have some pain behind his left shoulder blade which is pretty much resolved with still a little bit of discomfort there.    Allergies:  Allergies   Allergen Reactions     Lisinopril Cough     Spironolactone Other (See Comments)     hyperkalemia     Levaquin [Levofloxacin] Rash     Rash on abdomen, possibly from Levaquin.       Problem List:    Patient Active Problem List    Diagnosis Date Noted     S/P TAVR (transcatheter aortic valve replacement) 08/13/2021     Priority: Medium     OAB (overactive bladder) 08/13/2021     Priority: Medium     Benign prostatic hyperplasia with incomplete bladder emptying 08/13/2021      Priority: Medium     Surgical complete heart block (H) 07/26/2021     Priority: Medium     Pacemaker- Dependent (7/14/21) 07/26/2021     Priority: Medium     Nonrheumatic mitral valve stenosis 07/26/2021     Priority: Medium     Aortic stenosis, severe 07/14/2021     Priority: Medium     History of coronary artery stent placement 07/07/2021     Priority: Medium     Status post coronary angiogram 06/17/2021     Priority: Medium     Severe aortic stenosis 05/20/2021     Priority: Medium     Added automatically from request for surgery [id number]       Other ill-defined heart diseases 05/20/2021     Priority: Medium     Added automatically from request for surgery [id number]       Bicuspid aortic valve 05/13/2021     Priority: Medium     WOOD (dyspnea on exertion) 05/13/2021     Priority: Medium     History of tobacco abuse 05/13/2021     Priority: Medium     Hx of liver transplant (H) 05/13/2021     Priority: Medium     [facility]        History of hepatitis C 05/13/2021     Priority: Medium     Status post treatment       Chronic kidney disease, stage 3a 04/19/2021     Priority: Medium     COPD, severe on 12/29/2015 (H) 04/19/2021     Priority: Medium     Severe aortic stenosis 04/19/2021     Priority: Medium     Personal history of tobacco use, presenting hazards to health 04/08/2021     Priority: Medium     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 04/05/2021     Priority: Medium     Spinal stenosis in cervical region 01/23/2020     Priority: Medium     Degeneration of lumbar intervertebral disc 03/05/2018     Priority: Medium     Lumbar facet arthropathy 03/05/2018     Priority: Medium     Lumbar foraminal stenosis 03/05/2018     Priority: Medium     Well controlled type 2 diabetes mellitus (H) 02/27/2018     Priority: Medium     Essential hypertension 02/27/2018     Priority: Medium     Mixed hyperlipidemia 02/27/2018     Priority: Medium     Type 2 diabetes mellitus with hyperglycemia (H) 04/18/2016      Priority: Medium     Status post total replacement of left hip 03/18/2016     Priority: Medium     Primary localized osteoarthrosis, pelvic region and thigh 08/24/2015     Priority: Medium     Antineoplastic chemotherapy induced pancytopenia (H) 07/11/2014     Priority: Medium     Hypomagnesemia 06/10/2014     Priority: Medium     Drug-induced neutropenia (H) 06/10/2014     Priority: Medium     updating diagnosis code for icd10 cutover       NHL (non-Hodgkin's lymphoma) (H) 05/16/2014     Priority: Medium     PTLD (post-transplant lymphoproliferative disorder) (H) 04/02/2014     Priority: Medium     Liver replaced by transplant (H) 08/29/2012     Priority: Medium     Liver transplant recipient (H) 03/18/2008     Priority: Medium        Past Medical History:    Past Medical History:   Diagnosis Date     Cancer (H)      COPD (chronic obstructive pulmonary disease) (H)      Coronary artery disease      Diabetes (H)      History of blood transfusion      Hypertension      Thyroid disease        Past Surgical History:    Past Surgical History:   Procedure Laterality Date     ABDOMEN SURGERY      liver transplant     CARDIAC SURGERY      3 stents     CHOLECYSTECTOMY       CV CORONARY ANGIOGRAM N/A 6/17/2021    Procedure: CV CORONARY ANGIOGRAM;  Surgeon: Noah Akins MD;  Location:  [facility]ATH [facility]     CV INSTANTANEOUS WAVE-FREE RATIO N/A 6/17/2021    Procedure: Instantaneous Wave-Free Ratio;  Surgeon: Noah Akins MD;  Location:  [facility]     CV PCI STENT DRUG ELUTING N/A 6/17/2021    Procedure: Percutaneous Coronary Intervention Stent Drug Eluting;  Surgeon: Noah Akins MD;  Location:  [facility]ATH [facility]     CV RIGHT HEART CATH MEASUREMENTS RECORDED N/A 6/17/2021    Procedure: CV RIGHT HEART CATH;  Surgeon: Noah Akins MD;  Location:  [facility]ARD[facility]ATH [facility]     CV TRANSCATHETER AORTIC VALVE REPLACEMENT N/A 7/14/2021     Procedure: Transfemoral Transcatheter Aortic Valve Replacement (MEDTRONIC 29 mm), Trans thorasic echocardiogram read by Dr. Dean Smith, Perfusion and cardiac surgery standby, temporary pacemaker placent;  Surgeon: Lino Lucio MD;  Location: [facility] OR     EP PPM INSERT OF NEW OR REPL W/VENT LEAD N/A 7/14/2021    Procedure: Permanent Pacemakers  Insert of New or Replacement with Vent Lead;  Surgeon: Robby Moseley MD;  Location: [facility] OR     HEART CATH FEMORAL CANNULIZATION WITH OPEN STANDBY REPAIR AORTIC VALVE N/A 7/14/2021    Procedure: with possible emergency open heart bypass and or balloon pump placement and any indicated procedure;  Surgeon: Tato Vieira MD;  Location: [facility] OR     INJECT EPIDURAL TRANSFORAMINAL LUMBAR / SACRAL SINGLE Left 8/1/2018    Procedure: INJECT EPIDURAL TRANSFORAMINAL LUMBAR / SACRAL SINGLE;  Left Transforaminal Lumbar Epidural Steroid Injection  Lumbar 3, Lumbar 4;  Surgeon: Jeni Agarwal MD;  Location:  OR     THORACIC SURGERY      lung     TRANSPLANT  2008    Liver       Family History:    Family History   Problem Relation Age of Onset     Alcoholism Mother      Cirrhosis Mother      Heart Disease Father      Cirrhosis Brother        Social History:  Marital Status:   [2]  Social History     Tobacco Use     Smoking status: Former Smoker     Years: 5.00     Types: Cigarettes     Quit date: 5/30/2018     Years since quitting: 3.2     Smokeless tobacco: Never Used   Vaping Use     Vaping Use: Never used   Substance Use Topics     Alcohol use: No     Drug use: No        Medications:    amLODIPine (NORVASC) 5 MG tablet  aspirin 81 MG EC tablet  cyclobenzaprine (FLEXERIL) 5 MG tablet  LANTUS SOLOSTAR 100 UNIT/ML soln  losartan (COZAAR) 25 MG tablet  metoprolol succinate ER (TOPROL-XL) 25 MG 24 hr tablet  ticagrelor (BRILINTA) 90 MG tablet  acetaminophen (TYLENOL) 500 MG tablet  albuterol (PROAIR HFA, PROVENTIL HFA, VENTOLIN HFA) 108 (90 BASE) MCG/ACT  "inhaler  atorvastatin (LIPITOR) 40 MG tablet  B-D U/F 31G X 8 MM insulin pen needle  blood glucose monitoring (NO BRAND SPECIFIED) meter device kit  Blood Pressure Monitoring (BLOOD PRESSURE CUFF) MISC  cephALEXin (KEFLEX) 500 MG capsule  clopidogrel (PLAVIX) 75 MG tablet  CONTOUR NEXT TEST test strip  cyanocobalamin (VITAMIN B-12) 1000 MCG tablet  desonide (DESOWEN) 0.05 % external ointment  fluocinonide (LIDEX) 0.05 % external solution  levothyroxine (SYNTHROID/LEVOTHROID) 175 MCG tablet  metFORMIN (GLUCOPHAGE-XR) 500 MG 24 hr tablet  omeprazole (PRILOSEC) 20 MG DR capsule  SLOW-MAG 71.5-119 MG TBEC  SPIRIVA HANDIHALER 18 MCG inhaled capsule  tacrolimus (GENERIC EQUIVALENT) 1 MG capsule  tamsulosin (FLOMAX) 0.4 MG capsule  thin (NO BRAND SPECIFIED) lancets  traMADol (ULTRAM) 50 MG tablet  Vitamin D3 (CHOLECALCIFEROL) 25 mcg (1000 units) tablet          Review of Systems   Constitutional: Negative for chills and fever.   HENT: Negative for congestion.    Eyes: Negative for visual disturbance.   Respiratory: Positive for shortness of breath. Negative for chest tightness.    Cardiovascular: Negative for chest pain.   Gastrointestinal: Negative for nausea and vomiting.   Genitourinary: Negative for dysuria.   Musculoskeletal: Negative for arthralgias.   Skin: Negative for rash.   Neurological: Negative for light-headedness.   Psychiatric/Behavioral: Negative for agitation.       Physical Exam   BP: (!) 166/88  Pulse: 110  Resp: 20  Height: 170.2 cm (5' 7\")  Weight: 85.5 kg (188 lb 8 oz)  SpO2: 94 %      Physical Exam  Vitals and nursing note reviewed.   Constitutional:       Appearance: Normal appearance.   HENT:      Head: Normocephalic and atraumatic.      Mouth/Throat:      Mouth: Mucous membranes are moist.   Eyes:      Conjunctiva/sclera: Conjunctivae normal.   Cardiovascular:      Rate and Rhythm: Regular rhythm. Tachycardia present.      Comments: 1 out of 6 to 2 out of 6 systolic murmur  Pulmonary:      " Effort: Pulmonary effort is normal.      Breath sounds: Normal breath sounds.   Abdominal:      General: Abdomen is flat. Bowel sounds are normal.   Skin:     General: Skin is warm and dry.   Neurological:      Mental Status: He is alert and oriented to person, place, and time.   Psychiatric:         Behavior: Behavior normal.         ED Course     ED Course as of Aug 23 1854   Mon Aug 23, 2021   1443 Magnesium markedly decreased at 0.9.  Will give some IV replacement.        Procedures         EKG shows atrial sensed ventricular paced rhythm 112 bpm with no acute ST segment or T wave changes.  No ectopy.    Labs below, with the exception of the troponin, were all ordered from clinic prior to his arrival.    Results for orders placed or performed during the hospital encounter of 08/23/21   XR Chest 2 Views     Status: None    Narrative    Exam:  XR CHEST 2 VW    HISTORY: sob, tachycardia.    COMPARISON:  7/15/2021, 7/14/2021; CT of the chest on 4/8/2021    FINDINGS:   A left chest pacemaker generator is unchanged in position with leads  in the right atrium and right ventricle.    The cardiomediastinal contours are stable. Prior TAVR.    There is mild streaky opacity in the lung bases bilaterally, similar  to 7/15/2021. Small right pleural effusion. No pneumothorax.      No acute osseous abnormality. No subdiaphragmatic free air.      Impression    IMPRESSION:      Mild streaky opacity in the lung bases bilaterally, which is likely  due to atelectasis and/or scarring. Right pleural effusion, similar to  the prior exam.      STANLEY SÁNCHEZ MD         SYSTEM ID:  [id number]   Troponin I     Status: Normal   Result Value Ref Range    Troponin I 26.8 0.0 - 34.0 pg/mL   Results for orders placed or performed in visit on 08/23/21   Hepatic panel     Status: Abnormal   Result Value Ref Range    Bilirubin Total 1.2 (H) 0.3 - 1.0 mg/dL    Bilirubin Direct 0.3 (H) 0.0 - 0.2 mg/dL    Protein Total 7.3 6.4 - 8.9 g/dL    Albumin  3.8 3.5 - 5.7 g/dL    Alkaline Phosphatase 120 (H) 34 - 104 U/L    AST 12 (L) 13 - 39 U/L    ALT 10 7 - 52 U/L   CBC with platelets     Status: Abnormal   Result Value Ref Range    WBC Count 7.9 4.0 - 11.0 10e3/uL    RBC Count 3.77 (L) 4.40 - 5.90 10e6/uL    Hemoglobin 11.4 (L) 13.3 - 17.7 g/dL    Hematocrit 35.5 (L) 40.0 - 53.0 %    MCV 94 78 - 100 fL    MCH 30.2 26.5 - 33.0 pg    MCHC 32.1 31.5 - 36.5 g/dL    RDW 14.6 10.0 - 15.0 %    Platelet Count 103 (L) 150 - 450 10e3/uL   Basic metabolic panel     Status: Abnormal   Result Value Ref Range    Sodium 135 134 - 144 mmol/L    Potassium 4.4 3.5 - 5.1 mmol/L    Chloride 105 98 - 107 mmol/L    Carbon Dioxide (CO2) 19 (L) 21 - 31 mmol/L    Anion Gap 11 3 - 14 mmol/L    Urea Nitrogen 22 7 - 25 mg/dL    Creatinine 1.30 0.70 - 1.30 mg/dL    Calcium 9.7 8.6 - 10.3 mg/dL    Glucose 225 (H) 70 - 105 mg/dL    GFR Estimate 56 (L) >60 mL/min/1.73m2   Protein  random urine with Creat Ratio     Status: Abnormal   Result Value Ref Range    Total Protein Urine mg/dL 92.0 (H) 1.0 - 14.0 mg/dL    Total Protein UR MG/MG CR 0.60 mg/mg Cr    Creatinine Urine mg/dL 154 mg/dL    Narrative    The reference range has not been established for creatinine and the protein mg/mg creatinine in random urine samples. The result should be integrated into the clinical context for interpretation.     UA with Microscopic     Status: Abnormal   Result Value Ref Range    Color Urine Yellow Colorless, Straw, Light Yellow, Yellow    Appearance Urine Clear Clear    Glucose Urine Negative Negative mg/dL    Bilirubin Urine Negative Negative    Ketones Urine Negative Negative mg/dL    Specific Gravity Urine 1.016 1.000 - 1.030    Blood Urine Negative Negative    pH Urine 5.5 5.0 - 9.0    Protein Albumin Urine 70  (A) Negative mg/dL    Urobilinogen Urine Normal Normal, 2.0 mg/dL    Nitrite Urine Negative Negative    Leukocyte Esterase Urine Negative Negative    Bacteria Urine Few (A) None Seen /HPF    Mucus  Urine Present (A) None Seen /LPF    RBC Urine 1 <=2 /HPF    WBC Urine 2 <=5 /HPF    Hyaline Casts Urine 18 (H) <=2 /LPF   Magnesium     Status: Abnormal   Result Value Ref Range    Magnesium 0.9 (LL) 1.9 - 2.7 mg/dL   Vitamin B12     Status: Abnormal   Result Value Ref Range    Vitamin B12 >1,500 (H) 180 - 914 pg/mL   Folate     Status: Normal   Result Value Ref Range    Folic Acid 11.0 >=5.2 ng/mL   Iron binding panel     Status: Abnormal   Result Value Ref Range    Transferrin 175 (L) 203 - 362 mg/dL    Iron 52 50 - 212 ug/dL    UIBC (Unsaturated) 193.00 mg/dL    Iron Binding Capacity 245.00 245.00 - 400.00 ug/dL    Iron Saturation 21 20 - 55 %   Lipid Profile     Status: Abnormal   Result Value Ref Range    Cholesterol 87 <200 mg/dL    Triglycerides 157 (H) <150 mg/dL    Direct Measure HDL 31 23 - 92 mg/dL    LDL Cholesterol Calculated 25 <=100 mg/dL    Non HDL Cholesterol 56 <130 mg/dL    Patient Fasting > 8hrs? Yes             Results for orders placed or performed during the hospital encounter of 08/23/21 (from the past 24 hour(s))   Troponin I   Result Value Ref Range    Troponin I 26.8 0.0 - 34.0 pg/mL   XR Chest 2 Views    Narrative    Exam:  XR CHEST 2 VW    HISTORY: sob, tachycardia.    COMPARISON:  7/15/2021, 7/14/2021; CT of the chest on 4/8/2021    FINDINGS:   A left chest pacemaker generator is unchanged in position with leads  in the right atrium and right ventricle.    The cardiomediastinal contours are stable. Prior TAVR.    There is mild streaky opacity in the lung bases bilaterally, similar  to 7/15/2021. Small right pleural effusion. No pneumothorax.      No acute osseous abnormality. No subdiaphragmatic free air.      Impression    IMPRESSION:      Mild streaky opacity in the lung bases bilaterally, which is likely  due to atelectasis and/or scarring. Right pleural effusion, similar to  the prior exam.      STANLEY SÁNCHEZ MD         SYSTEM ID:  [id number]     *Note: Due to a large number of  results and/or encounters for the requested time period, some results have not been displayed. A complete set of results can be found in Results Review.       Medications   magnesium sulfate 4 g in 100 mL sterile water (premade) (0 g Intravenous Stopped 8/23/21 1819)       Assessments & Plan (with Medical Decision Making)     I have reviewed the nursing notes.    I have reviewed the findings, diagnosis, plan and need for follow up with the patient.  Patient's labs show pretty significant hypomagnesemia.  He has a history of that in the past.  I do believe that could explain some of his fatigue.  He was given 8 g of IV magnesium replacement here.  He is feeling better.  Will be discharged to home.  I recommended getting his magnesium checked in the next 1 to 2 days.  Follow-up in clinic as needed.  Return here if worse.    New Prescriptions    CLOPIDOGREL (PLAVIX) 75 MG TABLET    Take 1 tablet (75 mg) by mouth daily Take 4 tablet as initial dose then 1 tablet daily there after       Final diagnoses:   S/P TAVR (transcatheter aortic valve replacement)   Fatigue, unspecified type   Hypomagnesemia       8/23/2021   [facility]     León Ocasio MD  08/23/21 [id number]       León Ocasio MD  08/29/21 [id number]

## 2021-08-23 NOTE — TELEPHONE ENCOUNTER
Please call the patients wife.  The patient is having a hard time breathing.  Can he get a chest X ray?      Neena Sahni on 8/23/2021 at 12:14 PM

## 2021-08-23 NOTE — PROGRESS NOTES
Date: 8/23/2021    Time of Call: 2:35 PM     Diagnosis:  SOB, s/p TAVR, CAD     [ TORB ] Ordering provider: Airam Hannah CNP  Order: Stop Brilinta. Start Plavix 75 mg daily by mouth with a loading dose of 300 mg. Limited ECHO     Order received by: Patti French RN     Follow-up/additional notes: Spoke with Fide. Instructed to stop Brilinta and start Plavix. I instructed her that first dose of Plavix will be 4 tablet for a total of 300 mg then there after 1 (75mg) tablet daily by mouth.    I let her know that Airam would like Bill to get a limited ECHO due to his SOB to look at his valve.

## 2021-08-23 NOTE — TELEPHONE ENCOUNTER
Fide (patient's wife) verified patient's full name and date of birth.  She states she already talked to [facility]  and they said patient may need an echocardiogram and that they would be possibly ordering that for patient but that was prior to patient going to the ER.  Patient is in the ER currently with low magnesium.  Fide will discuss further with the ER provider and call back if she needs anything.  Shikha Jordan RN......August 23, 2021...3:29 PM

## 2021-08-23 NOTE — TELEPHONE ENCOUNTER
Called and spoke with wife and she states patient is in ER right now and she is on her way to see him.    Ne Farah RN on 8/23/2021 at 2:25 PM

## 2021-08-23 NOTE — ED TRIAGE NOTES
"ED Nursing Triage Note (General)   ________________________________    Ricki Sharp is a 71 year old Male that presents to triage from cardiac rehab  With history of  Being SOB and when VS were taken HR in the 130's. Pt denies chest pain or palpations. Pt had heart surgery 5 weeks ago, 3 stents placed.  BP (!) 166/88   Pulse 110   Resp 20   Ht 1.702 m (5' 7\")   Wt 85.5 kg (188 lb 8 oz)   SpO2 94%   BMI 29.52 kg/m  t  Patient appears alert  and oriented, in no acute distress., and cooperative and pleasant behavior.    GCS Total = 15  Airway: intact  Breathing noted as Normal  Circulation Normal  Skin:  Normal  Action taken:  Triage to critical care immediately      PRE HOSPITAL PRIOR LIVING SITUATION Spouse  "

## 2021-08-25 NOTE — TELEPHONE ENCOUNTER
[facility] Call Center    Phone Message    May a detailed message be left on voicemail: yes     Reason for Call: Other: Fide calling looking to speak with someone on Dr. Abdi and care team. States that patient was in the emergency room on 8/23 and had to have infusions again due to low magnesium. States that she wrote a note to care team on [employer] also but has not heard back.     States that they tried calling Denise but Denise is not in and would just like to speak with someone on Dr. Abdi care team to discuss a plan.     States that it feels like they are just hanging and would just like to speak with someone on the care team to make sure that they are notified that patient was in the hospital.     Please advise and call Fide back at your earliest convenience to discuss further     Action Taken: Other: [facility] NEPHROLOGY    Travel Screening: Not Applicable

## 2021-08-25 NOTE — TELEPHONE ENCOUNTER
Writer spoke to wife who expressed husbands concerns with slo mag vs mag oxide and recent 2 hospitalizations since last VV (virtual visit) with Dr. Abdi in June. Patients wife, has questions regarding slo mag in general given his heart disease she wanted to know if magnesium and calcium would be ok for his heart given the ingredients is slo mag. Writer mentioned Dr. Abdi's reply to my chart message earlier today regarding IV magnesium infusions and she also wanted to know if weekly or bi-weekly infusions are safe given his heart. Writer also mentioned that we could possible refer to our pharmacist and the patients wife states that would be a great idea as the patient has been wanting to ween off omeprazole.  Writer will send to Dr. Abdi.  Informed patients wife that Laura will be back tomorrow.  Also mentioned that Dr. Abdi is looking into the infusions per her my chart.  No further questions. Awaiting follow up.  .Gilma Clayton LPN  Nephrology  [phone]

## 2021-08-25 NOTE — TELEPHONE ENCOUNTER
Talked with wife re: concerns over patients continued elevated heart rate and delay with scheduling a echocardiogram.  She also states her frustration over low magnesium  Patient has a scheduled appointment with urology 8/26/2021  States she may feel better after discussing some of these issues with urology and continue contact with nephrology.  Explained that scheduling for echo is full at this time and she agrees she understands.  She just felt she needed to vent concerns and appreciated the support the patient and her have received.

## 2021-08-26 NOTE — NURSING NOTE
Review of Systems:    Weight loss:    No     Recent fever/chills:  No   Night sweats:   No  Current skin rash:  No   Recent hair loss:  No  Heat intolerance:  No   Cold intolerance:  No  Chest pain:   No   Palpitations:   No  Shortness of breath:  No   Wheezing:   No  Constipation:    No   Diarrhea:   No   Nausea:   No   Vomiting:   No   Kidney/side pain:  No   Back pain:   No  Frequent headaches:  No   Dizziness:     No  Leg swelling:   No   Calf pain:    No    Parents, brothers or sisters with history of kidney cancer:   No  Parents, brothers or sisters with history of bladder cancer: No    Efrem Daugherty LPN on 8/26/2021 at 1:23 PM

## 2021-08-26 NOTE — NURSING NOTE
Post-Void Residual  A post-void residual was measured by ultrasonic bladder scanner.  0 mL  Efrem Daugherty LPN  8/26/2021 1:45 PM

## 2021-08-26 NOTE — PROGRESS NOTES
Type of Visit  NPV    Chief Complaint  BPH with urinary frequency    HPI  Mr. Sharp is a 71 year old male with history of DM2, COPD and CAD who presents with BPH with urinary frequency.  The patient is currently on Flomax and has been on Flomax for 1-2 years.  The patient has complaints primarily of nocturia around 3 times a night.  He minimizes evening fluids and does not develop significant lower extremity edema throughout the day.  He also describes a weak stream particularly at night.  He denies dysuria and gross hematuria.      Past Medical History  He  has a past medical history of Cancer (H), COPD (chronic obstructive pulmonary disease) (H), Coronary artery disease, Diabetes (H), History of blood transfusion, Hypertension, and Thyroid disease. He also has no past medical history of Cerebral infarction (H), Depressive disorder, Sleep apnea, or Uncomplicated asthma.  Patient Active Problem List   Diagnosis     Liver replaced by transplant (H)     PTLD (post-transplant lymphoproliferative disorder) (H)     NHL (non-Hodgkin's lymphoma) (H)     Hypomagnesemia     Drug-induced neutropenia (H)     Antineoplastic chemotherapy induced pancytopenia (H)     Primary localized osteoarthrosis, pelvic region and thigh     Status post total replacement of left hip     Type 2 diabetes mellitus with hyperglycemia (H)     Liver transplant recipient (H)     Well controlled type 2 diabetes mellitus (H)     Essential hypertension     Mixed hyperlipidemia     Degeneration of lumbar intervertebral disc     Lumbar facet arthropathy     Lumbar foraminal stenosis     Spinal stenosis in cervical region     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     Personal history of tobacco use, presenting hazards to health     Chronic kidney disease, stage 3a     COPD, severe on 12/29/2015 (H)     Severe aortic stenosis     Bicuspid aortic valve     WOOD (dyspnea on exertion)     History of tobacco abuse     Hx of liver transplant (H)     History of  hepatitis C     Severe aortic stenosis     Other ill-defined heart diseases     Status post coronary angiogram     History of coronary artery stent placement     Aortic stenosis, severe     Surgical complete heart block (H)     Pacemaker- Dependent (7/14/21)     Nonrheumatic mitral valve stenosis     S/P TAVR (transcatheter aortic valve replacement)     OAB (overactive bladder)     Benign prostatic hyperplasia with incomplete bladder emptying       Past Surgical History  He  has a past surgical history that includes Abdomen surgery; Inject Epidural Transforaminal Lumbar / Sacral Single (Left, 8/1/2018); transplant (2008); Coronary Angiogram (N/A, 6/17/2021); Right Heart Cath (N/A, 6/17/2021); Percutaneous Coronary Intervention Stent Drug Eluting (N/A, 6/17/2021); Instantaneous Wave-Free Ratio (N/A, 6/17/2021); Cholecystectomy; Cardiac surgery; Thoracic surgery; Transcatheter Aortic Valve Replacement (N/A, 7/14/2021); Heart Cath Femoral Cannulization With Open Standby Repair Aortic Valve (N/A, 7/14/2021); and Permanent Pacemakers  Insert of New or Replacement with Vent Lead (N/A, 7/14/2021).    Medications  He has a current medication list which includes the following prescription(s): acetaminophen, albuterol, amlodipine, aspirin, atorvastatin, b-d u/f, blood glucose monitoring, blood pressure cuff, cephalexin, clopidogrel, contour next test, cyanocobalamin, cyclobenzaprine, desonide, fluocinonide, lantus solostar, levothyroxine, losartan, metformin, metoprolol succinate er, slow-mag, spiriva handihaler, tacrolimus, tamsulosin, thin, ticagrelor, tramadol, vitamin d3, and [DISCONTINUED] calcium carbonate-vitamin d.    Allergies  Allergies   Allergen Reactions     Lisinopril Cough     Spironolactone Other (See Comments)     hyperkalemia     Levaquin [Levofloxacin] Rash     Rash on abdomen, possibly from Levaquin.       Social History  He  reports that he quit smoking about 3 years ago. His smoking use included  cigarettes. He quit after 5.00 years of use. He has never used smokeless tobacco. He reports that he does not drink alcohol and does not use drugs.  No drug abuse.    Family History  Family History   Problem Relation Age of Onset     Alcoholism Mother      Cirrhosis Mother      Heart Disease Father      Cirrhosis Brother        Review of Systems  I personally reviewed the ROS with the patient.    Nursing Notes:   Efrem Daugherty LPN  8/26/2021  1:34 PM  Signed  Review of Systems:    Weight loss:    No     Recent fever/chills:  No   Night sweats:   No  Current skin rash:  No   Recent hair loss:  No  Heat intolerance:  No   Cold intolerance:  No  Chest pain:   No   Palpitations:   No  Shortness of breath:  No   Wheezing:   No  Constipation:    No   Diarrhea:   No   Nausea:   No   Vomiting:   No   Kidney/side pain:  No   Back pain:   No  Frequent headaches:  No   Dizziness:     No  Leg swelling:   No   Calf pain:    No    Parents, brothers or sisters with history of kidney cancer:   No  Parents, brothers or sisters with history of bladder cancer: No    Efrem Daugherty LPN on 8/26/2021 at 1:23 PM      Efrem Daugherty LPN  8/26/2021  1:45 PM  Signed  Post-Void Residual  A post-void residual was measured by ultrasonic bladder scanner.  0 mL  Efrem Daugherty LPN  8/26/2021 1:45 PM        Physical Exam  Vitals:    08/26/21 1330 08/26/21 1331   BP: (!) 150/76 (!) 146/74   Pulse: 117    Resp: 24    SpO2: 95%    Weight: 85.7 kg (189 lb)      Constitutional: No acute distress.  Alert and cooperative   Head: NCAT  Eyes: Conjunctivae normal  Cardiovascular: Regular rate.  Pulmonary/Chest: Respirations are even and non-labored bilaterally, no audible wheezing  Abdominal: Soft. No distension, tenderness, masses or guarding.   Extremities: TAMELA x 4, Warm. No clubbing.  No cyanosis.    Skin: Pink, warm and dry.  No visible rashes noted.  Psychiatric:  Normal mood and affect  Back:  No left CVA tenderness.  No right CVA  tenderness.  Genitourinary:  Nonpalpable bladder    Labs  Results for CLARIBEL SHARP (MRN [id number]) as of 8/26/2021 13:31   8/23/2021 13:04   Color Urine Yellow   Appearance Urine Clear   Glucose Urine Negative   Bilirubin Urine Negative   Ketones Urine Negative   Specific Gravity Urine 1.016   pH Urine 5.5   Protein Albumin Urine 70 (A)   Urobilinogen mg/dL Normal   Nitrite Urine Negative   Blood Urine Negative   Leukocyte Esterase Urine Negative   WBC Urine 2   RBC Urine 1   Bacteria Urine Few (A)   Mucus Urine Present (A)   Hyaline Casts 18 (H)     Post-Void Residual  A post-void residual was measured by ultrasonic bladder scanner.  0 mL today    Assessment  Mr. Sharp is a 71 year old male with history of DM2, COPD and CAD who presents with BPH with urinary frequency.    We discussed the pathophysiology of although obstruction as well as overactive bladder.  The patient has multiple medical problems which could contribute to disproportionate urine production at night.  We discussed anticholinergics however given the potential for side effects and the fact that they would result in weaker stream, which was also one of his complaints, we agreed to defer.    Plan  Continue Flomax  Follow-up as needed

## 2021-08-26 NOTE — PROGRESS NOTES
Subjective   Ricki Sharp is a 71 year old male who presents for ER follow-up.  He has been unable to do cardiac rehab lately because of tachycardia.  He likes cardiac rehab and it was helping him and he wants to get back to using it.  He felt very poor the other day and was found to have a magnesium level of 0.9.  He feels a lot better since the level was increased.  He does not know why his heart rate is still high.  He has been taking both metoprolol tartrate 100 mg twice daily and metoprolol succinate 25 mg twice daily.  He is not sure which he should be taking.    Objective   Vitals: /78 (BP Location: Right arm, Patient Position: Sitting, Cuff Size: Adult Regular)   Pulse 104   Temp 97.2  F (36.2  C) (Tympanic)   Resp 22   Wt 85.6 kg (188 lb 12.8 oz)   SpO2 97%   BMI 29.57 kg/m        Review and Analysis of Data   I personally reviewed the following:  External notes: Yes, I reviewed his cardiology, nephrology notes  Results: Yes Operatory data from the ER, last year worth of magnesium levels are reviewed.  Use of an independent historian: Yes I spoke with his wife  Independent review of a test performed by another physician: Yes, I reviewed his most recent pacemaker interrogation data from July  Discussion of management with another physician: No  High risk of morbidity from additional diagnostic testing and/or treatment.    Assessment & Plan   1. Hypomagnesemia  It has been very difficult to control his magnesium level.  The etiology of this is uncertain to me.  I see no medication that would increase his urinary excretion of magnesium.  I attempted to do some research into the slow absorbing forms of magnesium including the magnesium calcium he is on and magnesium lactate, etc.  From what I could find these form seem to be about the same amount of absorption.  We discussed the possibility of trying other formulations but decided against it at this time.  I appreciate the assistance of   Jin of nephrology.  Wonder if the dietitian may have some ideas on how we can maximize his absorption of magnesium.  - Nutrition Referral; Future    2. Tachycardia  3. S/P placement of cardiac pacemaker  For my review his heart rate has been tachycardic for the last month and a half.  Each tracing I see shows an atrial sensed ventricular paced rhythm.  He is essentially maximally beta blocked.  Wondering if perhaps he is being paced too quickly or the settings are set to something too sensitive.  I recommend close follow-up with Dr. Moseley.  - Nutrition Referral; Future    4. Acidosis  This may represent chronic respiratory acidosis secondary to severe COPD.  We discussed the possibility of obtaining ABG today but decided against it.      Signed, Jarad Bocanegra MD, FAAP, FACP  Internal Medicine & Pediatrics    Total time spent in the preparation, care care of this patient, coordination of care, documentation: 40-54 minutes.

## 2021-08-26 NOTE — NURSING NOTE
"Chief Complaint   Patient presents with     ER F/U     Patient presents to clinic to talk about recent high hear rates.    Initial /78 (BP Location: Right arm, Patient Position: Sitting, Cuff Size: Adult Regular)   Pulse 104   Temp 97.2  F (36.2  C) (Tympanic)   Resp 22   Wt 85.6 kg (188 lb 12.8 oz)   SpO2 97%   BMI 29.57 kg/m   Estimated body mass index is 29.57 kg/m  as calculated from the following:    Height as of 8/23/21: 1.702 m (5' 7\").    Weight as of this encounter: 85.6 kg (188 lb 12.8 oz).  Medication Reconciliation: complete    FOOD SECURITY SCREENING QUESTIONS  Hunger Vital Signs:  Within the past 12 months we worried whether our food would run out before we got money to buy more. Never  Within the past 12 months the food we bought just didn't last and we didn't have money to get more. Never  Allison Constantino LPN 8/26/2021 2:47 PM    Allison Constantino LPN  "

## 2021-08-26 NOTE — TELEPHONE ENCOUNTER
----- Message from Kristy Abdi MD sent at 8/25/2021  1:51 PM CDT -----  Hi  He was down to 0.9    I am not sure why he is not absorbing as well.  Can you help set up weekly magnesium infusion (2grams if 1.6 or less, 4 grams if 1.4 or less), maybe we can do every other week but easier to back off then end up in the hospital.    I dont know if [place] infusion would be best?    Thanks

## 2021-08-30 NOTE — TELEPHONE ENCOUNTER
Bill last saw Dr Camp  with no mention of Magnesium just the CKD being related to the  Diabetes. Wife was directed by other providers   to check with Endocrine to see if the Low magnesium  Is related to the Diabetes  or what may be causing it. Nephrology will check magnesium levels and  doing infusion if needed .I would say they will handle the magnesium levels. He has an appointment  In September to see you with Dr Camp gone now. Effie Manzo RN on 8/30/2021 at 12:06 PM

## 2021-08-30 NOTE — PROGRESS NOTES
Spoke with Fide to let her know that Airam and I spoke about the [employer] messages sent over the weekend regarding Brendan's heart rate a the metoprolol dosing questions.    Brendan will be seeing Dr. Leslie tomorrow 8/31/21. Airam would like to have Dr. Leslie take over the dose adjustments as he is Brendan's primary cardiologist and will be seeing Brendan long term for his cardiology care needs.    Fide stated understanding and agreered with this plan

## 2021-08-30 NOTE — PROGRESS NOTES
Medication Therapy Management (MTM) Encounter    ASSESSMENT:                            Medication Adherence/Access: No issues identified    History of liver transplant/Hypomagnesemia: Current incidence rates of low magnesium from his medications include: TACROLIMUS (3 to 95.4%), OMEPRAZOLE (can occur/exacerbate low magnesium), CALCIUM CARBONATE (mineral deficiency). Taper off PPI should help somewhat with magnesium absoprtion. Current Slow-Mag contains magnesium chloride which is more soluble and bioavailable than magnesium oxide. However, the calcium carbonate that is also in this product may be interfering with the absorption. May benefit from change to magnesium citrate capsules to see if this improved magnesium control.      S/p TAVR/Hypertension/HFpEF/CAD: Plan in place for follow-up with cardiology. Possible that Brilinta could be contributing to shortness of breath - may benefit from earlier change to clopidogrel if appropriate.     Hypothyroidism: Plan in place for lab recheck - could be contributing to faster heart rate.     Type 2 Diabetes:  Stable. Unclear need for insulin - may be appropriate to stop if sugars are still stable at next endocrinology visit. SGLT2 inhibitor could be good option given heart history (CAD/Heart Failure).  Not likely to cause increase in magnesium excretion.     COPD: Stable. Work up for cardiac issues scheduled.     Hyperlipidemia: Stable.    BPH: Stable.     Pain: Stable.     Supplements: Stable.     PLAN:                            Will send plan to Dr. Abdi/Dr. Bocanegra for consideration of the followin. Consider change from Slow-Mag to magnesium citrate 500 mg twice daily.     2. Future considerations - discontinue insulin, start SGLT2 inhibitor?    Follow-up: 3 months or sooner if needed      SUBJECTIVE/OBJECTIVE:                          Ricki Sharp is a 71 year old male called for an initial visit. He was referred to me from Dr. Bocanegra ([facility]).   Joined today on the phone by wife, Fide.    Reason for visit: Low magnesium levels - concerns about medication causes/supplement absorption.    Allergies/ADRs: Reviewed in chart  Past Medical History: Reviewed in chart  Tobacco: He reports that he quit smoking about 3 years ago. His smoking use included cigarettes. He quit after 5.00 years of use. He has never used smokeless tobacco.  Alcohol: none      Medication Adherence/Access: Patient uses pill box(es).  Patient takes medications 3 time(s) per day.   Per patient, misses medication 0 times per week.   Medication barriers: none.   The patient fills medications at [place]: NO, fills medications at [facility] in [facility].    AM - metoprolol, amlodipine, losartan, levothyroxine, tramadol, tacrolimus, Brilinta, Slow-Mag    Supper - tamsulosin, metformin, Slow-Mag    Bedtime - tacrolimus, atorvastatin, aspirin, Brilinta, Slow-Mag    History of liver transplant/Hypomagnesemia: Patient is taking tacrolimus 1 mg twice daily for his history of transplant.  He is currently taking Slow Mag - four tablets three times daily for his magnesium levels.  He recently did receive infusion to get his numbers up.  He had issues in the past when using magnesium oxide though wife and patient are not sure if that worked better or not.  Many issues with current regimen over several months - magnesium is monitored weekly.  Wife is concerned about absorption - especially with calcium as part of the formulation.  Of important note - patient has been on omeprazole, but is tapering off currently - taking one every other day but will go every third day this week.   SLOW-MAG  Contains (per two tablets):  143mg Magnesium  416mg Chloride  238mg Calcium    S/p TAVR/Hypertension/HFpEF/CAD: Current medications include aspirin 81 mg daily, amlodipine 5 mg daily, Brilinta 90 mg twice daily (just finishing bottle - then switching to clopidogrel - some concern for shortness of  breath), losartan 25 mg daily, metoprolol XL 25 mg daily. See cardiology tomorrow.  Patient reports the following medication side effects: fatigue. He did have some issues with taking the wrong dose of metoprolol (not clearly communicated per patient/wife in the past). He is having very fast heart rate.     ECHO:  Date 7/15/2021, EF 60-65%  Patient is complaining of HF symptomsof: dyspnea on exertion..    Patient is measuring daily weightsand reports stable.   Patient does self-monitor blood pressure. Home BP monitoring in range of 130/'s systolic over 70-80's diastolic.   Patient is following a low sodium diet, is avoiding EtOH.  BP Readings from Last 3 Encounters:   21 136/67   21 130/72   21 132/78     Hypothyroidism: Patient is taking levothyroxine 175 mcg daily. Patient is having the following symptoms: none.   TSH was 2.68 two year ago - plan for updated labs tomorrow.    Type 2 Diabetes:  Currently taking metformin XR 2,000 mg daily and Lantus 5 units daily (endocrinology has discussed stopping this given current dose). Did discuss potential for SGLT2 inhibitor. Patient is not experiencing side effects.  Blood sugar monitorin-3 time(s) daily. Ranges (patient reported): low 100s mg/dL  Symptoms of low blood sugar? none  Symptoms of high blood sugar? none  Eye exam: due  Foot exam: due  Diet/Exercise: watches diet closely  Aspirin: Taking 81mg daily and denies side effects  Statin: Yes: atorvastatin   ACEi/ARB: No.   Urine Albumin: No results found for: UMALCR   Lab Results   Component Value Date    A1C 7.2 2021    A1C 6.6 2019    A1C 7.0 2019    A1C 7.9 2019    A1C 8.4 2019       COPD: Current medications: Spiriva Handihaler 18 mcg daily and albuterol HFA as needed (one puff every morning).     Patient is not experiencing side effects.   Patient reports the following symptoms: increased need of albuterol.  Patient does not have an COPD Action Plan on file.    Has spirometry been completed: Yes       Hyperlipidemia: Current therapy includes atorvastatin 40 mg daily.  Patient reports no significant myalgias or other side effects.    Recent Labs   Lab Test 08/23/21  1308 05/20/21  0840   CHOL 87 103   HDL 31 41   LDL 25 37   TRIG 157* 128       BPH: Patient is taking tamsulosin 0.4 mg daily.  Denies any current issues.     Pain: Patient is taking tramadol 50 mg every morning, acetaminophen as needed and cyclobenzaprine 5 mg as needed. Denies any current side effect concerns.    Supplements: Patient is taking vitamin B12 1,000 mcg twice daily and vitamin D3 25 mcg daily. Denies any issues.     Today's Vitals: There were no vitals taken for this visit.   Wt Readings from Last 5 Encounters:   08/31/21 189 lb 6.4 oz (85.9 kg)   08/26/21 188 lb 12.8 oz (85.6 kg)   08/26/21 189 lb (85.7 kg)   08/23/21 188 lb 8 oz (85.5 kg)   08/13/21 190 lb (86.2 kg)     ----------------  Post Discharge Medication Reconciliation Status: discharge medications reconciled and changed, per note/orders.    I spent 42 minutes with this patient today. I offer these suggestions for consideration by Dr. Bocanegra/Dr. Abdi. A copy of the visit note was provided to the patient's primary care provider.    The patient was sent via [employer] a summary of these recommendations.     Valerio Kwong, ElyD, BCACP  Medication Therapy Management Pharmacist  Pager: [phone]    Telemedicine Visit Details  Type of service:  Telephone visit  Start Time: 3:00 PM  End Time: 3:42 PM  Originating Location (patient location): [place]  Distant Location (provider location):  [facility]     Medication Therapy Recommendations  Hypomagnesemia    Current Medication: SLOW-MAG 71.5-119 MG TBEC   Rationale: More effective medication available - Ineffective medication - Effectiveness   Recommendation: Change Medication - Magnesium Citrate 200 MG Tabs - Take two tablets by mouth by mouth twice daily   Status:  Contact Provider - Awaiting Response

## 2021-08-30 NOTE — TELEPHONE ENCOUNTER
[facility] Call Center    Phone Message    May a detailed message be left on voicemail: yes , Fide is wanting to get a call back at both confidential lines, if not answered.     Reason for Call: Other: . Per Patients Wife is wanting to get a call back. Fide states patient has been hospitalized for low Magnesium a few times and wanting to get recommendations on what to do. Fide is wanting to know patients low point for infusion and what type of magnesium patient should be taking, Fide states patient is taking Slow-Mag. Fide is wanting to know if patients blood sugars are causing this to happen to patients magnesium levels to be low. Please advise.     Action Taken: Message routed to:  [facility] ([facility]): Endo    Travel Screening: Not Applicable

## 2021-08-31 PROBLEM — Z95.3 S/P TAVR (TRANSCATHETER AORTIC VALVE REPLACEMENT), BIOPROSTHETIC: Status: ACTIVE | Noted: 2021-01-01

## 2021-08-31 PROBLEM — R79.89 ELEVATED D-DIMER: Status: ACTIVE | Noted: 2021-01-01

## 2021-08-31 PROBLEM — E03.9 ACQUIRED HYPOTHYROIDISM: Status: ACTIVE | Noted: 2021-01-01

## 2021-08-31 NOTE — TELEPHONE ENCOUNTER
A magnesium infusion is not recommended at this time. I will defer to Dr. Leslie on other labs he ordered.    Signed, Jarad Bocanegra MD, FAAP, FACP  Internal Medicine & Pediatrics

## 2021-08-31 NOTE — TELEPHONE ENCOUNTER
Spoke with another doctor about Magnesium levels, Dr. Bocanegra called and overrode for him to go in for magnesium infusion today, please call to advise, they are very confused about what to do at this point.   Riya Stewart on 8/31/2021 at 2:20 PM

## 2021-08-31 NOTE — PROGRESS NOTES
[facility] HEART CARE   CARDIOLOGY PROGRESS NOTE     Chief Complaint   Patient presents with     Follow Up     medications needs labs          Diagnosis:  1.  H/O severe AS with H/O TAVR with 29mm medtronic evolut Pro+ c/b complete heart block on 7/14/21 at the [facility].  2. S/p placement of permanent pacemaker on 7/26/21 at the [facility].  3. Acute on chronic diastolic heart failure.  4. Coronary artery disease s/p PCI to LAD, LCx 6/17/2021  5. HTN-borderline control.  6. HLD-borderline control.  7. Hx of liver transplant 2/2 hep C on immunosuppression in 2008.  8. Hypothyroidism-controlled.  9. COPD-severe on 12/29/2015.  10. Pulmonary fibrosis secondary to chemo and pleural effusions s/p bilateral decortication  11.  DM-2-controlled.  12.  CKD-3.  13.  HFpEF.  14.  H/O severe aortic stenosis on 4/7/2021.  15.  WOOD which I suspect is related to severe COPD.      Assessment/Plan:    1.  We will increase metoprolol from 25 mg daily to 50 mg daily secondary to sinus tachycardia.  At one point he was on metoprolol 125 mg XL twice a day, mistakenly.  2.  Related shortness of breath, will have labs today.  Labs to include D-dimer, ABG, TSH, BNP, magnesium, CMP, and CBC.  3.  Related shortness of breath, will obtain a chest x-ray today.  His chest x-ray on 7/15/2021 showed bilateral pleural effusions with atelectasis concerning for heart failure.  4.  Related to mild to moderate perivalvular leak with history of TAVR, he is to complete his echo on 9/2/2021.  5.  Reviewed his cardiac catheterization on 6/17/2021 with stent placement.  6.  He is to continue with cardiac rehab.  7.  He will have his pacemaker interrogated in October as scheduled.  8.  Follow-up in 3 months or sooner if issues.    Interval history:  Ricki is being seen in follow-up.  He was noted to have severe aortic stenosis on his echo from 4/7/2021.  He was referred to the [facility].  He underwent TAVR on 7/14/2021.  Following the procedure he  had complete heart block.  He had a pacemaker placed on the same day which was 7/14/2021.  Previous to this timeframe, he had a cardiac catheterization showing severe disease involving the LAD and circumflex.  Stent was placed on 6/7/2021.  He is biggest struggle is hypomagnesia/shortness of breath.  They have a log since 2012 that shows consistently low magnesium levels.  I suspect this is related to liver transplant and tacrolimus.  He is on 4 tablets 3 times a day.  He was previously on 5 tablets 3 times a day but was having diarrhea.  He is following up with a specialist related to this.  His other big issue is shortness of breath.  I suspect at this point is related to severe COPD.  He does have an appointment for an echocardiogram in early September, 2021 related to mild to moderate paravalvular leak noted on his echocardiogram from 7/14/2021.  Obviously, this is in the differential for shortness of breath.  Also as mentioned above, his chest x-ray showed some atelectasis/fluid overload.  There has been concern for diastolic dysfunction.  In addition, will obtain labs today.  His heart rate has been fast and cardiac rehab was discontinued previously.  We will increase metoprolol as outlined above.  Also, he is due to have his pacemaker checked in October.      HPI:    Mr. Sharp is a 71-year-old gentleman who is being seen by cardiology for severe aortic stenosis.  He states he has had a murmur all his life and recent echocardiogram with these findings.  He has been dyspneic on exertion.     He has been dyspneic on exertion for multiple years.  He states this accelerated recently when he was found to have a magnesium of 0.7 on 4/5/2021.  He has been on magnesium for 12 years but was recently discontinued.  Now that he is back on magnesium, his shortness of breath has improved but continues.  There is concern for bicuspid aortic valve.  He has a known history of a murmur which has been followed up  periodically.  The last echo we have on file was 2014.  More recently, he had an echocardiogram on 4/7/2021.  It showed an EF of 55-60% with concerns for severe aortic stenosis, progressing since 2014.  Most recently, on 4/7/2021, the calculated aortic valve area was 0.90 cm^2. The mean gradient across the aortic valve was 40 mmHg. The peak aortic velocity was 3.9 m/sec.     As mentioned, has been dyspneic on exertion but has denied chest pain, or significant peripheral edema.  He does have a history of severe COPD, quitting smoking in approximately 2018.  There is also reported history of pulmonary fibrosis.  He has been following up with pulmonary in [facility] [address] but has not been more recently.  [place]13/2021, at his request.     He has a history of a liver transplant with hepatitis C.  He states he was treated for hepatitis C.  He had a liver transplant in 2008 and has been following with [facility] since.  He prefers to get his care at the [facility].     He also has a history of diabetes an A1c of 7.2% in 4/5/2021.  He has a history of CKD stage III.  He has history of hyperlipidemia currently on Lipitor.      Relevant testing:  Echo on 7/14/2021:  Status post 29 mm Medtronic Evolut Pro Plus valve TAVR on 07/14/2021.  The TAVR is well-seated. A large area of calcification is seen in the 12-o'clock position and mild-moderate paravalvular regurgitation is seen originating adjacent to this in the 1-o'clock position. The peak velocity is 1.8 m/s and the mean gradient is 7 mmHg with an AT of 90 msec.  No pericardial effusion is present.  This study was compared with the study from 7/14/2021. No significant changes in the valve hemodynamics, degree of paravalvular regurgitation, or ventricular function.    ECHO on 4/7/21:  Severe aortic stenosis is present.  The calculated aortic valve are is 0.90 cm^2. The mean gradient across the aortic valve  is 40 mmHg. The peak aortic velocity is 3.9 m/sec.  Global and regional left ventricular function is normal with an EF of 55-60%.  Mild pulmonary hypertension is present.  IVC diameter <2.1 cm collapsing >50% with sniff suggests a normal RA pressure of 3 mmHg.  No pericardial effusion is present.  Compared to study from 2014, AS has progressed.  Mild mitral stenosis is present.     Stress test on 12/10/2019:     The nuclear stress test is negative for inducible myocardial ischemia or infarction.     The left ventricular ejection fraction at rest is 70%.  The left ventricular ejection fraction at stress is 66%.     A prior study was conducted on 3/14/2016.  This study has no change when compared with the prior study.        ICD-10-CM    1. WOOD (dyspnea on exertion)  R06.00 X-ray Chest 2 vws*     CBC with platelets     Comprehensive metabolic panel     N terminal pro BNP outpatient     TSH with free T4 reflex     Blood gas arterial and oxyhgb     D dimer quantitative     Pulmonary Function Test     CBC with platelets     Comprehensive metabolic panel     N terminal pro BNP outpatient     TSH with free T4 reflex     Blood gas arterial and oxyhgb     D dimer quantitative   2. Coronary artery disease involving native coronary artery of native heart without angina pectoris  I25.10 metoprolol succinate ER (TOPROL-XL) 50 MG 24 hr tablet   3. COPD, severe on 12/29/2015 (H)  J44.9 X-ray Chest 2 vws*     CBC with platelets     Comprehensive metabolic panel     Pulmonary Function Test     CBC with platelets     Comprehensive metabolic panel   4. 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  J84.10 X-ray Chest 2 vws*     Comprehensive metabolic panel     Pulmonary Function Test     Comprehensive metabolic panel   5. Well controlled type 2 diabetes mellitus (H)  E11.9    6. Type 2 diabetes mellitus with hyperglycemia, without long-term current use of insulin (H)  E11.65 CBC with platelets     Comprehensive metabolic panel     TSH with  free T4 reflex     Hemoglobin A1c     CBC with platelets     Comprehensive metabolic panel     TSH with free T4 reflex   7. Mixed hyperlipidemia  E78.2 TSH with free T4 reflex     TSH with free T4 reflex   8. Hypomagnesemia  E83.42 Magnesium     Magnesium   9. Surgical complete heart block (H)  I97.89 amoxicillin (AMOXIL) 500 MG capsule    I44.2    10. S/p TAVR (transcatheter aortic valve replacement), bioprosthetic on 7/14/2021 at the [facility]  Z95.3 amoxicillin (AMOXIL) 500 MG capsule   11. Liver replaced by transplant (H)  Z94.4    12. History of hepatitis C  Z86.19    13. Nonrheumatic mitral valve stenosis  I34.2    14. H/O severe aortic stenosis  I35.0 amoxicillin (AMOXIL) 500 MG capsule   15. Essential hypertension  I10 metoprolol succinate ER (TOPROL-XL) 50 MG 24 hr tablet   16. Bicuspid aortic valve  Q23.1 amoxicillin (AMOXIL) 500 MG capsule   17. Aortic stenosis, severe  I35.0    18. Chronic kidney disease, stage 3a  N18.31 CBC with platelets     CBC with platelets   19. History of tobacco abuse  Z87.891    20. Pacemaker- Dependent (7/14/21)  Z95.0    21. Liver transplant recipient (H)  Z94.4    22. Hx of liver transplant (H)  Z94.4    23. History of coronary artery stent placement  Z95.5    24. Sinus tachycardia  R00.0 metoprolol succinate ER (TOPROL-XL) 50 MG 24 hr tablet     TSH with free T4 reflex     TSH with free T4 reflex   25. Congestive heart failure with left ventricular diastolic dysfunction, chronic (H)  I50.32 N terminal pro BNP outpatient     N terminal pro BNP outpatient       Past Medical History:   Diagnosis Date     Cancer (H)     Lymphoma     COPD (chronic obstructive pulmonary disease) (H)      Coronary artery disease     3 stents placed in June of 2021 and aortic valve replacement TBD     Diabetes (H)      History of blood transfusion      Hypertension      Thyroid disease        Past Surgical History:   Procedure Laterality Date     ABDOMEN SURGERY      liver transplant     CARDIAC  SURGERY      3 stents     CHOLECYSTECTOMY       CV CORONARY ANGIOGRAM N/A 6/17/2021    Procedure: CV CORONARY ANGIOGRAM;  Surgeon: Noah Akins MD;  [facility]     CV INSTANTANEOUS WAVE-FREE RATIO N/A 6/17/2021    Procedure: Instantaneous Wave-Free Ratio;  Surgeon: Noah Akins MD;  Location:  HEART CARDIAC CATH LAB     CV PCI STENT DRUG ELUTING N/A 6/17/2021    Procedure: Percutaneous Coronary Intervention Stent Drug Eluting;  Surgeon: Noah Akins MD;  [facility]     CV RIGHT HEART CATH MEASUREMENTS RECORDED N/A 6/17/2021    Procedure: CV RIGHT HEART CATH;  Surgeon: Noah Akins MD;  Location:  HEART CARDIAC CATH LAB     CV TRANSCATHETER AORTIC VALVE REPLACEMENT N/A 7/14/2021    Procedure: Transfemoral Transcatheter Aortic Valve Replacement (MEDTRONIC 29 mm), Trans thorasic echocardiogram read by Dr. Dean Smith, Perfusion and cardiac surgery standby, temporary pacemaker placent;  Surgeon: Lino Lucio MD;  [facility]     EP PPM INSERT OF NEW OR REPL W/VENT LEAD N/A 7/14/2021    Procedure: Permanent Pacemakers  Insert of New or Replacement with Vent Lead;  Surgeon: Robby Moseley MD;  Location: [facility] OR     HEART CATH FEMORAL CANNULIZATION WITH OPEN STANDBY REPAIR AORTIC VALVE N/A 7/14/2021    Procedure: with possible emergency open heart bypass and or balloon pump placement and any indicated procedure;  Surgeon: Tato Vieira MD;  [facility]: [facility]     INJECT EPIDURAL TRANSFORAMINAL LUMBAR / SACRAL SINGLE Left 8/1/2018    Procedure: INJECT EPIDURAL TRANSFORAMINAL LUMBAR / SACRAL SINGLE;  Left Transforaminal Lumbar Epidural Steroid Injection  Lumbar 3, Lumbar 4;  Surgeon: Jeni Agarwal MD;  Location:  OR     THORACIC SURGERY      lung     TRANSPLANT  2008    Liver       Allergies   Allergen Reactions     Lisinopril Cough     Spironolactone Other (See Comments)      hyperkalemia     Levaquin [Levofloxacin] Rash     Rash on abdomen, possibly from Levaquin.       Current Outpatient Medications   Medication Sig Dispense Refill     acetaminophen (TYLENOL) 500 MG tablet Take 500 mg by mouth daily as needed (headache or back pain)       albuterol (PROAIR HFA, PROVENTIL HFA, VENTOLIN HFA) 108 (90 BASE) MCG/ACT inhaler Inhale 2 puffs into the lungs every 4 hours as needed for shortness of breath / dyspnea or wheezing 3 Inhaler 1     amLODIPine (NORVASC) 5 MG tablet Take 1 tablet (5 mg) by mouth daily 90 tablet 3     amoxicillin (AMOXIL) 500 MG capsule 30-60 minutes prior to dental procedures 4 capsule 3     aspirin 81 MG EC tablet Take 81 mg by mouth At Bedtime        atorvastatin (LIPITOR) 40 MG tablet Take 1 tablet (40 mg) by mouth daily 90 tablet 3     B-D U/F 31G X 8 MM insulin pen needle USE WITH LANTUS DAILY 100 each 3     blood glucose monitoring (NO BRAND SPECIFIED) meter device kit Use to test blood sugar 2 times daily. Please dispense item that is covered by insurance. 1 kit 11     Blood Pressure Monitoring (BLOOD PRESSURE CUFF) MISC BP cuff. HTN. 99 1 each 11     clopidogrel (PLAVIX) 75 MG tablet Take 1 tablet (75 mg) by mouth daily Take 4 tablet as initial dose then 1 tablet daily there after 94 tablet 3     CONTOUR NEXT TEST test strip TESTING 2 TIMES DAILY 200 strip 11     cyanocobalamin (VITAMIN B-12) 1000 MCG tablet Take 1,000 mcg by mouth 2 times daily       cyclobenzaprine (FLEXERIL) 5 MG tablet TAKE 1 TO 2 TABLETS BY MOUTH AT NIGHT AS NEEDED 45 tablet 3     desonide (DESOWEN) 0.05 % external ointment APPLY TOPICALLY TO THE FACE DAILY AS NEEDED 60 g 2     fluocinonide (LIDEX) 0.05 % external solution APPLY TOPICALLY TO THE AFFECTED SCALP TWICE DAILY 60 mL 3     LANTUS SOLOSTAR 100 UNIT/ML soln INJECT 5 UNITS SUB-Q AT BEDTIME 15 mL 3     levothyroxine (SYNTHROID/LEVOTHROID) 175 MCG tablet Take 1 tablet (175 mcg) by mouth daily 90 tablet 3     losartan (COZAAR) 25  MG tablet Take 1 tablet (25 mg) by mouth daily 90 tablet 3     metFORMIN (GLUCOPHAGE-XR) 500 MG 24 hr tablet TAKE 4 TABLETS(2000 MG) BY MOUTH DAILY WITH DINNER 360 tablet 3     metoprolol succinate ER (TOPROL-XL) 50 MG 24 hr tablet Take 1 tablet (50 mg) by mouth daily 90 tablet 3     SLOW-MAG 71.5-119 MG TBEC TAKE THREE TABLETS BY MOUTH FOUR TIMES A  tablet 3     SPIRIVA HANDIHALER 18 MCG inhaled capsule INHALE THE CONTENTS OF 1 CAPSULE(18 MCG) INTO THE LUNGS DAILY 90 capsule 0     tacrolimus (GENERIC EQUIVALENT) 1 MG capsule TAKE ONE CAPSULE BY MOUTH EVERY 12 HOURS 60 capsule 11     tamsulosin (FLOMAX) 0.4 MG capsule Take 1 capsule (0.4 mg) by mouth daily 90 capsule 3     thin (NO BRAND SPECIFIED) lancets Use to test blood sugar 2 times daily.  Please dispense items that are covered by insurance. 200 each 11     traMADol (ULTRAM) 50 MG tablet TAKE 1 TABLET(50 MG) BY MOUTH TWICE DAILY (Patient taking differently: Take 50 mg by mouth daily ) 60 tablet 5     Vitamin D3 (CHOLECALCIFEROL) 25 mcg (1000 units) tablet Take 1 tablet by mouth daily         Social History     Socioeconomic History     Marital status:      Spouse name: Not on file     Number of children: Not on file     Years of education: Not on file     Highest education level: Not on file   Occupational History     Not on file   Tobacco Use     Smoking status: Former Smoker     Years: 5.00     Types: Cigarettes     Quit date: 5/30/2018     Years since quitting: 3.2     Smokeless tobacco: Never Used   Vaping Use     Vaping Use: Never used   Substance and Sexual Activity     Alcohol use: No     Drug use: No     Sexual activity: Yes     Partners: Female   Other Topics Concern     Parent/sibling w/ CABG, MI or angioplasty before 65F 55M? Not Asked   Social History Narrative         Social Determinants of Health     Financial Resource Strain:      Difficulty of Paying Living Expenses:    Food Insecurity:      Worried About Running Out of  Food in the Last Year:      Ran Out of Food in the Last Year:    Transportation Needs:      Lack of Transportation (Medical):      Lack of Transportation (Non-Medical):    Physical Activity:      Days of Exercise per Week:      Minutes of Exercise per Session:    Stress:      Feeling of Stress :    Social Connections:      Frequency of Communication with Friends and Family:      Frequency of Social Gatherings with Friends and Family:      Attends [employer] Services:      Active Member of Clubs or Organizations:      Attends Club or Organization Meetings:      Marital Status:    Intimate Partner Violence:      Fear of Current or Ex-Partner:      Emotionally Abused:      Physically Abused:      Sexually Abused:        LAB RESULTS:   Ancillary Procedure on 08/28/2021   Component Date Value Ref Range Status     Date Time Interrogation Session 08/30/2021 [id number]   Final     Implantable Pulse Generator Manufa* 08/30/2021 Medtronic   Final     Implantable Pulse Generator Model 08/30/2021 [id number] Asher XT DR LEZAMA   Final     Implantable Pulse Generator Serial* 08/30/2021 [id number]   Final     Type Interrogation Session 08/30/2021 Remote    Final     Clinic Name 08/30/2021 [facility]   Final     Implantable Pulse Generator Type 08/30/2021 Pacemaker   Final     Implantable Pulse Generator Implan* 08/30/2021 [id number]   Final     Implantable Lead  08/30/2021 Medtronic   Final     Implantable Lead Model 08/30/2021 3830 SelectSecure MRI SureScan   Final     Implantable Lead Serial Number 08/30/2021 [id number]   Final     Implantable Lead Implant Date 08/30/2021 20210714   Final     Implantable Lead Polarity Type 08/30/2021 Bipolar Lead   Final     Implantable Lead Location Detail 1 08/30/2021 UNKNOWN   Final     Implantable Lead Special Function 08/30/2021 Left Bundle Aspxys44 cm   Final     Implantable Lead Location 08/30/2021 Right Ventricle   Final     Implantable Lead   08/30/2021 Medtronic   Final     Implantable Lead Model 08/30/2021 [id number] [id number] Novus MRI SureScan   Final     Implantable Lead Serial Number 08/30/2021 [id number]   Final     Implantable Lead Implant Date 08/30/2021 [id number]   Final     Implantable Lead Polarity Type 08/30/2021 Bipolar Lead   Final     Implantable Lead Location Detail 1 08/30/2021 UNKNOWN   Final     Implantable Lead Special Function 08/30/2021 52 cm   Final     Implantable Lead Location 08/30/2021 Right Atrium   Final     Josse Setting Mode (NBG Code) 08/30/2021 DDDR   Final     Josse Setting Lower Rate Limit 08/30/2021 70  [beats]/min Final     Josse Setting Maximum Tracking Rate 08/30/2021 140  [beats]/min Final     Josse Setting Maximum Sensor Rate 08/30/2021 140  [beats]/min Final     Josse Setting Hysterisis Rate 08/30/2021 DISABLED   Final     Josse Setting PRIMO Delay Low 08/30/2021 150  ms Final     Josse Setting PAV Delay Low 08/30/2021 180  ms Final     Josse Setting AT Mode Switch Rate 08/30/2021 171  [beats]/min Final     Lead Channel Setting Sensing Polar* 08/30/2021 Bipolar   Final     Lead Channel Setting Sensing Anode* 08/30/2021 Right Atrium   Final     Lead Channel Setting Sensing Anode* 08/30/2021 Ring   Final     Lead Channel Setting Sensing Catho* 08/30/2021 Right Atrium   Final     Lead Channel Setting Sensing Catho* 08/30/2021 Tip   Final     Lead Channel Setting Sensing Sensi* 08/30/2021 0.3  mV Final     Lead Channel Setting Sensing Polar* 08/30/2021 Bipolar   Final     Lead Channel Setting Sensing Anode* 08/30/2021 Right Ventricle   Final     Lead Channel Setting Sensing Anode* 08/30/2021 Ring   Final     Lead Channel Setting Sensing Catho* 08/30/2021 Right Ventricle   Final     Lead Channel Setting Sensing Catho* 08/30/2021 Tip   Final     Lead Channel Setting Sensing Sensi* 08/30/2021 2  mV Final     Lead Channel Setting Pacing Polari* 08/30/2021 Bipolar   Final     Lead Channel Setting Pacing Anode * 08/30/2021 Right  Atrium   Final     Lead Channel Setting Pacing Anode * 08/30/2021 Ring   Final     Lead Channel Setting Sensing Catho* 08/30/2021 Right Atrium   Final     Lead Channel Setting Sensing Catho* 08/30/2021 Tip   Final     Lead Channel Setting Pacing Pulse * 08/30/2021 0.4  ms Final     Lead Channel Setting Pacing Amplit* 08/30/2021 1.75  V Final     Lead Channel Setting Pacing Captur* 08/30/2021 Adaptive   Final     Lead Channel Setting Pacing Polari* 08/30/2021 Unipolar   Final     Lead Channel Setting Pacing Anode * 08/30/2021 Can   Final     Lead Channel Setting Sensing Catho* 08/30/2021 Right Ventricle   Final     Lead Channel Setting Sensing Catho* 08/30/2021 Tip   Final     Lead Channel Setting Pacing Pulse * 08/30/2021 0.4  ms Final     Lead Channel Setting Pacing Amplit* 08/30/2021 2  V Final     Lead Channel Setting Pacing Captur* 08/30/2021 Adaptive   Final     Zone Setting Type Category 08/30/2021 VF   Final     Zone Setting Type Category 08/30/2021 VT   Final     Zone Setting Type Category 08/30/2021 VT   Final     Zone Setting Type Category 08/30/2021 VT   Final     Zone Setting Detection Interval 08/30/2021 400  ms Final     Zone Setting Type Category 08/30/2021 ATRIAL_FIBRILLATION   Final     Zone Setting Type Category 08/30/2021 AT/AF   Final     Zone Setting Detection Interval 08/30/2021 350  ms Final     Lead Channel Impedance Value 08/30/2021 323  ohm Final     Lead Channel Impedance Value 08/30/2021 285  ohm Final     Lead Channel Sensing Intrinsic Amp* 08/30/2021 3.5  mV Final     Lead Channel Sensing Intrinsic Amp* 08/30/2021 3.5  mV Final     Lead Channel Pacing Threshold Ampl* 08/30/2021 0.625  V Final     Lead Channel Pacing Threshold Puls* 08/30/2021 0.4  ms Final     Lead Channel Impedance Value 08/30/2021 437  ohm Final     Lead Channel Impedance Value 08/30/2021 323  ohm Final     Lead Channel Sensing Intrinsic Amp* 08/30/2021 14.625  mV Final     Lead Channel Pacing Threshold Ampl*  08/30/2021 0.75  V Final     Lead Channel Pacing Threshold Puls* 08/30/2021 0.4  ms Final     Battery Date Time of Measurements 08/30/2021 [id number]   Final     Battery Status 08/30/2021 OK   Final     Battery RRT Trigger 08/30/2021 2.625   Final     Battery Remaining Longevity 08/30/2021 135  mo Final     Battery Voltage 08/30/2021 3.20  V Final     Josse Statistic Date Time Start 08/30/2021 [id number]   Final     Josse Statistic Date Time End 08/30/2021 [id number]   Final     Josse Statistic RA Percent Paced 08/30/2021 23.64  % Final     Josse Statistic RV Percent Paced 08/30/2021 99.98  % Final     Josse Statistic AP  Percent 08/30/2021 23.68  % Final     Josse Statistic AS  Percent 08/30/2021 76.3  % Final     Josse Statistic AP VS Percent 08/30/2021 0  % Final     Josse Statistic AS VS Percent 08/30/2021 0.01  % Final     Atrial Tachy Statistic Date Time S* 08/30/2021 [id number]   Final     Atrial Tachy Statistic Date Time E* 08/30/2021 [id number]   Final     Atrial Tachy Statistic AT/AF Burde* 08/30/2021 0  % Final     Episode Statistic Recent Count 08/30/2021 0   Final     Episode Statistic Type Category 08/30/2021 AT/AF   Final     Episode Statistic Recent Count 08/30/2021 0   Final     Episode Statistic Type Category 08/30/2021 Patient Activated   Final     Episode Statistic Recent Count 08/30/2021 0   Final     Episode Statistic Type Category 08/30/2021 SVT   Final     Episode Statistic Recent Count 08/30/2021 0   Final     Episode Statistic Type Category 08/30/2021 VT   Final     Episode Statistic Recent Count 08/30/2021 0   Final     Episode Statistic Type Category 08/30/2021 VT   Final     Episode Statistic Recent Date Time* 08/30/2021 [id number]   Final     Episode Statistic Recent Date Time* 08/30/2021 [id number]   Final     Episode Statistic Recent Date Time* 08/30/2021 [id number]   Final     Episode Statistic Recent Date Time* 08/30/2021 [id number]   Final      Episode Statistic Recent Date Time* 08/30/2021 [id number]   Final     Episode Statistic Recent Date Time* 08/30/2021 [id number]   Final     Episode Statistic Recent Date Time* 08/30/2021 [id number]   Final     Episode Statistic Recent Date Time* 08/30/2021 [id number]   Final     Episode Statistic Recent Date Time* 08/30/2021 [id number]   Final     Episode Statistic Recent Date Time* 08/30/2021 [id number]   Final     Episode Statistic Total Count 08/30/2021 0   Final     Episode Statistic Type Category 08/30/2021 AT/AF   Final     Episode Statistic Total Count 08/30/2021 0   Final     Episode Statistic Type Category 08/30/2021 Patient Activated   Final     Episode Statistic Total Count 08/30/2021 0   Final     Episode Statistic Type Category 08/30/2021 SVT   Final     Episode Statistic Total Count 08/30/2021 0   Final     Episode Statistic Type Category 08/30/2021 VT   Final     Episode Statistic Total Count 08/30/2021 0   Final     Episode Statistic Type Category 08/30/2021 VT   Final     Episode Statistic Total Date Time * 08/30/2021 [id number]   Final     Episode Statistic Total Date Time * 08/30/2021 [id number]   Final     Episode Statistic Total Date Time * 08/30/2021 [id number]   Final     Episode Statistic Total Date Time * 08/30/2021 [id number]   Final     Episode Statistic Total Date Time * 08/30/2021 74926162538301   Final     Episode Statistic Total Date Time * 08/30/2021 [id number]   Final     Episode Statistic Total Date Time * 08/30/2021 [id number]   Final     Episode Statistic Total Date Time * 08/30/2021 [id number]   Final     Episode Statistic Total Date Time * 08/30/2021 [id number]   Final     Episode Statistic Total Date Time * 08/30/2021 [id number]   Final   Lab on 08/24/2021   Component Date Value Ref Range Status     Sodium 08/24/2021 136  134 - 144 mmol/L Final     Potassium 08/24/2021 4.5  3.5 - 5.1 mmol/L Final     Chloride  08/24/2021 108* 98 - 107 mmol/L Final     Carbon Dioxide (CO2) 08/24/2021 19* 21 - 31 mmol/L Final     Anion Gap 08/24/2021 9  3 - 14 mmol/L Final     Urea Nitrogen 08/24/2021 17  7 - 25 mg/dL Final     Creatinine 08/24/2021 1.15  0.70 - 1.30 mg/dL Final     Calcium 08/24/2021 9.0  8.6 - 10.3 mg/dL Final     Glucose 08/24/2021 198* 70 - 105 mg/dL Final     GFR Estimate 08/24/2021 65  >60 mL/min/1.73m2 Final     Magnesium 08/24/2021 1.8* 1.9 - 2.7 mg/dL Final     Hold Specimen 08/24/2021 [facility]   Final   Lab on 08/23/2021   Component Date Value Ref Range Status     Tacrolimus 08/23/2021 6.7  5.0 - 15.0 ug/L Final     Tacrolimus Last Dose Date 08/23/2021    Final     Tacrolimus Last Dose Time 08/23/2021    Final     Bilirubin Total 08/23/2021 1.2* 0.3 - 1.0 mg/dL Final     Bilirubin Direct 08/23/2021 0.3* 0.0 - 0.2 mg/dL Final     Protein Total 08/23/2021 7.3  6.4 - 8.9 g/dL Final     Albumin 08/23/2021 3.8  3.5 - 5.7 g/dL Final     Alkaline Phosphatase 08/23/2021 120* 34 - 104 U/L Final     AST 08/23/2021 12* 13 - 39 U/L Final     ALT 08/23/2021 10  7 - 52 U/L Final     WBC Count 08/23/2021 7.9  4.0 - 11.0 10e3/uL Final     RBC Count 08/23/2021 3.77* 4.40 - 5.90 10e6/uL Final     Hemoglobin 08/23/2021 11.4* 13.3 - 17.7 g/dL Final     Hematocrit 08/23/2021 35.5* 40.0 - 53.0 % Final     MCV 08/23/2021 94  78 - 100 fL Final     MCH 08/23/2021 30.2  26.5 - 33.0 pg Final     MCHC 08/23/2021 32.1  31.5 - 36.5 g/dL Final     RDW 08/23/2021 14.6  10.0 - 15.0 % Final     Platelet Count 08/23/2021 103* 150 - 450 10e3/uL Final     Sodium 08/23/2021 135  134 - 144 mmol/L Final     Potassium 08/23/2021 4.4  3.5 - 5.1 mmol/L Final     Chloride 08/23/2021 105  98 - 107 mmol/L Final     Carbon Dioxide (CO2) 08/23/2021 19* 21 - 31 mmol/L Final     Anion Gap 08/23/2021 11  3 - 14 mmol/L Final     Urea Nitrogen 08/23/2021 22  7 - 25 mg/dL Final     Creatinine 08/23/2021 1.30  0.70 - 1.30 mg/dL Final     Calcium 08/23/2021 9.7  8.6 - 10.3  mg/dL Final     Glucose 08/23/2021 225* 70 - 105 mg/dL Final     GFR Estimate 08/23/2021 56* >60 mL/min/1.73m2 Final     Total Protein Urine mg/dL 08/23/2021 92.0* 1.0 - 14.0 mg/dL Final     Total Protein UR MG/MG CR 08/23/2021 0.60  mg/mg Cr Final     Creatinine Urine mg/dL 08/23/2021 154  mg/dL Final     Color Urine 08/23/2021 Yellow  Colorless, Straw, Light Yellow, Yellow Final     Appearance Urine 08/23/2021 Clear  Clear Final     Glucose Urine 08/23/2021 Negative  Negative mg/dL Final     Bilirubin Urine 08/23/2021 Negative  Negative Final     Ketones Urine 08/23/2021 Negative  Negative mg/dL Final     Specific Gravity Urine 08/23/2021 1.016  1.000 - 1.030 Final     Blood Urine 08/23/2021 Negative  Negative Final     pH Urine 08/23/2021 5.5  5.0 - 9.0 Final     Protein Albumin Urine 08/23/2021 70 * Negative mg/dL Final     Urobilinogen Urine 08/23/2021 Normal  Normal, 2.0 mg/dL Final     Nitrite Urine 08/23/2021 Negative  Negative Final     Leukocyte Esterase Urine 08/23/2021 Negative  Negative Final     Bacteria Urine 08/23/2021 Few* None Seen /HPF Final     Mucus Urine 08/23/2021 Present* None Seen /LPF Final     RBC Urine 08/23/2021 1  <=2 /HPF Final     WBC Urine 08/23/2021 2  <=5 /HPF Final     Hyaline Casts Urine 08/23/2021 18* <=2 /LPF Final     Magnesium 08/23/2021 0.9* 1.9 - 2.7 mg/dL Final     Vitamin B12 08/23/2021 >1,500* 180 - 914 pg/mL Final     Folic Acid 08/23/2021 11.0  >=5.2 ng/mL Final     Transferrin 08/23/2021 175* 203 - 362 mg/dL Final     Iron 08/23/2021 52  50 - 212 ug/dL Final     UIBC (Unsaturated) 08/23/2021 193.00  mg/dL Final     Iron Binding Capacity 08/23/2021 245.00  245.00 - 400.00 ug/dL Final     Iron Saturation 08/23/2021 21  20 - 55 % Final     Cholesterol 08/23/2021 87  <200 mg/dL Final     Triglycerides 08/23/2021 157* <150 mg/dL Final     Direct Measure HDL 08/23/2021 31  23 - 92 mg/dL Final     LDL Cholesterol Calculated 08/23/2021 25  <=100 mg/dL Final     Non HDL  Cholesterol 08/23/2021 56  <130 mg/dL Final     Patient Fasting > 8hrs? 08/23/2021 Yes   Final   Lab on 08/09/2021   Component Date Value Ref Range Status     Ferritin 08/09/2021 107  24 - 336 ng/mL Final     Magnesium 08/09/2021 1.3* 1.9 - 2.7 mg/dL Final     Hemoglobin 08/09/2021 10.9* 13.3 - 17.7 g/dL Final     Transferrin 08/09/2021 184* 203 - 362 mg/dL Final     Iron 08/09/2021 37* 50 - 212 ug/dL Final     UIBC (Unsaturated) 08/09/2021 220.60  mg/dL Final     Iron Binding Capacity 08/09/2021 257.60  245.00 - 400.00 ug/dL Final     Iron Saturation 08/09/2021 14* 20 - 55 % Final   Virtual Visit on 07/26/2021   Component Date Value Ref Range Status     Activated Clotting Time (Celite) P* 07/14/2021 279* 74 - 150 seconds Final   Office Visit on 07/26/2021   Component Date Value Ref Range Status     Sodium 07/26/2021 135  134 - 144 mmol/L Final     Potassium 07/26/2021 4.6  3.5 - 5.1 mmol/L Final     Chloride 07/26/2021 108* 98 - 107 mmol/L Final     Carbon Dioxide (CO2) 07/26/2021 20* 21 - 31 mmol/L Final     Anion Gap 07/26/2021 7  3 - 14 mmol/L Final     Urea Nitrogen 07/26/2021 30* 7 - 25 mg/dL Final     Creatinine 07/26/2021 1.16  0.70 - 1.30 mg/dL Final     Calcium 07/26/2021 9.3  8.6 - 10.3 mg/dL Final     Glucose 07/26/2021 168* 70 - 105 mg/dL Final     Alkaline Phosphatase 07/26/2021 94  34 - 104 U/L Final     AST 07/26/2021 16  13 - 39 U/L Final     ALT 07/26/2021 17  7 - 52 U/L Final     Protein Total 07/26/2021 6.7  6.4 - 8.9 g/dL Final     Albumin 07/26/2021 3.8  3.5 - 5.7 g/dL Final     Bilirubin Total 07/26/2021 1.2* 0.3 - 1.0 mg/dL Final     GFR Estimate 07/26/2021 64  >60 mL/min/1.73m2 Final     WBC Count 07/26/2021 8.0  4.0 - 11.0 10e3/uL Final     RBC Count 07/26/2021 3.17* 4.40 - 5.90 10e6/uL Final     Hemoglobin 07/26/2021 9.8* 13.3 - 17.7 g/dL Final     Hematocrit 07/26/2021 31.2* 40.0 - 53.0 % Final     MCV 07/26/2021 98  78 - 100 fL Final     MCH 07/26/2021 30.9  26.5 - 33.0 pg Final     MCHC  07/26/2021 31.4* 31.5 - 36.5 g/dL Final     RDW 07/26/2021 16.0* 10.0 - 15.0 % Final     Platelet Count 07/26/2021 114* 150 - 450 10e3/uL Final     Magnesium 07/26/2021 1.5* 1.9 - 2.7 mg/dL Final   Ancillary Procedure on 07/20/2021   Component Date Value Ref Range Status     Date Time Interrogation Session 07/30/2021 [id number]   Final     Implantable Pulse Generator Manufa* 07/30/2021 Medtronic   Final     Implantable Pulse Generator Model 07/30/2021 [id number] [place] XT DR MRI   Final     Implantable Pulse Generator Serial* 07/30/2021 [id number]   Final     Type Interrogation Session 07/30/2021 Remote    Final     Clinic Name 07/30/2021 [facility] [place] [facility]   Final     Implantable Pulse Generator Type 07/30/2021 Pacemaker   Final     Implantable Pulse Generator Implan* 07/30/2021 [id number]   Final     Implantable Lead  07/30/2021 Medtronic   Final     Implantable Lead Model 07/30/2021 3830 SelectSecure MRI SureScan   Final     Implantable Lead Serial Number 07/30/2021 [id number]   Final     Implantable Lead Implant Date 07/30/2021 20210714   Final     Implantable Lead Polarity Type 07/30/2021 Bipolar Lead   Final     Implantable Lead Location Detail 1 07/30/2021 UNKNOWN   Final     Implantable Lead Special Function 07/30/2021 Left Bundle [id number] cm   Final     Implantable Lead Location 07/30/2021 Right Ventricle   Final     Implantable Lead  07/30/2021 Medtronic   Final     Implantable Lead Model 07/30/2021 [id number] CapSureFix Novus MRI SureScan   Final     Implantable Lead Serial Number 07/30/2021 [id number]   Final     Implantable Lead Implant Date 07/30/2021 [id number]   Final     Implantable Lead Polarity Type 07/30/2021 Bipolar Lead   Final     Implantable Lead Location Detail 1 07/30/2021 UNKNOWN   Final     Implantable Lead Special Function 07/30/2021 52 cm   Final     Implantable Lead Location 07/30/2021 Right Atrium   Final     Josse Setting Mode (NBG Code) 07/30/2021 DDDR    Final     Josse Setting Lower Rate Limit 07/30/2021 70  [beats]/min Final     Josse Setting Maximum Tracking Rate 07/30/2021 140  [beats]/min Final     Josse Setting Maximum Sensor Rate 07/30/2021 140  [beats]/min Final     Josse Setting Hysterisis Rate 07/30/2021 DISABLED   Final     Josse Setting PRIMO Delay Low 07/30/2021 150  ms Final     Josse Setting PAV Delay Low 07/30/2021 180  ms Final     Josse Setting AT Mode Switch Rate 07/30/2021 171  [beats]/min Final     Lead Channel Setting Sensing Polar* 07/30/2021 Bipolar   Final     Lead Channel Setting Sensing Anode* 07/30/2021 Right Atrium   Final     Lead Channel Setting Sensing Anode* 07/30/2021 Ring   Final     Lead Channel Setting Sensing Catho* 07/30/2021 Right Atrium   Final     Lead Channel Setting Sensing Catho* 07/30/2021 Tip   Final     Lead Channel Setting Sensing Sensi* 07/30/2021 0.3  mV Final     Lead Channel Setting Sensing Polar* 07/30/2021 Bipolar   Final     Lead Channel Setting Sensing Anode* 07/30/2021 Right Ventricle   Final     Lead Channel Setting Sensing Anode* 07/30/2021 Ring   Final     Lead Channel Setting Sensing Catho* 07/30/2021 Right Ventricle   Final     Lead Channel Setting Sensing Catho* 07/30/2021 Tip   Final     Lead Channel Setting Sensing Sensi* 07/30/2021 2  mV Final     Lead Channel Setting Pacing Polari* 07/30/2021 Bipolar   Final     Lead Channel Setting Pacing Anode * 07/30/2021 Right Atrium   Final     Lead Channel Setting Pacing Anode * 07/30/2021 Ring   Final     Lead Channel Setting Sensing Catho* 07/30/2021 Right Atrium   Final     Lead Channel Setting Sensing Catho* 07/30/2021 Tip   Final     Lead Channel Setting Pacing Pulse * 07/30/2021 0.4  ms Final     Lead Channel Setting Pacing Amplit* 07/30/2021 3.5  V Final     Lead Channel Setting Pacing Captur* 07/30/2021 Adaptive   Final     Lead Channel Setting Pacing Polari* 07/30/2021 Unipolar   Final     Lead Channel Setting Pacing Anode * 07/30/2021 Can   Final      Lead Channel Setting Sensing Catho* 07/30/2021 Right Ventricle   Final     Lead Channel Setting Sensing Catho* 07/30/2021 Tip   Final     Lead Channel Setting Pacing Pulse * 07/30/2021 0.4  ms Final     Lead Channel Setting Pacing Amplit* 07/30/2021 3.5  V Final     Lead Channel Setting Pacing Captur* 07/30/2021 Adaptive   Final     Zone Setting Type Category 07/30/2021 VF   Final     Zone Setting Type Category 07/30/2021 VT   Final     Zone Setting Type Category 07/30/2021 VT   Final     Zone Setting Type Category 07/30/2021 VT   Final     Zone Setting Detection Interval 07/30/2021 400  ms Final     Zone Setting Type Category 07/30/2021 ATRIAL_FIBRILLATION   Final     Zone Setting Type Category 07/30/2021 AT/AF   Final     Zone Setting Detection Interval 07/30/2021 350  ms Final     Lead Channel Impedance Value 07/30/2021 323  ohm Final     Lead Channel Impedance Value 07/30/2021 285  ohm Final     Lead Channel Sensing Intrinsic Amp* 07/30/2021 3.875  mV Final     Lead Channel Sensing Intrinsic Amp* 07/30/2021 3.875  mV Final     Lead Channel Pacing Threshold Ampl* 07/30/2021 0.75  V Final     Lead Channel Pacing Threshold Puls* 07/30/2021 0.4  ms Final     Lead Channel Impedance Value 07/30/2021 475  ohm Final     Lead Channel Impedance Value 07/30/2021 342  ohm Final     Lead Channel Sensing Intrinsic Amp* 07/30/2021 14.625  mV Final     Lead Channel Pacing Threshold Ampl* 07/30/2021 0.5  V Final     Lead Channel Pacing Threshold Puls* 07/30/2021 0.4  ms Final     Battery Date Time of Measurements 07/30/2021 20210720231502   Final     Battery Status 07/30/2021 OK   Final     Battery RRT Trigger 07/30/2021 2.625   Final     Battery Remaining Longevity 07/30/2021 119  mo Final     Battery Voltage 07/30/2021 3.22  V Final     Josse Statistic Date Time Start 07/30/2021 20210715181709   Final     Josse Statistic Date Time End 07/30/2021 20210720234803   Final     Josse Statistic RA Percent Paced 07/30/2021 0.03  %  Final     Josse Statistic RV Percent Paced 07/30/2021 99.99  % Final     Josse Statistic AP  Percent 07/30/2021 0.03  % Final     Josse Statistic AS  Percent 07/30/2021 99.96  % Final     Josse Statistic AP VS Percent 07/30/2021 0  % Final     Josse Statistic AS VS Percent 07/30/2021 0.01  % Final     Episode Statistic Recent Count 07/30/2021 0   Final     Episode Statistic Type Category 07/30/2021 AT/AF   Final     Episode Statistic Recent Count 07/30/2021 0   Final     Episode Statistic Type Category 07/30/2021 VT   Final     Episode Statistic Recent Count 07/30/2021 0   Final     Episode Statistic Type Category 07/30/2021 VT   Final     Episode Statistic Recent Date Time* 07/30/2021 04741116106443   Final     Episode Statistic Recent Date Time* 07/30/2021 47278670585066   Final     Episode Statistic Recent Date Time* 07/30/2021 72164887299442   Final     Episode Statistic Recent Date Time* 07/30/2021 40651387798268   Final     Episode Statistic Recent Date Time* 07/30/2021 89385416125922   Final     Episode Statistic Recent Date Time* 07/30/2021 66967064878187   Final     Episode Statistic Total Count 07/30/2021 0   Final     Episode Statistic Type Category 07/30/2021 AT/AF   Final     Episode Statistic Total Count 07/30/2021 0   Final     Episode Statistic Type Category 07/30/2021 VT   Final     Episode Statistic Total Count 07/30/2021 0   Final     Episode Statistic Type Category 07/30/2021 VT   Final     Episode Statistic Total Date Time * 07/30/2021 20210714200630   Final     Episode Statistic Total Date Time * 07/30/2021 20210720234803   Final     Episode Statistic Total Date Time * 07/30/2021 20210714200630   Final     Episode Statistic Total Date Time * 07/30/2021 05969360399510   Final     Episode Statistic Total Date Time * 07/30/2021 20210714200630   Final     Episode Statistic Total Date Time * 07/30/2021 20210720234803   Final   Lab on 07/13/2021   Component Date Value Ref Range Status     SARS  CoV2 PCR 07/13/2021 Negative  Negative Final     WBC Count 07/13/2021 7.9  4.0 - 11.0 10e3/uL Final     RBC Count 07/13/2021 4.07* 4.40 - 5.90 10e6/uL Final     Hemoglobin 07/13/2021 12.5* 13.3 - 17.7 g/dL Final     Hematocrit 07/13/2021 39.8* 40.0 - 53.0 % Final     MCV 07/13/2021 98  78 - 100 fL Final     MCH 07/13/2021 30.7  26.5 - 33.0 pg Final     MCHC 07/13/2021 31.4* 31.5 - 36.5 g/dL Final     RDW 07/13/2021 14.6  10.0 - 15.0 % Final     Platelet Count 07/13/2021 92* 150 - 450 10e3/uL Final     N Terminal Pro BNP Outpatient 07/13/2021 1,017* 0 - 125 pg/mL Final     Bilirubin Direct 07/13/2021 0.3* 0.0 - 0.2 mg/dL Final     Sodium 07/13/2021 139  133 - 144 mmol/L Final     Potassium 07/13/2021 4.6  3.4 - 5.3 mmol/L Final     Chloride 07/13/2021 109  94 - 109 mmol/L Final     Carbon Dioxide (CO2) 07/13/2021 24  20 - 32 mmol/L Final     Anion Gap 07/13/2021 6  3 - 14 mmol/L Final     Urea Nitrogen 07/13/2021 19  7 - 30 mg/dL Final     Creatinine 07/13/2021 1.06  0.66 - 1.25 mg/dL Final     Calcium 07/13/2021 8.8  8.5 - 10.1 mg/dL Final     Glucose 07/13/2021 153* 70 - 99 mg/dL Final     Alkaline Phosphatase 07/13/2021 122  40 - 150 U/L Final     AST 07/13/2021 12  0 - 45 U/L Final     ALT 07/13/2021 18  0 - 70 U/L Final     Protein Total 07/13/2021 7.0  6.8 - 8.8 g/dL Final     Albumin 07/13/2021 3.3* 3.4 - 5.0 g/dL Final     Bilirubin Total 07/13/2021 1.3  0.2 - 1.3 mg/dL Final     GFR Estimate 07/13/2021 71  >60 mL/min/1.73m2 Final     Magnesium 07/13/2021 1.4* 1.6 - 2.3 mg/dL Final   Orders Only on 07/07/2021   Component Date Value Ref Range Status     Sodium 07/07/2021 Test canceled - Lab  error  133 - 144 mmol/L Corrected     Potassium 07/07/2021 Test canceled - Lab  error  3.4 - 5.3 mmol/L Corrected     Chloride 07/07/2021 Test canceled - Lab  error  94 - 109 mmol/L Corrected     Carbon Dioxide 07/07/2021 Test canceled - Lab  error  20 - 32 mmol/L Corrected      "Anion Gap 07/07/2021 Test canceled - Lab  error  6 - 17 mmol/L Corrected     Glucose 07/07/2021 Test canceled - Lab  error  70 - 99 mg/dL Corrected     Urea Nitrogen 07/07/2021 Test canceled - Lab  error  7 - 30 mg/dL Corrected     Creatinine 07/07/2021 Test canceled - Lab  error  0.66 - 1.25 mg/dL Corrected     GFR Estimate 07/07/2021 Test canceled - Lab  error  >60 mL/min/[1.73_m2] Corrected     GFR Estimate If Black 07/07/2021 Test canceled - Lab  error  >60 mL/min/[1.73_m2] Corrected     Calcium 07/07/2021 Test canceled - Lab  error  8.5 - 10.1 mg/dL Corrected     Bilirubin Total 07/07/2021 Test canceled - Lab  error  0.2 - 1.3 mg/dL Corrected     Albumin 07/07/2021 Test canceled - Lab  error  3.4 - 5.0 g/dL Corrected     Protein Total 07/07/2021 Test canceled - Lab  error  6.8 - 8.8 g/dL Corrected     Alkaline Phosphatase 07/07/2021 Test canceled - Lab  error  40 - 150 U/L Corrected     ALT 07/07/2021 Test canceled - Lab  error  0 - 70 U/L Corrected     AST 07/07/2021 Test canceled - Lab  error  0 - 45 U/L Corrected     N-Terminal Pro Bnp 07/07/2021 Test canceled - Lab  error  0 - 125 pg/mL Corrected     Magnesium 07/07/2021 Test canceled - Lab  error  1.6 - 2.3 mg/dL Corrected     Bilirubin Direct 07/07/2021 Test canceled - Lab  error  0.0 - 0.2 mg/dL Corrected     Magnesium 07/07/2021 1.3* 1.9 - 2.7 mg/dL Final        Review of systems: Negative except that which was noted in the HPI.    Physical examination:  /72 (BP Location: Right arm, Patient Position: Sitting, Cuff Size: Adult Regular)   Pulse 94   Temp 97.5  F (36.4  C) (Tympanic)   Resp 16   Ht 1.65 m (5' 4.96\")   Wt 85.9 kg (189 lb 6.4 oz)   SpO2 94%   BMI 31.56 kg/m      GENERAL APPEARANCE: healthy, alert and no distress  HEENT: no icterus, no xanthelasmas, normal " pupil size and reaction, no cyanosis.  NECK: no adenopathy, no asymmetry, masses.  CHEST: lungs clear to auscultation - no rales, rhonchi or wheezes, no use of accessory muscles, no retractions, respirations are unlabored, normal respiratory rate  CARDIOVASCULAR: regular rhythm, normal S1 with physiologic split S2, no S3 or S4 and no murmur, click or rub  EXTREMITIES: no clubbing, cyanosis or edema  NEURO: alert and oriented normal speech, and affect  VASC: No vascular bruits heard.  SKIN: no ecchymoses, no rashes    Total time spent on day of visit, including review of tests, obtaining/reviewing separately obtained history, ordering medications/tests/procedures, communicating with PCP/consultants, and documenting in electronic medical record: 55 minutes.       Thank you for allowing me to participate in the care of your patient. Please do not hesitate to contact me if you have any questions.     Elias Leslie, DO

## 2021-08-31 NOTE — TELEPHONE ENCOUNTER
Patient called and scheduled for tomorrow per protocol from orders by nephrology.   Jean S. Hammann, RN on 8/31/2021 at 2:58 PM

## 2021-08-31 NOTE — TELEPHONE ENCOUNTER
I'll defer to Nephrology    Signed, Jarad Bocanegra MD, FAAP, FACP  Internal Medicine & Pediatrics

## 2021-08-31 NOTE — TELEPHONE ENCOUNTER
I called the patient and according to the patient and his wife, Dr. Priest a Nephrologist at the [facility] has a standing order of an infusion if the magnesium 1.6. Patient is confused as to what who to listen to.    Efrem Daugherty LPN on 8/31/2021 at 2:05 PM

## 2021-08-31 NOTE — TELEPHONE ENCOUNTER
Fide called to inform RN[facility] that Brendan's primary care provider put a hold on the mag infusion that was set up today for a mag level of 1.3. Brendan was feeling poorly last night, she almost brought him to the ED, but knew he had labs today and the infusion, so they held off.    Will inform Dr. Abdi. Asked Fide to contact primary to see what he is then recommending if not part of what Dr. Abdi had previously ordered.

## 2021-08-31 NOTE — NURSING NOTE
"FOOD SECURITY SCREENING QUESTIONS  Hunger Vital Signs:  Within the past 12 months we worried whether our food would run out before we got money to buy more. Never  Within the past 12 months the food we bought just didn't last and we didn't have money to get more. Never  Quynh Douglas RN 8/31/2021 10:57 AM          Follow up on medication from June 17th in regards to metoprolol succinate.  Quynh Douglas RN on 8/31/2021 at 10:56 AM  Chief Complaint   Patient presents with     Follow Up     medications needs labs       Initial /72 (BP Location: Right arm, Patient Position: Sitting, Cuff Size: Adult Regular)   Pulse 94   Temp 97.5  F (36.4  C) (Tympanic)   Resp 16   Ht 1.65 m (5' 4.96\")   Wt 85.9 kg (189 lb 6.4 oz)   SpO2 94%   BMI 31.56 kg/m   Estimated body mass index is 31.56 kg/m  as calculated from the following:    Height as of this encounter: 1.65 m (5' 4.96\").    Weight as of this encounter: 85.9 kg (189 lb 6.4 oz).  Meds Reconciled: complete  Pt is on Aspirin  Pt is on a Statin  PHQ and/or BARAK reviewed. Pt referred to PCP/MH Provider as appropriate.    Quynh Douglas RN      "

## 2021-08-31 NOTE — TELEPHONE ENCOUNTER
Wife would like to know the lab results from today and if they should go to the ER today for magnesium.  They were offered an appointment for tomorrow am infusion  but would like Dr Bocanegra's opinion on waiting until then.

## 2021-08-31 NOTE — PATIENT INSTRUCTIONS
You were seen by  Elias Leslie DO    1. Dosage change: metoprolol succinate ER (TOPROL-XL) 50 MG 24 hr tablet - Take 1 tablet (50 mg) by mouth daily     2. PFT's (Pulmonary function test) has been ordered. You will be called to schedule this.  You will receive instructions for testing at that time.  You will be contacted with results.    3. Complete scheduled Echocardiogram.    4. Continue Cardiac Rehab.    5. Laboratory blood work has been ordered.  You will be notified by phone call or [employer] message when the results are available.    6. A Chest X-ray has been ordered. You will be called to schedule this. You will receive instructions for testing at that time. You will be contacted with results.    7. Medication sent to preferred pharmacy.    8. No other changes at this time.      You will follow up with [facility] in 3 months, sooner if needed.       Please call the cardiology office with problems, questions, or concerns at [phone].    If you experience chest pain, chest pressure, chest tightness, shortness of breath, fainting, lightheadedness, nausea, vomiting, or other concerning symptoms, please report to the Emergency Department or call 911. These symptoms may be emergent, and best treated in the Emergency Department.     Cardiology Nurses  RUFUS Villalpando, NOEMÍ PÉREZ LPN  [facility] ([address])  [phone]

## 2021-09-01 NOTE — PATIENT INSTRUCTIONS
Recommendations from today's MTM visit:                                                    MTM (medication therapy management) is a service provided by a clinical pharmacist designed to help you get the most of out of your medicines.   Today we reviewed what your medicines are for, how to know if they are working, that your medicines are safe and how to make your medicine regimen as easy as possible.      1. Based upon my review these are the medications/interactions that could be contributing to your low magnesium: TACROLIMUS (3 to 95.4%), OMEPRAZOLE (can occur/exacerbate low magnesium), CALCIUM CARBONATE (mineral deficiency)    2. I do believe that your taper off omeprazole should help eventually improve magnesium absorption.    3. SLOW-MAG  Contains (per two tablets):  143mg Magnesium  416mg Chloride  238mg Calcium    I do think that an alternative soluble magnesium may be a good option due to the amount of calcium carbonate your are receiving per day.  I have suggested to Dr. Bocanegra and Dr. Abdi about trying to change to MAGNESIUM CITRATE 400 to 500 MG by mouth twice daily.     Follow-up: 3 months or sooner if needed    It was great to speak with you today.  I value your experience and would be very thankful for your time with providing feedback on our clinic survey. You may receive a survey via email or text message in the next few days.     To schedule another MTM appointment, please call the clinic directly or you may call the MTM scheduling line at [phone] or toll-free at [phone].     My Clinical Pharmacist's contact information:                                                      Please feel free to contact me with any questions or concerns you have.      Valerio Kwong, Lisette, [facility]  Medication Therapy Management Pharmacist  Pager: [phone]

## 2021-09-01 NOTE — NURSING NOTE
Infusion Nursing Note:  Ricki Sharp presents today for Magnesium 4 grams.    Patient seen by provider today: No   present during visit today: Not Applicable.    Note: N/A.      Intravenous Access:  Peripheral IV placed.    Treatment Conditions:  Not Applicable.      Post Infusion Assessment:  Patient tolerated infusion without incident.  Blood return noted pre and post infusion.  Site patent and intact, free from redness, edema or discomfort.  No evidence of extravasations.  Access discontinued per protocol.       Discharge Plan:   Discharge instructions reviewed with: Patient.  Patient and/or family verbalized understanding of discharge instructions and all questions answered.  Patient discharged in stable condition accompanied by: self and wife.  Departure Mode: Ambulatory with ordered labs for next week.      Rama Koch RN

## 2021-09-01 NOTE — TELEPHONE ENCOUNTER
Spoke with patient's wife. She had also sent a [employer] message which was routed to Dr. Leslie. Ignacio Mccoy RN, BSN  ....................  9/1/2021   8:37 AM

## 2021-09-01 NOTE — TELEPHONE ENCOUNTER
Requested Prescriptions   Pending Prescriptions Disp Refills     LANTUS SOLOSTAR 100 UNIT/ML soln [Pharmacy Med Name: LANTUS SOLOSTAR PEN INJ 3ML] 15 mL 3     Sig: INJECT 5 UNITS SUBCUTANEOUS AT BEDTIME        Last Written Prescription Date:  8/25/20  Last Fill Quantity: 15 ml,   # refills: 3  Last Office Visit: 8/26/21 Bocanegra  Future Office visit:    Next 5 appointments (look out 90 days)    Nov 10, 2021  1:15 PM  Return Visit with Kristy Abdi MD  [facility] ([facility] ) [address] [place] [id number]  [phone]         Routing refill request to provider for review/approval because:  Unable to fill prescription refills per RN Med Refill Policy.  Brenda J. Goodell, RN on 9/1/2021 at 11:29 AM

## 2021-09-01 NOTE — TELEPHONE ENCOUNTER
Requested Prescriptions   Pending Prescriptions Disp Refills     tamsulosin (FLOMAX) 0.4 MG capsule [Pharmacy Med Name: TAMSULOSIN 0.4MG CAPSULES] 90 capsule 3     Sig: TAKE 1 CAPSULE(0.4 MG) BY MOUTH DAILY     Last Written Prescription Date:  8/25/20  Last Fill Quantity: 90,   # refills: 3  Last Office Visit: 8/26/21 Bocanegra  Future Office visit:    Next 5 appointments (look out 90 days)    Nov 10, 2021  1:15 PM  Return Visit with Kristy Abdi MD  [facility] ([facility] ) [address] [place] [id number]  [phone]         Routing refill request to provider for review/approval because:  Unable to fill prescription refills per RN Med Refill Policy.  Brenda J. Goodell, RN on 9/1/2021 at 2:41 PM

## 2021-09-02 NOTE — TELEPHONE ENCOUNTER
I did call and talk to patient's wife, Fide. She is aware of the plan for change to magnesium citrate in place of the current Slow Mag.    Valerio Kwong, PharmD, [facility]  Medication Therapy Management Pharmacist  Pager: [phone]

## 2021-09-02 NOTE — TELEPHONE ENCOUNTER
"\"Yes,   Agree.  If he is able to get in for magnesium infusion today or tomorrow I would.     Dr. Leslie\"    Called and followed up with patient's EC. As they did have the infusion yesterday. Antoinette Weir RN  ....................  9/2/2021   9:35 AM        "

## 2021-09-02 NOTE — PROGRESS NOTES
Received response from patient's nephrologist and also from primary care provider (see separate Telephone encounter on 9/2/2021):    RE: MTM Considerations - Magnesium Absorption  Received: Today  Kristy Abdi MD Lahti, Joseph David, [facility]; Jarad Bocanegra MD; Laura Galdamez, RUFUS; Mike Gray MD  I am fine with magnesium citrate capsules   I set up IV infusion in the mean time, but perhaps in the next couple of weeks we can see how things shape up   Thank you for your help   Laura and Dr Satish Abdi       Orders were placed by primary care provider, Dr. Bocanegra.  Discussed plan with patient/wife via MyChart and also with an additional phone call.    Valerio Kwong, PharmD, BCACP  Medication Therapy Management Pharmacist  Pager: [phone]

## 2021-09-02 NOTE — TELEPHONE ENCOUNTER
Thank you for your help.  I don't see magnesium citrate 500 mg capsules, and expect this would cause diarrhea. Did you mean another formulation of magnesium?    SignedJarad MD, FAAP, FACP  Internal Medicine & Pediatrics

## 2021-09-02 NOTE — TELEPHONE ENCOUNTER
----- Message from Ke Kwong [facility] sent at 9/1/2021  3:42 PM CDT -----  Regarding: [place] Considerations - Magnesium Absorption  Dr. Abdi and Dr. Bocanegra:    Ricki Sharp was seen for a Medication Therapy Management visit after recent hospitalization. I offer the following suggestions for your consideration.    1. Consider changing Slow-Mag to magnesium citrate 500 mg capsules by mouth twice daily. This is more soluble and more bioavailable then magnesium oxide and likely Slow Mag that he has tried before.  Patient's Slow Mag contains magnesium chloride 143 mg per two tablets (286 mg per four tablets, which he takes 3 times daily).  It also contain calcium carbonate 238 mg per two tablets (476 mg three times daily) which can interfere/compete with magnesium absorption.  The two other likely medication contributors to his low magnesium include omeprazole (tapering off currently) and tacrolimus.    I have CC'd my note as well. Please respond if you agree with the plan or have an alternative plan, and I can make the changes and follow-up with the patient if appropriate.    Thank you,    Valerio Kwong, PharmD, BCACP  Medication Therapy Management Pharmacist  Pager: [phone]

## 2021-09-02 NOTE — TELEPHONE ENCOUNTER
Thank you.      ICD-10-CM    1. Hypomagnesemia  E83.42 Magnesium Citrate 125 MG CAPS   2. Liver replaced by transplant (H)  Z94.4 Magnesium Citrate 125 MG CAPS      Orders Placed This Encounter   Medications     Magnesium Citrate 125 MG CAPS     Sig: Take 4 capsules (500 mg) by mouth 2 times daily     Dispense:  320 capsule     Refill:  11     Signed, Jarad Bocanegra MD, FAAP, FACP  Internal Medicine & Pediatrics

## 2021-09-02 NOTE — TELEPHONE ENCOUNTER
Hi Dr. Bocanegra:    I meant for magnesium citrate.  All forms of magnesium can have a laxative effect primarily driven by unabsorbed magnesium.  The magnesium citrate liquid used as a laxative is typically dosed as 6.5 to 10 fluid ounces of a magnesium citrate concentration of over 1.7 grams (1,700 mg) per fluid ounce. I did discuss with my pharmacist colleagues in transplant and GI clinics regarding their experience with this population and my transplant colleague states that he has had good success with changing individuals to magnesium citrate 400-500 mg once to twice daily.  They do have 125 mg and 200 mg capsules/tablets so usually given as either two 200 mg or four 125 mg tablets/capsules each dose. He had not used magnesium glycinate as much and the conversion is not well published, but this could also be an option.     Brendna and his wife mentioned potentially using the non-calcium containing Slow Mag product - and the only other product marketed by Slow Mag is actually a magnesium citrate containing product. The 125 mg or 200 mg dosage forms can be found under database for the magnesium citrate in Epic.       Let me know if you have any additional questions,    Valerio Kwong, PharmD, BCACP  Medication Therapy Management Pharmacist  Pager: [phone]

## 2021-09-02 NOTE — TELEPHONE ENCOUNTER
Patient wanted to discuss Magnesium formulations to maximize absorption. See source at bottom of note. Magnesium citrate and Magnesium glycinate both seem to have good availability for absorption, but it was not across the board for all magnesium citrate products. The 2 magnesium citrate which were best absorbed seem only available in the [place]. The next best absorbed magnesium was Doctor's Best High Absorption Magnesium which is a magnesium glycinate lysinate chelate. This is available in the [place].  Product https://www.[employer]/Doctors-Best-Absorption-Magnesium-Bioperine/dp/E79181H4Y6/ref=pd_lpo_1?pd_rd_i=L18834A6P2&psc=1    I recommended patient try this product, starting at 2 tablets (200mg of elemental magnesium) TID.     Baljit Turner, PharmD  [facility] Pharmacist    Phone: [phone]         Source: Article  Predicting and Testing Bioavailability of  Magnesium Supplements  Nora Son 1 , Jose Alberto Leslie 2 and Victorino Ibarra 1,*  1 Department of Movement and Sports Sciences, [facility], [address] [place] [address]  2 Laboratory of Pharmaceutical Technology, [facility], [address] [place] [address]  * Correspondence: [email or web address].; Tel.: [phone]

## 2021-09-07 NOTE — PROGRESS NOTES
Labs ordered for upcoming 9/15 office visit with Ana Faria PA-C. Labs already scheduled, will add on these labs.   stretcher

## 2021-09-08 NOTE — PROGRESS NOTES
Infusion Nursing Note:  Ricki Sharp presents today for magnesium replacement 4 grams.    Patient seen by provider today: No   present during visit today: Not Applicable.    Note: patient is scheduled for labs next week for follow up with provider.      Intravenous Access:  Peripheral IV placed.    Treatment Conditions:  Not Applicable.      Post Infusion Assessment:  Patient tolerated infusion without incident.  Blood return noted pre and post infusion.  Site patent and intact, free from redness, edema or discomfort.  No evidence of extravasations.  Access discontinued per protocol.       Discharge Plan:   Discharge instructions reviewed with: Patient.  Patient and/or family verbalized understanding of discharge instructions and all questions answered.  AVS to patient via [employer].  Patient will return week for next appointment.   Patient discharged in stable condition accompanied by: wife.  Departure Mode: Ambulatory.      Jean S. Hammann, RN

## 2021-09-09 PROBLEM — J44.1 COPD EXACERBATION (H): Status: ACTIVE | Noted: 2021-01-01

## 2021-09-09 NOTE — TELEPHONE ENCOUNTER
"Call made to patient about the my chart message. Wife moses answered and I verified ok to speak with her. States  has been gasping every 4- 5 breaths. Wife said here is Bill. Both patient and wife on phone. Patient was able to speak clearly. He did sound slight short of breath. I advised patient that if he is having trouble catching his breath he should go to the emergency department. Patient said \"no\" stated he will not be going to the ED.   They stated that they pushed the pacemaker button already because they didn't know what to do. I again advised patient and his wife that patient should go to ED. Patient said what is plan B as he will not go to ED Refused to go to ED. Wife states if he gets bad enough I will forced him to go. I stated to the wife that he should go in to the ED and if things get worse she needs to call 911. Maite Yepez RN ....................  9/9/2021   4:30 PM      "

## 2021-09-10 NOTE — TELEPHONE ENCOUNTER
[facility] sent Rx request for the following:      Requested Prescriptions   Pending Prescriptions Disp Refills     SPIRIVA HANDIHALER 18 MCG inhaled capsule [Pharmacy Med Name: SPIRIVA 18MCG CAPS 30S &HANDIHALER] 90 capsule 0     Sig: INHALE THE CONTENTS OF 1 CAPSULE(18 MCG) INTO THE LUNGS DAILY       Asthma Maintenance Inhalers - Anticholinergics Passed - 9/10/2021  3:13 PM       Inhaled Steroids Protocol Passed - 9/10/2021  3:13 PM   Last Prescription Date:   6/3/21  Last Fill Qty/Refills:         90. R: 0         cyclobenzaprine (FLEXERIL) 5 MG tablet [Pharmacy Med Name: CYCLOBENZAPRINE 5MG TABLETS] 45 tablet 3     Sig: TAKE 1 TO 2 TABLETS BY MOUTH AT NIGHT AS NEEDED       There is no refill protocol information for this order          Last Prescription Date:   10/26/20  Last Fill Qty/Refills:         45, R-3   Last Office Visit:               8/26/21  Future Office visit:             Next 5 appointments (look out 90 days)    Nov 10, 2021  1:15 PM  Return Visit with Kristy Abdi MD  [facility] ([facility] ) [address] [place] [id number] [phone]        Routing refill request to provider for review/approval because:  Drug not on the [facility], [facility] or [facility] refill protocol or controlled substance.

## 2021-09-10 NOTE — TELEPHONE ENCOUNTER
Talked with patients wife Fide after verification of ok to speak with her. Per patients note 9/9/2021 patient states he will stop the Brilinta and start Plavix today 9/10/2021.   Consulted with Oswald Coy MD as to when to start the plavix. He stated that as long as the patient has not taken his Brilinta today he can start the plavix as planned. Call made to patient/wife regarding my chart message. I advised patient/wife to continue with the plan to switch medications. I relayed to the patient/wife that I spoke with another provider and that the patient has not taken the Brilinta today.  Notified patient/wife to call us with any further questions or concerns. Maite Yepez RN ....................  9/10/2021   9:09 AM

## 2021-09-12 NOTE — ED NOTES
Respiratory and CRNA arrived at 0815, pt given intubation medications, intubated at 0818 with 8.0 tube, 23 at the teeth.

## 2021-09-12 NOTE — ED NOTES
Patient extremely weak and unable to stand and very confused and disorientated. Only able to void very small amounts. Do to agitation bladder scan completed for 62 ml post void.

## 2021-09-12 NOTE — ED PROVIDER NOTES
This is a 70 yo white male signed out to me at change of shift after having a full workup for sepsis. Patient has had change in mentation over the last 3 hours. HE has history of Liver transplant in 2008, Type II DM, AORTIC stenosis with TAVR July 2021. CAD disease with stents, hypothyroidism, Hepatitis C, COPD, HLD, HTN  , remote history of Non - Hodgkins lymphoma which occurred after his transplant.    See ED course- patient intubated, give mannitol, propofol infusion, and Brilinta reversal started.   8:30 AM ETA on flight-only 20 mcg  Desmopressin given as this is all  We have in house. Will make sure they know this at time of transfer.  Will get 3% NS available in case of need for additional treatment.       CT HEAD  IMPRESSION:   1. THIS REPORT CONTAINS FINDINGS THAT MAY BE CRITICAL TO PATIENT CARE. As of   7:57 AM CDT on 9/12/2021, Fide the charge nurse confirmed that Dr Payton Malik   is aware of the critical findings, and is currently arranging definitive care.   2. A large, approximately 27 mm acute left subdural hematoma is identified with   a smaller component extending along the falx.   3. There is approximately 17 mm of left to right subfalcine herniation.   4. There is compression of the left ventricular system and dilatation of the   right temporal horn. This is measured on series 6, image 15.   Other findings as described above.    Forms filled out for transfer- awaiting [employer] for transport.        Payton Malik MD  09/12/21 0815

## 2021-09-12 NOTE — ED PROVIDER NOTES
History     Chief Complaint   Patient presents with     Shortness of Breath     HPI  Ricki Sharp is a 71 year old male who reports having many recent medication changes due to hypomagnesemia for which she was changed from Slow-Mag to magnesium citrate a few days ago.  Also his thyroid has needed dosing of medication.  His spouse reports that he was diagnosed with pneumonia as well and has been on azithromycin as well as amoxicillin.  Furthermore he was changed from metoprolol 50 mg twice daily to Toprol-XL 50 mg daily.  He was also recently changed from Brilinta to Plavix.  He reports over the past few days he has been feeling more winded and fatigued.  He apparently had several episodes of lightheadedness.  He had an episode of collapsing to the ground when bending over to pick something up off the ground today.  He is here for further evaluation.  Denies any chest pain.  No nausea or vomiting.  No diarrhea or constipation.  No cough or sore throat.  He has had his Covid vaccination series receiving the Moderna series on 3/18/2021.    Allergies:  Allergies   Allergen Reactions     Lisinopril Cough     Spironolactone Other (See Comments)     hyperkalemia     Levaquin [Levofloxacin] Rash     Rash on abdomen, possibly from Levaquin.       Problem List:    Patient Active Problem List    Diagnosis Date Noted     COPD exacerbation (H) 09/09/2021     Priority: Medium     Elevated d-dimer on 8/31/2021 08/31/2021     Priority: Medium     Acquired hypothyroidism 08/31/2021     Priority: Medium     S/p TAVR (transcatheter aortic valve replacement), bioprosthetic on 7/14/2021 at the [facility] 08/13/2021     Priority: Medium     OAB (overactive bladder) 08/13/2021     Priority: Medium     Benign prostatic hyperplasia with incomplete bladder emptying 08/13/2021     Priority: Medium     Surgical complete heart block (H) 07/26/2021     Priority: Medium     Pacemaker- Dependent (7/14/21) 07/26/2021     Priority: Medium      Nonrheumatic mitral valve stenosis 07/26/2021     Priority: Medium     Aortic stenosis, severe 07/14/2021     Priority: Medium     History of coronary artery stent placement 07/07/2021     Priority: Medium     Status post coronary angiogram 06/17/2021     Priority: Medium     H/O severe aortic stenosis 05/20/2021     Priority: Medium     Added automatically from request for surgery [id number]       Other ill-defined heart diseases 05/20/2021     Priority: Medium     Added automatically from request for surgery [id number]       Bicuspid aortic valve 05/13/2021     Priority: Medium     WOOD (dyspnea on exertion) 05/13/2021     Priority: Medium     History of tobacco abuse 05/13/2021     Priority: Medium     Hx of liver transplant (H) 05/13/2021     Priority: Medium     [facility]        History of hepatitis C 05/13/2021     Priority: Medium     Status post treatment       Chronic kidney disease, stage 3a 04/19/2021     Priority: Medium     COPD, severe on 12/29/2015 (H) 04/19/2021     Priority: Medium     Personal history of tobacco use, presenting hazards to health 04/08/2021     Priority: Medium     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 04/05/2021     Priority: Medium     Spinal stenosis in cervical region 01/23/2020     Priority: Medium     Degeneration of lumbar intervertebral disc 03/05/2018     Priority: Medium     Lumbar facet arthropathy 03/05/2018     Priority: Medium     Lumbar foraminal stenosis 03/05/2018     Priority: Medium     Well controlled type 2 diabetes mellitus (H) 02/27/2018     Priority: Medium     Essential hypertension 02/27/2018     Priority: Medium     Mixed hyperlipidemia 02/27/2018     Priority: Medium     Type 2 diabetes mellitus with hyperglycemia (H) 04/18/2016     Priority: Medium     Status post total replacement of left hip 03/18/2016     Priority: Medium     Primary localized osteoarthrosis, pelvic region and thigh 08/24/2015     Priority: Medium     Antineoplastic chemotherapy  induced pancytopenia (H) 07/11/2014     Priority: Medium     Hypomagnesemia 06/10/2014     Priority: Medium     Drug-induced neutropenia (H) 06/10/2014     Priority: Medium     updating diagnosis code for icd10 cutover       NHL (non-Hodgkin's lymphoma) (H) 05/16/2014     Priority: Medium     PTLD (post-transplant lymphoproliferative disorder) (H) 04/02/2014     Priority: Medium     Liver replaced by transplant (H) 08/29/2012     Priority: Medium     Liver transplant recipient (H) 03/18/2008     Priority: Medium        Past Medical History:    Past Medical History:   Diagnosis Date     Cancer (H)      COPD (chronic obstructive pulmonary disease) (H)      Coronary artery disease      Diabetes (H)      History of blood transfusion      Hypertension      Thyroid disease        Past Surgical History:    Past Surgical History:   Procedure Laterality Date     ABDOMEN SURGERY      liver transplant     CARDIAC SURGERY      3 stents     CHOLECYSTECTOMY       CV CORONARY ANGIOGRAM N/A 6/17/2021    Procedure: CV CORONARY ANGIOGRAM;  Surgeon: Noah Akins MD;  [facility]     CV INSTANTANEOUS WAVE-FREE RATIO N/A 6/17/2021    Procedure: Instantaneous Wave-Free Ratio;  Surgeon: Noah Akins MD;  [facility]     CV PCI STENT DRUG ELUTING N/A 6/17/2021    Procedure: Percutaneous Coronary Intervention Stent Drug Eluting;  Surgeon: Noah Akins MD;  [facility]     CV RIGHT HEART CATH MEASUREMENTS RECORDED N/A 6/17/2021    Procedure: CV RIGHT HEART CATH;  Surgeon: Noah Akins MD;  [facility]     CV TRANSCATHETER AORTIC VALVE REPLACEMENT N/A 7/14/2021    Procedure: Transfemoral Transcatheter Aortic Valve Replacement (MEDTRONIC 29 mm), Trans thorasic echocardiogram read by Dr. Dean Smith, Perfusion and cardiac surgery standby, temporary pacemaker placent;  Surgeon:  Lino Lucio MD;  Location: [facility]     EP PPM INSERT OF NEW OR REPL W/VENT LEAD N/A 7/14/2021    Procedure: Permanent Pacemakers  Insert of New or Replacement with Vent Lead;  Surgeon: Robby Moseley MD;  Location: [facility]     HEART CATH FEMORAL CANNULIZATION WITH OPEN STANDBY REPAIR AORTIC VALVE N/A 7/14/2021    Procedure: with possible emergency open heart bypass and or balloon pump placement and any indicated procedure;  Surgeon: Tato Vieira MD;  Location: [facility]     INJECT EPIDURAL TRANSFORAMINAL LUMBAR / SACRAL SINGLE Left 8/1/2018    Procedure: INJECT EPIDURAL TRANSFORAMINAL LUMBAR / SACRAL SINGLE;  Left Transforaminal Lumbar Epidural Steroid Injection  Lumbar 3, Lumbar 4;  Surgeon: Jeni Agarwal MD;  Location:  OR     THORACIC SURGERY      lung     TRANSPLANT  2008    Liver       Family History:    Family History   Problem Relation Age of Onset     Alcoholism Mother      Cirrhosis Mother      Heart Disease Father      Cirrhosis Brother        Social History:  Marital Status:   [2]  Social History     Tobacco Use     Smoking status: Former Smoker     Years: 5.00     Types: Cigarettes     Quit date: 5/30/2018     Years since quitting: 3.2     Smokeless tobacco: Never Used   Vaping Use     Vaping Use: Never used   Substance Use Topics     Alcohol use: No     Drug use: No        Medications:    acetaminophen (TYLENOL) 500 MG tablet  albuterol (PROAIR HFA, PROVENTIL HFA, VENTOLIN HFA) 108 (90 BASE) MCG/ACT inhaler  amLODIPine (NORVASC) 5 MG tablet  amoxicillin (AMOXIL) 500 MG capsule  amoxicillin-clavulanate (AUGMENTIN) 875-125 MG tablet  aspirin 81 MG EC tablet  atorvastatin (LIPITOR) 40 MG tablet  azithromycin (ZITHROMAX Z-BOBBI) 250 MG tablet  B-D U/F 31G X 8 MM insulin pen needle  blood glucose monitoring (NO BRAND SPECIFIED) meter device kit  Blood Pressure Monitoring (BLOOD PRESSURE CUFF) MISC  clopidogrel (PLAVIX) 75 MG tablet  CONTOUR NEXT TEST test strip  cyanocobalamin  "(VITAMIN B-12) 1000 MCG tablet  cyclobenzaprine (FLEXERIL) 5 MG tablet  desonide (DESOWEN) 0.05 % external ointment  diltiazem (CARDIZEM) 120 MG tablet  fluocinonide (LIDEX) 0.05 % external solution  LANTUS SOLOSTAR 100 UNIT/ML soln  levothyroxine (SYNTHROID/LEVOTHROID) 150 MCG tablet  losartan (COZAAR) 25 MG tablet  Magnesium Citrate 125 MG CAPS  metFORMIN (GLUCOPHAGE-XR) 500 MG 24 hr tablet  metoprolol succinate ER (TOPROL-XL) 50 MG 24 hr tablet  predniSONE (DELTASONE) 20 MG tablet  SPIRIVA HANDIHALER 18 MCG inhaled capsule  tacrolimus (GENERIC EQUIVALENT) 1 MG capsule  tamsulosin (FLOMAX) 0.4 MG capsule  thin (NO BRAND SPECIFIED) lancets  traMADol (ULTRAM) 50 MG tablet  UNABLE TO FIND  Vitamin D3 (CHOLECALCIFEROL) 25 mcg (1000 units) tablet          Review of Systems   Constitutional: Positive for fatigue. Negative for fever.   HENT: Negative for drooling and facial swelling.    Eyes: Negative for pain and visual disturbance.   Respiratory: Positive for shortness of breath. Negative for stridor.    Cardiovascular: Negative for chest pain.   Gastrointestinal: Negative for abdominal pain, nausea and vomiting.   Genitourinary: Negative for flank pain.   Musculoskeletal: Negative for back pain.   Skin: Negative for pallor.   Neurological: Positive for weakness and light-headedness. Negative for tremors, seizures, facial asymmetry and headaches.   Psychiatric/Behavioral: Negative for agitation and confusion.   All other systems reviewed and are negative.      Physical Exam   BP: (!) 150/72  Pulse: 97  Temp: 97.4  F (36.3  C)  Resp: 11  Height: 170.2 cm (5' 7\")  Weight: 89 kg (196 lb 3.4 oz)  SpO2: 96 %      Physical Exam  Vitals and nursing note reviewed.   Constitutional:       General: He is not in acute distress.     Appearance: Normal appearance. He is not ill-appearing or toxic-appearing.   HENT:      Head: Normocephalic. No raccoon eyes, right periorbital erythema or left periorbital erythema.      Right Ear: No " drainage or tenderness.      Left Ear: No drainage or tenderness.      Nose: Nose normal.   Eyes:      General: Lids are normal. Gaze aligned appropriately. No scleral icterus.     Extraocular Movements: Extraocular movements intact.   Neck:      Trachea: No tracheal deviation.   Cardiovascular:      Rate and Rhythm: Normal rate.   Pulmonary:      Effort: Pulmonary effort is normal. No respiratory distress.      Breath sounds: No stridor. No wheezing.      Comments: Lung sounds are clear but decreased SaO2 is 96% on room air.  He is not appear to be in any respiratory distress.  No tachypnea.  Abdominal:      Tenderness: There is no abdominal tenderness.   Musculoskeletal:         General: No deformity or signs of injury. Normal range of motion.      Cervical back: Normal range of motion. No signs of trauma.   Skin:     General: Skin is warm and dry.      Coloration: Skin is not jaundiced or pale.      Comments: Skin appears slightly ashen   Neurological:      General: No focal deficit present.      Mental Status: He is alert and oriented to person, place, and time.      GCS: GCS eye subscore is 4. GCS verbal subscore is 5. GCS motor subscore is 6.      Motor: No tremor or seizure activity.   Psychiatric:         Attention and Perception: Attention normal.         Mood and Affect: Mood normal.         ED Course     EKG shows an atrial sensed ventricularly paced rhythm with a heart rate of 95.    Results for orders placed or performed during the hospital encounter of 09/11/21 (from the past 24 hour(s))   Blood gas arterial and oxyhgb   Result Value Ref Range    pH Arterial 7.39 7.35 - 7.45    pCO2 Arterial 33 (L) 35 - 45 mm Hg    pO2 Arterial 87 80 - 105 mm Hg    Bicarbonate Arterial 20 (L) 21 - 28 mmol/L    Oxyhemoglobin Arterial 95 92 - 100 %    Base Excess/Deficit (+/-) -3.9 -9.0 - 1.8 mmol/L    FIO2 0    CBC with platelets differential    Narrative    The following orders were created for panel order CBC with  platelets differential.  Procedure                               Abnormality         Status                     ---------                               -----------         ------                     CBC with platelets and d...[[id number]]  Abnormal            Final result                 Please view results for these tests on the individual orders.   Comprehensive metabolic panel   Result Value Ref Range    Sodium 131 (L) 134 - 144 mmol/L    Potassium 4.9 3.5 - 5.1 mmol/L    Chloride 95 (L) 98 - 107 mmol/L    Carbon Dioxide (CO2) 22 21 - 31 mmol/L    Anion Gap 14 3 - 14 mmol/L    Urea Nitrogen 44 (H) 7 - 25 mg/dL    Creatinine 1.52 (H) 0.70 - 1.30 mg/dL    Calcium 9.2 8.6 - 10.3 mg/dL    Glucose 461 (H) 70 - 105 mg/dL    Alkaline Phosphatase 110 (H) 34 - 104 U/L    AST 19 13 - 39 U/L    ALT 18 7 - 52 U/L    Protein Total 6.6 6.4 - 8.9 g/dL    Albumin 3.6 3.5 - 5.7 g/dL    Bilirubin Total 1.3 (H) 0.3 - 1.0 mg/dL    GFR Estimate 46 (L) >60 mL/min/1.73m2   Lactic acid whole blood   Result Value Ref Range    Lactic Acid 6.7 (HH) 0.7 - 2.0 mmol/L   Troponin I   Result Value Ref Range    Troponin I 13.0 0.0 - 34.0 pg/mL   Magnesium   Result Value Ref Range    Magnesium 1.6 (L) 1.9 - 2.7 mg/dL   Nt probnp inpatient (BNP)   Result Value Ref Range    N terminal Pro BNP Inpatient 322 (H) 0 - 100 pg/mL   CRP inflammation   Result Value Ref Range    CRP Inflammation 11.0 (H) <10.0 mg/L   D dimer quantitative   Result Value Ref Range    D-Dimer Quantitative 1.31 (H) 0.00 - 0.50 ug/mL FEU    Narrative    This D-dimer assay is intended for use in conjunction with a clinical pretest probability assessment model to exclude pulmonary embolism (PE) and deep venous thrombosis (DVT) in outpatients suspected of PE or DVT. The cut-off value is 0.50 ug/mL FEU.   TSH Reflex GH   Result Value Ref Range    TSH 0.08 (L) 0.40 - 4.00 mU/L   CBC with platelets and differential   Result Value Ref Range    WBC Count 9.1 4.0 - 11.0 10e3/uL    RBC  Count 3.13 (L) 4.40 - 5.90 10e6/uL    Hemoglobin 9.3 (L) 13.3 - 17.7 g/dL    Hematocrit 29.8 (L) 40.0 - 53.0 %    MCV 95 78 - 100 fL    MCH 29.7 26.5 - 33.0 pg    MCHC 31.2 (L) 31.5 - 36.5 g/dL    RDW 15.3 (H) 10.0 - 15.0 %    Platelet Count 121 (L) 150 - 450 10e3/uL    % Neutrophils 94 %    % Lymphocytes 5 %    % Monocytes 1 %    % Eosinophils 0 %    % Basophils 0 %    % Immature Granulocytes 0 %    NRBCs per 100 WBC 0 <1 /100    Absolute Neutrophils 8.5 (H) 1.6 - 8.3 10e3/uL    Absolute Lymphocytes 0.5 (L) 0.8 - 5.3 10e3/uL    Absolute Monocytes 0.1 0.0 - 1.3 10e3/uL    Absolute Eosinophils 0.0 0.0 - 0.7 10e3/uL    Absolute Basophils 0.0 0.0 - 0.2 10e3/uL    Absolute Immature Granulocytes 0.0 <=0.0 10e3/uL    Absolute NRBCs 0.0 10e3/uL   T4 free   Result Value Ref Range    Free T4 1.57 0.60 - 1.60 ng/dL   CT Chest Pulmonary Embolism w Contrast    Narrative    PROCEDURE INFORMATION:   Exam: CT Chest With Contrast; Diagnostic   Exam date and time: 9/11/2021 10:49 PM   Age: 71 years old   Clinical indication: Shortness of breath; Additional info: 71 year old male who   reports having many recent medication changes due to hypomagnesemia for which   she was changed from slow-mag to magnesium citrate a few days ago. Also his   thyroid has needed dosing of medication. His spouse reports that he was   diagnosed with pneumonia as well and has been on azithromycin as well as   amoxicillin. Furthermore he was changed from metoprolol 50 MG twice daily to   toprol-xl 50 MG daily. He was also recently changed from brilinta to plavix. He   reports over the past few days he has been feeling more winded and fatigued. He   apparently had several episodes of lightheadedness. He had an episode of   collapsing to the ground when bending over to pick something up off the ground   today. He is here for further evaluation. Denies any chest pain. No nausea or   vomiting. No diarrhea or constipation. No cough or sore throat. He has had his    covid vaccination series receiving the moderna series on 3/18/2021.     TECHNIQUE:   Imaging protocol: Diagnostic computed tomography of the chest with contrast.   3D rendering (Not supervised by radiologist): MIP and/or 3D reconstructed   images were created by the technologist.   Radiation optimization: All CT scans at this facility use at least one of these   dose optimization techniques: automated exposure control; mA and/or kV   adjustment per patient size (includes targeted exams where dose is matched to   clinical indication); or iterative reconstruction.   Contrast material: ISOVUE 370; Contrast volume: 100 ml; Contrast route:   INTRAVENOUS (IV);      COMPARISON:   CT CHEST PULMONARY EMBOLISM W CONTRAST 4/8/2021 1:36 AM     FINDINGS:   Tubes, catheters and devices: Implanted left chest ICD with unremarkable   positioning.     Lungs: Overall, moderate severity of emphysema. Diffuse reticular interstitial   lung changes. No definite focal airspace consolidation. No obstructing   endobronchial lesion. Minor scattered ground-glass changes with mild mosaic   attenuation pattern.   Pleural spaces: Unremarkable. No pneumothorax. No pleural effusion.   Heart: There is a TAVR present. No significant cardiac chamber enlargement.   Negative for pericardial effusion.   Mediastinal space: No thoracic esophageal wall thickening.   Aorta: Thoracic aorta has normal diameter. Negative for dissection.   Lymph nodes: Unremarkable. No enlarged lymph nodes.     Liver: Extensive surgical changes of the liver noted.   Gallbladder and bile ducts: There is a common bile duct stent present.   Kidneys and ureters: Cortical atrophy of both kidneys.   Bones/joints: No acute thoracic fractures are identified. Thoracic spine   alignment is anatomic.The thoracic spine demonstrates moderate degenerative   changes at multiple levels. There is surgical change of the left lateral ribs   with a missing portion of the left rib 7.   Soft  tissues: Unremarkable.       Impression    IMPRESSION:   1. Negative for pulmonary embolism.   2. No convincing evidence of focal pneumonia.   3. Background emphysema with mild mosaic attenuation changes which may reflect   scattered areas of air trapping related to small airways disease. No   significant change from comparison imaging.     THIS DOCUMENT HAS BEEN ELECTRONICALLY SIGNED BY DAVIS TINOCO MD   UA with Microscopic reflex to Culture    Specimen: Urine, Clean Catch   Result Value Ref Range    Color Urine Light Yellow Colorless, Straw, Light Yellow, Yellow    Appearance Urine Clear Clear    Glucose Urine >1000 (A) Negative mg/dL    Bilirubin Urine Negative Negative    Ketones Urine Negative Negative mg/dL    Specific Gravity Urine 1.021 1.000 - 1.030    Blood Urine Negative Negative    pH Urine 5.0 5.0 - 9.0    Protein Albumin Urine Negative Negative mg/dL    Urobilinogen Urine Normal Normal, 2.0 mg/dL    Nitrite Urine Negative Negative    Leukocyte Esterase Urine Negative Negative    RBC Urine 1 <=2 /HPF    WBC Urine <1 <=5 /HPF    Hyaline Casts Urine 5 (H) <=2 /LPF    Narrative    Urine Culture not indicated   Lactic acid whole blood   Result Value Ref Range    Lactic Acid 4.5 (HH) 0.7 - 2.0 mmol/L   CT Abdomen Pelvis w/o Contrast    Narrative    PROCEDURE INFORMATION:   Exam: CT Abdomen And Pelvis Without Contrast   Exam date and time: 9/12/2021 4:09 AM   Age: 71 years old   Clinical indication: Other: Mental status change; Patient HX: Patients mental   status has changed from when he first came in. Very restless and confused.   Prior report documents history of cirrhosis and hepatitis C status post liver   transplant on March 13, 2008.     TECHNIQUE:   Imaging protocol: Computed tomography of the abdomen and pelvis without   contrast.   Radiation optimization: All CT scans at this facility use at least one of these   dose optimization techniques: automated exposure control; mA and/or kV   adjustment per  patient size (includes targeted exams where dose is matched to   clinical indication); or iterative reconstruction.     COMPARISON:   1. CT CHEST/ABDOMEN/PELVIS W CONTRAST 1/12/2016 8:26 AM   2. CT CHEST PULMONARY EMBOLISM W CONTRAST 9/11/2021 10:57:23 PM     FINDINGS:   Limitations: Evaluation is limited by motion, lack of oral contrast.     Mediastinal space: Small hiatal hernia     Liver: No acute findings.   Gallbladder and bile ducts: Gallbladder not visualized.   Pancreas: No acute findings.   Spleen: Splenic calcifications re-identified   Adrenal glands: No acute findings.   Kidneys and ureters:  Cortical hypodensities re-identified.  Evaluation is   limited by motion and the lack of current intravenous contrast.  Contrast is   seen in the collecting system, consistent with recent chest CT.  No   hydronephrosis or ureteral dilatation. Distal ureters not visualized due to   artifact.   Stomach and bowel: No acute findings. No obstruction.  Evaluation of the colon   is limited by motion.  In the region of the hepatic flexure, colon is   decompressed, with hypodense mucosa.      Appendix: The appendix is identified. No wall thickening, periappendiceal   inflammatory change or other evidence of acute appendicitis.     Intraperitoneal space: Surgical clips are again seen in the right upper   quadrant, and adjacent to the liver, consistent with history of   transplantation.   Vasculature:    Atherosclerotic vascular disease is present. No aortic aneurysm or acute   abnormalities.    Lymph nodes: No enlarged lymph nodes.   Urinary bladder: No acute findings.   Reproductive: No acute findings.   Bones/joints: bilateral total hip arthroplasties.   Soft tissues: Ventral hernia containing only fat; no bowel is seen extending   into the subcutaneous tissues.     Other findings: TIPSS re-identified. Patency is difficult to assess given lack   of contrast       Impression    IMPRESSION:   Hypodense colonic mucosa is  nonspecific and may be due to under distension,   colitis, systemic disease or other etiology.    No radiographic evidence for bowel obstruction, bowel perforation,   appendicitis, diverticulitis, obstructive uropathy.    Other findings as described above.      THIS DOCUMENT HAS BEEN ELECTRONICALLY SIGNED BY DELORIS EID MD   CBC with platelets differential    Narrative    The following orders were created for panel order CBC with platelets differential.  Procedure                               Abnormality         Status                     ---------                               -----------         ------                     CBC with platelets and d...[[id number]]  Abnormal            Final result                 Please view results for these tests on the individual orders.   Basic metabolic panel   Result Value Ref Range    Sodium 135 134 - 144 mmol/L    Potassium 4.7 3.5 - 5.1 mmol/L    Chloride 101 98 - 107 mmol/L    Carbon Dioxide (CO2) 20 (L) 21 - 31 mmol/L    Anion Gap 14 3 - 14 mmol/L    Urea Nitrogen 39 (H) 7 - 25 mg/dL    Creatinine 1.34 (H) 0.70 - 1.30 mg/dL    Calcium 9.2 8.6 - 10.3 mg/dL    Glucose 372 (H) 70 - 105 mg/dL    GFR Estimate 54 (L) >60 mL/min/1.73m2   Procalcitonin   Result Value Ref Range    Procalcitonin 0.79 <5.00 ng/mL   Lactic acid whole blood   Result Value Ref Range    Lactic Acid 4.7 (HH) 0.7 - 2.0 mmol/L   CBC with platelets and differential   Result Value Ref Range    WBC Count 16.1 (H) 4.0 - 11.0 10e3/uL    RBC Count 2.95 (L) 4.40 - 5.90 10e6/uL    Hemoglobin 8.9 (L) 13.3 - 17.7 g/dL    Hematocrit 27.8 (L) 40.0 - 53.0 %    MCV 94 78 - 100 fL    MCH 30.2 26.5 - 33.0 pg    MCHC 32.0 31.5 - 36.5 g/dL    RDW 15.2 (H) 10.0 - 15.0 %    Platelet Count 134 (L) 150 - 450 10e3/uL    % Neutrophils 91 %    % Lymphocytes 3 %    % Monocytes 5 %    % Eosinophils 0 %    % Basophils 0 %    % Immature Granulocytes 1 %    NRBCs per 100 WBC 0 <1 /100    Absolute Neutrophils 14.7 (H) 1.6 - 8.3  10e3/uL    Absolute Lymphocytes 0.5 (L) 0.8 - 5.3 10e3/uL    Absolute Monocytes 0.8 0.0 - 1.3 10e3/uL    Absolute Eosinophils 0.0 0.0 - 0.7 10e3/uL    Absolute Basophils 0.0 0.0 - 0.2 10e3/uL    Absolute Immature Granulocytes 0.1 (H) <=0.0 10e3/uL    Absolute NRBCs 0.0 10e3/uL     *Note: Due to a large number of results and/or encounters for the requested time period, some results have not been displayed. A complete set of results can be found in Results Review.       Medications   0.9% sodium chloride BOLUS (500 mLs Intravenous Started 9/12/21 0627)   vancomycin (VANCOCIN) 1,750 mg in sodium chloride 0.9 % 500 mL intermittent infusion (1,750 mg Intravenous New Bag 9/12/21 0621)   sodium chloride 0.9% infusion (has no administration in time range)   iopamidol (ISOVUE-370) solution 100 mL (has no administration in time range)   piperacillin-tazobactam (ZOSYN) intermittent infusion 4.5 g (has no administration in time range)   0.9% sodium chloride BOLUS (0 mLs Intravenous Stopped 9/12/21 0111)   acetaminophen (TYLENOL) tablet 500 mg (500 mg Oral Given 9/11/21 2252)   iopamidol (ISOVUE-370) solution 100 mL (100 mLs Intravenous Given 9/11/21 2257)   magnesium sulfate 2 g in water intermittent infusion (0 g Intravenous Stopped 9/12/21 0152)   ondansetron (ZOFRAN) injection 4 mg (4 mg Intravenous Given 9/11/21 2355)   fentaNYL (PF) (SUBLIMAZE) injection 50 mcg (50 mcg Intravenous Given 9/11/21 2359)   0.9% sodium chloride BOLUS (0 mLs Intravenous Stopped 9/12/21 0152)   0.9% sodium chloride BOLUS (0 mLs Intravenous Stopped 9/12/21 0333)   fentaNYL (PF) (SUBLIMAZE) injection 50 mcg (50 mcg Intravenous Given 9/12/21 0222)   insulin regular injection 5 Units (5 Units Subcutaneous Given 9/12/21 0224)   ondansetron (ZOFRAN) injection 4 mg (4 mg Intravenous Given 9/12/21 0314)   LORazepam (ATIVAN) injection 1 mg (1 mg Intravenous Given 9/12/21 3128)   piperacillin-tazobactam (ZOSYN) intermittent infusion 4.5 g (4.5 g  Intravenous New Bag 9/12/21 0528)   LORazepam (ATIVAN) injection 1 mg (1 mg Intravenous Given 9/12/21 0706)       Assessments & Plan (with Medical Decision Making)     I have reviewed the nursing notes.    I have reviewed the findings, diagnosis, plan and need for follow up with the patient.      New Prescriptions    No medications on file       Final diagnoses:   Sepsis with encephalopathy without septic shock, due to unspecified organism (H)       9/11/2021   [facility] [place]     Naldo Traore PA-C  09/11/21 4722       León Ocasio MD  09/12/21 9678

## 2021-09-12 NOTE — ED NOTES
Patient incontinent of urine. Extremely agitated and confused pulling off all vital sign monitors unable to settle.

## 2021-09-12 NOTE — ED PROVIDER NOTES
Updated ER DR at [facility] that patient only received 20 of the desmopressin, and that he is on propofol drip, received mannitol and that his left eye was slightly larger than his right eye.  They are expecting his arrival and patient has left our facility.      Payton Malik MD  09/12/21 5309

## 2021-09-12 NOTE — ED TRIAGE NOTES
"ED Nursing Triage Note (General)   ________________________________    Ricki Sharp is a 71 year old Male that presents to triage private car. Several episodes of feeling light headed this evening and then collapsing to the ground when bending over to pick something up off the ground.  reported by spouse/SO    BP (!) 150/72   Pulse 97   Temp 97.4  F (36.3  C) (Temporal)   Resp 11   Ht 1.702 m (5' 7\")   Wt 89 kg (196 lb 3.4 oz)   SpO2 96%   BMI 30.73 kg/m  t  Patient appears alert  and oriented, in mild distress., and cooperative and calm behavior.    GCS Total = 15  Airway: intact  Breathing noted as AbNormal  Circulation Normal  Skin:  Normal  Action taken:  Triage to critical care immediately      PRE HOSPITAL PRIOR LIVING SITUATION Spouse  "

## 2021-09-12 NOTE — PROGRESS NOTES
Patient intubated by CRNA with size 8 ET tube secured 23cm at lips.  ETCO2 in place with readings 29-33.  Breath sounds equal bilateraly.  Assisted with ventilations until life like arrived and transferred to their vent.

## 2021-09-13 NOTE — TELEPHONE ENCOUNTER
Pt's wife Fide called today to update us on Brendan's condition.He was admitted to the ER with a subdural hematoma. He was transferred to [place].  She states he is still intubated and doctors are telling her that his recovery could be  Weeks to months. I have cancelled his  Follow up appointments with [facility]. I called [facility] and xiao  to let them know what is happening and [facility] will let  know .

## 2021-09-13 NOTE — PROGRESS NOTES
Cardiac Rehab Discharge Summary    Reason for discharge:    Patient/family request discontinuation of services.  Change in medical status.  Pt has not attended since 8/20/21 due to multiple medical issues.  Pt's wife called today stating he in currently admitted at [facility] with a subdural hematoma.  She thinks he will have a lengthy recovery.  Pt will be discharged at this time.  Pt completed 8 visits.      Progress towards goals:  Goals not met.  Barriers to achieving goals:   limited tolerance for therapy.    Recommendation(s):    No recommendations can be given at this time

## 2021-09-13 NOTE — TELEPHONE ENCOUNTER
Call from Fide wanting us to know that Brendan had a fall on Saturday night, bad headache, went to ER in [place], was eventually flown to [facility] in [place] where he had evacuation of hematoma.  Fide asked that I notify  and Naveed and Jin.   Offered support.  Message sent.

## 2021-09-13 NOTE — TELEPHONE ENCOUNTER
Pt's wife Fide Sharp called today to update us on Brendan's condition.  He was admitted to the ER on 9/11/21 with a subdural hematoma.  He was transferred to [facility] in [place].  She states he is still intubated and doctors are telling her that his recovery could be lengthy.  We will discharge him from cardiac rehab at this time.

## 2021-09-14 NOTE — PROGRESS NOTES
Jim was admitted to [facility] for subdural bleed. Fide and I spoke about the difference between antiplatelet and anticoagulation medications. Fide stated that [facility] stopped Jim's brilinta. She is concerned if this is ok. We talked about how the neurology team and cardiology team there will work together on best plan of care for jim.  I did tell her that we would share any [facility] medical records with Arv[facility].

## 2021-09-15 NOTE — PROGRESS NOTES
:    Received a call from patients wife who stated she had made a call to [facility] to make a complaint.  She wanted to make sure she had the right number.  Gave her support and encouragement.     DUONG Salas on 9/15/2021 at 9:01 AM

## 2021-09-16 NOTE — TELEPHONE ENCOUNTER
"Call back to Fide.  Bill is \"about the same\" though is having seizures today.   He is still intubated.     She will keep us updated.  "

## 2021-09-23 NOTE — TELEPHONE ENCOUNTER
"Call to Fide per her request for update on Jim's condition  \"he hasn't come around but he is opening his eyes, but not on command\".  He had seizures but the team doesn't think this is unusual.  She feels like she is getting inconsistent information.  They told her Jim is going to \"be a Christopher Abdi\".  They have told fide that Jim will require a nursing home but will likely die.  Jim is vented and trached. He isn't moving his arms or legs.  Renae, their daughter came down with COVID.  They are delaying making end of life decisions until Renae can see her dad again.  Fide is realistic about Jim's prognosis and doesn't want jim to live on a vent in a care facility.  The kids, Kim and Kwesi want to continue support.  Fide will keep us posted.  "

## 2021-10-18 NOTE — TELEPHONE ENCOUNTER
[facility] sent Rx request for the following:     Requested Prescriptions   Pending Prescriptions Disp Refills     losartan (COZAAR) 25 MG tablet [Pharmacy Med Name: LOSARTAN 25MG TABLETS] 90 tablet 3     Sig: TAKE 1 TABLET(25 MG) BY MOUTH DAILY         Last Prescription Date:   8/25/2020  Last Fill Qty/Refills:         90, R-3    Last Office Visit:              8/26/2021   Future Office visit:           none    Routing refill request to provider for review/approval because:     Angiotensin-II Receptors Failed - 10/14/2021  4:10 PM        Failed - Last blood pressure under 140/90 in past 12 months     BP Readings from Last 3 Encounters:   09/12/21 (!) 144/73   09/08/21 136/67   09/01/21 136/67           Failed - Normal serum creatinine on file in past 12 months     Recent Labs   Lab Test 09/12/21  0515 12/30/14  1238 12/30/14  1059   CR 1.34*   < >  --    CRPOC  --   --  1.3*    < > = values in this interval not displayed.        Unable to complete prescription refill per RN Medication Refill Policy.................... Tika Torres RN ....................  10/18/2021   11:18 AM

## 2021-10-27 NOTE — TELEPHONE ENCOUNTER
Call from Fide.  Brendan is in [facility] post head bleed.  He is trached and intubated.  She thinks he may have aspirated (has right lower lobe infiltrate which is new on CXR today). He spiked a temp to 105 during the night.   They are treating w/ antibiotics.  Brendan has been communicating w/ Fide fairly appropriately most of the time.  He is working w/ OT and PT.  Reviewed hepatic panel from today.  Is abnormal but this may be related to his recent event.  He is getting his IS.  I asked that they recheck in a few days.  FYI to Dr. Gray

## 2021-10-28 ENCOUNTER — TELEPHONE (OUTPATIENT)
Dept: TRANSPLANT | Facility: CLINIC | Age: 71
End: 2021-10-28

## 2021-10-28 NOTE — TELEPHONE ENCOUNTER
Call from Fide letting me know that Brendan passed away yesterday afternoon  At [facility].    She is at peace w/ knowing that her family did all they could for Bill.  She was very grateful for the care that her received from our transplant program.  She asked me to thank Dr. Gray specifically.

## 2021-11-01 ENCOUNTER — TELEPHONE (OUTPATIENT)
Dept: TRANSPLANT | Facility: CLINIC | Age: 71
End: 2021-11-01

## 2021-11-08 DIAGNOSIS — I10 ESSENTIAL HYPERTENSION: ICD-10-CM

## 2021-11-08 RX ORDER — AMLODIPINE BESYLATE 5 MG/1
TABLET ORAL
Qty: 90 TABLET | Refills: 3 | OUTPATIENT
Start: 2021-11-08

## 2022-11-18 NOTE — TELEPHONE ENCOUNTER
I attempted to return patient's call, left a message. I have reviewed magnesium levels. It appears that this is a new problem after his transplant, and I agree with Dr. Camp's assessment that this is most likely related to the transplant or to transplant medications (in particular tacrolimus). Diabetes is controlled and so should not be the cause for hypomagnesemia. I will attempt to call Brendan again later this week.     Addendum: I reached Brendan and Fide this morning 9/3/2021. In addition to above, they have noted high BG on am blood draws, which does not match evening BG or hemoglobin A1c. I asked them to check BG at various times of day including fasting AM in preparation for our appointment in 3 weeks. Will review numbers and treatment plan for diabetes at that time.     Vanesa Llanes MD  Endocrinology and Diabetes   [phone]     Left message for patient regarding positive strep pneumonia and urine.  Prescription sent to pharmacy patient notified to take Augmentin for 5 days until finished.

## 2023-05-18 NOTE — TELEPHONE ENCOUNTER
After verification, Fide( pt wife) said she is ok with appointment on 8/26/21 with CORTES Tapia CNP, NOEMÍ ....................7/19/2021   11:52 AM     No

## 2023-06-27 NOTE — TELEPHONE ENCOUNTER
Patient called back and also needs refill for Lantus Solarstar Pen 3ml.  Please call if you have any questions.  Riya Stewart on 8/25/2020 at 2:42 PM     done
